# Patient Record
Sex: FEMALE | Race: WHITE | Employment: UNEMPLOYED
[De-identification: names, ages, dates, MRNs, and addresses within clinical notes are randomized per-mention and may not be internally consistent; named-entity substitution may affect disease eponyms.]

---

## 2017-01-03 ENCOUNTER — ROUTINE PRENATAL (OUTPATIENT)
Dept: PERINATAL CARE | Facility: CLINIC | Age: 27
End: 2017-01-03

## 2017-01-03 VITALS
SYSTOLIC BLOOD PRESSURE: 102 MMHG | RESPIRATION RATE: 18 BRPM | WEIGHT: 182 LBS | BODY MASS INDEX: 30.29 KG/M2 | HEART RATE: 84 BPM | DIASTOLIC BLOOD PRESSURE: 64 MMHG | TEMPERATURE: 97.8 F

## 2017-01-03 DIAGNOSIS — O09.899 HIGH RISK PREGNANCY WITH LOW PAPPA (PREGNANCY-ASSOCIATED PLASMA PROTEIN A): ICD-10-CM

## 2017-01-03 DIAGNOSIS — O28.0 HIGH RISK PREGNANCY WITH LOW PAPPA (PREGNANCY-ASSOCIATED PLASMA PROTEIN A): ICD-10-CM

## 2017-01-03 DIAGNOSIS — O24.414 INSULIN CONTROLLED GESTATIONAL DIABETES MELLITUS (GDM) IN THIRD TRIMESTER: Primary | ICD-10-CM

## 2017-01-03 DIAGNOSIS — Z3A.34 34 WEEKS GESTATION OF PREGNANCY: ICD-10-CM

## 2017-01-03 DIAGNOSIS — O43.93 PLACENTAL DISORDER, THIRD TRIMESTER: ICD-10-CM

## 2017-01-03 PROCEDURE — 76820 UMBILICAL ARTERY ECHO: CPT | Performed by: OBSTETRICS & GYNECOLOGY

## 2017-01-03 PROCEDURE — 76818 FETAL BIOPHYS PROFILE W/NST: CPT | Performed by: OBSTETRICS & GYNECOLOGY

## 2017-01-05 ENCOUNTER — ROUTINE PRENATAL (OUTPATIENT)
Dept: OBGYN | Facility: CLINIC | Age: 27
End: 2017-01-05

## 2017-01-05 VITALS
DIASTOLIC BLOOD PRESSURE: 74 MMHG | HEART RATE: 74 BPM | SYSTOLIC BLOOD PRESSURE: 112 MMHG | WEIGHT: 182 LBS | BODY MASS INDEX: 30.29 KG/M2

## 2017-01-05 DIAGNOSIS — O09.899 HIGH RISK PREGNANCY WITH LOW PAPPA (PREGNANCY-ASSOCIATED PLASMA PROTEIN A): Primary | ICD-10-CM

## 2017-01-05 DIAGNOSIS — O09.93 HRP (HIGH RISK PREGNANCY), THIRD TRIMESTER: ICD-10-CM

## 2017-01-05 DIAGNOSIS — O28.0 HIGH RISK PREGNANCY WITH LOW PAPPA (PREGNANCY-ASSOCIATED PLASMA PROTEIN A): Primary | ICD-10-CM

## 2017-01-05 DIAGNOSIS — Z3A.34 34 WEEKS GESTATION OF PREGNANCY: ICD-10-CM

## 2017-01-05 PROCEDURE — 59025 FETAL NON-STRESS TEST: CPT | Performed by: NURSE PRACTITIONER

## 2017-01-09 ENCOUNTER — ROUTINE PRENATAL (OUTPATIENT)
Dept: PERINATAL CARE | Facility: CLINIC | Age: 27
End: 2017-01-09

## 2017-01-09 VITALS
DIASTOLIC BLOOD PRESSURE: 60 MMHG | HEART RATE: 92 BPM | RESPIRATION RATE: 16 BRPM | WEIGHT: 181 LBS | TEMPERATURE: 97.7 F | SYSTOLIC BLOOD PRESSURE: 96 MMHG | BODY MASS INDEX: 30.12 KG/M2

## 2017-01-09 DIAGNOSIS — Z36.4 ANTENATAL SCREENING FOR FETAL GROWTH RETARDATION USING ULTRASONICS: ICD-10-CM

## 2017-01-09 DIAGNOSIS — O28.0 HIGH RISK PREGNANCY WITH LOW PAPPA (PREGNANCY-ASSOCIATED PLASMA PROTEIN A): ICD-10-CM

## 2017-01-09 DIAGNOSIS — Z3A.35 35 WEEKS GESTATION OF PREGNANCY: ICD-10-CM

## 2017-01-09 DIAGNOSIS — O09.899 HIGH RISK PREGNANCY WITH LOW PAPPA (PREGNANCY-ASSOCIATED PLASMA PROTEIN A): ICD-10-CM

## 2017-01-09 DIAGNOSIS — O24.414 INSULIN CONTROLLED GESTATIONAL DIABETES MELLITUS (GDM) IN THIRD TRIMESTER: Primary | ICD-10-CM

## 2017-01-09 PROCEDURE — 76816 OB US FOLLOW-UP PER FETUS: CPT | Performed by: OBSTETRICS & GYNECOLOGY

## 2017-01-09 PROCEDURE — 76820 UMBILICAL ARTERY ECHO: CPT | Performed by: OBSTETRICS & GYNECOLOGY

## 2017-01-09 PROCEDURE — 76819 FETAL BIOPHYS PROFIL W/O NST: CPT | Performed by: OBSTETRICS & GYNECOLOGY

## 2017-01-12 ENCOUNTER — ROUTINE PRENATAL (OUTPATIENT)
Dept: OBGYN | Facility: CLINIC | Age: 27
End: 2017-01-12

## 2017-01-12 VITALS
WEIGHT: 180 LBS | BODY MASS INDEX: 29.95 KG/M2 | HEART RATE: 82 BPM | DIASTOLIC BLOOD PRESSURE: 64 MMHG | SYSTOLIC BLOOD PRESSURE: 110 MMHG

## 2017-01-12 DIAGNOSIS — Z3A.35 35 WEEKS GESTATION OF PREGNANCY: ICD-10-CM

## 2017-01-12 DIAGNOSIS — O09.93 HRP (HIGH RISK PREGNANCY), THIRD TRIMESTER: Primary | ICD-10-CM

## 2017-01-12 DIAGNOSIS — O28.0 HIGH RISK PREGNANCY WITH LOW PAPPA (PREGNANCY-ASSOCIATED PLASMA PROTEIN A): ICD-10-CM

## 2017-01-12 DIAGNOSIS — O09.899 HIGH RISK PREGNANCY WITH LOW PAPPA (PREGNANCY-ASSOCIATED PLASMA PROTEIN A): ICD-10-CM

## 2017-01-12 PROCEDURE — 59025 FETAL NON-STRESS TEST: CPT | Performed by: ADVANCED PRACTICE MIDWIFE

## 2017-01-12 ASSESSMENT — PATIENT HEALTH QUESTIONNAIRE - PHQ9
1. LITTLE INTEREST OR PLEASURE IN DOING THINGS: 0
SUM OF ALL RESPONSES TO PHQ QUESTIONS 1-9: 0
2. FEELING DOWN, DEPRESSED OR HOPELESS: 0
SUM OF ALL RESPONSES TO PHQ9 QUESTIONS 1 & 2: 0

## 2017-01-16 ENCOUNTER — ROUTINE PRENATAL (OUTPATIENT)
Dept: PERINATAL CARE | Facility: CLINIC | Age: 27
End: 2017-01-16

## 2017-01-16 VITALS
SYSTOLIC BLOOD PRESSURE: 112 MMHG | TEMPERATURE: 98.1 F | HEART RATE: 80 BPM | BODY MASS INDEX: 30.45 KG/M2 | WEIGHT: 183 LBS | RESPIRATION RATE: 16 BRPM | DIASTOLIC BLOOD PRESSURE: 74 MMHG

## 2017-01-16 DIAGNOSIS — O09.899 HIGH RISK PREGNANCY WITH LOW PAPPA (PREGNANCY-ASSOCIATED PLASMA PROTEIN A): ICD-10-CM

## 2017-01-16 DIAGNOSIS — Z3A.36 36 WEEKS GESTATION OF PREGNANCY: ICD-10-CM

## 2017-01-16 DIAGNOSIS — O43.93 PLACENTAL DISORDER, THIRD TRIMESTER: ICD-10-CM

## 2017-01-16 DIAGNOSIS — O28.0 LOW MATERNAL SERUM HUMAN CHORIONIC GONADOTROPIN (HCG): ICD-10-CM

## 2017-01-16 DIAGNOSIS — O28.0 HIGH RISK PREGNANCY WITH LOW PAPPA (PREGNANCY-ASSOCIATED PLASMA PROTEIN A): ICD-10-CM

## 2017-01-16 DIAGNOSIS — O24.414 INSULIN CONTROLLED GESTATIONAL DIABETES MELLITUS (GDM) IN THIRD TRIMESTER: Primary | ICD-10-CM

## 2017-01-16 PROCEDURE — 76818 FETAL BIOPHYS PROFILE W/NST: CPT | Performed by: OBSTETRICS & GYNECOLOGY

## 2017-01-16 PROCEDURE — 76820 UMBILICAL ARTERY ECHO: CPT | Performed by: OBSTETRICS & GYNECOLOGY

## 2017-01-19 ENCOUNTER — ROUTINE PRENATAL (OUTPATIENT)
Dept: OBGYN | Facility: CLINIC | Age: 27
End: 2017-01-19

## 2017-01-19 VITALS
DIASTOLIC BLOOD PRESSURE: 74 MMHG | WEIGHT: 183 LBS | SYSTOLIC BLOOD PRESSURE: 108 MMHG | HEART RATE: 78 BPM | BODY MASS INDEX: 30.45 KG/M2

## 2017-01-19 DIAGNOSIS — Z33.2 ELECTIVE ABORTION: ICD-10-CM

## 2017-01-19 DIAGNOSIS — O09.93 HRP (HIGH RISK PREGNANCY), THIRD TRIMESTER: Primary | ICD-10-CM

## 2017-01-19 DIAGNOSIS — O09.899 HIGH RISK PREGNANCY WITH LOW PAPPA (PREGNANCY-ASSOCIATED PLASMA PROTEIN A): ICD-10-CM

## 2017-01-19 DIAGNOSIS — O24.414 INSULIN CONTROLLED GESTATIONAL DIABETES MELLITUS (GDM) IN THIRD TRIMESTER: ICD-10-CM

## 2017-01-19 DIAGNOSIS — O24.419 GESTATIONAL DIABETES MELLITUS (GDM) IN THIRD TRIMESTER, GESTATIONAL DIABETES METHOD OF CONTROL UNSPECIFIED: ICD-10-CM

## 2017-01-19 DIAGNOSIS — O28.0 HIGH RISK PREGNANCY WITH LOW PAPPA (PREGNANCY-ASSOCIATED PLASMA PROTEIN A): ICD-10-CM

## 2017-01-19 DIAGNOSIS — R73.09 GTT (GLUCOSE TOLERANCE TEST) ABNORMAL: ICD-10-CM

## 2017-01-19 DIAGNOSIS — Z3A.36 36 WEEKS GESTATION OF PREGNANCY: ICD-10-CM

## 2017-01-19 PROCEDURE — 59025 FETAL NON-STRESS TEST: CPT | Performed by: ADVANCED PRACTICE MIDWIFE

## 2017-01-23 ENCOUNTER — ROUTINE PRENATAL (OUTPATIENT)
Dept: PERINATAL CARE | Facility: CLINIC | Age: 27
End: 2017-01-23

## 2017-01-23 VITALS
RESPIRATION RATE: 16 BRPM | WEIGHT: 184 LBS | HEART RATE: 92 BPM | BODY MASS INDEX: 30.62 KG/M2 | SYSTOLIC BLOOD PRESSURE: 112 MMHG | DIASTOLIC BLOOD PRESSURE: 74 MMHG | TEMPERATURE: 98 F

## 2017-01-23 DIAGNOSIS — O24.414 INSULIN CONTROLLED GESTATIONAL DIABETES MELLITUS (GDM) IN THIRD TRIMESTER: Primary | ICD-10-CM

## 2017-01-23 DIAGNOSIS — O09.899 HIGH RISK PREGNANCY WITH LOW PAPPA (PREGNANCY-ASSOCIATED PLASMA PROTEIN A): ICD-10-CM

## 2017-01-23 DIAGNOSIS — O43.93 PLACENTAL DISORDER, THIRD TRIMESTER: ICD-10-CM

## 2017-01-23 DIAGNOSIS — O28.0 HIGH RISK PREGNANCY WITH LOW PAPPA (PREGNANCY-ASSOCIATED PLASMA PROTEIN A): ICD-10-CM

## 2017-01-23 DIAGNOSIS — Z3A.37 37 WEEKS GESTATION OF PREGNANCY: ICD-10-CM

## 2017-01-23 PROCEDURE — 76818 FETAL BIOPHYS PROFILE W/NST: CPT | Performed by: OBSTETRICS & GYNECOLOGY

## 2017-01-23 PROCEDURE — 76820 UMBILICAL ARTERY ECHO: CPT | Performed by: OBSTETRICS & GYNECOLOGY

## 2017-01-26 ENCOUNTER — ROUTINE PRENATAL (OUTPATIENT)
Dept: OBGYN | Facility: CLINIC | Age: 27
End: 2017-01-26

## 2017-01-26 VITALS
DIASTOLIC BLOOD PRESSURE: 72 MMHG | WEIGHT: 184 LBS | BODY MASS INDEX: 30.62 KG/M2 | HEART RATE: 82 BPM | SYSTOLIC BLOOD PRESSURE: 118 MMHG

## 2017-01-26 DIAGNOSIS — O09.899 HIGH RISK PREGNANCY WITH LOW PAPPA (PREGNANCY-ASSOCIATED PLASMA PROTEIN A): ICD-10-CM

## 2017-01-26 DIAGNOSIS — O09.93 HRP (HIGH RISK PREGNANCY), THIRD TRIMESTER: ICD-10-CM

## 2017-01-26 DIAGNOSIS — O28.0 HIGH RISK PREGNANCY WITH LOW PAPPA (PREGNANCY-ASSOCIATED PLASMA PROTEIN A): ICD-10-CM

## 2017-01-26 DIAGNOSIS — Z3A.37 37 WEEKS GESTATION OF PREGNANCY: Primary | ICD-10-CM

## 2017-01-26 PROCEDURE — 59025 FETAL NON-STRESS TEST: CPT | Performed by: OBSTETRICS & GYNECOLOGY

## 2017-01-30 ENCOUNTER — ROUTINE PRENATAL (OUTPATIENT)
Dept: PERINATAL CARE | Facility: CLINIC | Age: 27
End: 2017-01-30

## 2017-01-30 VITALS
WEIGHT: 185.5 LBS | HEART RATE: 78 BPM | DIASTOLIC BLOOD PRESSURE: 65 MMHG | RESPIRATION RATE: 16 BRPM | TEMPERATURE: 98.1 F | BODY MASS INDEX: 30.87 KG/M2 | SYSTOLIC BLOOD PRESSURE: 99 MMHG

## 2017-01-30 DIAGNOSIS — Z3A.38 38 WEEKS GESTATION OF PREGNANCY: ICD-10-CM

## 2017-01-30 DIAGNOSIS — O09.899 HIGH RISK PREGNANCY WITH LOW PAPPA (PREGNANCY-ASSOCIATED PLASMA PROTEIN A): ICD-10-CM

## 2017-01-30 DIAGNOSIS — O24.414 INSULIN CONTROLLED GESTATIONAL DIABETES MELLITUS (GDM) IN THIRD TRIMESTER: Primary | ICD-10-CM

## 2017-01-30 DIAGNOSIS — Z36.4 ANTENATAL SCREENING FOR FETAL GROWTH RETARDATION USING ULTRASONICS: ICD-10-CM

## 2017-01-30 DIAGNOSIS — O28.0 HIGH RISK PREGNANCY WITH LOW PAPPA (PREGNANCY-ASSOCIATED PLASMA PROTEIN A): ICD-10-CM

## 2017-01-30 PROCEDURE — 76818 FETAL BIOPHYS PROFILE W/NST: CPT | Performed by: OBSTETRICS & GYNECOLOGY

## 2017-01-30 PROCEDURE — 76820 UMBILICAL ARTERY ECHO: CPT | Performed by: OBSTETRICS & GYNECOLOGY

## 2017-01-30 PROCEDURE — 76816 OB US FOLLOW-UP PER FETUS: CPT | Performed by: OBSTETRICS & GYNECOLOGY

## 2017-02-02 ENCOUNTER — ROUTINE PRENATAL (OUTPATIENT)
Dept: OBGYN | Facility: CLINIC | Age: 27
End: 2017-02-02

## 2017-02-02 VITALS
SYSTOLIC BLOOD PRESSURE: 108 MMHG | WEIGHT: 186 LBS | HEART RATE: 68 BPM | DIASTOLIC BLOOD PRESSURE: 68 MMHG | BODY MASS INDEX: 30.95 KG/M2

## 2017-02-02 DIAGNOSIS — Z3A.38 38 WEEKS GESTATION OF PREGNANCY: ICD-10-CM

## 2017-02-02 DIAGNOSIS — R73.09 GTT (GLUCOSE TOLERANCE TEST) ABNORMAL: ICD-10-CM

## 2017-02-02 DIAGNOSIS — O28.0 HIGH RISK PREGNANCY WITH LOW PAPPA (PREGNANCY-ASSOCIATED PLASMA PROTEIN A): Primary | ICD-10-CM

## 2017-02-02 DIAGNOSIS — Z33.2 ELECTIVE ABORTION: ICD-10-CM

## 2017-02-02 DIAGNOSIS — O24.414 INSULIN CONTROLLED GESTATIONAL DIABETES MELLITUS (GDM) IN THIRD TRIMESTER: ICD-10-CM

## 2017-02-02 DIAGNOSIS — O24.419 GESTATIONAL DIABETES MELLITUS (GDM), ANTEPARTUM, GESTATIONAL DIABETES METHOD OF CONTROL UNSPECIFIED: ICD-10-CM

## 2017-02-02 DIAGNOSIS — O09.899 HIGH RISK PREGNANCY WITH LOW PAPPA (PREGNANCY-ASSOCIATED PLASMA PROTEIN A): Primary | ICD-10-CM

## 2017-02-02 PROCEDURE — 59025 FETAL NON-STRESS TEST: CPT | Performed by: OBSTETRICS & GYNECOLOGY

## 2017-02-06 ENCOUNTER — ROUTINE PRENATAL (OUTPATIENT)
Dept: PERINATAL CARE | Facility: CLINIC | Age: 27
End: 2017-02-06

## 2017-02-06 ENCOUNTER — HOSPITAL ENCOUNTER (OUTPATIENT)
Age: 27
Discharge: HOME | End: 2017-02-06
Payer: MEDICAID

## 2017-02-06 VITALS
HEART RATE: 84 BPM | DIASTOLIC BLOOD PRESSURE: 70 MMHG | BODY MASS INDEX: 31.62 KG/M2 | SYSTOLIC BLOOD PRESSURE: 120 MMHG | TEMPERATURE: 97.1 F | RESPIRATION RATE: 20 BRPM | WEIGHT: 190 LBS

## 2017-02-06 DIAGNOSIS — O43.93 PLACENTAL DISORDER, THIRD TRIMESTER: ICD-10-CM

## 2017-02-06 DIAGNOSIS — O28.0 HIGH RISK PREGNANCY WITH LOW PAPPA (PREGNANCY-ASSOCIATED PLASMA PROTEIN A): ICD-10-CM

## 2017-02-06 DIAGNOSIS — O09.899 HIGH RISK PREGNANCY WITH LOW PAPPA (PREGNANCY-ASSOCIATED PLASMA PROTEIN A): ICD-10-CM

## 2017-02-06 DIAGNOSIS — O24.414 INSULIN CONTROLLED GESTATIONAL DIABETES MELLITUS (GDM) IN THIRD TRIMESTER: Primary | ICD-10-CM

## 2017-02-06 DIAGNOSIS — Z3A.39 39 WEEKS GESTATION OF PREGNANCY: ICD-10-CM

## 2017-02-06 PROCEDURE — 76818 FETAL BIOPHYS PROFILE W/NST: CPT | Performed by: OBSTETRICS & GYNECOLOGY

## 2017-02-06 PROCEDURE — 76815 OB US LIMITED FETUS(S): CPT | Performed by: OBSTETRICS & GYNECOLOGY

## 2017-02-06 PROCEDURE — 76820 UMBILICAL ARTERY ECHO: CPT | Performed by: OBSTETRICS & GYNECOLOGY

## 2017-02-07 ENCOUNTER — HOSPITAL ENCOUNTER (INPATIENT)
Age: 27
LOS: 3 days | Discharge: HOME | End: 2017-02-10
Attending: OBSTETRICS & GYNECOLOGY | Admitting: OBSTETRICS & GYNECOLOGY
Payer: COMMERCIAL

## 2017-02-07 PROBLEM — O42.90 LEAKAGE OF AMNIOTIC FLUID: Status: ACTIVE | Noted: 2017-02-07

## 2017-02-07 LAB
ABO/RH: NORMAL
ABSOLUTE EOS #: 0.1 K/UL (ref 0–0.4)
ABSOLUTE LYMPH #: 2.5 K/UL (ref 1–4.8)
ABSOLUTE MONO #: 0.9 K/UL (ref 0.1–1.3)
ANTIBODY SCREEN: NEGATIVE
ARM BAND NUMBER: NORMAL
BASOPHILS # BLD: 0 % (ref 0–2)
BASOPHILS ABSOLUTE: 0 K/UL (ref 0–0.2)
DIFFERENTIAL TYPE: ABNORMAL
EOSINOPHILS RELATIVE PERCENT: 1 % (ref 0–4)
EXPIRATION DATE: NORMAL
GLUCOSE BLD-MCNC: 101 MG/DL (ref 65–105)
GLUCOSE BLD-MCNC: 93 MG/DL (ref 65–105)
HCT VFR BLD CALC: 33.5 % (ref 36–46)
HEMOGLOBIN: 11.8 G/DL (ref 12–16)
INR BLD: 0.9
LYMPHOCYTES # BLD: 24 % (ref 24–44)
MCH RBC QN AUTO: 29.6 PG (ref 26–34)
MCHC RBC AUTO-ENTMCNC: 35.1 G/DL (ref 31–37)
MCV RBC AUTO: 84.4 FL (ref 80–100)
MONOCYTES # BLD: 9 % (ref 1–7)
PARTIAL THROMBOPLASTIN TIME: 25.9 SEC (ref 23–31)
PDW BLD-RTO: 13.5 % (ref 11.5–14.9)
PLATELET # BLD: 196 K/UL (ref 150–450)
PLATELET ESTIMATE: ABNORMAL
PMV BLD AUTO: 8.6 FL (ref 6–12)
PROTHROMBIN TIME: 9.9 SEC (ref 9.7–12)
RBC # BLD: 3.97 M/UL (ref 4–5.2)
RBC # BLD: ABNORMAL 10*6/UL
SEG NEUTROPHILS: 66 % (ref 36–66)
SEGMENTED NEUTROPHILS ABSOLUTE COUNT: 7.1 K/UL (ref 1.3–9.1)
WBC # BLD: 10.6 K/UL (ref 3.5–11)
WBC # BLD: ABNORMAL 10*3/UL

## 2017-02-07 PROCEDURE — 85025 COMPLETE CBC W/AUTO DIFF WBC: CPT

## 2017-02-07 PROCEDURE — 85610 PROTHROMBIN TIME: CPT

## 2017-02-07 PROCEDURE — 85730 THROMBOPLASTIN TIME PARTIAL: CPT

## 2017-02-07 PROCEDURE — 86900 BLOOD TYPING SEROLOGIC ABO: CPT

## 2017-02-07 PROCEDURE — 86901 BLOOD TYPING SEROLOGIC RH(D): CPT

## 2017-02-07 PROCEDURE — 81001 URINALYSIS AUTO W/SCOPE: CPT

## 2017-02-07 PROCEDURE — 1220000000 HC SEMI PRIVATE OB R&B

## 2017-02-07 PROCEDURE — 36415 COLL VENOUS BLD VENIPUNCTURE: CPT

## 2017-02-07 PROCEDURE — 86850 RBC ANTIBODY SCREEN: CPT

## 2017-02-07 PROCEDURE — 86780 TREPONEMA PALLIDUM: CPT

## 2017-02-07 PROCEDURE — 3E033VJ INTRODUCTION OF OTHER HORMONE INTO PERIPHERAL VEIN, PERCUTANEOUS APPROACH: ICD-10-PCS | Performed by: OBSTETRICS & GYNECOLOGY

## 2017-02-07 PROCEDURE — 2580000003 HC RX 258: Performed by: OBSTETRICS & GYNECOLOGY

## 2017-02-07 PROCEDURE — 82947 ASSAY GLUCOSE BLOOD QUANT: CPT

## 2017-02-07 RX ORDER — ACETAMINOPHEN 500 MG
1000 TABLET ORAL EVERY 6 HOURS PRN
Status: DISCONTINUED | OUTPATIENT
Start: 2017-02-07 | End: 2017-02-08

## 2017-02-07 RX ORDER — DEXTROSE MONOHYDRATE 25 G/50ML
12.5 INJECTION, SOLUTION INTRAVENOUS PRN
Status: DISCONTINUED | OUTPATIENT
Start: 2017-02-07 | End: 2017-02-08

## 2017-02-07 RX ORDER — SODIUM CHLORIDE 9 MG/ML
INJECTION, SOLUTION INTRAVENOUS CONTINUOUS
Status: DISCONTINUED | OUTPATIENT
Start: 2017-02-07 | End: 2017-02-08

## 2017-02-07 RX ORDER — SODIUM CHLORIDE 0.9 % (FLUSH) 0.9 %
10 SYRINGE (ML) INJECTION PRN
Status: DISCONTINUED | OUTPATIENT
Start: 2017-02-07 | End: 2017-02-08

## 2017-02-07 RX ORDER — ONDANSETRON 2 MG/ML
4 INJECTION INTRAMUSCULAR; INTRAVENOUS EVERY 6 HOURS PRN
Status: DISCONTINUED | OUTPATIENT
Start: 2017-02-07 | End: 2017-02-08

## 2017-02-07 RX ORDER — NICOTINE POLACRILEX 4 MG
15 LOZENGE BUCCAL PRN
Status: DISCONTINUED | OUTPATIENT
Start: 2017-02-07 | End: 2017-02-08

## 2017-02-07 RX ORDER — DEXTROSE MONOHYDRATE 50 MG/ML
100 INJECTION, SOLUTION INTRAVENOUS PRN
Status: DISCONTINUED | OUTPATIENT
Start: 2017-02-07 | End: 2017-02-08

## 2017-02-07 RX ADMIN — SODIUM CHLORIDE: 9 INJECTION, SOLUTION INTRAVENOUS at 21:00

## 2017-02-07 NOTE — IP AVS SNAPSHOT
After Visit Summary    Medication List for Home    Based on the information you provided to us as well as any changes during this visit, the following is your updated medication list.  Compare this with your prescription bottles at home. If you have any questions or concerns, contact your primary care physician's office. Daily Medication List (This medication list can be shared with any healthcare provider who is helping you manage your medications)      There are NEW medications for you. START taking them after you leave the hospital        Last Dose    Next Dose Due AM NOON PM NIGHT    benzocaine-menthol 20-0.5 % Aero spray   Commonly known as:  DERMOPLAST   Apply topically as needed for Pain                2/10/2017  8:06 AM     Use as needed                         cephALEXin 500 MG capsule   Commonly known as:  KEFLEX   Take 1 capsule by mouth 4 times daily for 6 days                500 mg on 2/10/2017  9:30 AM     2/10/17 3:00 pm                         docusate 100 MG Caps   Commonly known as:  COLACE, DULCOLAX   Take 100 mg by mouth 2 times daily                100 mg on 2/10/2017  8:09 AM     2/10/17  9:00 pm                       HYDROcodone-acetaminophen 5-325 MG per tablet   Commonly known as:  NORCO   Take 1 tablet by mouth every 4 hours as needed for Pain . 1 tablet on 2/10/2017  4:18 AM     May have anytime. Can have every 4 hours if needed. ibuprofen 800 MG tablet   Commonly known as:  ADVIL;MOTRIN   Take 1 tablet by mouth every 8 hours                800 mg on 2/10/2017  8:07 AM     4:00 pm if needed.                            These are medications you told us you were taking at home, CONTINUE taking them after you leave the hospital        Last Dose    Next Dose Due AM NOON PM NIGHT    Prenatal Vitamins 0.8 MG Tabs   Take 1 tablet by mouth daily your survey in the envelope provided. We hope you will choose us in the future for your healthcare needs. Patient Information     Patient Name NIKOS James Civil 1990      Care Provided at:     Name Address Phone       Gaurav Vazquez  [address] [phone]            Your Visit    Here you will find information about your visit, including the reason for your visit. Please take this sheet with you when you visit your doctor or other health care provider in the future. It will help determine the best possible medical care for you at that time. If you have any questions once you leave the hospital, please call the department phone number listed below. Why you were here     Your primary diagnosis was:  Not on File    Your diagnoses also included:  High Risk Pregnancy With Low Pappa (Pregnancy-Associated Plasma Protein A), Insulin Controlled Gestational Diabetes Mellitus (Gdm) In Third Trimester, Generalized Anxiety Disorder, Leakage Of Amniotic Fluid, Placental Disorder      Visit Information     Date & Time Provider Department Dept. Phone    2017 [facility]   Labor & Delivery [phone]       Follow-up Appointments    Below is a list of your follow-up and future appointments. This may not be a complete list as you may have made appointments directly with providers that we are not aware of or your providers may have made some for you. Please call your providers to confirm appointments. It is important to keep your appointments. Please bring your current insurance card, photo ID, co-pay, and all medication bottles to your appointment. If self-pay, payment is expected at the time of service.         Follow-up Information     Follow up with Delman Dakins, MD .    Specialty:  Family Medicine    Contact information:    [address]  [address] [facility]  [address] [facility] [address] [phone]

## 2017-02-08 ENCOUNTER — ANESTHESIA EVENT (OUTPATIENT)
Dept: LABOR AND DELIVERY | Age: 27
End: 2017-02-08

## 2017-02-08 ENCOUNTER — ANESTHESIA (OUTPATIENT)
Dept: LABOR AND DELIVERY | Age: 27
End: 2017-02-08

## 2017-02-08 ENCOUNTER — ANESTHESIA EVENT (OUTPATIENT)
Dept: LABOR AND DELIVERY | Age: 27
End: 2017-02-08
Payer: COMMERCIAL

## 2017-02-08 ENCOUNTER — ANESTHESIA (OUTPATIENT)
Dept: LABOR AND DELIVERY | Age: 27
End: 2017-02-08
Payer: COMMERCIAL

## 2017-02-08 LAB
-: ABNORMAL
AMORPHOUS: ABNORMAL
BACTERIA: ABNORMAL
BILIRUBIN URINE: NEGATIVE
CASTS UA: ABNORMAL /LPF
COLOR: YELLOW
COMMENT UA: ABNORMAL
CRYSTALS, UA: ABNORMAL /HPF
EPITHELIAL CELLS UA: ABNORMAL /HPF
GLUCOSE BLD-MCNC: 83 MG/DL (ref 65–105)
GLUCOSE BLD-MCNC: 92 MG/DL (ref 65–105)
GLUCOSE URINE: NEGATIVE
KETONES, URINE: NEGATIVE
LEUKOCYTE ESTERASE, URINE: ABNORMAL
MUCUS: ABNORMAL
NITRITE, URINE: NEGATIVE
OTHER OBSERVATIONS UA: ABNORMAL
PH UA: 7 (ref 5–8)
PROTEIN UA: ABNORMAL
RBC UA: ABNORMAL /HPF
RENAL EPITHELIAL, UA: ABNORMAL /HPF
SPECIFIC GRAVITY UA: 1.01 (ref 1–1.03)
T. PALLIDUM, IGG: NONREACTIVE
TRICHOMONAS: ABNORMAL
TURBIDITY: ABNORMAL
URINE HGB: ABNORMAL
UROBILINOGEN, URINE: NORMAL
WBC UA: ABNORMAL /HPF
YEAST: ABNORMAL

## 2017-02-08 PROCEDURE — 1220000000 HC SEMI PRIVATE OB R&B

## 2017-02-08 PROCEDURE — 6370000000 HC RX 637 (ALT 250 FOR IP): Performed by: OBSTETRICS & GYNECOLOGY

## 2017-02-08 PROCEDURE — 87075 CULTR BACTERIA EXCEPT BLOOD: CPT

## 2017-02-08 PROCEDURE — 96366 THER/PROPH/DIAG IV INF ADDON: CPT

## 2017-02-08 PROCEDURE — 96375 TX/PRO/DX INJ NEW DRUG ADDON: CPT

## 2017-02-08 PROCEDURE — 6360000002 HC RX W HCPCS: Performed by: OBSTETRICS & GYNECOLOGY

## 2017-02-08 PROCEDURE — 96365 THER/PROPH/DIAG IV INF INIT: CPT

## 2017-02-08 PROCEDURE — 82947 ASSAY GLUCOSE BLOOD QUANT: CPT

## 2017-02-08 PROCEDURE — 59409 OBSTETRICAL CARE: CPT | Performed by: OBSTETRICS & GYNECOLOGY

## 2017-02-08 PROCEDURE — 6360000002 HC RX W HCPCS: Performed by: ANESTHESIOLOGY

## 2017-02-08 PROCEDURE — 87070 CULTURE OTHR SPECIMN AEROBIC: CPT

## 2017-02-08 PROCEDURE — 7200000001 HC VAGINAL DELIVERY

## 2017-02-08 PROCEDURE — 96360 HYDRATION IV INFUSION INIT: CPT

## 2017-02-08 PROCEDURE — 0KQM0ZZ REPAIR PERINEUM MUSCLE, OPEN APPROACH: ICD-10-PCS | Performed by: OBSTETRICS & GYNECOLOGY

## 2017-02-08 PROCEDURE — 2580000003 HC RX 258: Performed by: OBSTETRICS & GYNECOLOGY

## 2017-02-08 PROCEDURE — 86403 PARTICLE AGGLUT ANTBDY SCRN: CPT

## 2017-02-08 PROCEDURE — 62327 NJX INTERLAMINAR LMBR/SAC: CPT | Performed by: ANESTHESIOLOGY

## 2017-02-08 PROCEDURE — 2500000003 HC RX 250 WO HCPCS: Performed by: ANESTHESIOLOGY

## 2017-02-08 PROCEDURE — 88307 TISSUE EXAM BY PATHOLOGIST: CPT

## 2017-02-08 PROCEDURE — 87086 URINE CULTURE/COLONY COUNT: CPT

## 2017-02-08 PROCEDURE — 6360000002 HC RX W HCPCS

## 2017-02-08 PROCEDURE — 96374 THER/PROPH/DIAG INJ IV PUSH: CPT

## 2017-02-08 PROCEDURE — 96361 HYDRATE IV INFUSION ADD-ON: CPT

## 2017-02-08 PROCEDURE — 87205 SMEAR GRAM STAIN: CPT

## 2017-02-08 RX ORDER — FENTANYL CITRATE 50 UG/ML
INJECTION, SOLUTION INTRAMUSCULAR; INTRAVENOUS
Status: COMPLETED
Start: 2017-02-08 | End: 2017-02-08

## 2017-02-08 RX ORDER — ACETAMINOPHEN 650 MG/1
650 SUPPOSITORY RECTAL EVERY 4 HOURS PRN
Status: DISCONTINUED | OUTPATIENT
Start: 2017-02-08 | End: 2017-02-10 | Stop reason: HOSPADM

## 2017-02-08 RX ORDER — HYDROCODONE BITARTRATE AND ACETAMINOPHEN 5; 325 MG/1; MG/1
2 TABLET ORAL EVERY 4 HOURS PRN
Status: DISCONTINUED | OUTPATIENT
Start: 2017-02-08 | End: 2017-02-09

## 2017-02-08 RX ORDER — LANOLIN 100 %
OINTMENT (GRAM) TOPICAL PRN
Status: DISCONTINUED | OUTPATIENT
Start: 2017-02-08 | End: 2017-02-10 | Stop reason: HOSPADM

## 2017-02-08 RX ORDER — SODIUM CHLORIDE 0.9 % (FLUSH) 0.9 %
10 SYRINGE (ML) INJECTION EVERY 12 HOURS SCHEDULED
Status: DISCONTINUED | OUTPATIENT
Start: 2017-02-08 | End: 2017-02-10 | Stop reason: HOSPADM

## 2017-02-08 RX ORDER — ONDANSETRON 2 MG/ML
4 INJECTION INTRAMUSCULAR; INTRAVENOUS EVERY 6 HOURS PRN
Status: DISCONTINUED | OUTPATIENT
Start: 2017-02-08 | End: 2017-02-10 | Stop reason: HOSPADM

## 2017-02-08 RX ORDER — IBUPROFEN 800 MG/1
800 TABLET ORAL EVERY 8 HOURS
Status: DISCONTINUED | OUTPATIENT
Start: 2017-02-08 | End: 2017-02-10 | Stop reason: HOSPADM

## 2017-02-08 RX ORDER — BISACODYL 10 MG
10 SUPPOSITORY, RECTAL RECTAL DAILY PRN
Status: DISCONTINUED | OUTPATIENT
Start: 2017-02-08 | End: 2017-02-10 | Stop reason: HOSPADM

## 2017-02-08 RX ORDER — ROPIVACAINE HYDROCHLORIDE 2 MG/ML
INJECTION, SOLUTION EPIDURAL; INFILTRATION; PERINEURAL PRN
Status: DISCONTINUED | OUTPATIENT
Start: 2017-02-08 | End: 2017-02-09 | Stop reason: SDUPTHER

## 2017-02-08 RX ORDER — NALBUPHINE HCL 10 MG/ML
10 AMPUL (ML) INJECTION ONCE
Status: COMPLETED | OUTPATIENT
Start: 2017-02-08 | End: 2017-02-08

## 2017-02-08 RX ORDER — FENTANYL CITRATE 50 UG/ML
INJECTION, SOLUTION INTRAMUSCULAR; INTRAVENOUS PRN
Status: DISCONTINUED | OUTPATIENT
Start: 2017-02-08 | End: 2017-02-09 | Stop reason: SDUPTHER

## 2017-02-08 RX ORDER — NALOXONE HYDROCHLORIDE 0.4 MG/ML
0.4 INJECTION, SOLUTION INTRAMUSCULAR; INTRAVENOUS; SUBCUTANEOUS PRN
Status: DISCONTINUED | OUTPATIENT
Start: 2017-02-08 | End: 2017-02-08

## 2017-02-08 RX ORDER — SODIUM CHLORIDE 0.9 % (FLUSH) 0.9 %
10 SYRINGE (ML) INJECTION PRN
Status: DISCONTINUED | OUTPATIENT
Start: 2017-02-08 | End: 2017-02-10 | Stop reason: HOSPADM

## 2017-02-08 RX ORDER — DOCUSATE SODIUM 100 MG/1
100 CAPSULE, LIQUID FILLED ORAL 2 TIMES DAILY
Status: DISCONTINUED | OUTPATIENT
Start: 2017-02-08 | End: 2017-02-10 | Stop reason: HOSPADM

## 2017-02-08 RX ORDER — ONDANSETRON 2 MG/ML
4 INJECTION INTRAMUSCULAR; INTRAVENOUS EVERY 6 HOURS PRN
Status: DISCONTINUED | OUTPATIENT
Start: 2017-02-08 | End: 2017-02-08

## 2017-02-08 RX ORDER — SIMETHICONE 80 MG
80 TABLET,CHEWABLE ORAL EVERY 6 HOURS PRN
Status: DISCONTINUED | OUTPATIENT
Start: 2017-02-08 | End: 2017-02-10 | Stop reason: HOSPADM

## 2017-02-08 RX ORDER — ONDANSETRON HYDROCHLORIDE 8 MG/1
8 TABLET, FILM COATED ORAL EVERY 8 HOURS PRN
Status: DISCONTINUED | OUTPATIENT
Start: 2017-02-08 | End: 2017-02-10 | Stop reason: HOSPADM

## 2017-02-08 RX ORDER — EPHEDRINE SULFATE 50 MG/ML
INJECTION, SOLUTION INTRAVENOUS
Status: COMPLETED
Start: 2017-02-08 | End: 2017-02-08

## 2017-02-08 RX ORDER — HYDROCODONE BITARTRATE AND ACETAMINOPHEN 5; 325 MG/1; MG/1
1 TABLET ORAL EVERY 4 HOURS PRN
Status: DISCONTINUED | OUTPATIENT
Start: 2017-02-08 | End: 2017-02-09

## 2017-02-08 RX ORDER — ACETAMINOPHEN 500 MG
1000 TABLET ORAL EVERY 6 HOURS PRN
Status: DISCONTINUED | OUTPATIENT
Start: 2017-02-08 | End: 2017-02-10 | Stop reason: HOSPADM

## 2017-02-08 RX ADMIN — ONDANSETRON 4 MG: 2 INJECTION INTRAMUSCULAR; INTRAVENOUS at 07:57

## 2017-02-08 RX ADMIN — FENTANYL CITRATE 100 MCG: 50 INJECTION, SOLUTION INTRAMUSCULAR; INTRAVENOUS at 02:33

## 2017-02-08 RX ADMIN — ROPIVACAINE HYDROCHLORIDE 8 ML: 2 INJECTION, SOLUTION EPIDURAL; INFILTRATION at 02:33

## 2017-02-08 RX ADMIN — HYDROCODONE BITARTRATE AND ACETAMINOPHEN 2 TABLET: 5; 325 TABLET ORAL at 14:00

## 2017-02-08 RX ADMIN — Medication 8 ML/HR: at 02:54

## 2017-02-08 RX ADMIN — SODIUM CHLORIDE: 9 INJECTION, SOLUTION INTRAVENOUS at 03:01

## 2017-02-08 RX ADMIN — HYDROCODONE BITARTRATE AND ACETAMINOPHEN 2 TABLET: 5; 325 TABLET ORAL at 17:56

## 2017-02-08 RX ADMIN — DOCUSATE SODIUM 100 MG: 100 CAPSULE, LIQUID FILLED ORAL at 10:06

## 2017-02-08 RX ADMIN — HYDROCODONE BITARTRATE AND ACETAMINOPHEN 2 TABLET: 5; 325 TABLET ORAL at 22:49

## 2017-02-08 RX ADMIN — NALBUPHINE HYDROCHLORIDE 10 MG: 10 INJECTION, SOLUTION INTRAMUSCULAR; INTRAVENOUS; SUBCUTANEOUS at 00:16

## 2017-02-08 RX ADMIN — Medication 1 MILLI-UNITS/MIN: at 00:00

## 2017-02-08 RX ADMIN — CEFAZOLIN SODIUM 2 G: 2 SOLUTION INTRAVENOUS at 08:12

## 2017-02-08 RX ADMIN — CEFAZOLIN SODIUM 2 G: 2 SOLUTION INTRAVENOUS at 17:55

## 2017-02-08 RX ADMIN — BENZOCAINE AND MENTHOL: 20; .5 SPRAY TOPICAL at 12:02

## 2017-02-08 RX ADMIN — IBUPROFEN 800 MG: 800 TABLET, FILM COATED ORAL at 19:55

## 2017-02-08 RX ADMIN — IBUPROFEN 800 MG: 800 TABLET, FILM COATED ORAL at 10:06

## 2017-02-08 RX ADMIN — DOCUSATE SODIUM 100 MG: 100 CAPSULE, LIQUID FILLED ORAL at 19:56

## 2017-02-08 RX ADMIN — FENTANYL CITRATE 100 MCG: 50 INJECTION INTRAMUSCULAR; INTRAVENOUS at 03:00

## 2017-02-08 ASSESSMENT — PAIN SCALES - GENERAL
PAINLEVEL_OUTOF10: 10
PAINLEVEL_OUTOF10: 6
PAINLEVEL_OUTOF10: 7
PAINLEVEL_OUTOF10: 6
PAINLEVEL_OUTOF10: 8
PAINLEVEL_OUTOF10: 7
PAINLEVEL_OUTOF10: 9

## 2017-02-08 NOTE — L&D DELIVERY NOTE
Mother's Information     Labor Events     labor?:  No   Rupture date:  17 Rupture time:     Rupture type:  Spontaneous=SROM   Fluid color:  Clear               Mother Delivery Information    Episiotomy:  Right Mediolateral   Lacerations:  None   # of Repair Packets:  2   Vaginal Delivery Est. Blood Loss (mL):  200   Surgical or Additional Est. Blood Loss (mL):  0 (View Only):  Edit in Flowsheets   Combined Est. Blood Loss (mL):  200            Good Zimmerman, Baby Boy Harry Cee [[id number]]     Events of Labor     labor?:  No    steroids?:  None   Cervical ripening date/time:     Antibiotics received during labor?:  Yes   Rupture date/time: 17   Rupture type:  Spontaneous=SROM   Fluid color:  Clear   Fluid odor:  None   Induction:  None   Augmentation:  Oxytocin   Indications for augmentation:  Ineffective Contraction Pattern   Labor complications:  None         Print Group Title    Labor onset date/time: 17 0353 EST   Dilation complete date/time:   17 0612 EST   Start pushin2017 5515   Decision time (emergent ):        Anesthesia    Method:  Epidural         Assisted Delivery Details    Forceps attempted?:  No   Vacuum extractor attempted?:  No            Document Additional Attempt       Document Additional Attempt                   Shoulder Dystocia    Shoulder dystocia present?:  No            Add Second Maneuver      Add Third Maneuver      Add Fourth Maneuver      Add Fifth Maneuver      Add Sixth Maneuver      Add Seventh Maneuver      Add Eighth Maneuver      Add Ninth Maneuver          Presentation    Presentation:  Vertex       Information     Changing the 's delivery date/time could affect patient care.:     Delivery date/time:   17 5845   Delivery type:  Vaginal, Spontaneous Delivery    Details:            Delivery Providers    Delivering clinician:  Sun Blackburn   Other personnel:   Provider Role   Charmaine Al noted  Uterine cultures obtained secondary to maternal fever. Uterus did not appear warm. No malodorous discharge noted    cord blood was obtained and sent to the lab and Pitocin, 20 milliunits in 1 liter of ringers lactate was administered, wide open, to assist with uterine contraction    The umbilical cord had delayed clamping of 1 minute: Yes      Episiotomy: (lateralizing to right)  Second degree episiotomy. Suture used for repair:  Vicryl 3.0. The Cervix Vagina & Rectum were inspected after the repair and found to be intact without any defects. Good sphincter tone was present. Yes        Delivery Summary:  Labor & Delivery Summary  Labor Onset Date: 17  Labor Onset Time: 0353  Dilation Complete Date: 17  Dilation Complete Time: 612  OB Anesthesia Type: Epidural  Augmentation: Oxytocin  Delivery of Placenta Date: 17  Delivery of Placenta Time: 919  Placenta: Intact, Spontaneous, To Pathology  Episiotomy: Right mediolateral  Laceration: None  : No  Complications: None    Specimen:  Placenta sent to pathology Yes     Estimated blood loss: 200 ML        Condition:  infant stable to general nursery and mother stable    Blood Type and Rh:   ABO/Rh   Date Value Ref Range Status   2017 A POSITIVE  Final         Rubella Immunity Status:     Rubella Antibody, IGG   Date Value Ref Range Status   2016 230.0 IU/mL Final     Comment:                 REFERENCE RANGE:  <5.0       NON-REACTIVE (non-immune)  5.0 TO 9.9 EQUIVOCAL  >=10.0     REACTIVE     (immune)        NOTE: NEW REFERENCE RANGE  Performed at [facility] [id number] [place] [address] [phone]               Infant Feeding:    breast    Circumcision Requested: Yes      Attending Attestation: I was present and scrubbed for the entire procedure. A Vaginal Vault Sweep Was Completed-All Sponge Counts Were Correct: Yes          Resident Name: RADHA Guerrero. PGY2    Attending Name: Ashok Aguilar

## 2017-02-08 NOTE — DISCHARGE SUMMARY
Obstetric Discharge Summary    Patient Name: Hayes Lopez  Patient : 1990  Primary Care Physician: Enid Peter MD  Admit Date: 2017    Principal Diagnosis: IUP at 39w1d, admitted for spontaneous ROM     Her pregnancy has been complicated by:   Patient Active Problem List   Diagnosis    Lower abdominal pain    Generalized anxiety disorder    Tachycardia    Elective     BMI 26.0-26.9,adult    High risk pregnancy with low PAPPA (pregnancy-associated plasma protein A) and low free Beta-hcG    Placental lakes    33 weeks gestation of pregnancy    Gestational diabetes mellitus, class A1    GDMA2    SROM @ 1945 on      17 M Ap Wt:7#10    Maternal fever during labor       Infection Present?: No  Hospital Acquired: No    Surgical Operations & Procedures:  [x] Pitocin Induction of Labor  [] Pitocin Augmentation of Labor  [] Prostaglandin Induction of Labor  [] Mechanical Induction of Labor  [] Artificial Rupture of Membranes  [] Intrauterine Pressure Catheter  [] Fetal Scalp Electrode  [] Amnioinfusion  Analgesia: epidural  Delivery Type: Spontaneous Vaginal Delivery: See Labor and Delivery Summary   Laceration(s): Second degree episiotomy. Suture used for repair:  Vicryl 3.0 x2. Consultations: Anesthesia    Pertinent Findings & Procedures:   Hayes Lopez is a 27 y.o. female  at 36w3d presents with SROM. She received pitocin, epidural. Maternal fever was noted with a Tmax of 100.6 F. Patient was started on IV antibiotics (Ancef). She delivered by spontaneous vaginal a Live Born infant on 17. Information for the patient's :  Jazz Rueda [[id number]]   male  Birth Weight: 7 lb 10 oz (3.459 kg)      Apgars:   Information for the patient's :  Jazz Rueda [[id number]]      Apgars were 8 at 1 minute and 9 at 5 minutes. Postpartum course: normal. Patient was continued on Ancef x 24 hours.  Both anaerobic and aerobic uterine

## 2017-02-08 NOTE — LACTATION NOTE
This note was copied from a baby's chart. Assisted mother again with feeding with glucose screen of 34. Baby awake and attempts to latch but falls asleep after a few sucks. Assisted mother with pumping and 8 ml colostrum fed to baby with syringe. Baby is gaggy and trying to have mucous emesis but retains feeding. Will continue to follow glucose protocol.

## 2017-02-08 NOTE — H&P
pocket  Estimated Fetal Weight:  7 lbs 11 oz    PRENATAL LAB RESULTS:     Blood Type/Rh: A pos  Antibody Screen: negative  Hemoglobin, Hematocrit, Platelets: 16.2 / 20.3 / 191  Rubella: immune  T.  Pallidum, IgG: non-reactive   Hepatitis B Surface Antigen: non-reactive   HIV: non-reactive   Sickle Cell Screen: negative  Gonorrhea: negative  Chlamydia: negative  Urine culture: negative, date: 16    1 hour Glucose Tolerance Test: 142     Glucose Fastin  1 hour Glucose Tolerance Test: 172  2 hour Glucose Tolerance Test: 156  3 hour Glucose Tolerance Test: 133    Group B Strep:  Negative 17    Cystic Fibrosis Screen:  negative  First Trimester Screen:  Low PAPPA and hcg  MSAFP/Multiple Markers:  normal  Anatomy US: M report reviewed - anterior placenta, male fetus, 3 vessel cord    Assessment/Plan:    Yang Fry is a 27 y.o. female  39w1d     GBS negative / Rh positive / R immune   - No indication for GBS prophylaxis    SROM @ 1945, clear   - Admit to L&D, service of Dr. Josie Carranza   - cEFM and toco   - CBC, T&S, Trep, UA and coags ordered   - IV NS @ 125ml/hr   - plan for pitocin induction    GDMA-2   - will monitor glucose closely, poct now and then in 2h   - clears (carb control) diet   - NPH 6u qhs held at this time    Patient Active Problem List    Diagnosis Date Noted    High risk pregnancy with low PAPPA (pregnancy-associated plasma protein A) and low free Beta-hcG 2016     Priority: High     2016 Growth scans every 4-6 weeks [facility]  32 week  testing   Follow up appt in 6 weeks      Elective  2016     Priority: Medium           SROM @ 1945 on 2017    GDMA2 2017     1/3/2016 NPH QHS 6 units started      Placental disorder 2016    33 weeks gestation of pregnancy     Gestational diabetes mellitus, class A1     BMI 26.0-26.9,adult 2016    Lower abdominal pain 10/06/2015    Generalized anxiety disorder 10/06/2015    Tachycardia 10/06/2015       Plan discussed with Dr. Gurwinder Rodney, who is agreeable. Steroids given this admission: No    Risks, benefits, alternatives and possible complications have been discussed in detail with the patient. Admission, and post admission procedures and expectations were discussed in detail. All questions were answered.     Attending's Name: Dr. Jo Ann Blanco DO  Ob/Gyn Resident  Pager [phone]  2/7/2017, 9:03 PM

## 2017-02-08 NOTE — FLOWSHEET NOTE
Patient admitted to room 180 from ED per wheelchair. Here with c/o leaking fluid/contractions. Denies vaginal bleeding. Denies N/V/D. Denies fever/chills. Relates of + fetal movement. Assisted into bed, Siderails up x2. Call light in reach. Bed in low position. Oriented to room, surroundings, call system and plan of care. Patient verbalizes understanding. EFM applied and monitor test completed/passed.

## 2017-02-09 ENCOUNTER — ANESTHESIA EVENT (OUTPATIENT)
Dept: LABOR AND DELIVERY | Age: 27
End: 2017-02-09

## 2017-02-09 ENCOUNTER — ANESTHESIA (OUTPATIENT)
Dept: LABOR AND DELIVERY | Age: 27
End: 2017-02-09

## 2017-02-09 LAB
ABSOLUTE EOS #: 0.1 K/UL (ref 0–0.4)
ABSOLUTE LYMPH #: 2.8 K/UL (ref 1–4.8)
ABSOLUTE MONO #: 0.9 K/UL (ref 0.1–1.3)
BASOPHILS # BLD: 1 % (ref 0–2)
BASOPHILS ABSOLUTE: 0.1 K/UL (ref 0–0.2)
CULTURE: NORMAL
CULTURE: NORMAL
DIFFERENTIAL TYPE: ABNORMAL
EOSINOPHILS RELATIVE PERCENT: 1 % (ref 0–4)
GLUCOSE BLD-MCNC: 92 MG/DL (ref 65–105)
HCT VFR BLD CALC: 32.1 % (ref 36–46)
HEMOGLOBIN: 10.9 G/DL (ref 12–16)
LYMPHOCYTES # BLD: 21 % (ref 24–44)
Lab: NORMAL
MCH RBC QN AUTO: 28.9 PG (ref 26–34)
MCHC RBC AUTO-ENTMCNC: 33.9 G/DL (ref 31–37)
MCV RBC AUTO: 85.2 FL (ref 80–100)
MONOCYTES # BLD: 7 % (ref 1–7)
PDW BLD-RTO: 13.6 % (ref 11.5–14.9)
PLATELET # BLD: 154 K/UL (ref 150–450)
PLATELET ESTIMATE: ABNORMAL
PMV BLD AUTO: 8.2 FL (ref 6–12)
RBC # BLD: 3.77 M/UL (ref 4–5.2)
RBC # BLD: ABNORMAL 10*6/UL
SEG NEUTROPHILS: 70 % (ref 36–66)
SEGMENTED NEUTROPHILS ABSOLUTE COUNT: 9.6 K/UL (ref 1.3–9.1)
SPECIMEN DESCRIPTION: NORMAL
SPECIMEN DESCRIPTION: NORMAL
STATUS: NORMAL
WBC # BLD: 13.4 K/UL (ref 3.5–11)
WBC # BLD: ABNORMAL 10*3/UL

## 2017-02-09 PROCEDURE — 1220000000 HC SEMI PRIVATE OB R&B

## 2017-02-09 PROCEDURE — 36415 COLL VENOUS BLD VENIPUNCTURE: CPT

## 2017-02-09 PROCEDURE — 6370000000 HC RX 637 (ALT 250 FOR IP): Performed by: OBSTETRICS & GYNECOLOGY

## 2017-02-09 PROCEDURE — 7200000001 HC VAGINAL DELIVERY

## 2017-02-09 PROCEDURE — 82947 ASSAY GLUCOSE BLOOD QUANT: CPT

## 2017-02-09 PROCEDURE — 6360000002 HC RX W HCPCS: Performed by: OBSTETRICS & GYNECOLOGY

## 2017-02-09 PROCEDURE — 85025 COMPLETE CBC W/AUTO DIFF WBC: CPT

## 2017-02-09 RX ORDER — HYDROCODONE BITARTRATE AND ACETAMINOPHEN 5; 325 MG/1; MG/1
1 TABLET ORAL EVERY 4 HOURS PRN
Qty: 20 TABLET | Refills: 0 | Status: SHIPPED | OUTPATIENT
Start: 2017-02-09 | End: 2017-02-16

## 2017-02-09 RX ORDER — CEPHALEXIN 500 MG/1
500 CAPSULE ORAL 4 TIMES DAILY
Qty: 24 CAPSULE | Refills: 0 | Status: SHIPPED | OUTPATIENT
Start: 2017-02-09 | End: 2017-02-15

## 2017-02-09 RX ORDER — HYDROCODONE BITARTRATE AND ACETAMINOPHEN 5; 325 MG/1; MG/1
1 TABLET ORAL EVERY 4 HOURS PRN
Status: DISCONTINUED | OUTPATIENT
Start: 2017-02-09 | End: 2017-02-10 | Stop reason: HOSPADM

## 2017-02-09 RX ORDER — CEPHALEXIN 500 MG/1
500 CAPSULE ORAL EVERY 6 HOURS SCHEDULED
Status: DISCONTINUED | OUTPATIENT
Start: 2017-02-09 | End: 2017-02-10 | Stop reason: HOSPADM

## 2017-02-09 RX ORDER — IBUPROFEN 800 MG/1
800 TABLET ORAL EVERY 8 HOURS
Qty: 30 TABLET | Refills: 0 | Status: SHIPPED | OUTPATIENT
Start: 2017-02-09 | End: 2017-02-22

## 2017-02-09 RX ORDER — HYDROCODONE BITARTRATE AND ACETAMINOPHEN 5; 325 MG/1; MG/1
2 TABLET ORAL EVERY 4 HOURS PRN
Status: DISCONTINUED | OUTPATIENT
Start: 2017-02-09 | End: 2017-02-10 | Stop reason: HOSPADM

## 2017-02-09 RX ORDER — PSEUDOEPHEDRINE HCL 30 MG
100 TABLET ORAL 2 TIMES DAILY
Qty: 60 CAPSULE | Refills: 0 | Status: SHIPPED | OUTPATIENT
Start: 2017-02-09 | End: 2017-02-22

## 2017-02-09 RX ADMIN — HYDROCODONE BITARTRATE AND ACETAMINOPHEN 1 TABLET: 5; 325 TABLET ORAL at 20:45

## 2017-02-09 RX ADMIN — IBUPROFEN 800 MG: 800 TABLET, FILM COATED ORAL at 04:26

## 2017-02-09 RX ADMIN — HYDROCODONE BITARTRATE AND ACETAMINOPHEN 1 TABLET: 5; 325 TABLET ORAL at 22:49

## 2017-02-09 RX ADMIN — HYDROCODONE BITARTRATE AND ACETAMINOPHEN 2 TABLET: 5; 325 TABLET ORAL at 04:26

## 2017-02-09 RX ADMIN — DOCUSATE SODIUM 100 MG: 100 CAPSULE, LIQUID FILLED ORAL at 08:15

## 2017-02-09 RX ADMIN — CEFAZOLIN SODIUM 2 G: 2 SOLUTION INTRAVENOUS at 00:35

## 2017-02-09 RX ADMIN — CEPHALEXIN 500 MG: 500 CAPSULE ORAL at 15:18

## 2017-02-09 RX ADMIN — DOCUSATE SODIUM 100 MG: 100 CAPSULE, LIQUID FILLED ORAL at 20:45

## 2017-02-09 RX ADMIN — IBUPROFEN 800 MG: 800 TABLET, FILM COATED ORAL at 15:17

## 2017-02-09 RX ADMIN — HYDROCODONE BITARTRATE AND ACETAMINOPHEN 2 TABLET: 5; 325 TABLET ORAL at 09:12

## 2017-02-09 RX ADMIN — IBUPROFEN 800 MG: 800 TABLET, FILM COATED ORAL at 22:48

## 2017-02-09 RX ADMIN — CEFAZOLIN SODIUM 2 G: 2 SOLUTION INTRAVENOUS at 08:16

## 2017-02-09 RX ADMIN — CEPHALEXIN 500 MG: 500 CAPSULE ORAL at 20:47

## 2017-02-09 ASSESSMENT — PAIN SCALES - GENERAL
PAINLEVEL_OUTOF10: 7
PAINLEVEL_OUTOF10: 5
PAINLEVEL_OUTOF10: 5
PAINLEVEL_OUTOF10: 8
PAINLEVEL_OUTOF10: 5

## 2017-02-09 ASSESSMENT — PAIN DESCRIPTION - PAIN TYPE: TYPE: ACUTE PAIN

## 2017-02-09 ASSESSMENT — PAIN DESCRIPTION - ORIENTATION: ORIENTATION: LOWER

## 2017-02-09 ASSESSMENT — PAIN DESCRIPTION - LOCATION: LOCATION: ABDOMEN

## 2017-02-10 VITALS
SYSTOLIC BLOOD PRESSURE: 112 MMHG | BODY MASS INDEX: 31.49 KG/M2 | TEMPERATURE: 97.5 F | HEART RATE: 73 BPM | OXYGEN SATURATION: 98 % | RESPIRATION RATE: 16 BRPM | HEIGHT: 65 IN | DIASTOLIC BLOOD PRESSURE: 70 MMHG | WEIGHT: 189 LBS

## 2017-02-10 LAB — SURGICAL PATHOLOGY REPORT: NORMAL

## 2017-02-10 PROCEDURE — 6370000000 HC RX 637 (ALT 250 FOR IP): Performed by: OBSTETRICS & GYNECOLOGY

## 2017-02-10 RX ADMIN — CEPHALEXIN 500 MG: 500 CAPSULE ORAL at 09:30

## 2017-02-10 RX ADMIN — IBUPROFEN 800 MG: 800 TABLET, FILM COATED ORAL at 08:07

## 2017-02-10 RX ADMIN — HYDROCODONE BITARTRATE AND ACETAMINOPHEN 1 TABLET: 5; 325 TABLET ORAL at 04:18

## 2017-02-10 RX ADMIN — DOCUSATE SODIUM 100 MG: 100 CAPSULE, LIQUID FILLED ORAL at 08:09

## 2017-02-10 RX ADMIN — CEPHALEXIN 500 MG: 500 CAPSULE ORAL at 03:53

## 2017-02-10 RX ADMIN — BENZOCAINE AND MENTHOL: 20; .5 SPRAY TOPICAL at 08:06

## 2017-02-10 ASSESSMENT — PAIN SCALES - GENERAL
PAINLEVEL_OUTOF10: 4
PAINLEVEL_OUTOF10: 4

## 2017-02-10 NOTE — FLOWSHEET NOTE
Educational information given:  Caring for Yourself and Your Baby; Why must my baby be screened (PKU); Pertussis; Chicken Pox; All Kids Need Hepatitis B Shots!; Melvin; Vaccines: Questions and Answers about Shots; [place] Hearing Screening; Smoking Cessation; The Facts About Secondhand Smoke; Victim of abuse information; Hepatitis B Vaccine information sheet; Baby Safe, anti shaking certificate.

## 2017-02-10 NOTE — PROGRESS NOTES
Dr. Alex Benson performing bedside ultrasound.
Labor Note    Santi Aviles is a 27 y.o. female at 44w2d    Patient seen and evaluated. She was resting comfortably and denied any complaints. Vital Signs:  Vitals:    17 0603 17 0606 17 0608 17 0611   BP: 124/79      Pulse: 96      Resp: 16      Temp:   100 °F (37.8 °C)    TempSrc:       SpO2:  99%  100%   Weight:       Height:           FHT: 130, moderate variability  Accels: present  Decels: occasional early  Contractions: regular, every 2-3 minutes  Cervical Exam: 10 cm dilated, 100% effaced, 1+ station  Pitocin: @ 6 mu/min    Membranes: Ruptured clear fluid  Scalp Electrode in place: absent  Intrauterine Pressure Catheter in Place: absent    Interventions: none      Assessment/Plan:  Santi Aviles is a 27 y.o. female  39w2d     GBS negative / Rh positive / R immune   - No indication for GBS prophylaxis     SROM @ 1945, clear   - cEFM and toco   - IV NS @ 125ml/hr   - pitocin per protocol    - s/p epidural     GDMA-2   - poct glucose q2h    - maintain intrapartum glucose between 70 and 110   - clears (carb control) diet   - NPH 6u qhs held      Anticipate vaginal delivery. Dr. Gurwinder Rodney updated and en route.     Minnie Anglin DO  Ob/Gyn Resident   2017, 6:18 AM
Fingerstick    Collection Time: 17  5:57 AM   Result Value Ref Range    POC Glucose 92 65 - 105 mg/dL       Assessment/Plan:  1. Tia Kaye is PPD # 1 s/p    - Doing well, VSS   - male infant in [facility], circumcision desired   - Encourage ambulation   - Postpartum CBC awaiting  2. Rh positive/Rubella immune  3. Breast feeding   4. GDMA2   - fasting blood glucose wnl  5. Continue post partum care    Counseling Completed:  Secondary Smoke risks and Sudden Infant Death Syndrome were reviewed with recommendations. Infant sleeping, \"back to sleep\" and avoidance of co-sleeping recommendations were reviewed. Signs and Symptoms of Post Partum Depression were reviewed. The patient is to call if any occur. Signs and symptoms of Mastitis were reviewed. The patient is to call if any occur for follow up.   Discharge instructions including pelvic rest, no driving with pain medicine and office follow-up were reviewed with patient     Provider's Name: Dr. Chandrika Yates DO  Ob/Gyn Resident   2017, 6:17 AM
edematous    Recent Results (from the past 24 hour(s))   CBC auto differential    Collection Time: 17  9:20 AM   Result Value Ref Range    WBC 13.4 (H) 3.5 - 11.0 k/uL    RBC 3.77 (L) 4.0 - 5.2 m/uL    Hemoglobin 10.9 (L) 12.0 - 16.0 g/dL    Hematocrit 32.1 (L) 36 - 46 %    MCV 85.2 80 - 100 fL    MCH 28.9 26 - 34 pg    MCHC 33.9 31 - 37 g/dL    RDW 13.6 11.5 - 14.9 %    Platelets 908 265 - 764 k/uL    MPV 8.2 6.0 - 12.0 fL    Differential Type NOT REPORTED     Seg Neutrophils 70 (H) 36 - 66 %    Lymphocytes 21 (L) 24 - 44 %    Monocytes 7 1 - 7 %    Eosinophils Relative Percent 1 0 - 4 %    Basophils 1 0 - 2 %    Segs Absolute 9.60 (H) 1.3 - 9.1 k/uL    Absolute Lymph # 2.80 1.0 - 4.8 k/uL    Absolute Mono # 0.90 0.1 - 1.3 k/uL    Absolute Eos # 0.10 0.0 - 0.4 k/uL    Basophils # 0.10 0.0 - 0.2 k/uL    WBC Morphology NOT REPORTED     RBC Morphology NOT REPORTED     Platelet Estimate NOT REPORTED          Assessment/Plan:  1. Andry Salter is PPD # 2 s/p    - Doing well, VSS   - male infant in [address] [facility], circumcision completed   - Encourage ambulation   - Postpartum CBC completed  2. Rh positive/Rubella immune  3. Breast feeding   4. GDMA2   - fasting blood glucose wnl  5. Continue post partum care    Patient okay for discharge to home today. Counseling Completed:  Secondary Smoke risks and Sudden Infant Death Syndrome were reviewed with recommendations. Infant sleeping, \"back to sleep\" and avoidance of co-sleeping recommendations were reviewed. Signs and Symptoms of Post Partum Depression were reviewed. The patient is to call if any occur. Signs and symptoms of Mastitis were reviewed. The patient is to call if any occur for follow up.   Discharge instructions including pelvic rest, no driving with pain medicine and office follow-up were reviewed with patient     Provider's Name: Dr. Melvi Marcos DO  Ob/Gyn Resident   2/10/2017, 7:01 AM

## 2017-02-13 LAB
CULTURE: ABNORMAL
DIRECT EXAM: ABNORMAL
DIRECT EXAM: ABNORMAL
Lab: ABNORMAL
SPECIMEN DESCRIPTION: ABNORMAL
SPECIMEN DESCRIPTION: ABNORMAL
STATUS: ABNORMAL

## 2017-02-22 ENCOUNTER — OFFICE VISIT (OUTPATIENT)
Dept: OBGYN | Facility: CLINIC | Age: 27
End: 2017-02-22

## 2017-02-22 VITALS
DIASTOLIC BLOOD PRESSURE: 78 MMHG | HEIGHT: 64 IN | RESPIRATION RATE: 16 BRPM | HEART RATE: 88 BPM | WEIGHT: 162 LBS | BODY MASS INDEX: 27.66 KG/M2 | SYSTOLIC BLOOD PRESSURE: 112 MMHG

## 2017-02-22 PROCEDURE — 99024 POSTOP FOLLOW-UP VISIT: CPT | Performed by: ADVANCED PRACTICE MIDWIFE

## 2017-02-22 ASSESSMENT — PATIENT HEALTH QUESTIONNAIRE - PHQ9
SUM OF ALL RESPONSES TO PHQ QUESTIONS 1-9: 2
2. FEELING DOWN, DEPRESSED OR HOPELESS: 1
SUM OF ALL RESPONSES TO PHQ9 QUESTIONS 1 & 2: 2
1. LITTLE INTEREST OR PLEASURE IN DOING THINGS: 1

## 2017-03-08 ENCOUNTER — OFFICE VISIT (OUTPATIENT)
Dept: OBGYN | Facility: CLINIC | Age: 27
End: 2017-03-08

## 2017-03-08 VITALS
BODY MASS INDEX: 27.49 KG/M2 | SYSTOLIC BLOOD PRESSURE: 114 MMHG | HEART RATE: 80 BPM | WEIGHT: 161 LBS | RESPIRATION RATE: 18 BRPM | HEIGHT: 64 IN | DIASTOLIC BLOOD PRESSURE: 68 MMHG

## 2017-03-08 PROCEDURE — 99024 POSTOP FOLLOW-UP VISIT: CPT | Performed by: ADVANCED PRACTICE MIDWIFE

## 2017-03-08 RX ORDER — SERTRALINE HYDROCHLORIDE 25 MG/1
25 TABLET, FILM COATED ORAL DAILY
Qty: 30 TABLET | Refills: 0 | Status: SHIPPED | OUTPATIENT
Start: 2017-03-08 | End: 2017-07-24

## 2017-03-08 ASSESSMENT — PATIENT HEALTH QUESTIONNAIRE - PHQ9
SUM OF ALL RESPONSES TO PHQ9 QUESTIONS 1 & 2: 2
1. LITTLE INTEREST OR PLEASURE IN DOING THINGS: 1
2. FEELING DOWN, DEPRESSED OR HOPELESS: 1
SUM OF ALL RESPONSES TO PHQ QUESTIONS 1-9: 2

## 2017-03-22 ENCOUNTER — OFFICE VISIT (OUTPATIENT)
Dept: OBGYN CLINIC | Age: 27
End: 2017-03-22
Payer: MEDICAID

## 2017-03-22 VITALS
HEART RATE: 78 BPM | SYSTOLIC BLOOD PRESSURE: 110 MMHG | BODY MASS INDEX: 27.31 KG/M2 | HEIGHT: 64 IN | WEIGHT: 160 LBS | DIASTOLIC BLOOD PRESSURE: 65 MMHG

## 2017-03-22 RX ORDER — ACETAMINOPHEN AND CODEINE PHOSPHATE 120; 12 MG/5ML; MG/5ML
1 SOLUTION ORAL DAILY
Qty: 28 TABLET | Refills: 12 | Status: SHIPPED | OUTPATIENT
Start: 2017-03-22 | End: 2017-07-24

## 2017-03-22 ASSESSMENT — PATIENT HEALTH QUESTIONNAIRE - PHQ9
SUM OF ALL RESPONSES TO PHQ QUESTIONS 1-9: 0
2. FEELING DOWN, DEPRESSED OR HOPELESS: 0
1. LITTLE INTEREST OR PLEASURE IN DOING THINGS: 0
SUM OF ALL RESPONSES TO PHQ9 QUESTIONS 1 & 2: 0

## 2017-07-24 ENCOUNTER — OFFICE VISIT (OUTPATIENT)
Dept: OBGYN CLINIC | Age: 27
End: 2017-07-24
Payer: MEDICAID

## 2017-07-24 VITALS
RESPIRATION RATE: 18 BRPM | HEIGHT: 64 IN | SYSTOLIC BLOOD PRESSURE: 114 MMHG | WEIGHT: 155 LBS | DIASTOLIC BLOOD PRESSURE: 62 MMHG | HEART RATE: 72 BPM | BODY MASS INDEX: 26.46 KG/M2

## 2017-07-24 DIAGNOSIS — Z32.02 NEGATIVE PREGNANCY TEST: Primary | ICD-10-CM

## 2017-07-24 DIAGNOSIS — N91.2 AMENORRHEA: ICD-10-CM

## 2017-07-24 DIAGNOSIS — Z01.419 WELL FEMALE EXAM WITH ROUTINE GYNECOLOGICAL EXAM: ICD-10-CM

## 2017-07-24 DIAGNOSIS — Z3A.09 9 WEEKS GESTATION OF PREGNANCY: ICD-10-CM

## 2017-07-24 LAB
CONTROL: NORMAL
PREGNANCY TEST URINE, POC: NEGATIVE

## 2017-07-24 PROCEDURE — 81025 URINE PREGNANCY TEST: CPT | Performed by: ADVANCED PRACTICE MIDWIFE

## 2017-07-24 PROCEDURE — 99214 OFFICE O/P EST MOD 30 MIN: CPT | Performed by: ADVANCED PRACTICE MIDWIFE

## 2017-07-24 ASSESSMENT — PATIENT HEALTH QUESTIONNAIRE - PHQ9
SUM OF ALL RESPONSES TO PHQ QUESTIONS 1-9: 0
2. FEELING DOWN, DEPRESSED OR HOPELESS: 0
SUM OF ALL RESPONSES TO PHQ9 QUESTIONS 1 & 2: 0
1. LITTLE INTEREST OR PLEASURE IN DOING THINGS: 0

## 2017-08-10 ENCOUNTER — TELEPHONE (OUTPATIENT)
Dept: OBGYN CLINIC | Age: 27
End: 2017-08-10

## 2017-08-10 RX ORDER — AZITHROMYCIN 250 MG/1
250 TABLET, FILM COATED ORAL DAILY
Qty: 6 TABLET | Refills: 0 | Status: SHIPPED | OUTPATIENT
Start: 2017-08-10 | End: 2017-08-16

## 2018-01-22 ENCOUNTER — OFFICE VISIT (OUTPATIENT)
Dept: FAMILY MEDICINE CLINIC | Age: 28
End: 2018-01-22
Payer: MEDICAID

## 2018-01-22 ENCOUNTER — TELEPHONE (OUTPATIENT)
Dept: OBGYN CLINIC | Age: 28
End: 2018-01-22

## 2018-01-22 VITALS
BODY MASS INDEX: 21.86 KG/M2 | TEMPERATURE: 97.6 F | HEART RATE: 75 BPM | OXYGEN SATURATION: 98 % | SYSTOLIC BLOOD PRESSURE: 103 MMHG | HEIGHT: 66 IN | DIASTOLIC BLOOD PRESSURE: 71 MMHG | WEIGHT: 136 LBS | RESPIRATION RATE: 16 BRPM

## 2018-01-22 DIAGNOSIS — R50.9 FEVER, UNSPECIFIED FEVER CAUSE: ICD-10-CM

## 2018-01-22 DIAGNOSIS — B34.9 PHARYNGITIS WITH VIRAL SYNDROME: Primary | ICD-10-CM

## 2018-01-22 DIAGNOSIS — J02.9 PHARYNGITIS WITH VIRAL SYNDROME: Primary | ICD-10-CM

## 2018-01-22 LAB
INFLUENZA A ANTIBODY: NORMAL
INFLUENZA B ANTIBODY: NORMAL

## 2018-01-22 PROCEDURE — 1036F TOBACCO NON-USER: CPT | Performed by: FAMILY MEDICINE

## 2018-01-22 PROCEDURE — 87804 INFLUENZA ASSAY W/OPTIC: CPT | Performed by: FAMILY MEDICINE

## 2018-01-22 PROCEDURE — 99214 OFFICE O/P EST MOD 30 MIN: CPT | Performed by: FAMILY MEDICINE

## 2018-01-22 PROCEDURE — G8427 DOCREV CUR MEDS BY ELIG CLIN: HCPCS | Performed by: FAMILY MEDICINE

## 2018-01-22 PROCEDURE — G8484 FLU IMMUNIZE NO ADMIN: HCPCS | Performed by: FAMILY MEDICINE

## 2018-01-22 PROCEDURE — G8420 CALC BMI NORM PARAMETERS: HCPCS | Performed by: FAMILY MEDICINE

## 2018-01-22 RX ORDER — CEPHALEXIN 500 MG/1
500 CAPSULE ORAL 4 TIMES DAILY
Qty: 40 CAPSULE | Refills: 0 | Status: SHIPPED | OUTPATIENT
Start: 2018-01-22 | End: 2018-01-22 | Stop reason: ALTCHOICE

## 2018-01-22 ASSESSMENT — ENCOUNTER SYMPTOMS
RHINORRHEA: 1
EYES NEGATIVE: 1
RESPIRATORY NEGATIVE: 1
ALLERGIC/IMMUNOLOGIC NEGATIVE: 1
GASTROINTESTINAL NEGATIVE: 1
SORE THROAT: 1

## 2018-01-22 NOTE — PROGRESS NOTES
Social History   Substance Use Topics    Smoking status: Never Smoker    Smokeless tobacco: Never Used    Alcohol use No      Comment: not with pregnancy      Current Outpatient Prescriptions   Medication Sig Dispense Refill    Prenatal Multivit-Min-Fe-FA (PRENATAL VITAMINS) 0.8 MG TABS Take 1 tablet by mouth daily 30 tablet 12     No current facility-administered medications for this visit. Allergies   Allergen Reactions    Codeine Nausea Only    Percocet [Oxycodone-Acetaminophen] Nausea Only     Pt states she is okay with percocet. Health Maintenance   Topic Date Due    Cervical cancer screen  07/13/2017    Flu vaccine (1) 09/01/2017    DTaP/Tdap/Td vaccine (2 - Td) 12/01/2026    HIV screen  Completed       Subjective:      Review of Systems   Constitutional: Positive for fever. HENT: Positive for congestion, postnasal drip, rhinorrhea and sore throat. Eyes: Negative. Respiratory: Negative. Cardiovascular: Negative. Gastrointestinal: Negative. Endocrine: Negative. Genitourinary: Negative. Musculoskeletal: Negative. Skin: Negative. Allergic/Immunologic: Negative. Neurological: Negative. Hematological: Negative. Psychiatric/Behavioral: Negative. Objective:     Physical Exam   Constitutional: She is oriented to person, place, and time. She appears well-developed and well-nourished. HENT:   Head: Normocephalic and atraumatic. Right Ear: External ear normal.   Left Ear: External ear normal.   Nose: Nose normal.   Mouth/Throat: Uvula is midline. Oropharyngeal exudate, posterior oropharyngeal edema and posterior oropharyngeal erythema present. Eyes: Conjunctivae and EOM are normal. Pupils are equal, round, and reactive to light. Neck: Normal range of motion. Neck supple. Cardiovascular: Normal rate, regular rhythm, normal heart sounds and intact distal pulses. Pulmonary/Chest: Effort normal and breath sounds normal.   Abdominal: Soft.

## 2018-02-20 ENCOUNTER — TELEPHONE (OUTPATIENT)
Dept: OBGYN CLINIC | Age: 28
End: 2018-02-20

## 2018-02-20 NOTE — TELEPHONE ENCOUNTER
Had a baby 1 yr ago periods have been normal up until  This one she is bleeding heavy going thru super tampon every hour and cramping.  Still breast feeding Please advise

## 2018-03-15 ENCOUNTER — OFFICE VISIT (OUTPATIENT)
Dept: FAMILY MEDICINE CLINIC | Age: 28
End: 2018-03-15
Payer: MEDICAID

## 2018-03-15 VITALS
HEIGHT: 66 IN | OXYGEN SATURATION: 98 % | TEMPERATURE: 97.7 F | BODY MASS INDEX: 21.69 KG/M2 | HEART RATE: 68 BPM | WEIGHT: 135 LBS | SYSTOLIC BLOOD PRESSURE: 105 MMHG | DIASTOLIC BLOOD PRESSURE: 71 MMHG

## 2018-03-15 DIAGNOSIS — J06.9 UPPER RESPIRATORY TRACT INFECTION, UNSPECIFIED TYPE: ICD-10-CM

## 2018-03-15 DIAGNOSIS — J02.9 SORE THROAT: Primary | ICD-10-CM

## 2018-03-15 LAB
INFLUENZA A ANTIBODY: NEGATIVE
INFLUENZA B ANTIBODY: NEGATIVE
S PYO AG THROAT QL: NORMAL

## 2018-03-15 PROCEDURE — 1036F TOBACCO NON-USER: CPT | Performed by: FAMILY MEDICINE

## 2018-03-15 PROCEDURE — 87804 INFLUENZA ASSAY W/OPTIC: CPT | Performed by: FAMILY MEDICINE

## 2018-03-15 PROCEDURE — G8427 DOCREV CUR MEDS BY ELIG CLIN: HCPCS | Performed by: FAMILY MEDICINE

## 2018-03-15 PROCEDURE — 99213 OFFICE O/P EST LOW 20 MIN: CPT | Performed by: FAMILY MEDICINE

## 2018-03-15 PROCEDURE — G8484 FLU IMMUNIZE NO ADMIN: HCPCS | Performed by: FAMILY MEDICINE

## 2018-03-15 PROCEDURE — G8420 CALC BMI NORM PARAMETERS: HCPCS | Performed by: FAMILY MEDICINE

## 2018-03-15 PROCEDURE — 87880 STREP A ASSAY W/OPTIC: CPT | Performed by: FAMILY MEDICINE

## 2018-03-15 RX ORDER — AZITHROMYCIN 250 MG/1
250 TABLET, FILM COATED ORAL DAILY
Qty: 6 TABLET | Refills: 0 | Status: SHIPPED | OUTPATIENT
Start: 2018-03-15 | End: 2018-03-20

## 2018-03-15 ASSESSMENT — ENCOUNTER SYMPTOMS
SORE THROAT: 1
EYE ITCHING: 0
EYE PAIN: 0
WHEEZING: 0
SHORTNESS OF BREATH: 0
EYE DISCHARGE: 0
NAUSEA: 0
CHANGE IN BOWEL HABIT: 0
CHEST TIGHTNESS: 0
VISUAL CHANGE: 0
RHINORRHEA: 0
DIARRHEA: 0
ABDOMINAL PAIN: 0
SINUS PRESSURE: 0
VOMITING: 0
SWOLLEN GLANDS: 0
COUGH: 0
SINUS PAIN: 0
BACK PAIN: 0
EYE REDNESS: 0

## 2018-03-15 NOTE — PROGRESS NOTES
[address] [facility]   [address] [place] [address] [address] [facility] [address]  Dept: [phone]  Dept Fax: [phone]    Lauren Greene is a 24 y.o. female who presents today for her medical conditions/complaints as noted below. Lauren Greene is c/o of Pharyngitis (started yesterday)        HPI:     Pharyngitis   This is a new problem. The current episode started yesterday. The problem occurs constantly. The problem has been gradually worsening. Associated symptoms include fatigue and a sore throat. Pertinent negatives include no abdominal pain, change in bowel habit, chest pain, chills, congestion, coughing, diaphoresis, fever, headaches, myalgias, nausea, neck pain, rash, swollen glands, urinary symptoms, visual change or vomiting. The symptoms are aggravated by swallowing. Treatments tried: cough drops salt water gargles. The treatment provided mild relief. Breast feeding her 3year old in the process of weaning.   Past Medical History:   Diagnosis Date    Anxiety     Generalized anxiety disorder 10/6/2015    Gestational diabetes mellitus in pregnancy 2016      Past Surgical History:   Procedure Laterality Date    WISDOM TOOTH EXTRACTION         Family History   Problem Relation Age of Onset    No Known Problems Father     No Known Problems Mother     No Known Problems Brother     Breast Cancer Neg Hx     Cancer Neg Hx     Colon Cancer Neg Hx     Diabetes Neg Hx     Eclampsia Neg Hx     Hypertension Neg Hx     Ovarian Cancer Neg Hx      Labor Neg Hx     Spont Abortions Neg Hx     Stroke Neg Hx        Social History   Substance Use Topics    Smoking status: Never Smoker    Smokeless tobacco: Never Used    Alcohol use No      Comment: not with pregnancy      Current Outpatient Prescriptions   Medication Sig Dispense Refill    azithromycin (ZITHROMAX Z-ALAN) 250 MG tablet Take 1 tablet by mouth daily for 5 days Take 2 tablets by mouth on the first day and then one tablet daily for the next 4 days 6 tablet 0    Prenatal Multivit-Min-Fe-FA (PRENATAL VITAMINS) 0.8 MG TABS Take 1 tablet by mouth daily 30 tablet 12     No current facility-administered medications for this visit. Allergies   Allergen Reactions    Codeine Nausea Only    Percocet [Oxycodone-Acetaminophen] Nausea Only     Pt states she is okay with percocet. Health Maintenance   Topic Date Due    Cervical cancer screen  07/13/2017    Flu vaccine (1) 09/01/2017    DTaP/Tdap/Td vaccine (2 - Td) 12/01/2026    HIV screen  Completed       Subjective:      Review of Systems   Constitutional: Positive for fatigue. Negative for appetite change, chills, diaphoresis and fever. HENT: Positive for sore throat. Negative for congestion, ear pain, postnasal drip, rhinorrhea, sinus pain, sinus pressure and sneezing. Eyes: Negative for pain, discharge, redness, itching and visual disturbance. Respiratory: Negative for cough, chest tightness, shortness of breath and wheezing. Cardiovascular: Negative for chest pain and leg swelling. Gastrointestinal: Negative for abdominal pain, change in bowel habit, diarrhea, nausea and vomiting. Genitourinary: Negative for dysuria, flank pain, frequency and urgency. LMP 2/19/18   Musculoskeletal: Negative for back pain, myalgias and neck pain. Skin: Negative for rash. Neurological: Negative for dizziness, light-headedness and headaches. Objective:     Physical Exam   Constitutional: She is oriented to person, place, and time. She appears well-developed and well-nourished. No distress. HENT:   Head: Normocephalic. Right Ear: External ear normal. A middle ear effusion is present. Left Ear: External ear normal. A middle ear effusion is present. Nose: Mucosal edema present. No rhinorrhea or sinus tenderness. Mouth/Throat: Uvula is midline and mucous membranes are normal. No uvula swelling.  Posterior oropharyngeal days Take 2 tablets by mouth on the first day and then one tablet daily for the next 4 days     Dispense:  6 tablet     Refill:  0       Patient given educational materials - see patient instructions. Discussed use, benefit, and side effects of prescribed medications. All patient questions answered. Pt voiced understanding. Reviewed health maintenance. Instructed to continue current medications, diet and exercise. Patient agreed with treatment plan. Follow up as directed.      Electronically signed by Yessenia Cobb MD on 3/15/2018 at 12:27 PM

## 2018-03-15 NOTE — PATIENT INSTRUCTIONS
good.  · Use a vaporizer or humidifier to add moisture to your bedroom. Follow the directions for cleaning the machine. When should you call for help? Call your doctor now or seek immediate medical care if:  ? · You have new or worse trouble swallowing. ? · Your sore throat gets much worse on one side. ? Watch closely for changes in your health, and be sure to contact your doctor if you do not get better as expected. Where can you learn more? Go to [email or web address] org and sign in to your NaHere account. Enter S678 in the [employer] box to learn more about \"Sore Throat: Care Instructions. \"     If you do not have an account, please click on the \"Sign Up Now\" link. Current as of: May 12, 2017  Content Version: 11.5  © 7220-9473 [employer]el[employer]. Care instructions adapted under license by [facility] ([facility]). If you have questions about a medical condition or this instruction, always ask your healthcare professional. [place] [address] any warranty or liability for your use of this information. Patient Education        Upper Respiratory Infection (Cold): Care Instructions  Your Care Instructions    An upper respiratory infection, or URI, is an infection of the nose, sinuses, or throat. URIs are spread by coughs, sneezes, and direct contact. The common cold is the most frequent kind of URI. The flu and sinus infections are other kinds of URIs. Almost all URIs are caused by viruses. Antibiotics won't cure them. But you can treat most infections with home care. This may include drinking lots of fluids and taking over-the-counter pain medicine. You will probably feel better in 4 to 10 days. The doctor has checked you carefully, but problems can develop later. If you notice any problems or new symptoms, get medical treatment right away. Follow-up care is a key part of your treatment and safety.  Be sure to make and go to all appointments, and call your change in the color of your mucus. ? · You do not get better as expected. Where can you learn more? Go to https://chpepiceweb.[email or web address] org and sign in to your StackAdapt account. Enter H906 in the Paybubble box to learn more about \"Upper Respiratory Infection (Cold): Care Instructions. \"     If you do not have an account, please click on the \"Sign Up Now\" link. Current as of: May 12, 2017  Content Version: 11.5  © 3586-2202 [employer]. Care instructions adapted under license by [facility] ([facility]). If you have questions about a medical condition or this instruction, always ask your healthcare professional. [address] any warranty or liability for your use of this information.

## 2018-06-04 ENCOUNTER — TELEPHONE (OUTPATIENT)
Dept: OBGYN CLINIC | Age: 28
End: 2018-06-04

## 2018-06-04 RX ORDER — FLUCONAZOLE 150 MG/1
150 TABLET ORAL ONCE
Qty: 2 TABLET | Refills: 0 | Status: SHIPPED | OUTPATIENT
Start: 2018-06-04 | End: 2018-06-04

## 2018-06-28 ENCOUNTER — HOSPITAL ENCOUNTER (OUTPATIENT)
Age: 28
Discharge: HOME | End: 2018-06-28
Payer: MEDICAID

## 2018-06-28 ENCOUNTER — OFFICE VISIT (OUTPATIENT)
Dept: FAMILY MEDICINE CLINIC | Age: 28
End: 2018-06-28
Payer: MEDICAID

## 2018-06-28 VITALS
TEMPERATURE: 98.1 F | OXYGEN SATURATION: 97 % | BODY MASS INDEX: 21.99 KG/M2 | WEIGHT: 132 LBS | SYSTOLIC BLOOD PRESSURE: 112 MMHG | DIASTOLIC BLOOD PRESSURE: 74 MMHG | HEIGHT: 65 IN | HEART RATE: 75 BPM

## 2018-06-28 DIAGNOSIS — R42 LIGHTHEADEDNESS: ICD-10-CM

## 2018-06-28 DIAGNOSIS — Z86.32 HISTORY OF GESTATIONAL DIABETES: ICD-10-CM

## 2018-06-28 DIAGNOSIS — R63.4 UNEXPLAINED WEIGHT LOSS: ICD-10-CM

## 2018-06-28 DIAGNOSIS — D64.9 ANEMIA, UNSPECIFIED TYPE: ICD-10-CM

## 2018-06-28 DIAGNOSIS — F41.1 GENERALIZED ANXIETY DISORDER: ICD-10-CM

## 2018-06-28 DIAGNOSIS — Z00.00 PREVENTATIVE HEALTH CARE: ICD-10-CM

## 2018-06-28 DIAGNOSIS — S29.012A MUSCLE STRAIN OF LEFT UPPER BACK, INITIAL ENCOUNTER: Primary | ICD-10-CM

## 2018-06-28 LAB
ALBUMIN SERPL-MCNC: 4.4 G/DL (ref 3.5–5.2)
ALBUMIN/GLOBULIN RATIO: NORMAL (ref 1–2.5)
ALP BLD-CCNC: 78 U/L (ref 35–104)
ALT SERPL-CCNC: 8 U/L (ref 5–33)
ANION GAP SERPL CALCULATED.3IONS-SCNC: 10 MMOL/L (ref 9–17)
AST SERPL-CCNC: 14 U/L
BILIRUB SERPL-MCNC: 0.7 MG/DL (ref 0.3–1.2)
BUN BLDV-MCNC: 13 MG/DL (ref 6–20)
BUN/CREAT BLD: NORMAL (ref 9–20)
CALCIUM SERPL-MCNC: 8.9 MG/DL (ref 8.6–10.4)
CHLORIDE BLD-SCNC: 105 MMOL/L (ref 98–107)
CO2: 26 MMOL/L (ref 20–31)
CREAT SERPL-MCNC: 0.52 MG/DL (ref 0.5–0.9)
GFR AFRICAN AMERICAN: >60 ML/MIN
GFR NON-AFRICAN AMERICAN: >60 ML/MIN
GFR SERPL CREATININE-BSD FRML MDRD: NORMAL ML/MIN/{1.73_M2}
GFR SERPL CREATININE-BSD FRML MDRD: NORMAL ML/MIN/{1.73_M2}
GLUCOSE BLD-MCNC: 79 MG/DL (ref 70–99)
HCT VFR BLD CALC: 41.3 % (ref 36–46)
HEMOGLOBIN: 13.9 G/DL (ref 12–16)
MCH RBC QN AUTO: 28.6 PG (ref 26–34)
MCHC RBC AUTO-ENTMCNC: 33.7 G/DL (ref 31–37)
MCV RBC AUTO: 84.9 FL (ref 80–100)
NRBC AUTOMATED: NORMAL PER 100 WBC
PDW BLD-RTO: 13.1 % (ref 11.5–14.9)
PLATELET # BLD: 229 K/UL (ref 150–450)
PMV BLD AUTO: 8.8 FL (ref 6–12)
POTASSIUM SERPL-SCNC: 4 MMOL/L (ref 3.7–5.3)
RBC # BLD: 4.86 M/UL (ref 4–5.2)
SODIUM BLD-SCNC: 141 MMOL/L (ref 135–144)
TOTAL PROTEIN: 7.4 G/DL (ref 6.4–8.3)
TSH SERPL DL<=0.05 MIU/L-ACNC: 2.22 MIU/L (ref 0.3–5)
WBC # BLD: 5.8 K/UL (ref 3.5–11)

## 2018-06-28 PROCEDURE — 80053 COMPREHEN METABOLIC PANEL: CPT

## 2018-06-28 PROCEDURE — 84443 ASSAY THYROID STIM HORMONE: CPT

## 2018-06-28 PROCEDURE — 83036 HEMOGLOBIN GLYCOSYLATED A1C: CPT

## 2018-06-28 PROCEDURE — 99204 OFFICE O/P NEW MOD 45 MIN: CPT | Performed by: NURSE PRACTITIONER

## 2018-06-28 PROCEDURE — 1036F TOBACCO NON-USER: CPT | Performed by: NURSE PRACTITIONER

## 2018-06-28 PROCEDURE — G8427 DOCREV CUR MEDS BY ELIG CLIN: HCPCS | Performed by: NURSE PRACTITIONER

## 2018-06-28 PROCEDURE — 85027 COMPLETE CBC AUTOMATED: CPT

## 2018-06-28 PROCEDURE — 36415 COLL VENOUS BLD VENIPUNCTURE: CPT

## 2018-06-28 PROCEDURE — G8420 CALC BMI NORM PARAMETERS: HCPCS | Performed by: NURSE PRACTITIONER

## 2018-06-28 RX ORDER — TIZANIDINE 4 MG/1
4 TABLET ORAL 3 TIMES DAILY
Qty: 21 TABLET | Refills: 0 | Status: SHIPPED | OUTPATIENT
Start: 2018-06-28 | End: 2018-07-25

## 2018-06-28 ASSESSMENT — ENCOUNTER SYMPTOMS
COUGH: 0
NAUSEA: 1
SHORTNESS OF BREATH: 0
BACK PAIN: 1
BLOOD IN STOOL: 0
EYE DISCHARGE: 0
ABDOMINAL PAIN: 0

## 2018-06-29 LAB
ESTIMATED AVERAGE GLUCOSE: 94 MG/DL
HBA1C MFR BLD: 4.9 % (ref 4–6)

## 2018-07-25 ENCOUNTER — OFFICE VISIT (OUTPATIENT)
Dept: OBGYN CLINIC | Age: 28
End: 2018-07-25
Payer: MEDICAID

## 2018-07-25 VITALS
BODY MASS INDEX: 22.36 KG/M2 | RESPIRATION RATE: 18 BRPM | WEIGHT: 131 LBS | HEIGHT: 64 IN | DIASTOLIC BLOOD PRESSURE: 70 MMHG | HEART RATE: 74 BPM | SYSTOLIC BLOOD PRESSURE: 114 MMHG

## 2018-07-25 DIAGNOSIS — N91.2 AMENORRHEA: ICD-10-CM

## 2018-07-25 DIAGNOSIS — Z32.02 NEGATIVE PREGNANCY TEST: Primary | ICD-10-CM

## 2018-07-25 LAB
CONTROL: NORMAL
PREGNANCY TEST URINE, POC: NEGATIVE

## 2018-07-25 PROCEDURE — 81025 URINE PREGNANCY TEST: CPT | Performed by: ADVANCED PRACTICE MIDWIFE

## 2018-07-25 PROCEDURE — 99214 OFFICE O/P EST MOD 30 MIN: CPT | Performed by: ADVANCED PRACTICE MIDWIFE

## 2018-07-25 ASSESSMENT — PATIENT HEALTH QUESTIONNAIRE - PHQ9
SUM OF ALL RESPONSES TO PHQ QUESTIONS 1-9: 0
SUM OF ALL RESPONSES TO PHQ9 QUESTIONS 1 & 2: 0
2. FEELING DOWN, DEPRESSED OR HOPELESS: 0
1. LITTLE INTEREST OR PLEASURE IN DOING THINGS: 0

## 2018-07-25 NOTE — PROGRESS NOTES
History and Physical  [address], [address] [facility] [address] [phone]   Fax [phone]  Jahaira Cannon  2018              28 y.o. Chief Complaint   Patient presents with    Annual Exam       Patient's last menstrual period was 2018. Primary Care Physician: JANUARY Rivera CNP    The patient was seen and examined. She has no chief complaint today and is here for her annual exam.  Her bowels are regular. There are no voiding complaints. She denies any bloating. She denies vaginal discharge and was counseled on STD's and the need for barrier contraception.      HPI : Jahaira Cannon is a 34 y.o. female     Gyn exam, no complains, trying to conceive   ________________________________________________________________________  Obstetric History       T1      L1     SAB0   TAB1   Ectopic0   Molar0   Multiple0   Live Births1       # Outcome Date GA Lbr Roberto/2nd Weight Sex Delivery Anes PTL Lv   2 Term 17 39w2d 02:19 / 03:04 7 lb 10 oz (3.459 kg) M Vag-Spont EPI N JERICHO      Name: Rinku Avni:  8                Apgar5: 9   1 TAB                 Past Medical History:   Diagnosis Date    Anemia 2018    Anxiety     Generalized anxiety disorder 10/6/2015    Gestational diabetes mellitus in pregnancy 2016                                                                   Past Surgical History:   Procedure Laterality Date    WISDOM TOOTH EXTRACTION       Family History   Problem Relation Age of Onset    Hypertension Father     Hypertension Mother     No Known Problems Brother     Breast Cancer Neg Hx     Cancer Neg Hx     Colon Cancer Neg Hx     Diabetes Neg Hx     Eclampsia Neg Hx     Ovarian Cancer Neg Hx      Labor Neg Hx     Spont Abortions Neg Hx     Stroke Neg Hx      Social History     Social History    Marital status: Single     Spouse name: N/A    evaluated and found to be appropriate for the patients age. OMM Structural Component:  The patient did not complain of a Chief complaint requiring OMM. Chief Complaint:none    Structural Exam: No Interest                  ASSESSMENT:      34 y.o. Annual   Diagnosis Orders   1. Negative pregnancy test  POCT urine pregnancy    HCG, Quantitative, Pregnancy   2. Amenorrhea  HCG, Quantitative, Pregnancy          Chief Complaint   Patient presents with    Annual Exam          Past Medical History:   Diagnosis Date    Anemia 6/28/2018    Anxiety     Generalized anxiety disorder 10/6/2015    Gestational diabetes mellitus in pregnancy 12/1/2016         Patient Active Problem List    Diagnosis Date Noted    Unexplained weight loss 06/28/2018    Anemia 06/28/2018    SROM @ 1945 on 2/7 02/07/2017    GDMA2 01/03/2017     1/3/2016 NPH QHS 6 units started      Placental lakes 12/28/2016    Gestational diabetes mellitus, class A1     Lower abdominal pain 10/06/2015    Generalized anxiety disorder 10/06/2015    Tachycardia 10/06/2015          Hereditary Breast, Ovarian, Colon and Uterine Cancer screening Done. Tobacco & Secondary smoke risks reviewed; instructed on cessation and avoidance      Counseling Completed:  Preventative Health Recommendations and Follow up  The patient was informed of the recommended preventative health recommendations. 1. Annuals every year; Cytology collections per prevailing guidelines. 2. Mammograms begin every year at 35 yo if no abnormalities are found and no family     History. 3. Bone density studies every 2-3 years. Begin at 71 yo. If no fracture history or osteoporosis family history. (significant). 4. Colonoscopy begin at 38 yo. Repeat every ten years if negative and no family history. 5. Calcium of 9906-0976 mg/day in split dosing  6. Vitamin D 400-800 IU/day  7.  All other preventative health recommendations will be managed by the patients Primary care

## 2018-07-25 NOTE — PATIENT INSTRUCTIONS
Patient Education        Learning About Natural Family Planning  What is natural family planning? Natural family planning is a way to find out which days of the month you are most likely to get pregnant. To prevent pregnancy, you do not have sex on those days. If you want to get pregnant, you have sex during those days. But a woman may use natural family planning and still not get the results she wants. This method may not work well for you if your periods are not regular. It also may not work well if you have problems keeping track of your periods or taking your temperature at the same time each day. How well does it work as birth control? Family planning takes a lot of effort. You must be very aware of your body. And you must track many things closely. It can be very hard to do \"exactly as directed. \" This type of family planning does not work better than other birth control methods. In the first year of use:  · When natural family planning is used exactly as directed, 5 women out of 100 have an unplanned pregnancy. · When it is not used exactly as directed, 24 women out of 100 have an unplanned pregnancy. How do you find out when you are likely to get pregnant? A woman who has regular periods has about 5 to 9 fertile days each month. These are the days when she can get pregnant. To find out when you are fertile, you must know when you release an egg (ovulate). There are several ways to find out when you are fertile. To get the result you want, you may need to use some of these methods at the same time. You should check your body changes using these methods for several months before you use them to avoid pregnancy. · In the calendar, or rhythm method, you write down when you start your period. This tells you how long your cycle is. It also tells you how regular it is. With this information, you can guess which days of the month you are most likely to be fertile.  This is between 9 and 17 days before your next period. This method works best if you have regular cycles. · In the basal body temperature (BBT) method, you take your temperature first thing in the morning every day. This gives you your BBT. This is your lowest temperature during the day. Your BBT goes down 1 to 2 days before ovulation. Then it goes back up 1 to 2 days after you ovulate. If you use care to track your BBT, you may be able to guess when you are fertile. · In the cervical mucus (Casey) method, you check the mucus in your vagina every day. You write down the amount, stickiness, and color of the mucus. The mucus changes during your menstrual cycle. If you track it, you may be able to guess when you are fertile. · In hormonal monitoring, you buy a kit that lets you check the amount of a hormone in your urine. The amount of the hormone tells you if you may be ovulating. · In the combined (symptothermal) method, you use your basal body temperature, changes in your cervical mucus, and a hormone test to guess when you ovulate. You also watch for signs of ovulation. These include tender breasts, belly pain, and mood changes. Be sure to tell your doctor about any health problems you have or medicines you take. He or she can help you choose the birth control method that is right for you. What are the advantages of natural family planning? · It may fit well with your Scientology or personal beliefs about birth control. · You are able to use a \"natural\" method of birth control. It doesn't use medicines, surgery, or other devices. · You are aware of your cycle and the changes it causes in your body. What are the disadvantages of natural family planning? · It does not always work very well. · It doesn't protect against sexually transmitted infections (STIs), such as herpes or HIV. If you're not sure if your sex partner might have an STI, use a condom to protect against disease.   · It may not work well when you have a lot of stress, have

## 2018-08-13 ENCOUNTER — HOSPITAL ENCOUNTER (OUTPATIENT)
Age: 28
Discharge: HOME | End: 2018-08-13
Payer: MEDICAID

## 2018-08-13 DIAGNOSIS — Z32.02 NEGATIVE PREGNANCY TEST: ICD-10-CM

## 2018-08-13 DIAGNOSIS — N91.2 AMENORRHEA: ICD-10-CM

## 2018-08-13 LAB — HCG QUANTITATIVE: ABNORMAL IU/L

## 2018-08-13 PROCEDURE — 84702 CHORIONIC GONADOTROPIN TEST: CPT

## 2018-08-13 PROCEDURE — 36415 COLL VENOUS BLD VENIPUNCTURE: CPT

## 2018-08-14 DIAGNOSIS — O36.80X0 ENCOUNTER TO DETERMINE FETAL VIABILITY OF PREGNANCY, SINGLE OR UNSPECIFIED FETUS: Primary | ICD-10-CM

## 2018-08-14 RX ORDER — PYRIDOXINE HCL (VITAMIN B6) 50 MG
50 TABLET ORAL 2 TIMES DAILY
Qty: 30 TABLET | Refills: 0 | Status: SHIPPED | OUTPATIENT
Start: 2018-08-14 | End: 2018-11-21

## 2018-08-15 ENCOUNTER — HOSPITAL ENCOUNTER (OUTPATIENT)
Dept: ULTRASOUND IMAGING | Age: 28
Discharge: HOME | End: 2018-08-17
Payer: MEDICAID

## 2018-08-15 DIAGNOSIS — O36.80X0 ENCOUNTER TO DETERMINE FETAL VIABILITY OF PREGNANCY, SINGLE OR UNSPECIFIED FETUS: ICD-10-CM

## 2018-08-15 PROCEDURE — 76801 OB US < 14 WKS SINGLE FETUS: CPT

## 2018-08-15 PROCEDURE — 76817 TRANSVAGINAL US OBSTETRIC: CPT

## 2018-08-16 ENCOUNTER — TELEPHONE (OUTPATIENT)
Dept: OBGYN CLINIC | Age: 28
End: 2018-08-16

## 2018-08-16 NOTE — TELEPHONE ENCOUNTER
Patient notified of US results, she is already taking PNV, has a new ob intake scheduled beginning of September.

## 2018-08-16 NOTE — TELEPHONE ENCOUNTER
----- Message from Froylan Venegas, Sho Santana sent at 8/16/2018  8:03 AM EDT -----  New OB appointment  PNV # 30 one po daily with 12 RF

## 2018-08-23 NOTE — FLOWSHEET NOTE
Discharge teaching and instructions completed. AVS reviewed. Printed prescriptions filled by outpatient pharmacy. Patient voiced understanding regarding prescriptions, follow up appointments, and care of self at home. Discharged home per wheelchair. I called the patient and let him know that all his pathology was negative. He can follow-up with Dr. Munoz in a couple of weeks.     ----- Message from Vilma Lazar RN sent at 8/23/2018 11:43 AM CDT -----  Contact: Erin patients wife  Post-op with provider closer to home??    -Rosy    ----- Message -----  From: Elvia Erickson  Sent: 8/23/2018  11:19 AM  To: Albert PANDYA Staff    Needs Advice    Reason for call:  Calling for further post instruction. Also, wants to know if patients initial po can be with an ENT closer to where the patient lives.         Communication Preference: [phone]

## 2018-08-30 ENCOUNTER — TELEPHONE (OUTPATIENT)
Dept: OBGYN CLINIC | Age: 28
End: 2018-08-30

## 2018-08-30 NOTE — TELEPHONE ENCOUNTER
pt called stating that she threw up this morning-it started out bright green and then turned to straight blood, she said it was kind of a lot.  Please call her back

## 2018-09-10 ENCOUNTER — HOSPITAL ENCOUNTER (OUTPATIENT)
Age: 28
Setting detail: SPECIMEN
Discharge: HOME | End: 2018-09-10
Payer: MEDICAID

## 2018-09-10 ENCOUNTER — INITIAL PRENATAL (OUTPATIENT)
Dept: OBGYN CLINIC | Age: 28
End: 2018-09-10
Payer: MEDICAID

## 2018-09-10 VITALS
HEART RATE: 78 BPM | BODY MASS INDEX: 23.34 KG/M2 | DIASTOLIC BLOOD PRESSURE: 70 MMHG | WEIGHT: 136 LBS | SYSTOLIC BLOOD PRESSURE: 114 MMHG

## 2018-09-10 DIAGNOSIS — Z3A.10 10 WEEKS GESTATION OF PREGNANCY: Primary | ICD-10-CM

## 2018-09-10 DIAGNOSIS — Z3A.10 10 WEEKS GESTATION OF PREGNANCY: ICD-10-CM

## 2018-09-10 DIAGNOSIS — Z34.90 SECOND PREGNANCY: ICD-10-CM

## 2018-09-10 DIAGNOSIS — Z86.32 HISTORY OF GESTATIONAL DIABETES MELLITUS (GDM): ICD-10-CM

## 2018-09-10 DIAGNOSIS — O09.91 HIGH-RISK PREGNANCY IN FIRST TRIMESTER: ICD-10-CM

## 2018-09-10 PROCEDURE — 87070 CULTURE OTHR SPECIMN AEROBIC: CPT

## 2018-09-10 PROCEDURE — 99213 OFFICE O/P EST LOW 20 MIN: CPT | Performed by: ADVANCED PRACTICE MIDWIFE

## 2018-09-10 PROCEDURE — 87591 N.GONORRHOEAE DNA AMP PROB: CPT

## 2018-09-10 PROCEDURE — 87491 CHLMYD TRACH DNA AMP PROBE: CPT

## 2018-09-10 NOTE — PROGRESS NOTES
Abi Toro  9/10/2018    YOB: 1990    9/10/2018   Patient's last menstrual period was 2018.  10w0d        Primary Care Physician: JANUARY Muñoz CNP        CC: Initial Prenatal Visit    Subjective:     Abi Toro is a 34 y.o. female      is being seen today for her first obstetrical visit. This is a planned pregnancy. She is at 10w0d  Her obstetrical history is significant for history of GDM. Relationship with FOB: significant other, living together. Patient does intend to breast feed. Pregnancy history fully reviewed. Mother's ethnicity:   Father's ethnicity:       Objective:   Blood pressure 114/70, pulse 78, weight 136 lb (61.7 kg), last menstrual period 2018, not currently breastfeeding. Obstetric History       T1      L1     SAB0   TAB1   Ectopic0   Molar0   Multiple0   Live Births1       # Outcome Date GA Lbr Roberto/2nd Weight Sex Delivery Anes PTL Lv   3 Current            2 Term 17 39w2d 02:19 / 03:04 7 lb 10 oz (3.459 kg) M Vag-Spont EPI N JERICHO      Name: Stefanie Sesay:  8                Apgar5: 9   1 TAB                   Past Medical History:   Diagnosis Date    Anemia 2018    Anxiety     Generalized anxiety disorder 10/6/2015    Gestational diabetes mellitus in pregnancy 2016     Past Surgical History:   Procedure Laterality Date    WISDOM TOOTH EXTRACTION        Social History     Social History    Marital status: Single     Spouse name: N/A    Number of children: N/A    Years of education: N/A     Occupational History    Not on file.      Social History Main Topics    Smoking status: Never Smoker    Smokeless tobacco: Never Used    Alcohol use No      Comment: rare     Drug use: No    Sexual activity: Yes     Partners: Male     Other Topics Concern    Not on file     Social History Narrative    No narrative on file     Family History   Problem well.    The patient was informed of a 2-4% risk of congenital anomalies in the general population. She was also informed that karyotyping is the only way to evaluate the fetus for genetic problems and genetic lethal anomalies. Chorionic villous sampling, amniocentesis and NIPT testing were also discussed with morbidity rates in detail. She declined the procedure. Route of delivery and counseling on vaginal, operative vaginal, and  sections were completed with the risks of each to both the patient as well as her baby. The possibility of a blood transfusion was discussed as well. The patient was not opposed to receiving a transfusion if needed. Nuchal translucency/Quad Evaluation and MSAFP single marker testing was reviewed in detail with attention to timing of testing and their windows. For patients beyond the gestational age for Nuchal translucency evaluation Quad testing was recommended. Timing for the Quad test was reviewed. Benefits of the above testing was reviewed. A second trimester amniocentesis was also made available to the patient. Risks, Benefits and non-invasive alternative testing was reviewed. The literature regarding a questionable link to pitocin augmentation and induction of labor, the assistance of labor contractions and the initiation of contractions to help delivery, have been reviewed with the patient regarding the increased potential of having a  with Attention Deficit Hyperactivity Disorder and or Autism. These two disorders and the ramifications of their impact on a child and the family caring for that child has been reviewed with the patient in detail. She was given the risks, benefits and alternatives of the use of this medication. She has agreed to its use in the delivery of her unborn child if needed at the time of delivery, Yes.     The patient was questioned in detail regarding any genetic misnomer history, chromosomal abnormalities, or learning disabilities in herself, the father of the baby or their families. SHE DENIED ANY HISTORY AS STATED ABOVE: Yes    Upon completion of the visit all questions were answered and the patients follow-up and testing schedule were reviewed. Prenatal vitamins were given. T-dap Vaccine recommendations reviewed with the patient. Patient notified of timing of vaccination 27-36 weeks gestation. Patient aware Vaccine is NOT Live. Yes.

## 2018-09-11 LAB
C TRACH DNA GENITAL QL NAA+PROBE: NEGATIVE
N. GONORRHOEAE DNA: NEGATIVE

## 2018-09-12 ENCOUNTER — TELEPHONE (OUTPATIENT)
Dept: OBGYN CLINIC | Age: 28
End: 2018-09-12

## 2018-09-13 LAB
CULTURE: ABNORMAL
Lab: ABNORMAL
SPECIMEN DESCRIPTION: ABNORMAL
STATUS: ABNORMAL

## 2018-09-14 ENCOUNTER — HOSPITAL ENCOUNTER (OUTPATIENT)
Age: 28
Discharge: HOME | End: 2018-09-14
Payer: MEDICAID

## 2018-09-14 DIAGNOSIS — Z3A.10 10 WEEKS GESTATION OF PREGNANCY: ICD-10-CM

## 2018-09-14 DIAGNOSIS — O09.91 HIGH-RISK PREGNANCY IN FIRST TRIMESTER: ICD-10-CM

## 2018-09-14 LAB
ABO/RH: NORMAL
ABSOLUTE EOS #: 0.1 K/UL (ref 0–0.4)
ABSOLUTE IMMATURE GRANULOCYTE: ABNORMAL K/UL (ref 0–0.3)
ABSOLUTE LYMPH #: 1.2 K/UL (ref 1–4.8)
ABSOLUTE MONO #: 0.4 K/UL (ref 0.1–1.3)
ANTIBODY SCREEN: NEGATIVE
BASOPHILS # BLD: 0 % (ref 0–2)
BASOPHILS ABSOLUTE: 0 K/UL (ref 0–0.2)
BILIRUBIN URINE: NEGATIVE
COLOR: YELLOW
COMMENT UA: NORMAL
DIFFERENTIAL TYPE: ABNORMAL
EOSINOPHILS RELATIVE PERCENT: 1 % (ref 0–4)
GLUCOSE ADMINISTRATION: NORMAL
GLUCOSE TOLERANCE SCREEN 50G: 116 MG/DL (ref 70–135)
GLUCOSE URINE: NEGATIVE
HCG QUANTITATIVE: ABNORMAL IU/L
HCT VFR BLD CALC: 36.2 % (ref 36–46)
HEMOGLOBIN: 12.3 G/DL (ref 12–16)
HEPATITIS B SURFACE ANTIGEN: NONREACTIVE
HIV AG/AB: NONREACTIVE
IMMATURE GRANULOCYTES: ABNORMAL %
KETONES, URINE: NEGATIVE
LEUKOCYTE ESTERASE, URINE: NEGATIVE
LYMPHOCYTES # BLD: 17 % (ref 24–44)
MCH RBC QN AUTO: 28.8 PG (ref 26–34)
MCHC RBC AUTO-ENTMCNC: 33.9 G/DL (ref 31–37)
MCV RBC AUTO: 84.9 FL (ref 80–100)
MONOCYTES # BLD: 6 % (ref 1–7)
NITRITE, URINE: NEGATIVE
NRBC AUTOMATED: ABNORMAL PER 100 WBC
PDW BLD-RTO: 13.5 % (ref 11.5–14.9)
PH UA: 7 (ref 5–8)
PLATELET # BLD: 189 K/UL (ref 150–450)
PLATELET ESTIMATE: ABNORMAL
PMV BLD AUTO: 8.1 FL (ref 6–12)
PROTEIN UA: NEGATIVE
RBC # BLD: 4.26 M/UL (ref 4–5.2)
RBC # BLD: ABNORMAL 10*6/UL
RUBV IGG SER QL: 269.7 IU/ML
SEG NEUTROPHILS: 76 % (ref 36–66)
SEGMENTED NEUTROPHILS ABSOLUTE COUNT: 5.4 K/UL (ref 1.3–9.1)
SPECIFIC GRAVITY UA: 1.01 (ref 1–1.03)
T. PALLIDUM, IGG: NONREACTIVE
TSH SERPL DL<=0.05 MIU/L-ACNC: 1.28 MIU/L (ref 0.3–5)
TURBIDITY: CLEAR
URINE HGB: NEGATIVE
UROBILINOGEN, URINE: NORMAL
WBC # BLD: 7.1 K/UL (ref 3.5–11)
WBC # BLD: ABNORMAL 10*3/UL

## 2018-09-14 PROCEDURE — 82950 GLUCOSE TEST: CPT

## 2018-09-14 PROCEDURE — 86901 BLOOD TYPING SEROLOGIC RH(D): CPT

## 2018-09-14 PROCEDURE — 86762 RUBELLA ANTIBODY: CPT

## 2018-09-14 PROCEDURE — 87340 HEPATITIS B SURFACE AG IA: CPT

## 2018-09-14 PROCEDURE — 86900 BLOOD TYPING SEROLOGIC ABO: CPT

## 2018-09-14 PROCEDURE — 36415 COLL VENOUS BLD VENIPUNCTURE: CPT

## 2018-09-14 PROCEDURE — 81003 URINALYSIS AUTO W/O SCOPE: CPT

## 2018-09-14 PROCEDURE — 86780 TREPONEMA PALLIDUM: CPT

## 2018-09-14 PROCEDURE — 86850 RBC ANTIBODY SCREEN: CPT

## 2018-09-14 PROCEDURE — 84443 ASSAY THYROID STIM HORMONE: CPT

## 2018-09-14 PROCEDURE — 85025 COMPLETE CBC W/AUTO DIFF WBC: CPT

## 2018-09-14 PROCEDURE — 84702 CHORIONIC GONADOTROPIN TEST: CPT

## 2018-09-14 PROCEDURE — 87389 HIV-1 AG W/HIV-1&-2 AB AG IA: CPT

## 2018-09-24 ENCOUNTER — ROUTINE PRENATAL (OUTPATIENT)
Dept: PERINATAL CARE | Age: 28
End: 2018-09-24
Payer: MEDICAID

## 2018-09-24 VITALS
DIASTOLIC BLOOD PRESSURE: 71 MMHG | HEIGHT: 64 IN | TEMPERATURE: 98.1 F | WEIGHT: 137 LBS | RESPIRATION RATE: 16 BRPM | BODY MASS INDEX: 23.39 KG/M2 | HEART RATE: 82 BPM | SYSTOLIC BLOOD PRESSURE: 113 MMHG

## 2018-09-24 DIAGNOSIS — Z3A.12 12 WEEKS GESTATION OF PREGNANCY: ICD-10-CM

## 2018-09-24 DIAGNOSIS — O36.80X0 ENCOUNTER TO DETERMINE FETAL VIABILITY OF PREGNANCY, SINGLE OR UNSPECIFIED FETUS: ICD-10-CM

## 2018-09-24 DIAGNOSIS — Z36.9 FIRST TRIMESTER SCREENING: Primary | ICD-10-CM

## 2018-09-24 PROBLEM — R63.4 UNEXPLAINED WEIGHT LOSS: Status: RESOLVED | Noted: 2018-06-28 | Resolved: 2018-09-24

## 2018-09-24 PROBLEM — Z34.90 SECOND PREGNANCY: Status: RESOLVED | Noted: 2018-09-10 | Resolved: 2018-09-24

## 2018-09-24 PROBLEM — O42.90 LEAKAGE OF AMNIOTIC FLUID: Status: RESOLVED | Noted: 2017-02-07 | Resolved: 2018-09-24

## 2018-09-24 PROCEDURE — 76801 OB US < 14 WKS SINGLE FETUS: CPT | Performed by: OBSTETRICS & GYNECOLOGY

## 2018-09-24 PROCEDURE — 76813 OB US NUCHAL MEAS 1 GEST: CPT | Performed by: OBSTETRICS & GYNECOLOGY

## 2018-09-27 ENCOUNTER — TELEPHONE (OUTPATIENT)
Dept: PERINATAL CARE | Age: 28
End: 2018-09-27

## 2018-10-03 PROBLEM — O09.899 HIGH RISK PREGNANCY WITH LOW PAPPA (PREGNANCY-ASSOCIATED PLASMA PROTEIN A): Status: ACTIVE | Noted: 2018-10-03

## 2018-10-03 PROBLEM — O28.0 HIGH RISK PREGNANCY WITH LOW PAPPA (PREGNANCY-ASSOCIATED PLASMA PROTEIN A): Status: ACTIVE | Noted: 2018-10-03

## 2018-10-09 ENCOUNTER — ROUTINE PRENATAL (OUTPATIENT)
Dept: OBGYN CLINIC | Age: 28
End: 2018-10-09
Payer: MEDICAID

## 2018-10-09 VITALS
WEIGHT: 136 LBS | HEART RATE: 82 BPM | BODY MASS INDEX: 23.34 KG/M2 | DIASTOLIC BLOOD PRESSURE: 70 MMHG | SYSTOLIC BLOOD PRESSURE: 116 MMHG

## 2018-10-09 DIAGNOSIS — O28.0 HIGH RISK PREGNANCY WITH LOW PAPPA (PREGNANCY-ASSOCIATED PLASMA PROTEIN A): ICD-10-CM

## 2018-10-09 DIAGNOSIS — Z86.32 HISTORY OF GESTATIONAL DIABETES MELLITUS (GDM): ICD-10-CM

## 2018-10-09 DIAGNOSIS — O09.899 HIGH RISK PREGNANCY WITH LOW PAPPA (PREGNANCY-ASSOCIATED PLASMA PROTEIN A): ICD-10-CM

## 2018-10-09 DIAGNOSIS — Z3A.14 14 WEEKS GESTATION OF PREGNANCY: Primary | ICD-10-CM

## 2018-10-09 PROCEDURE — G8484 FLU IMMUNIZE NO ADMIN: HCPCS | Performed by: ADVANCED PRACTICE MIDWIFE

## 2018-10-09 PROCEDURE — G8420 CALC BMI NORM PARAMETERS: HCPCS | Performed by: ADVANCED PRACTICE MIDWIFE

## 2018-10-09 PROCEDURE — 1036F TOBACCO NON-USER: CPT | Performed by: ADVANCED PRACTICE MIDWIFE

## 2018-10-09 PROCEDURE — 99213 OFFICE O/P EST LOW 20 MIN: CPT | Performed by: ADVANCED PRACTICE MIDWIFE

## 2018-10-09 PROCEDURE — G8427 DOCREV CUR MEDS BY ELIG CLIN: HCPCS | Performed by: ADVANCED PRACTICE MIDWIFE

## 2018-11-01 ENCOUNTER — ROUTINE PRENATAL (OUTPATIENT)
Dept: OBGYN CLINIC | Age: 28
End: 2018-11-01
Payer: MEDICAID

## 2018-11-01 VITALS
HEART RATE: 72 BPM | WEIGHT: 141 LBS | DIASTOLIC BLOOD PRESSURE: 62 MMHG | SYSTOLIC BLOOD PRESSURE: 110 MMHG | BODY MASS INDEX: 24.2 KG/M2

## 2018-11-01 DIAGNOSIS — D64.9 ANEMIA, UNSPECIFIED TYPE: ICD-10-CM

## 2018-11-01 DIAGNOSIS — F41.1 GENERALIZED ANXIETY DISORDER: ICD-10-CM

## 2018-11-01 DIAGNOSIS — O24.414 INSULIN CONTROLLED GESTATIONAL DIABETES MELLITUS (GDM) IN THIRD TRIMESTER: ICD-10-CM

## 2018-11-01 DIAGNOSIS — Z86.32 HISTORY OF GESTATIONAL DIABETES MELLITUS (GDM): ICD-10-CM

## 2018-11-01 DIAGNOSIS — Z3A.17 17 WEEKS GESTATION OF PREGNANCY: ICD-10-CM

## 2018-11-01 DIAGNOSIS — O28.0 HIGH RISK PREGNANCY WITH LOW PAPPA (PREGNANCY-ASSOCIATED PLASMA PROTEIN A): ICD-10-CM

## 2018-11-01 DIAGNOSIS — O09.92 HRP (HIGH RISK PREGNANCY), SECOND TRIMESTER: Primary | ICD-10-CM

## 2018-11-01 DIAGNOSIS — O09.899 HIGH RISK PREGNANCY WITH LOW PAPPA (PREGNANCY-ASSOCIATED PLASMA PROTEIN A): ICD-10-CM

## 2018-11-01 PROCEDURE — 99213 OFFICE O/P EST LOW 20 MIN: CPT | Performed by: OBSTETRICS & GYNECOLOGY

## 2018-11-01 PROCEDURE — G8420 CALC BMI NORM PARAMETERS: HCPCS | Performed by: OBSTETRICS & GYNECOLOGY

## 2018-11-01 PROCEDURE — G8484 FLU IMMUNIZE NO ADMIN: HCPCS | Performed by: OBSTETRICS & GYNECOLOGY

## 2018-11-01 PROCEDURE — 1036F TOBACCO NON-USER: CPT | Performed by: OBSTETRICS & GYNECOLOGY

## 2018-11-01 PROCEDURE — G8427 DOCREV CUR MEDS BY ELIG CLIN: HCPCS | Performed by: OBSTETRICS & GYNECOLOGY

## 2018-11-14 ENCOUNTER — HOSPITAL ENCOUNTER (OUTPATIENT)
Age: 28
Discharge: HOME | End: 2018-11-14
Payer: MEDICAID

## 2018-11-14 DIAGNOSIS — Z3A.17 17 WEEKS GESTATION OF PREGNANCY: ICD-10-CM

## 2018-11-14 DIAGNOSIS — O09.899 HIGH RISK PREGNANCY WITH LOW PAPPA (PREGNANCY-ASSOCIATED PLASMA PROTEIN A): ICD-10-CM

## 2018-11-14 DIAGNOSIS — O28.0 HIGH RISK PREGNANCY WITH LOW PAPPA (PREGNANCY-ASSOCIATED PLASMA PROTEIN A): ICD-10-CM

## 2018-11-14 DIAGNOSIS — O09.92 HRP (HIGH RISK PREGNANCY), SECOND TRIMESTER: ICD-10-CM

## 2018-11-14 PROCEDURE — 82105 ALPHA-FETOPROTEIN SERUM: CPT

## 2018-11-14 PROCEDURE — 36415 COLL VENOUS BLD VENIPUNCTURE: CPT

## 2018-11-16 LAB
AFP INTERPRETATION: NORMAL
AFP MOM: 0.76
AFP SPECIMEN: NORMAL
AFP: 41 NG/ML
DATE OF BIRTH: NORMAL
DATING METHOD: NORMAL
DETERMINED BY: NORMAL
DIABETIC: NO
DUE DATE: NORMAL
ESTIMATED DUE DATE: NORMAL
FAMILY HISTORY NTD: NO
GESTATIONAL AGE: NORMAL
INSULIN REQ DIABETES: NO
LAST MENSTRUAL PERIOD: NORMAL
MATERNAL AGE AT EDD: 27 YR
MATERNAL WEIGHT: 144
MONOCHORIONIC TWINS: NO
NUMBER OF FETUSES: NORMAL
PATIENT WEIGHT UNITS: NORMAL
PATIENT WEIGHT: NORMAL
RACE (MATERNAL): NORMAL
RACE: NORMAL
REPEAT SPECIMEN?: NO
SMOKING: NO
SMOKING: NO
VALPROIC/CARBAMAZEP: NO
ZZ NTE CLEAN UP: HISTORY: NO

## 2018-11-21 ENCOUNTER — ROUTINE PRENATAL (OUTPATIENT)
Dept: PERINATAL CARE | Age: 28
End: 2018-11-21
Payer: MEDICAID

## 2018-11-21 VITALS
RESPIRATION RATE: 16 BRPM | HEART RATE: 71 BPM | WEIGHT: 146 LBS | BODY MASS INDEX: 24.92 KG/M2 | DIASTOLIC BLOOD PRESSURE: 60 MMHG | HEIGHT: 64 IN | TEMPERATURE: 97.4 F | SYSTOLIC BLOOD PRESSURE: 101 MMHG

## 2018-11-21 DIAGNOSIS — O28.0 HIGH RISK PREGNANCY WITH LOW PAPPA (PREGNANCY-ASSOCIATED PLASMA PROTEIN A): Primary | ICD-10-CM

## 2018-11-21 DIAGNOSIS — Z3A.20 20 WEEKS GESTATION OF PREGNANCY: ICD-10-CM

## 2018-11-21 DIAGNOSIS — Z86.32 H/O GESTATIONAL DIABETES IN PRIOR PREGNANCY, CURRENTLY PREGNANT, SECOND TRIMESTER: ICD-10-CM

## 2018-11-21 DIAGNOSIS — Z36.86 ENCOUNTER FOR SCREENING FOR RISK OF PRE-TERM LABOR: ICD-10-CM

## 2018-11-21 DIAGNOSIS — O09.899 SHORT INTERVAL BETWEEN PREGNANCIES COMPLICATING PREGNANCY, ANTEPARTUM: ICD-10-CM

## 2018-11-21 DIAGNOSIS — O09.292 H/O GESTATIONAL DIABETES IN PRIOR PREGNANCY, CURRENTLY PREGNANT, SECOND TRIMESTER: ICD-10-CM

## 2018-11-21 DIAGNOSIS — O09.899 HIGH RISK PREGNANCY WITH LOW PAPPA (PREGNANCY-ASSOCIATED PLASMA PROTEIN A): Primary | ICD-10-CM

## 2018-11-21 PROCEDURE — G8419 CALC BMI OUT NRM PARAM NOF/U: HCPCS | Performed by: OBSTETRICS & GYNECOLOGY

## 2018-11-21 PROCEDURE — G8427 DOCREV CUR MEDS BY ELIG CLIN: HCPCS | Performed by: OBSTETRICS & GYNECOLOGY

## 2018-11-21 PROCEDURE — 76817 TRANSVAGINAL US OBSTETRIC: CPT | Performed by: OBSTETRICS & GYNECOLOGY

## 2018-11-21 PROCEDURE — 99214 OFFICE O/P EST MOD 30 MIN: CPT | Performed by: OBSTETRICS & GYNECOLOGY

## 2018-11-21 PROCEDURE — G8484 FLU IMMUNIZE NO ADMIN: HCPCS | Performed by: OBSTETRICS & GYNECOLOGY

## 2018-11-21 PROCEDURE — 76811 OB US DETAILED SNGL FETUS: CPT | Performed by: OBSTETRICS & GYNECOLOGY

## 2018-11-21 PROCEDURE — 1036F TOBACCO NON-USER: CPT | Performed by: OBSTETRICS & GYNECOLOGY

## 2018-11-29 ENCOUNTER — ROUTINE PRENATAL (OUTPATIENT)
Dept: OBGYN CLINIC | Age: 28
End: 2018-11-29
Payer: MEDICAID

## 2018-11-29 VITALS
SYSTOLIC BLOOD PRESSURE: 110 MMHG | HEART RATE: 74 BPM | DIASTOLIC BLOOD PRESSURE: 70 MMHG | BODY MASS INDEX: 25.06 KG/M2 | WEIGHT: 146 LBS

## 2018-11-29 DIAGNOSIS — O28.0 HIGH RISK PREGNANCY WITH LOW PAPPA (PREGNANCY-ASSOCIATED PLASMA PROTEIN A): ICD-10-CM

## 2018-11-29 DIAGNOSIS — Z86.32 HISTORY OF GESTATIONAL DIABETES MELLITUS (GDM): ICD-10-CM

## 2018-11-29 DIAGNOSIS — O09.899 HIGH RISK PREGNANCY WITH LOW PAPPA (PREGNANCY-ASSOCIATED PLASMA PROTEIN A): ICD-10-CM

## 2018-11-29 DIAGNOSIS — Z3A.21 21 WEEKS GESTATION OF PREGNANCY: Primary | ICD-10-CM

## 2018-11-29 PROCEDURE — G8427 DOCREV CUR MEDS BY ELIG CLIN: HCPCS | Performed by: ADVANCED PRACTICE MIDWIFE

## 2018-11-29 PROCEDURE — G8484 FLU IMMUNIZE NO ADMIN: HCPCS | Performed by: ADVANCED PRACTICE MIDWIFE

## 2018-11-29 PROCEDURE — 1036F TOBACCO NON-USER: CPT | Performed by: ADVANCED PRACTICE MIDWIFE

## 2018-11-29 PROCEDURE — G8419 CALC BMI OUT NRM PARAM NOF/U: HCPCS | Performed by: ADVANCED PRACTICE MIDWIFE

## 2018-11-29 PROCEDURE — 99213 OFFICE O/P EST LOW 20 MIN: CPT | Performed by: ADVANCED PRACTICE MIDWIFE

## 2018-11-30 ENCOUNTER — TELEPHONE (OUTPATIENT)
Dept: OBGYN CLINIC | Age: 28
End: 2018-11-30

## 2018-12-27 ENCOUNTER — ROUTINE PRENATAL (OUTPATIENT)
Dept: OBGYN CLINIC | Age: 28
End: 2018-12-27
Payer: MEDICAID

## 2018-12-27 VITALS
WEIGHT: 154 LBS | HEART RATE: 78 BPM | SYSTOLIC BLOOD PRESSURE: 100 MMHG | DIASTOLIC BLOOD PRESSURE: 64 MMHG | BODY MASS INDEX: 26.43 KG/M2

## 2018-12-27 DIAGNOSIS — O28.0 HIGH RISK PREGNANCY WITH LOW PAPPA (PREGNANCY-ASSOCIATED PLASMA PROTEIN A): Primary | ICD-10-CM

## 2018-12-27 DIAGNOSIS — O09.899 HIGH RISK PREGNANCY WITH LOW PAPPA (PREGNANCY-ASSOCIATED PLASMA PROTEIN A): Primary | ICD-10-CM

## 2018-12-27 DIAGNOSIS — Z86.32 HISTORY OF GESTATIONAL DIABETES MELLITUS (GDM): ICD-10-CM

## 2018-12-27 PROCEDURE — 99213 OFFICE O/P EST LOW 20 MIN: CPT | Performed by: ADVANCED PRACTICE MIDWIFE

## 2018-12-27 PROCEDURE — 1036F TOBACCO NON-USER: CPT | Performed by: ADVANCED PRACTICE MIDWIFE

## 2018-12-27 PROCEDURE — G8484 FLU IMMUNIZE NO ADMIN: HCPCS | Performed by: ADVANCED PRACTICE MIDWIFE

## 2018-12-27 PROCEDURE — G8427 DOCREV CUR MEDS BY ELIG CLIN: HCPCS | Performed by: ADVANCED PRACTICE MIDWIFE

## 2018-12-27 PROCEDURE — G8419 CALC BMI OUT NRM PARAM NOF/U: HCPCS | Performed by: ADVANCED PRACTICE MIDWIFE

## 2019-01-03 ENCOUNTER — ROUTINE PRENATAL (OUTPATIENT)
Dept: PERINATAL CARE | Age: 29
End: 2019-01-03
Payer: MEDICAID

## 2019-01-03 VITALS
HEART RATE: 76 BPM | DIASTOLIC BLOOD PRESSURE: 70 MMHG | TEMPERATURE: 98.1 F | HEIGHT: 64 IN | SYSTOLIC BLOOD PRESSURE: 110 MMHG | BODY MASS INDEX: 26.63 KG/M2 | WEIGHT: 156 LBS | RESPIRATION RATE: 16 BRPM

## 2019-01-03 DIAGNOSIS — Z86.32 H/O GESTATIONAL DIABETES IN PRIOR PREGNANCY, CURRENTLY PREGNANT, SECOND TRIMESTER: ICD-10-CM

## 2019-01-03 DIAGNOSIS — Z3A.26 26 WEEKS GESTATION OF PREGNANCY: ICD-10-CM

## 2019-01-03 DIAGNOSIS — O09.899 SHORT INTERVAL BETWEEN PREGNANCIES COMPLICATING PREGNANCY, ANTEPARTUM: ICD-10-CM

## 2019-01-03 DIAGNOSIS — O28.0 HIGH RISK PREGNANCY WITH LOW PAPPA (PREGNANCY-ASSOCIATED PLASMA PROTEIN A): Primary | ICD-10-CM

## 2019-01-03 DIAGNOSIS — Z36.4 ANTENATAL SCREENING FOR FETAL GROWTH RETARDATION USING ULTRASONICS: ICD-10-CM

## 2019-01-03 DIAGNOSIS — O09.899 HIGH RISK PREGNANCY WITH LOW PAPPA (PREGNANCY-ASSOCIATED PLASMA PROTEIN A): Primary | ICD-10-CM

## 2019-01-03 DIAGNOSIS — O09.292 H/O GESTATIONAL DIABETES IN PRIOR PREGNANCY, CURRENTLY PREGNANT, SECOND TRIMESTER: ICD-10-CM

## 2019-01-03 PROCEDURE — 76820 UMBILICAL ARTERY ECHO: CPT | Performed by: OBSTETRICS & GYNECOLOGY

## 2019-01-03 PROCEDURE — 76816 OB US FOLLOW-UP PER FETUS: CPT | Performed by: OBSTETRICS & GYNECOLOGY

## 2019-01-11 ENCOUNTER — HOSPITAL ENCOUNTER (OUTPATIENT)
Age: 29
Discharge: HOME | End: 2019-01-11
Payer: MEDICAID

## 2019-01-11 DIAGNOSIS — O28.0 HIGH RISK PREGNANCY WITH LOW PAPPA (PREGNANCY-ASSOCIATED PLASMA PROTEIN A): ICD-10-CM

## 2019-01-11 DIAGNOSIS — O09.899 HIGH RISK PREGNANCY WITH LOW PAPPA (PREGNANCY-ASSOCIATED PLASMA PROTEIN A): ICD-10-CM

## 2019-01-11 LAB
ABSOLUTE EOS #: 0 K/UL (ref 0–0.4)
ABSOLUTE IMMATURE GRANULOCYTE: ABNORMAL K/UL (ref 0–0.3)
ABSOLUTE LYMPH #: 1.4 K/UL (ref 1–4.8)
ABSOLUTE MONO #: 0.6 K/UL (ref 0.1–1.3)
BASOPHILS # BLD: 0 % (ref 0–2)
BASOPHILS ABSOLUTE: 0 K/UL (ref 0–0.2)
DIFFERENTIAL TYPE: ABNORMAL
EOSINOPHILS RELATIVE PERCENT: 0 % (ref 0–4)
GLUCOSE ADMINISTRATION: NORMAL
GLUCOSE TOLERANCE SCREEN 50G: 116 MG/DL (ref 70–135)
HCT VFR BLD CALC: 33.1 % (ref 36–46)
HEMOGLOBIN: 11.6 G/DL (ref 12–16)
IMMATURE GRANULOCYTES: ABNORMAL %
LYMPHOCYTES # BLD: 16 % (ref 24–44)
MCH RBC QN AUTO: 30.2 PG (ref 26–34)
MCHC RBC AUTO-ENTMCNC: 35 G/DL (ref 31–37)
MCV RBC AUTO: 86.2 FL (ref 80–100)
MONOCYTES # BLD: 7 % (ref 1–7)
NRBC AUTOMATED: ABNORMAL PER 100 WBC
PDW BLD-RTO: 13 % (ref 11.5–14.9)
PLATELET # BLD: 180 K/UL (ref 150–450)
PLATELET ESTIMATE: ABNORMAL
PMV BLD AUTO: 7.6 FL (ref 6–12)
RBC # BLD: 3.83 M/UL (ref 4–5.2)
RBC # BLD: ABNORMAL 10*6/UL
SEG NEUTROPHILS: 77 % (ref 36–66)
SEGMENTED NEUTROPHILS ABSOLUTE COUNT: 6.7 K/UL (ref 1.3–9.1)
WBC # BLD: 8.8 K/UL (ref 3.5–11)
WBC # BLD: ABNORMAL 10*3/UL

## 2019-01-11 PROCEDURE — 82950 GLUCOSE TEST: CPT

## 2019-01-11 PROCEDURE — 36415 COLL VENOUS BLD VENIPUNCTURE: CPT

## 2019-01-11 PROCEDURE — 85025 COMPLETE CBC W/AUTO DIFF WBC: CPT

## 2019-01-17 ENCOUNTER — HOSPITAL ENCOUNTER (OUTPATIENT)
Age: 29
Setting detail: SPECIMEN
Discharge: HOME | End: 2019-01-17
Payer: MEDICAID

## 2019-01-17 ENCOUNTER — ROUTINE PRENATAL (OUTPATIENT)
Dept: OBGYN CLINIC | Age: 29
End: 2019-01-17
Payer: MEDICAID

## 2019-01-17 VITALS
DIASTOLIC BLOOD PRESSURE: 62 MMHG | WEIGHT: 162 LBS | SYSTOLIC BLOOD PRESSURE: 102 MMHG | HEART RATE: 74 BPM | BODY MASS INDEX: 27.81 KG/M2

## 2019-01-17 DIAGNOSIS — O09.899 HIGH RISK PREGNANCY WITH LOW PAPPA (PREGNANCY-ASSOCIATED PLASMA PROTEIN A): ICD-10-CM

## 2019-01-17 DIAGNOSIS — O28.0 HIGH RISK PREGNANCY WITH LOW PAPPA (PREGNANCY-ASSOCIATED PLASMA PROTEIN A): ICD-10-CM

## 2019-01-17 DIAGNOSIS — O24.414 INSULIN CONTROLLED GESTATIONAL DIABETES MELLITUS (GDM) IN THIRD TRIMESTER: ICD-10-CM

## 2019-01-17 DIAGNOSIS — O09.899 SHORT INTERVAL BETWEEN PREGNANCIES COMPLICATING PREGNANCY, ANTEPARTUM: ICD-10-CM

## 2019-01-17 DIAGNOSIS — Z23 NEED FOR PROPHYLACTIC VACCINATION WITH COMBINED DIPHTHERIA-TETANUS-PERTUSSIS (DTP) VACCINE: ICD-10-CM

## 2019-01-17 DIAGNOSIS — D64.9 ANEMIA, UNSPECIFIED TYPE: ICD-10-CM

## 2019-01-17 DIAGNOSIS — O09.93 HIGH-RISK PREGNANCY IN THIRD TRIMESTER: Primary | ICD-10-CM

## 2019-01-17 DIAGNOSIS — Z86.32 HISTORY OF GESTATIONAL DIABETES MELLITUS (GDM): ICD-10-CM

## 2019-01-17 LAB
BILIRUBIN URINE: NEGATIVE
COLOR: YELLOW
COMMENT UA: NORMAL
GLUCOSE URINE: NEGATIVE
KETONES, URINE: NEGATIVE
LEUKOCYTE ESTERASE, URINE: NEGATIVE
NITRITE, URINE: NEGATIVE
PH UA: 7 (ref 5–8)
PROTEIN UA: NEGATIVE
SPECIFIC GRAVITY UA: 1 (ref 1–1.03)
TURBIDITY: CLEAR
URINE HGB: NEGATIVE
UROBILINOGEN, URINE: NORMAL

## 2019-01-17 PROCEDURE — G8428 CUR MEDS NOT DOCUMENT: HCPCS | Performed by: OBSTETRICS & GYNECOLOGY

## 2019-01-17 PROCEDURE — 99213 OFFICE O/P EST LOW 20 MIN: CPT | Performed by: OBSTETRICS & GYNECOLOGY

## 2019-01-17 PROCEDURE — 1036F TOBACCO NON-USER: CPT | Performed by: OBSTETRICS & GYNECOLOGY

## 2019-01-17 PROCEDURE — 90715 TDAP VACCINE 7 YRS/> IM: CPT | Performed by: OBSTETRICS & GYNECOLOGY

## 2019-01-17 PROCEDURE — G8419 CALC BMI OUT NRM PARAM NOF/U: HCPCS | Performed by: OBSTETRICS & GYNECOLOGY

## 2019-01-17 PROCEDURE — G8484 FLU IMMUNIZE NO ADMIN: HCPCS | Performed by: OBSTETRICS & GYNECOLOGY

## 2019-01-17 PROCEDURE — 81003 URINALYSIS AUTO W/O SCOPE: CPT

## 2019-01-17 PROCEDURE — 90471 IMMUNIZATION ADMIN: CPT | Performed by: OBSTETRICS & GYNECOLOGY

## 2019-01-29 ENCOUNTER — ROUTINE PRENATAL (OUTPATIENT)
Dept: OBGYN CLINIC | Age: 29
End: 2019-01-29
Payer: MEDICAID

## 2019-01-29 VITALS
HEART RATE: 76 BPM | WEIGHT: 162 LBS | DIASTOLIC BLOOD PRESSURE: 70 MMHG | SYSTOLIC BLOOD PRESSURE: 114 MMHG | BODY MASS INDEX: 27.81 KG/M2

## 2019-01-29 DIAGNOSIS — Z3A.30 30 WEEKS GESTATION OF PREGNANCY: Primary | ICD-10-CM

## 2019-01-29 DIAGNOSIS — O09.899 HIGH RISK PREGNANCY WITH LOW PAPPA (PREGNANCY-ASSOCIATED PLASMA PROTEIN A): ICD-10-CM

## 2019-01-29 DIAGNOSIS — Z86.32 HISTORY OF GESTATIONAL DIABETES MELLITUS (GDM): ICD-10-CM

## 2019-01-29 DIAGNOSIS — O28.0 HIGH RISK PREGNANCY WITH LOW PAPPA (PREGNANCY-ASSOCIATED PLASMA PROTEIN A): ICD-10-CM

## 2019-01-29 PROCEDURE — 1036F TOBACCO NON-USER: CPT | Performed by: ADVANCED PRACTICE MIDWIFE

## 2019-01-29 PROCEDURE — G8484 FLU IMMUNIZE NO ADMIN: HCPCS | Performed by: ADVANCED PRACTICE MIDWIFE

## 2019-01-29 PROCEDURE — G8419 CALC BMI OUT NRM PARAM NOF/U: HCPCS | Performed by: ADVANCED PRACTICE MIDWIFE

## 2019-01-29 PROCEDURE — G8427 DOCREV CUR MEDS BY ELIG CLIN: HCPCS | Performed by: ADVANCED PRACTICE MIDWIFE

## 2019-01-29 PROCEDURE — 99213 OFFICE O/P EST LOW 20 MIN: CPT | Performed by: ADVANCED PRACTICE MIDWIFE

## 2019-02-07 ENCOUNTER — TELEPHONE (OUTPATIENT)
Dept: OBGYN CLINIC | Age: 29
End: 2019-02-07

## 2019-02-07 RX ORDER — FLUCONAZOLE 150 MG/1
150 TABLET ORAL ONCE
Qty: 1 TABLET | Refills: 0 | Status: SHIPPED | OUTPATIENT
Start: 2019-02-07 | End: 2019-02-07

## 2019-02-12 ENCOUNTER — ROUTINE PRENATAL (OUTPATIENT)
Dept: OBGYN CLINIC | Age: 29
End: 2019-02-12
Payer: MEDICAID

## 2019-02-12 VITALS
WEIGHT: 166 LBS | HEART RATE: 82 BPM | SYSTOLIC BLOOD PRESSURE: 108 MMHG | BODY MASS INDEX: 28.49 KG/M2 | DIASTOLIC BLOOD PRESSURE: 66 MMHG

## 2019-02-12 DIAGNOSIS — Z86.32 HISTORY OF GESTATIONAL DIABETES MELLITUS (GDM): ICD-10-CM

## 2019-02-12 DIAGNOSIS — O09.93 HIGH-RISK PREGNANCY IN THIRD TRIMESTER: Primary | ICD-10-CM

## 2019-02-12 DIAGNOSIS — Z3A.32 32 WEEKS GESTATION OF PREGNANCY: ICD-10-CM

## 2019-02-12 DIAGNOSIS — O28.0 HIGH RISK PREGNANCY WITH LOW PAPPA (PREGNANCY-ASSOCIATED PLASMA PROTEIN A): ICD-10-CM

## 2019-02-12 DIAGNOSIS — O09.899 HIGH RISK PREGNANCY WITH LOW PAPPA (PREGNANCY-ASSOCIATED PLASMA PROTEIN A): ICD-10-CM

## 2019-02-12 PROCEDURE — G8419 CALC BMI OUT NRM PARAM NOF/U: HCPCS | Performed by: OBSTETRICS & GYNECOLOGY

## 2019-02-12 PROCEDURE — G8427 DOCREV CUR MEDS BY ELIG CLIN: HCPCS | Performed by: OBSTETRICS & GYNECOLOGY

## 2019-02-12 PROCEDURE — 99213 OFFICE O/P EST LOW 20 MIN: CPT | Performed by: OBSTETRICS & GYNECOLOGY

## 2019-02-12 PROCEDURE — 1036F TOBACCO NON-USER: CPT | Performed by: OBSTETRICS & GYNECOLOGY

## 2019-02-12 PROCEDURE — G8484 FLU IMMUNIZE NO ADMIN: HCPCS | Performed by: OBSTETRICS & GYNECOLOGY

## 2019-02-13 DIAGNOSIS — O09.93 HIGH-RISK PREGNANCY IN THIRD TRIMESTER: ICD-10-CM

## 2019-02-13 DIAGNOSIS — Z3A.32 32 WEEKS GESTATION OF PREGNANCY: ICD-10-CM

## 2019-02-13 DIAGNOSIS — O09.899 HIGH RISK PREGNANCY WITH LOW PAPPA (PREGNANCY-ASSOCIATED PLASMA PROTEIN A): ICD-10-CM

## 2019-02-13 DIAGNOSIS — O28.0 HIGH RISK PREGNANCY WITH LOW PAPPA (PREGNANCY-ASSOCIATED PLASMA PROTEIN A): ICD-10-CM

## 2019-02-15 ENCOUNTER — ROUTINE PRENATAL (OUTPATIENT)
Dept: OBGYN CLINIC | Age: 29
End: 2019-02-15
Payer: MEDICAID

## 2019-02-15 VITALS
WEIGHT: 166 LBS | SYSTOLIC BLOOD PRESSURE: 110 MMHG | DIASTOLIC BLOOD PRESSURE: 71 MMHG | BODY MASS INDEX: 28.49 KG/M2 | HEART RATE: 81 BPM

## 2019-02-15 DIAGNOSIS — Z3A.32 32 WEEKS GESTATION OF PREGNANCY: Primary | ICD-10-CM

## 2019-02-15 DIAGNOSIS — O28.0 HIGH RISK PREGNANCY WITH LOW PAPPA (PREGNANCY-ASSOCIATED PLASMA PROTEIN A): ICD-10-CM

## 2019-02-15 DIAGNOSIS — Z86.32 HISTORY OF GESTATIONAL DIABETES MELLITUS (GDM): ICD-10-CM

## 2019-02-15 DIAGNOSIS — O09.899 HIGH RISK PREGNANCY WITH LOW PAPPA (PREGNANCY-ASSOCIATED PLASMA PROTEIN A): ICD-10-CM

## 2019-02-15 PROCEDURE — 59025 FETAL NON-STRESS TEST: CPT | Performed by: OBSTETRICS & GYNECOLOGY

## 2019-02-18 ENCOUNTER — ROUTINE PRENATAL (OUTPATIENT)
Dept: PERINATAL CARE | Age: 29
End: 2019-02-18
Payer: MEDICAID

## 2019-02-18 VITALS
RESPIRATION RATE: 16 BRPM | BODY MASS INDEX: 28.51 KG/M2 | HEIGHT: 64 IN | HEART RATE: 88 BPM | TEMPERATURE: 98.3 F | SYSTOLIC BLOOD PRESSURE: 112 MMHG | DIASTOLIC BLOOD PRESSURE: 68 MMHG | WEIGHT: 167 LBS

## 2019-02-18 DIAGNOSIS — O28.0 HIGH RISK PREGNANCY WITH LOW PAPPA (PREGNANCY-ASSOCIATED PLASMA PROTEIN A): Primary | ICD-10-CM

## 2019-02-18 DIAGNOSIS — O09.899 SHORT INTERVAL BETWEEN PREGNANCIES COMPLICATING PREGNANCY, ANTEPARTUM: ICD-10-CM

## 2019-02-18 DIAGNOSIS — Z3A.33 33 WEEKS GESTATION OF PREGNANCY: ICD-10-CM

## 2019-02-18 DIAGNOSIS — Z13.89 ENCOUNTER FOR ROUTINE SCREENING FOR MALFORMATION USING ULTRASONICS: ICD-10-CM

## 2019-02-18 DIAGNOSIS — O09.899 HIGH RISK PREGNANCY WITH LOW PAPPA (PREGNANCY-ASSOCIATED PLASMA PROTEIN A): Primary | ICD-10-CM

## 2019-02-18 DIAGNOSIS — Z36.4 ANTENATAL SCREENING FOR FETAL GROWTH RETARDATION USING ULTRASONICS: ICD-10-CM

## 2019-02-18 PROCEDURE — 76820 UMBILICAL ARTERY ECHO: CPT | Performed by: OBSTETRICS & GYNECOLOGY

## 2019-02-18 PROCEDURE — 76805 OB US >/= 14 WKS SNGL FETUS: CPT | Performed by: OBSTETRICS & GYNECOLOGY

## 2019-02-18 PROCEDURE — 76818 FETAL BIOPHYS PROFILE W/NST: CPT | Performed by: OBSTETRICS & GYNECOLOGY

## 2019-02-21 ENCOUNTER — ROUTINE PRENATAL (OUTPATIENT)
Dept: OBGYN CLINIC | Age: 29
End: 2019-02-21
Payer: MEDICAID

## 2019-02-21 VITALS
HEART RATE: 84 BPM | DIASTOLIC BLOOD PRESSURE: 68 MMHG | BODY MASS INDEX: 28.49 KG/M2 | WEIGHT: 166 LBS | SYSTOLIC BLOOD PRESSURE: 108 MMHG

## 2019-02-21 DIAGNOSIS — O09.899 SHORT INTERVAL BETWEEN PREGNANCIES COMPLICATING PREGNANCY, ANTEPARTUM: ICD-10-CM

## 2019-02-21 DIAGNOSIS — O09.899 HIGH RISK PREGNANCY WITH LOW PAPPA (PREGNANCY-ASSOCIATED PLASMA PROTEIN A): ICD-10-CM

## 2019-02-21 DIAGNOSIS — Z3A.33 33 WEEKS GESTATION OF PREGNANCY: Primary | ICD-10-CM

## 2019-02-21 DIAGNOSIS — O28.0 HIGH RISK PREGNANCY WITH LOW PAPPA (PREGNANCY-ASSOCIATED PLASMA PROTEIN A): ICD-10-CM

## 2019-02-21 DIAGNOSIS — Z86.32 HISTORY OF GESTATIONAL DIABETES MELLITUS (GDM): ICD-10-CM

## 2019-02-21 PROCEDURE — G8419 CALC BMI OUT NRM PARAM NOF/U: HCPCS | Performed by: ADVANCED PRACTICE MIDWIFE

## 2019-02-21 PROCEDURE — G8427 DOCREV CUR MEDS BY ELIG CLIN: HCPCS | Performed by: ADVANCED PRACTICE MIDWIFE

## 2019-02-21 PROCEDURE — 59025 FETAL NON-STRESS TEST: CPT | Performed by: ADVANCED PRACTICE MIDWIFE

## 2019-02-21 PROCEDURE — G8484 FLU IMMUNIZE NO ADMIN: HCPCS | Performed by: ADVANCED PRACTICE MIDWIFE

## 2019-02-21 PROCEDURE — 1036F TOBACCO NON-USER: CPT | Performed by: ADVANCED PRACTICE MIDWIFE

## 2019-02-21 RX ORDER — AMOXICILLIN 500 MG/1
500 CAPSULE ORAL 3 TIMES DAILY
COMMUNITY
End: 2019-02-26 | Stop reason: ALTCHOICE

## 2019-02-26 ENCOUNTER — ROUTINE PRENATAL (OUTPATIENT)
Dept: PERINATAL CARE | Age: 29
End: 2019-02-26
Payer: MEDICAID

## 2019-02-26 VITALS
HEART RATE: 80 BPM | BODY MASS INDEX: 29.02 KG/M2 | SYSTOLIC BLOOD PRESSURE: 106 MMHG | RESPIRATION RATE: 16 BRPM | WEIGHT: 170 LBS | TEMPERATURE: 98 F | HEIGHT: 64 IN | DIASTOLIC BLOOD PRESSURE: 70 MMHG

## 2019-02-26 DIAGNOSIS — O09.899 SHORT INTERVAL BETWEEN PREGNANCIES COMPLICATING PREGNANCY, ANTEPARTUM: ICD-10-CM

## 2019-02-26 DIAGNOSIS — Z3A.34 34 WEEKS GESTATION OF PREGNANCY: ICD-10-CM

## 2019-02-26 DIAGNOSIS — O28.0 HIGH RISK PREGNANCY WITH LOW PAPPA (PREGNANCY-ASSOCIATED PLASMA PROTEIN A): Primary | ICD-10-CM

## 2019-02-26 DIAGNOSIS — O43.103 PLACENTAL ABNORMALITY IN THIRD TRIMESTER: ICD-10-CM

## 2019-02-26 DIAGNOSIS — O09.899 HIGH RISK PREGNANCY WITH LOW PAPPA (PREGNANCY-ASSOCIATED PLASMA PROTEIN A): Primary | ICD-10-CM

## 2019-02-26 PROCEDURE — 76818 FETAL BIOPHYS PROFILE W/NST: CPT | Performed by: OBSTETRICS & GYNECOLOGY

## 2019-02-26 PROCEDURE — 76820 UMBILICAL ARTERY ECHO: CPT | Performed by: OBSTETRICS & GYNECOLOGY

## 2019-02-26 PROCEDURE — 76815 OB US LIMITED FETUS(S): CPT | Performed by: OBSTETRICS & GYNECOLOGY

## 2019-02-28 ENCOUNTER — ROUTINE PRENATAL (OUTPATIENT)
Dept: OBGYN CLINIC | Age: 29
End: 2019-02-28
Payer: MEDICAID

## 2019-02-28 VITALS
HEART RATE: 78 BPM | BODY MASS INDEX: 29.01 KG/M2 | WEIGHT: 169 LBS | SYSTOLIC BLOOD PRESSURE: 106 MMHG | DIASTOLIC BLOOD PRESSURE: 66 MMHG

## 2019-02-28 DIAGNOSIS — Z3A.34 34 WEEKS GESTATION OF PREGNANCY: ICD-10-CM

## 2019-02-28 DIAGNOSIS — O28.0 HIGH RISK PREGNANCY WITH LOW PAPPA (PREGNANCY-ASSOCIATED PLASMA PROTEIN A): ICD-10-CM

## 2019-02-28 DIAGNOSIS — O09.93 HIGH-RISK PREGNANCY IN THIRD TRIMESTER: Primary | ICD-10-CM

## 2019-02-28 DIAGNOSIS — Z86.32 HISTORY OF GESTATIONAL DIABETES MELLITUS (GDM): ICD-10-CM

## 2019-02-28 DIAGNOSIS — O09.899 HIGH RISK PREGNANCY WITH LOW PAPPA (PREGNANCY-ASSOCIATED PLASMA PROTEIN A): ICD-10-CM

## 2019-02-28 PROCEDURE — G8427 DOCREV CUR MEDS BY ELIG CLIN: HCPCS | Performed by: NURSE PRACTITIONER

## 2019-02-28 PROCEDURE — G8484 FLU IMMUNIZE NO ADMIN: HCPCS | Performed by: NURSE PRACTITIONER

## 2019-02-28 PROCEDURE — 59025 FETAL NON-STRESS TEST: CPT | Performed by: NURSE PRACTITIONER

## 2019-02-28 PROCEDURE — G8419 CALC BMI OUT NRM PARAM NOF/U: HCPCS | Performed by: NURSE PRACTITIONER

## 2019-02-28 PROCEDURE — 1036F TOBACCO NON-USER: CPT | Performed by: NURSE PRACTITIONER

## 2019-03-05 ENCOUNTER — ROUTINE PRENATAL (OUTPATIENT)
Dept: PERINATAL CARE | Age: 29
End: 2019-03-05
Payer: MEDICAID

## 2019-03-05 VITALS
WEIGHT: 170 LBS | HEIGHT: 64 IN | BODY MASS INDEX: 29.02 KG/M2 | HEART RATE: 100 BPM | SYSTOLIC BLOOD PRESSURE: 106 MMHG | DIASTOLIC BLOOD PRESSURE: 68 MMHG | RESPIRATION RATE: 16 BRPM | TEMPERATURE: 98 F

## 2019-03-05 DIAGNOSIS — O09.899 HIGH RISK PREGNANCY WITH LOW PAPPA (PREGNANCY-ASSOCIATED PLASMA PROTEIN A): Primary | ICD-10-CM

## 2019-03-05 DIAGNOSIS — O28.0 HIGH RISK PREGNANCY WITH LOW PAPPA (PREGNANCY-ASSOCIATED PLASMA PROTEIN A): Primary | ICD-10-CM

## 2019-03-05 DIAGNOSIS — O43.103 PLACENTAL ABNORMALITY IN THIRD TRIMESTER: ICD-10-CM

## 2019-03-05 DIAGNOSIS — Z3A.35 35 WEEKS GESTATION OF PREGNANCY: ICD-10-CM

## 2019-03-05 DIAGNOSIS — O09.899 SHORT INTERVAL BETWEEN PREGNANCIES COMPLICATING PREGNANCY, ANTEPARTUM: ICD-10-CM

## 2019-03-05 PROCEDURE — 76820 UMBILICAL ARTERY ECHO: CPT | Performed by: OBSTETRICS & GYNECOLOGY

## 2019-03-05 PROCEDURE — 76815 OB US LIMITED FETUS(S): CPT | Performed by: OBSTETRICS & GYNECOLOGY

## 2019-03-05 PROCEDURE — 76818 FETAL BIOPHYS PROFILE W/NST: CPT | Performed by: OBSTETRICS & GYNECOLOGY

## 2019-03-05 RX ORDER — PENICILLIN V POTASSIUM 500 MG/1
500 TABLET ORAL 3 TIMES DAILY
COMMUNITY
Start: 2019-03-01 | End: 2019-03-19 | Stop reason: ALTCHOICE

## 2019-03-07 ENCOUNTER — ROUTINE PRENATAL (OUTPATIENT)
Dept: OBGYN CLINIC | Age: 29
End: 2019-03-07
Payer: MEDICAID

## 2019-03-07 VITALS
BODY MASS INDEX: 29.01 KG/M2 | HEART RATE: 91 BPM | SYSTOLIC BLOOD PRESSURE: 106 MMHG | WEIGHT: 169 LBS | DIASTOLIC BLOOD PRESSURE: 70 MMHG

## 2019-03-07 DIAGNOSIS — O09.93 HRP (HIGH RISK PREGNANCY), THIRD TRIMESTER: Primary | ICD-10-CM

## 2019-03-07 DIAGNOSIS — Z86.32 HISTORY OF GESTATIONAL DIABETES MELLITUS (GDM): ICD-10-CM

## 2019-03-07 DIAGNOSIS — Z3A.35 35 WEEKS GESTATION OF PREGNANCY: ICD-10-CM

## 2019-03-07 DIAGNOSIS — O28.0 HIGH RISK PREGNANCY WITH LOW PAPPA (PREGNANCY-ASSOCIATED PLASMA PROTEIN A): ICD-10-CM

## 2019-03-07 DIAGNOSIS — O09.899 HIGH RISK PREGNANCY WITH LOW PAPPA (PREGNANCY-ASSOCIATED PLASMA PROTEIN A): ICD-10-CM

## 2019-03-07 PROCEDURE — G8419 CALC BMI OUT NRM PARAM NOF/U: HCPCS | Performed by: OBSTETRICS & GYNECOLOGY

## 2019-03-07 PROCEDURE — 1036F TOBACCO NON-USER: CPT | Performed by: OBSTETRICS & GYNECOLOGY

## 2019-03-07 PROCEDURE — 59025 FETAL NON-STRESS TEST: CPT | Performed by: OBSTETRICS & GYNECOLOGY

## 2019-03-07 PROCEDURE — G8484 FLU IMMUNIZE NO ADMIN: HCPCS | Performed by: OBSTETRICS & GYNECOLOGY

## 2019-03-07 PROCEDURE — G8427 DOCREV CUR MEDS BY ELIG CLIN: HCPCS | Performed by: OBSTETRICS & GYNECOLOGY

## 2019-03-12 ENCOUNTER — ROUTINE PRENATAL (OUTPATIENT)
Dept: PERINATAL CARE | Age: 29
End: 2019-03-12
Payer: MEDICAID

## 2019-03-12 VITALS
HEIGHT: 64 IN | RESPIRATION RATE: 16 BRPM | SYSTOLIC BLOOD PRESSURE: 110 MMHG | TEMPERATURE: 97.7 F | BODY MASS INDEX: 29.37 KG/M2 | WEIGHT: 172 LBS | HEART RATE: 103 BPM | DIASTOLIC BLOOD PRESSURE: 73 MMHG

## 2019-03-12 DIAGNOSIS — O09.899 HIGH RISK PREGNANCY WITH LOW PAPPA (PREGNANCY-ASSOCIATED PLASMA PROTEIN A): Primary | ICD-10-CM

## 2019-03-12 DIAGNOSIS — O09.899 SHORT INTERVAL BETWEEN PREGNANCIES COMPLICATING PREGNANCY, ANTEPARTUM: ICD-10-CM

## 2019-03-12 DIAGNOSIS — Z36.4 ANTENATAL SCREENING FOR FETAL GROWTH RETARDATION USING ULTRASONICS: ICD-10-CM

## 2019-03-12 DIAGNOSIS — O28.0 HIGH RISK PREGNANCY WITH LOW PAPPA (PREGNANCY-ASSOCIATED PLASMA PROTEIN A): Primary | ICD-10-CM

## 2019-03-12 DIAGNOSIS — Z3A.36 36 WEEKS GESTATION OF PREGNANCY: ICD-10-CM

## 2019-03-12 PROCEDURE — 76816 OB US FOLLOW-UP PER FETUS: CPT | Performed by: OBSTETRICS & GYNECOLOGY

## 2019-03-12 PROCEDURE — 76820 UMBILICAL ARTERY ECHO: CPT | Performed by: OBSTETRICS & GYNECOLOGY

## 2019-03-12 PROCEDURE — 76818 FETAL BIOPHYS PROFILE W/NST: CPT | Performed by: OBSTETRICS & GYNECOLOGY

## 2019-03-14 ENCOUNTER — ROUTINE PRENATAL (OUTPATIENT)
Dept: OBGYN CLINIC | Age: 29
End: 2019-03-14
Payer: MEDICAID

## 2019-03-14 ENCOUNTER — HOSPITAL ENCOUNTER (OUTPATIENT)
Age: 29
Setting detail: SPECIMEN
Discharge: HOME | End: 2019-03-14
Payer: MEDICAID

## 2019-03-14 VITALS
HEART RATE: 94 BPM | BODY MASS INDEX: 29.52 KG/M2 | DIASTOLIC BLOOD PRESSURE: 71 MMHG | SYSTOLIC BLOOD PRESSURE: 116 MMHG | WEIGHT: 172 LBS

## 2019-03-14 DIAGNOSIS — O09.93 HIGH-RISK PREGNANCY IN THIRD TRIMESTER: Primary | ICD-10-CM

## 2019-03-14 DIAGNOSIS — O28.0 HIGH RISK PREGNANCY WITH LOW PAPPA (PREGNANCY-ASSOCIATED PLASMA PROTEIN A): ICD-10-CM

## 2019-03-14 DIAGNOSIS — Z86.32 HISTORY OF GESTATIONAL DIABETES MELLITUS (GDM): ICD-10-CM

## 2019-03-14 DIAGNOSIS — O09.899 HIGH RISK PREGNANCY WITH LOW PAPPA (PREGNANCY-ASSOCIATED PLASMA PROTEIN A): ICD-10-CM

## 2019-03-14 DIAGNOSIS — Z3A.36 36 WEEKS GESTATION OF PREGNANCY: ICD-10-CM

## 2019-03-14 PROCEDURE — 87081 CULTURE SCREEN ONLY: CPT

## 2019-03-14 PROCEDURE — 1036F TOBACCO NON-USER: CPT | Performed by: OBSTETRICS & GYNECOLOGY

## 2019-03-14 PROCEDURE — G8419 CALC BMI OUT NRM PARAM NOF/U: HCPCS | Performed by: OBSTETRICS & GYNECOLOGY

## 2019-03-14 PROCEDURE — G8484 FLU IMMUNIZE NO ADMIN: HCPCS | Performed by: OBSTETRICS & GYNECOLOGY

## 2019-03-14 PROCEDURE — G8427 DOCREV CUR MEDS BY ELIG CLIN: HCPCS | Performed by: OBSTETRICS & GYNECOLOGY

## 2019-03-14 PROCEDURE — 59025 FETAL NON-STRESS TEST: CPT | Performed by: OBSTETRICS & GYNECOLOGY

## 2019-03-17 LAB
CULTURE: NORMAL
Lab: NORMAL
SPECIMEN DESCRIPTION: NORMAL

## 2019-03-19 ENCOUNTER — ROUTINE PRENATAL (OUTPATIENT)
Dept: PERINATAL CARE | Age: 29
End: 2019-03-19
Payer: MEDICAID

## 2019-03-19 VITALS
BODY MASS INDEX: 30.05 KG/M2 | HEIGHT: 64 IN | HEART RATE: 88 BPM | TEMPERATURE: 98.8 F | WEIGHT: 176 LBS | SYSTOLIC BLOOD PRESSURE: 122 MMHG | RESPIRATION RATE: 16 BRPM | DIASTOLIC BLOOD PRESSURE: 76 MMHG

## 2019-03-19 DIAGNOSIS — Z3A.37 37 WEEKS GESTATION OF PREGNANCY: ICD-10-CM

## 2019-03-19 DIAGNOSIS — O09.899 HIGH RISK PREGNANCY WITH LOW PAPPA (PREGNANCY-ASSOCIATED PLASMA PROTEIN A): Primary | ICD-10-CM

## 2019-03-19 DIAGNOSIS — O09.899 SHORT INTERVAL BETWEEN PREGNANCIES COMPLICATING PREGNANCY, ANTEPARTUM: ICD-10-CM

## 2019-03-19 DIAGNOSIS — O43.103 PLACENTAL ABNORMALITY IN THIRD TRIMESTER: ICD-10-CM

## 2019-03-19 DIAGNOSIS — O28.0 HIGH RISK PREGNANCY WITH LOW PAPPA (PREGNANCY-ASSOCIATED PLASMA PROTEIN A): Primary | ICD-10-CM

## 2019-03-19 PROCEDURE — 76815 OB US LIMITED FETUS(S): CPT | Performed by: OBSTETRICS & GYNECOLOGY

## 2019-03-19 PROCEDURE — 76818 FETAL BIOPHYS PROFILE W/NST: CPT | Performed by: OBSTETRICS & GYNECOLOGY

## 2019-03-19 PROCEDURE — 76820 UMBILICAL ARTERY ECHO: CPT | Performed by: OBSTETRICS & GYNECOLOGY

## 2019-03-21 ENCOUNTER — ROUTINE PRENATAL (OUTPATIENT)
Dept: OBGYN CLINIC | Age: 29
End: 2019-03-21
Payer: MEDICAID

## 2019-03-21 VITALS
BODY MASS INDEX: 29.87 KG/M2 | WEIGHT: 174 LBS | SYSTOLIC BLOOD PRESSURE: 118 MMHG | DIASTOLIC BLOOD PRESSURE: 75 MMHG | HEART RATE: 103 BPM

## 2019-03-21 DIAGNOSIS — O09.93 HIGH-RISK PREGNANCY IN THIRD TRIMESTER: Primary | ICD-10-CM

## 2019-03-21 DIAGNOSIS — O28.0 HIGH RISK PREGNANCY WITH LOW PAPPA (PREGNANCY-ASSOCIATED PLASMA PROTEIN A): ICD-10-CM

## 2019-03-21 DIAGNOSIS — O09.899 HIGH RISK PREGNANCY WITH LOW PAPPA (PREGNANCY-ASSOCIATED PLASMA PROTEIN A): ICD-10-CM

## 2019-03-21 DIAGNOSIS — Z3A.37 37 WEEKS GESTATION OF PREGNANCY: ICD-10-CM

## 2019-03-21 DIAGNOSIS — Z86.32 HISTORY OF GESTATIONAL DIABETES MELLITUS (GDM): ICD-10-CM

## 2019-03-21 PROCEDURE — G8484 FLU IMMUNIZE NO ADMIN: HCPCS | Performed by: OBSTETRICS & GYNECOLOGY

## 2019-03-21 PROCEDURE — 59025 FETAL NON-STRESS TEST: CPT | Performed by: OBSTETRICS & GYNECOLOGY

## 2019-03-21 PROCEDURE — G8427 DOCREV CUR MEDS BY ELIG CLIN: HCPCS | Performed by: OBSTETRICS & GYNECOLOGY

## 2019-03-21 PROCEDURE — G8417 CALC BMI ABV UP PARAM F/U: HCPCS | Performed by: OBSTETRICS & GYNECOLOGY

## 2019-03-21 PROCEDURE — 1036F TOBACCO NON-USER: CPT | Performed by: OBSTETRICS & GYNECOLOGY

## 2019-03-24 ENCOUNTER — HOSPITAL ENCOUNTER (OUTPATIENT)
Age: 29
Discharge: HOME | End: 2019-03-24
Attending: OBSTETRICS & GYNECOLOGY | Admitting: OBSTETRICS & GYNECOLOGY
Payer: MEDICAID

## 2019-03-24 VITALS
HEART RATE: 126 BPM | BODY MASS INDEX: 29.71 KG/M2 | HEIGHT: 64 IN | TEMPERATURE: 98.5 F | DIASTOLIC BLOOD PRESSURE: 80 MMHG | WEIGHT: 174 LBS | RESPIRATION RATE: 18 BRPM | SYSTOLIC BLOOD PRESSURE: 123 MMHG

## 2019-03-24 PROBLEM — O09.93 HRP (HIGH RISK PREGNANCY), THIRD TRIMESTER: Status: ACTIVE | Noted: 2019-03-24

## 2019-03-24 LAB
-: ABNORMAL
ABSOLUTE EOS #: 0 K/UL (ref 0–0.4)
ABSOLUTE IMMATURE GRANULOCYTE: ABNORMAL K/UL (ref 0–0.3)
ABSOLUTE LYMPH #: 0.9 K/UL (ref 1–4.8)
ABSOLUTE MONO #: 0.4 K/UL (ref 0.1–1.3)
ALBUMIN SERPL-MCNC: 3.5 G/DL (ref 3.5–5.2)
ALBUMIN/GLOBULIN RATIO: ABNORMAL (ref 1–2.5)
ALP BLD-CCNC: 196 U/L (ref 35–104)
ALT SERPL-CCNC: 15 U/L (ref 5–33)
AMORPHOUS: ABNORMAL
ANION GAP SERPL CALCULATED.3IONS-SCNC: 15 MMOL/L (ref 9–17)
AST SERPL-CCNC: 24 U/L
BACTERIA: ABNORMAL
BASOPHILS # BLD: 0 % (ref 0–2)
BASOPHILS ABSOLUTE: 0 K/UL (ref 0–0.2)
BILIRUB SERPL-MCNC: 0.81 MG/DL (ref 0.3–1.2)
BILIRUBIN URINE: NEGATIVE
BUN BLDV-MCNC: 10 MG/DL (ref 6–20)
BUN/CREAT BLD: ABNORMAL (ref 9–20)
CALCIUM SERPL-MCNC: 8.7 MG/DL (ref 8.6–10.4)
CASTS UA: ABNORMAL /LPF
CHLORIDE BLD-SCNC: 105 MMOL/L (ref 98–107)
CO2: 21 MMOL/L (ref 20–31)
COLOR: YELLOW
COMMENT UA: ABNORMAL
CREAT SERPL-MCNC: 0.46 MG/DL (ref 0.5–0.9)
CRYSTALS, UA: ABNORMAL /HPF
DIFFERENTIAL TYPE: ABNORMAL
DIRECT EXAM: NORMAL
EOSINOPHILS RELATIVE PERCENT: 0 % (ref 0–4)
EPITHELIAL CELLS UA: ABNORMAL /HPF
GFR AFRICAN AMERICAN: >60 ML/MIN
GFR NON-AFRICAN AMERICAN: >60 ML/MIN
GFR SERPL CREATININE-BSD FRML MDRD: ABNORMAL ML/MIN/{1.73_M2}
GFR SERPL CREATININE-BSD FRML MDRD: ABNORMAL ML/MIN/{1.73_M2}
GLUCOSE BLD-MCNC: 99 MG/DL (ref 70–99)
GLUCOSE URINE: NEGATIVE
HCT VFR BLD CALC: 40.8 % (ref 36–46)
HEMOGLOBIN: 13.7 G/DL (ref 12–16)
IMMATURE GRANULOCYTES: ABNORMAL %
KETONES, URINE: ABNORMAL
LEUKOCYTE ESTERASE, URINE: ABNORMAL
LYMPHOCYTES # BLD: 8 % (ref 24–44)
Lab: NORMAL
MCH RBC QN AUTO: 29.2 PG (ref 26–34)
MCHC RBC AUTO-ENTMCNC: 33.6 G/DL (ref 31–37)
MCV RBC AUTO: 87.1 FL (ref 80–100)
MONOCYTES # BLD: 3 % (ref 1–7)
MUCUS: ABNORMAL
NITRITE, URINE: NEGATIVE
NRBC AUTOMATED: ABNORMAL PER 100 WBC
OTHER OBSERVATIONS UA: ABNORMAL
PDW BLD-RTO: 13.3 % (ref 11.5–14.9)
PH UA: 7 (ref 5–8)
PLATELET # BLD: 237 K/UL (ref 150–450)
PLATELET ESTIMATE: ABNORMAL
PMV BLD AUTO: 8.6 FL (ref 6–12)
POTASSIUM SERPL-SCNC: 4.4 MMOL/L (ref 3.7–5.3)
PROTEIN UA: ABNORMAL
RBC # BLD: 4.68 M/UL (ref 4–5.2)
RBC # BLD: ABNORMAL 10*6/UL
RBC UA: ABNORMAL /HPF
RENAL EPITHELIAL, UA: ABNORMAL /HPF
SEG NEUTROPHILS: 89 % (ref 36–66)
SEGMENTED NEUTROPHILS ABSOLUTE COUNT: 10.8 K/UL (ref 1.3–9.1)
SODIUM BLD-SCNC: 141 MMOL/L (ref 135–144)
SPECIFIC GRAVITY UA: 1.02 (ref 1–1.03)
SPECIMEN DESCRIPTION: NORMAL
TOTAL PROTEIN: 6.6 G/DL (ref 6.4–8.3)
TRICHOMONAS: ABNORMAL
TURBIDITY: ABNORMAL
URINE HGB: NEGATIVE
UROBILINOGEN, URINE: NORMAL
WBC # BLD: 12.1 K/UL (ref 3.5–11)
WBC # BLD: ABNORMAL 10*3/UL
WBC UA: ABNORMAL /HPF
YEAST: ABNORMAL

## 2019-03-24 PROCEDURE — 81001 URINALYSIS AUTO W/SCOPE: CPT

## 2019-03-24 PROCEDURE — 99213 OFFICE O/P EST LOW 20 MIN: CPT

## 2019-03-24 PROCEDURE — 96374 THER/PROPH/DIAG INJ IV PUSH: CPT

## 2019-03-24 PROCEDURE — 80053 COMPREHEN METABOLIC PANEL: CPT

## 2019-03-24 PROCEDURE — 87804 INFLUENZA ASSAY W/OPTIC: CPT

## 2019-03-24 PROCEDURE — 87086 URINE CULTURE/COLONY COUNT: CPT

## 2019-03-24 PROCEDURE — 96361 HYDRATE IV INFUSION ADD-ON: CPT

## 2019-03-24 PROCEDURE — 36415 COLL VENOUS BLD VENIPUNCTURE: CPT

## 2019-03-24 PROCEDURE — 96372 THER/PROPH/DIAG INJ SC/IM: CPT

## 2019-03-24 PROCEDURE — 96360 HYDRATION IV INFUSION INIT: CPT

## 2019-03-24 PROCEDURE — 85025 COMPLETE CBC W/AUTO DIFF WBC: CPT

## 2019-03-24 PROCEDURE — 6360000002 HC RX W HCPCS: Performed by: OBSTETRICS & GYNECOLOGY

## 2019-03-24 PROCEDURE — 2580000003 HC RX 258: Performed by: OBSTETRICS & GYNECOLOGY

## 2019-03-24 RX ORDER — ONDANSETRON 4 MG/1
4 TABLET, FILM COATED ORAL EVERY 8 HOURS PRN
Qty: 30 TABLET | Refills: 0 | Status: SHIPPED | OUTPATIENT
Start: 2019-03-24 | End: 2019-03-29

## 2019-03-24 RX ORDER — SODIUM CHLORIDE, SODIUM LACTATE, POTASSIUM CHLORIDE, AND CALCIUM CHLORIDE .6; .31; .03; .02 G/100ML; G/100ML; G/100ML; G/100ML
1000 INJECTION, SOLUTION INTRAVENOUS ONCE
Status: COMPLETED | OUTPATIENT
Start: 2019-03-24 | End: 2019-03-24

## 2019-03-24 RX ORDER — ONDANSETRON 2 MG/ML
4 INJECTION INTRAMUSCULAR; INTRAVENOUS EVERY 6 HOURS PRN
Status: DISCONTINUED | OUTPATIENT
Start: 2019-03-24 | End: 2019-03-24 | Stop reason: HOSPADM

## 2019-03-24 RX ORDER — ACETAMINOPHEN 325 MG/1
650 TABLET ORAL EVERY 4 HOURS PRN
Status: DISCONTINUED | OUTPATIENT
Start: 2019-03-24 | End: 2019-03-24 | Stop reason: HOSPADM

## 2019-03-24 RX ORDER — PROMETHAZINE HYDROCHLORIDE 25 MG/ML
25 INJECTION, SOLUTION INTRAMUSCULAR; INTRAVENOUS ONCE
Status: COMPLETED | OUTPATIENT
Start: 2019-03-24 | End: 2019-03-24

## 2019-03-24 RX ADMIN — SODIUM CHLORIDE, POTASSIUM CHLORIDE, SODIUM LACTATE AND CALCIUM CHLORIDE 1000 ML: 600; 310; 30; 20 INJECTION, SOLUTION INTRAVENOUS at 11:07

## 2019-03-24 RX ADMIN — PROMETHAZINE HYDROCHLORIDE 25 MG: 25 INJECTION INTRAMUSCULAR; INTRAVENOUS at 13:05

## 2019-03-24 RX ADMIN — ONDANSETRON 4 MG: 2 INJECTION INTRAMUSCULAR; INTRAVENOUS at 11:07

## 2019-03-24 ASSESSMENT — PAIN DESCRIPTION - DESCRIPTORS: DESCRIPTORS: CRAMPING

## 2019-03-25 LAB
CULTURE: NORMAL
Lab: NORMAL
SPECIMEN DESCRIPTION: NORMAL

## 2019-03-26 ENCOUNTER — ROUTINE PRENATAL (OUTPATIENT)
Dept: PERINATAL CARE | Age: 29
End: 2019-03-26
Payer: MEDICAID

## 2019-03-26 VITALS
DIASTOLIC BLOOD PRESSURE: 74 MMHG | WEIGHT: 176 LBS | BODY MASS INDEX: 30.05 KG/M2 | SYSTOLIC BLOOD PRESSURE: 114 MMHG | HEIGHT: 64 IN | TEMPERATURE: 97.5 F | HEART RATE: 76 BPM | RESPIRATION RATE: 16 BRPM

## 2019-03-26 DIAGNOSIS — O43.103 PLACENTAL ABNORMALITY IN THIRD TRIMESTER: ICD-10-CM

## 2019-03-26 DIAGNOSIS — Z3A.38 38 WEEKS GESTATION OF PREGNANCY: ICD-10-CM

## 2019-03-26 DIAGNOSIS — O09.899 SHORT INTERVAL BETWEEN PREGNANCIES COMPLICATING PREGNANCY, ANTEPARTUM: ICD-10-CM

## 2019-03-26 DIAGNOSIS — O28.0 HIGH RISK PREGNANCY WITH LOW PAPPA (PREGNANCY-ASSOCIATED PLASMA PROTEIN A): Primary | ICD-10-CM

## 2019-03-26 DIAGNOSIS — O09.899 HIGH RISK PREGNANCY WITH LOW PAPPA (PREGNANCY-ASSOCIATED PLASMA PROTEIN A): Primary | ICD-10-CM

## 2019-03-26 PROCEDURE — 76820 UMBILICAL ARTERY ECHO: CPT | Performed by: OBSTETRICS & GYNECOLOGY

## 2019-03-26 PROCEDURE — 76815 OB US LIMITED FETUS(S): CPT | Performed by: OBSTETRICS & GYNECOLOGY

## 2019-03-26 PROCEDURE — 76818 FETAL BIOPHYS PROFILE W/NST: CPT | Performed by: OBSTETRICS & GYNECOLOGY

## 2019-03-28 ENCOUNTER — ROUTINE PRENATAL (OUTPATIENT)
Dept: OBGYN CLINIC | Age: 29
End: 2019-03-28
Payer: MEDICAID

## 2019-03-28 VITALS
WEIGHT: 177 LBS | HEART RATE: 86 BPM | SYSTOLIC BLOOD PRESSURE: 121 MMHG | BODY MASS INDEX: 30.38 KG/M2 | DIASTOLIC BLOOD PRESSURE: 76 MMHG

## 2019-03-28 DIAGNOSIS — Z3A.38 38 WEEKS GESTATION OF PREGNANCY: ICD-10-CM

## 2019-03-28 DIAGNOSIS — Z86.32 HISTORY OF GESTATIONAL DIABETES MELLITUS (GDM): ICD-10-CM

## 2019-03-28 DIAGNOSIS — O09.899 HIGH RISK PREGNANCY WITH LOW PAPPA (PREGNANCY-ASSOCIATED PLASMA PROTEIN A): Primary | ICD-10-CM

## 2019-03-28 DIAGNOSIS — O28.0 HIGH RISK PREGNANCY WITH LOW PAPPA (PREGNANCY-ASSOCIATED PLASMA PROTEIN A): Primary | ICD-10-CM

## 2019-03-28 PROCEDURE — G8417 CALC BMI ABV UP PARAM F/U: HCPCS | Performed by: OBSTETRICS & GYNECOLOGY

## 2019-03-28 PROCEDURE — G8427 DOCREV CUR MEDS BY ELIG CLIN: HCPCS | Performed by: OBSTETRICS & GYNECOLOGY

## 2019-03-28 PROCEDURE — 1036F TOBACCO NON-USER: CPT | Performed by: OBSTETRICS & GYNECOLOGY

## 2019-03-28 PROCEDURE — 99213 OFFICE O/P EST LOW 20 MIN: CPT | Performed by: OBSTETRICS & GYNECOLOGY

## 2019-03-28 PROCEDURE — G8484 FLU IMMUNIZE NO ADMIN: HCPCS | Performed by: OBSTETRICS & GYNECOLOGY

## 2019-03-28 PROCEDURE — 59025 FETAL NON-STRESS TEST: CPT | Performed by: OBSTETRICS & GYNECOLOGY

## 2019-04-02 ENCOUNTER — ANESTHESIA EVENT (OUTPATIENT)
Dept: LABOR AND DELIVERY | Age: 29
DRG: 540 | End: 2019-04-02
Payer: MEDICAID

## 2019-04-02 ENCOUNTER — ANESTHESIA (OUTPATIENT)
Dept: LABOR AND DELIVERY | Age: 29
DRG: 540 | End: 2019-04-02
Payer: MEDICAID

## 2019-04-02 ENCOUNTER — HOSPITAL ENCOUNTER (INPATIENT)
Age: 29
LOS: 3 days | Discharge: HOME | DRG: 540 | End: 2019-04-05
Attending: OBSTETRICS & GYNECOLOGY | Admitting: OBSTETRICS & GYNECOLOGY
Payer: MEDICAID

## 2019-04-02 VITALS — DIASTOLIC BLOOD PRESSURE: 49 MMHG | OXYGEN SATURATION: 97 % | SYSTOLIC BLOOD PRESSURE: 97 MMHG

## 2019-04-02 PROBLEM — Z33.1 IUP (INTRAUTERINE PREGNANCY), INCIDENTAL: Status: ACTIVE | Noted: 2019-04-02

## 2019-04-02 PROBLEM — O09.93 HRP (HIGH RISK PREGNANCY), THIRD TRIMESTER: Status: RESOLVED | Noted: 2019-03-24 | Resolved: 2019-04-02

## 2019-04-02 PROBLEM — Z33.1 IUP (INTRAUTERINE PREGNANCY), INCIDENTAL: Status: RESOLVED | Noted: 2019-04-02 | Resolved: 2019-04-02

## 2019-04-02 PROBLEM — Z86.32 H/O GESTATIONAL DIABETES IN PRIOR PREGNANCY, CURRENTLY PREGNANT, SECOND TRIMESTER: Status: RESOLVED | Noted: 2018-11-21 | Resolved: 2019-04-02

## 2019-04-02 PROBLEM — O09.292 H/O GESTATIONAL DIABETES IN PRIOR PREGNANCY, CURRENTLY PREGNANT, SECOND TRIMESTER: Status: RESOLVED | Noted: 2018-11-21 | Resolved: 2019-04-02

## 2019-04-02 LAB
ABO/RH: NORMAL
AMPHETAMINE SCREEN URINE: NEGATIVE
ANTIBODY SCREEN: NEGATIVE
ARM BAND NUMBER: NORMAL
BARBITURATE SCREEN URINE: NEGATIVE
BENZODIAZEPINE SCREEN, URINE: NEGATIVE
BUPRENORPHINE URINE: NORMAL
CANNABINOID SCREEN URINE: NEGATIVE
COCAINE METABOLITE, URINE: NEGATIVE
EXPIRATION DATE: NORMAL
GLUCOSE BLD-MCNC: 90 MG/DL (ref 65–105)
HCT VFR BLD CALC: 34.5 % (ref 36–46)
HCT VFR BLD CALC: 38.2 % (ref 36–46)
HEMOGLOBIN: 11.6 G/DL (ref 12–16)
HEMOGLOBIN: 12.7 G/DL (ref 12–16)
MCH RBC QN AUTO: 28.5 PG (ref 26–34)
MCHC RBC AUTO-ENTMCNC: 33.3 G/DL (ref 31–37)
MCV RBC AUTO: 85.6 FL (ref 80–100)
MDMA URINE: NORMAL
METHADONE SCREEN, URINE: NEGATIVE
METHAMPHETAMINE, URINE: NORMAL
NRBC AUTOMATED: NORMAL PER 100 WBC
OPIATES, URINE: NEGATIVE
OXYCODONE SCREEN URINE: NEGATIVE
PDW BLD-RTO: 13.1 % (ref 11.5–14.9)
PHENCYCLIDINE, URINE: NEGATIVE
PLATELET # BLD: 284 K/UL (ref 150–450)
PMV BLD AUTO: 8.5 FL (ref 6–12)
PROPOXYPHENE, URINE: NORMAL
RBC # BLD: 4.47 M/UL (ref 4–5.2)
T. PALLIDUM, IGG: NONREACTIVE
TEST INFORMATION: NORMAL
TRICYCLIC ANTIDEPRESSANTS, UR: NORMAL
WBC # BLD: 11 K/UL (ref 3.5–11)

## 2019-04-02 PROCEDURE — 59514 CESAREAN DELIVERY ONLY: CPT | Performed by: OBSTETRICS & GYNECOLOGY

## 2019-04-02 PROCEDURE — 3609079900 HC CESAREAN SECTION: Performed by: OBSTETRICS & GYNECOLOGY

## 2019-04-02 PROCEDURE — 88307 TISSUE EXAM BY PATHOLOGIST: CPT

## 2019-04-02 PROCEDURE — 2580000003 HC RX 258: Performed by: STUDENT IN AN ORGANIZED HEALTH CARE EDUCATION/TRAINING PROGRAM

## 2019-04-02 PROCEDURE — 3700000000 HC ANESTHESIA ATTENDED CARE: Performed by: OBSTETRICS & GYNECOLOGY

## 2019-04-02 PROCEDURE — 6A550ZT PHERESIS OF CORD BLOOD STEM CELLS, SINGLE: ICD-10-PCS | Performed by: OBSTETRICS & GYNECOLOGY

## 2019-04-02 PROCEDURE — 80307 DRUG TEST PRSMV CHEM ANLYZR: CPT

## 2019-04-02 PROCEDURE — 6360000002 HC RX W HCPCS: Performed by: STUDENT IN AN ORGANIZED HEALTH CARE EDUCATION/TRAINING PROGRAM

## 2019-04-02 PROCEDURE — 76815 OB US LIMITED FETUS(S): CPT

## 2019-04-02 PROCEDURE — 6370000000 HC RX 637 (ALT 250 FOR IP): Performed by: STUDENT IN AN ORGANIZED HEALTH CARE EDUCATION/TRAINING PROGRAM

## 2019-04-02 PROCEDURE — 85027 COMPLETE CBC AUTOMATED: CPT

## 2019-04-02 PROCEDURE — 3700000001 HC ADD 15 MINUTES (ANESTHESIA): Performed by: OBSTETRICS & GYNECOLOGY

## 2019-04-02 PROCEDURE — 36415 COLL VENOUS BLD VENIPUNCTURE: CPT

## 2019-04-02 PROCEDURE — 85014 HEMATOCRIT: CPT

## 2019-04-02 PROCEDURE — 4A1HXCZ MONITORING OF PRODUCTS OF CONCEPTION, CARDIAC RATE, EXTERNAL APPROACH: ICD-10-PCS | Performed by: OBSTETRICS & GYNECOLOGY

## 2019-04-02 PROCEDURE — 86850 RBC ANTIBODY SCREEN: CPT

## 2019-04-02 PROCEDURE — 2709999900 HC NON-CHARGEABLE SUPPLY: Performed by: OBSTETRICS & GYNECOLOGY

## 2019-04-02 PROCEDURE — 82947 ASSAY GLUCOSE BLOOD QUANT: CPT

## 2019-04-02 PROCEDURE — 86780 TREPONEMA PALLIDUM: CPT

## 2019-04-02 PROCEDURE — 93005 ELECTROCARDIOGRAM TRACING: CPT

## 2019-04-02 PROCEDURE — 6360000002 HC RX W HCPCS: Performed by: SPECIALIST

## 2019-04-02 PROCEDURE — 1220000000 HC SEMI PRIVATE OB R&B

## 2019-04-02 PROCEDURE — 2500000003 HC RX 250 WO HCPCS: Performed by: SPECIALIST

## 2019-04-02 PROCEDURE — 86901 BLOOD TYPING SEROLOGIC RH(D): CPT

## 2019-04-02 PROCEDURE — 7100000001 HC PACU RECOVERY - ADDTL 15 MIN: Performed by: OBSTETRICS & GYNECOLOGY

## 2019-04-02 PROCEDURE — 85018 HEMOGLOBIN: CPT

## 2019-04-02 PROCEDURE — 86900 BLOOD TYPING SEROLOGIC ABO: CPT

## 2019-04-02 PROCEDURE — 2580000003 HC RX 258: Performed by: SPECIALIST

## 2019-04-02 PROCEDURE — 2500000003 HC RX 250 WO HCPCS: Performed by: STUDENT IN AN ORGANIZED HEALTH CARE EDUCATION/TRAINING PROGRAM

## 2019-04-02 PROCEDURE — 7100000000 HC PACU RECOVERY - FIRST 15 MIN: Performed by: OBSTETRICS & GYNECOLOGY

## 2019-04-02 RX ORDER — BUPIVACAINE HYDROCHLORIDE 5 MG/ML
30 INJECTION, SOLUTION EPIDURAL; INTRACAUDAL ONCE
Status: COMPLETED | OUTPATIENT
Start: 2019-04-02 | End: 2019-04-02

## 2019-04-02 RX ORDER — DIPHENHYDRAMINE HYDROCHLORIDE 50 MG/ML
25 INJECTION INTRAMUSCULAR; INTRAVENOUS EVERY 6 HOURS PRN
Status: DISCONTINUED | OUTPATIENT
Start: 2019-04-02 | End: 2019-04-05 | Stop reason: HOSPADM

## 2019-04-02 RX ORDER — NAPROXEN 500 MG/1
250 TABLET ORAL 2 TIMES DAILY WITH MEALS
Status: DISCONTINUED | OUTPATIENT
Start: 2019-04-03 | End: 2019-04-03

## 2019-04-02 RX ORDER — KETOROLAC TROMETHAMINE 30 MG/ML
30 INJECTION, SOLUTION INTRAMUSCULAR; INTRAVENOUS EVERY 6 HOURS
Status: COMPLETED | OUTPATIENT
Start: 2019-04-02 | End: 2019-04-03

## 2019-04-02 RX ORDER — NALOXONE HYDROCHLORIDE 0.4 MG/ML
0.4 INJECTION, SOLUTION INTRAMUSCULAR; INTRAVENOUS; SUBCUTANEOUS PRN
Status: DISCONTINUED | OUTPATIENT
Start: 2019-04-02 | End: 2019-04-02

## 2019-04-02 RX ORDER — TRISODIUM CITRATE DIHYDRATE AND CITRIC ACID MONOHYDRATE 500; 334 MG/5ML; MG/5ML
30 SOLUTION ORAL ONCE
Status: COMPLETED | OUTPATIENT
Start: 2019-04-02 | End: 2019-04-02

## 2019-04-02 RX ORDER — NALBUPHINE HCL 10 MG/ML
5 AMPUL (ML) INJECTION EVERY 4 HOURS PRN
Status: DISCONTINUED | OUTPATIENT
Start: 2019-04-02 | End: 2019-04-02

## 2019-04-02 RX ORDER — SODIUM CHLORIDE 0.9 % (FLUSH) 0.9 %
10 SYRINGE (ML) INJECTION PRN
Status: DISCONTINUED | OUTPATIENT
Start: 2019-04-02 | End: 2019-04-05 | Stop reason: HOSPADM

## 2019-04-02 RX ORDER — ONDANSETRON 2 MG/ML
4 INJECTION INTRAMUSCULAR; INTRAVENOUS EVERY 6 HOURS PRN
Status: DISCONTINUED | OUTPATIENT
Start: 2019-04-02 | End: 2019-04-05 | Stop reason: HOSPADM

## 2019-04-02 RX ORDER — DOCUSATE SODIUM 100 MG/1
100 CAPSULE, LIQUID FILLED ORAL 2 TIMES DAILY
Status: DISCONTINUED | OUTPATIENT
Start: 2019-04-02 | End: 2019-04-05 | Stop reason: HOSPADM

## 2019-04-02 RX ORDER — GLYCOPYRROLATE 1 MG/5 ML
SYRINGE (ML) INTRAVENOUS PRN
Status: DISCONTINUED | OUTPATIENT
Start: 2019-04-02 | End: 2019-04-02 | Stop reason: SDUPTHER

## 2019-04-02 RX ORDER — SODIUM CHLORIDE 9 MG/ML
INJECTION, SOLUTION INTRAVENOUS CONTINUOUS PRN
Status: DISCONTINUED | OUTPATIENT
Start: 2019-04-02 | End: 2019-04-02 | Stop reason: SDUPTHER

## 2019-04-02 RX ORDER — SODIUM CHLORIDE, SODIUM LACTATE, POTASSIUM CHLORIDE, CALCIUM CHLORIDE 600; 310; 30; 20 MG/100ML; MG/100ML; MG/100ML; MG/100ML
INJECTION, SOLUTION INTRAVENOUS CONTINUOUS
Status: DISCONTINUED | OUTPATIENT
Start: 2019-04-02 | End: 2019-04-03

## 2019-04-02 RX ORDER — SODIUM CHLORIDE, SODIUM LACTATE, POTASSIUM CHLORIDE, CALCIUM CHLORIDE 600; 310; 30; 20 MG/100ML; MG/100ML; MG/100ML; MG/100ML
INJECTION, SOLUTION INTRAVENOUS CONTINUOUS
Status: DISCONTINUED | OUTPATIENT
Start: 2019-04-02 | End: 2019-04-02

## 2019-04-02 RX ORDER — SIMETHICONE 80 MG
80 TABLET,CHEWABLE ORAL EVERY 6 HOURS PRN
Status: DISCONTINUED | OUTPATIENT
Start: 2019-04-02 | End: 2019-04-05 | Stop reason: HOSPADM

## 2019-04-02 RX ORDER — MORPHINE SULFATE 0.5 MG/ML
INJECTION, SOLUTION EPIDURAL; INTRATHECAL; INTRAVENOUS PRN
Status: DISCONTINUED | OUTPATIENT
Start: 2019-04-02 | End: 2019-04-02 | Stop reason: SDUPTHER

## 2019-04-02 RX ORDER — ACETAMINOPHEN 325 MG/1
650 TABLET ORAL EVERY 4 HOURS PRN
Status: DISCONTINUED | OUTPATIENT
Start: 2019-04-02 | End: 2019-04-05 | Stop reason: HOSPADM

## 2019-04-02 RX ORDER — OXYCODONE HYDROCHLORIDE AND ACETAMINOPHEN 5; 325 MG/1; MG/1
2 TABLET ORAL EVERY 4 HOURS PRN
Status: DISCONTINUED | OUTPATIENT
Start: 2019-04-02 | End: 2019-04-05 | Stop reason: HOSPADM

## 2019-04-02 RX ORDER — SODIUM CHLORIDE 0.9 % (FLUSH) 0.9 %
10 SYRINGE (ML) INJECTION PRN
Status: DISCONTINUED | OUTPATIENT
Start: 2019-04-02 | End: 2019-04-02

## 2019-04-02 RX ORDER — ONDANSETRON 2 MG/ML
INJECTION INTRAMUSCULAR; INTRAVENOUS PRN
Status: DISCONTINUED | OUTPATIENT
Start: 2019-04-02 | End: 2019-04-02 | Stop reason: SDUPTHER

## 2019-04-02 RX ORDER — OXYCODONE HYDROCHLORIDE AND ACETAMINOPHEN 5; 325 MG/1; MG/1
1 TABLET ORAL EVERY 4 HOURS PRN
Status: DISCONTINUED | OUTPATIENT
Start: 2019-04-02 | End: 2019-04-05 | Stop reason: HOSPADM

## 2019-04-02 RX ORDER — ONDANSETRON 2 MG/ML
4 INJECTION INTRAMUSCULAR; INTRAVENOUS EVERY 6 HOURS PRN
Status: DISCONTINUED | OUTPATIENT
Start: 2019-04-02 | End: 2019-04-02

## 2019-04-02 RX ORDER — LANOLIN 100 %
OINTMENT (GRAM) TOPICAL
Status: DISCONTINUED | OUTPATIENT
Start: 2019-04-02 | End: 2019-04-05 | Stop reason: HOSPADM

## 2019-04-02 RX ADMIN — MORPHINE SULFATE 0.2 MG: 0.5 INJECTION, SOLUTION EPIDURAL; INTRATHECAL; INTRAVENOUS at 10:53

## 2019-04-02 RX ADMIN — KETOROLAC TROMETHAMINE 30 MG: 30 INJECTION, SOLUTION INTRAMUSCULAR; INTRAVENOUS at 18:37

## 2019-04-02 RX ADMIN — ONDANSETRON 4 MG: 2 INJECTION INTRAMUSCULAR; INTRAVENOUS at 11:19

## 2019-04-02 RX ADMIN — Medication 2 G: at 18:37

## 2019-04-02 RX ADMIN — PHENYLEPHRINE HYDROCHLORIDE 200 MCG: 10 INJECTION INTRAVENOUS at 11:19

## 2019-04-02 RX ADMIN — Medication 50 MILLI-UNITS/MIN: at 12:33

## 2019-04-02 RX ADMIN — PHENYLEPHRINE HYDROCHLORIDE 200 MCG: 10 INJECTION INTRAVENOUS at 11:32

## 2019-04-02 RX ADMIN — PHENYLEPHRINE HYDROCHLORIDE 200 MCG: 10 INJECTION INTRAVENOUS at 11:04

## 2019-04-02 RX ADMIN — SODIUM CITRATE AND CITRIC ACID MONOHYDRATE 30 ML: 500; 334 SOLUTION ORAL at 10:24

## 2019-04-02 RX ADMIN — DOCUSATE SODIUM 100 MG: 100 CAPSULE, LIQUID FILLED ORAL at 21:49

## 2019-04-02 RX ADMIN — BUPIVACAINE HYDROCHLORIDE 150 MG: 5 INJECTION, SOLUTION EPIDURAL; INTRACAUDAL; PERINEURAL at 11:30

## 2019-04-02 RX ADMIN — ONDANSETRON 4 MG: 2 INJECTION INTRAMUSCULAR; INTRAVENOUS at 16:47

## 2019-04-02 RX ADMIN — SODIUM CHLORIDE: 9 INJECTION, SOLUTION INTRAVENOUS at 10:44

## 2019-04-02 RX ADMIN — Medication 0.4 MG: at 10:55

## 2019-04-02 RX ADMIN — SODIUM CHLORIDE, POTASSIUM CHLORIDE, SODIUM LACTATE AND CALCIUM CHLORIDE: 600; 310; 30; 20 INJECTION, SOLUTION INTRAVENOUS at 12:10

## 2019-04-02 RX ADMIN — Medication 500 ML: at 11:16

## 2019-04-02 RX ADMIN — SODIUM CHLORIDE, POTASSIUM CHLORIDE, SODIUM LACTATE AND CALCIUM CHLORIDE: 600; 310; 30; 20 INJECTION, SOLUTION INTRAVENOUS at 09:44

## 2019-04-02 RX ADMIN — KETOROLAC TROMETHAMINE 30 MG: 30 INJECTION, SOLUTION INTRAMUSCULAR; INTRAVENOUS at 12:41

## 2019-04-02 RX ADMIN — Medication 2 G: at 10:33

## 2019-04-02 RX ADMIN — PHENYLEPHRINE HYDROCHLORIDE 200 MCG: 10 INJECTION INTRAVENOUS at 10:56

## 2019-04-02 RX ADMIN — DIPHENHYDRAMINE HYDROCHLORIDE 25 MG: 50 INJECTION INTRAMUSCULAR; INTRAVENOUS at 21:58

## 2019-04-02 ASSESSMENT — PULMONARY FUNCTION TESTS
PIF_VALUE: 1
PIF_VALUE: 2
PIF_VALUE: 1
PIF_VALUE: 2
PIF_VALUE: 1
PIF_VALUE: 2
PIF_VALUE: 1
PIF_VALUE: 0
PIF_VALUE: 1
PIF_VALUE: 2
PIF_VALUE: 1
PIF_VALUE: 2
PIF_VALUE: 1

## 2019-04-02 ASSESSMENT — PAIN SCALES - GENERAL
PAINLEVEL_OUTOF10: 7
PAINLEVEL_OUTOF10: 0
PAINLEVEL_OUTOF10: 0

## 2019-04-02 NOTE — H&P
OBSTETRICAL HISTORY [address]    Date: 2019       Time: 9:01 AM   Patient Name: Heather Du     Patient : 1990  Room/Bed: [facility]1558-    Admission Date/Time: 2019  8:36 AM      CC: scheduled primary  section      HPI: Heather Du is a 34 y.o.  at 39w2d who presents to labor and delivery for primary  section for maternal request 2/2 suspected fetal macrosomia. Patient denies any headache, visual changes, difficulty breathing, RUQ pain, N/V, F/C, and pain/swelling in lower extremities. The patient reports fetal movement is present, denies contractions, denies loss of fluid, denies vaginal bleeding. Pregnancy is complicated by  Suspected fetal macrosomia, Low PAPPA, Short interpregnancy interval, Hx GDMA2 (G1), Anemia (12.7), Anxiety     DATING:  LMP: Patient's last menstrual period was 2018 (exact date).   Estimated Date of Delivery: 19   Based on: early ultrasound, at 6 2/7 weeks GA    PREGNANCY RISK FACTORS:  Patient Active Problem List   Diagnosis    Generalized anxiety disorder    Hx GDMA2 (G1)    Anemia    History of gestational diabetes mellitus (GDM)    High risk pregnancy with low PAPPA (pregnancy-associated plasma protein A)    Short interval between pregnancies complicating pregnancy, antepartum    H/O gestational diabetes in prior pregnancy, currently pregnant, second trimester    Placental abnormality in third trimester    IUP (intrauterine pregnancy), incidental        Steroids Given In This Pregnancy:  no     REVIEW OF SYSTEMS:   Constitutional: negative fever, negative chills  HEENT: negative visual disturbances, negative headaches  Respiratory: negative dyspnea, negative cough  Cardiovascular: negative chest pain,  negative palpitations  Gastrointestinal: negative abdominal pain, negative RUQ pain, negative N/V, negative diarrhea, negative constipation  Genitourinary: negative dysuria, apparent distress, alert and cooperative  Neurologic:  alert, oriented, normal speech, no focal findings or movement disorder noted  Lungs:  No increased work of breathing, good air exchange, clear to auscultation bilaterally, no crackles or wheezing  Heart:  regular rate and rhythm and no murmur    Abdomen:  soft, gravid, non-tender, no right upper quadrant tenderness, no CVA tenderness, uterus non-tender, no signs of abruption and no signs of chorioamnionitis  Extremities:  no calf tenderness, non edematous, DTRs: normal    Pelvic Exam: not indicated     LIMITED BEDSIDE US:  Position: Cephalic  Placental Location: posterior  Fetal Heart Tones: Present  Fetal Movement: Present  Amniotic Fluid Index/Volume: adequate 2x2 cm fluid pocket  Estimated Fetal Weight:  10 lbs 8 oz    PRENATAL LAB RESULTS:   Blood Type/Rh: A pos  Antibody Screen: negative  Hemoglobin, Hematocrit, Platelets: 06.7 / 64.9 / 189  Rubella: immune  T. Pallidum, IgG: non-reactive   Hepatitis B Surface Antigen: non-reactive   HIV: non-reactive   Sickle Cell Screen: negative  Gonorrhea: negative  Chlamydia: negative  Urine culture: negative, date: 3/24/19    1 hour Glucose Tolerance Test: 116    Group B Strep: negative  Cystic Fibrosis Screen: negative  First Trimester Screen: increased risk  > 1/1,532 to   MSAFP/Multiple Markers: normal  Non-Invasive Prenatal Testing: not available  Anatomy US: posterior, 3VC, patient doesn't want to know (male)    ASSESSMENT & PLAN:  James White is a 34 y.o. female  at 39w2d IUP   - GBS negative / Rh positive / R immune   - No indication for GBS prophylaxis    Scheduled primary  section    - Maternal request    - Suspected fetal macrosomia    - Admit to L&D service of Dr. Tayler Nuñez   - Prepare for  Section   -  Consent signed and placed in chart    - T.  Pall, Type & Screen, CBC ordered   - Urine drug screen ordered, Informed consent obtained   - IV fluid - LR @ 125cc/hr   - NPO   - Ancef antibiotic prophylaxis to be given prior to incision    - Anesthesia and NICU notified    Suspected fetal macrosomia    - 10#2 in [facility] on week ago per patient    Low PAPPA    Short interpregnancy interval     Hx GDMA2 (G1)   - POCT glucose 90 on admission    - 1h     Anemia (12.7)    Anxiety     Patient Active Problem List    Diagnosis Date Noted    High risk pregnancy with low PAPPA (pregnancy-associated plasma protein A) 10/03/2018     Priority: High     Consult with [facility]  32 week week  testing  Growth scans every 4 weeks starting at 25 weeks  2018 declines invasive prenatal testing, opted for non-invasive testing through [facility]        History of gestational diabetes mellitus (GDM) 09/10/2018     Priority: High     Early 1 hour GTT ordered 9/10/2018  If wnl repeat at 28 weeks      IUP (intrauterine pregnancy), incidental 2019    Placental abnormality in third trimester 2019    Short interval between pregnancies complicating pregnancy, antepartum 2018    H/O gestational diabetes in prior pregnancy, currently pregnant, second trimester 2018    Anemia 2018    Hx GDMA2 (G1) 2017     1/3/2016 NPH QHS 6 units started      Generalized anxiety disorder 10/06/2015       Plan discussed with Dr. Jonathan Do, who is agreeable. Steroids given this admission: No    Risks, benefits, alternatives and possible complications have been discussed in detail with the patient. Admission, and post admission procedures and expectations were discussed in detail. All questions were answered.     Attending's Name: Dr. Niocle Hernandez  Ob/Gyn Resident  2019, 9:01 AM

## 2019-04-02 NOTE — ANESTHESIA PRE PROCEDURE
Department of Anesthesiology  Preprocedure Note       Name:  Ruperto Uriostegui   Age:  34 y.o.  :  1990                                          MRN:  [id number]         Date:  2019      Surgeon: * No surgeons listed *    Procedure: anesthesia record    Medications prior to admission:   Prior to Admission medications    Medication Sig Start Date End Date Taking?  Authorizing Provider   Prenatal Multivit-Min-Fe-FA (PRENATAL VITAMINS) 0.8 MG TABS Take 1 tablet by mouth daily 18  Yes JANUARY Mahoney CNM       Current medications:    Current Facility-Administered Medications   Medication Dose Route Frequency Provider Last Rate Last Dose    lactated ringers infusion   Intravenous Continuous Funmilayo L Nichols 75 mL/hr at 19 1210      sodium chloride flush 0.9 % injection 10 mL  10 mL Intravenous PRN Funmilayo L Nichols        acetaminophen (TYLENOL) tablet 650 mg  650 mg Oral Q4H PRN Funmilayo L Nichols        oxyCODONE-acetaminophen (PERCOCET) 5-325 MG per tablet 1 tablet  1 tablet Oral Q4H PRN Funmilayo L Nichols        Or    oxyCODONE-acetaminophen (PERCOCET) 5-325 MG per tablet 2 tablet  2 tablet Oral Q4H PRN Funmilayo L Nichols        ketorolac (TORADOL) injection 30 mg  30 mg Intravenous Q6H Funmilayo L Nichols   30 mg at 19 1241    diphenhydrAMINE (BENADRYL) injection 25 mg  25 mg Intravenous Q6H PRN Funmilayo L Nichols        simethicone (MYLICON) chewable tablet 80 mg  80 mg Oral Q6H PRN Funmilayo L Nichols        docusate sodium (COLACE) capsule 100 mg  100 mg Oral BID Funmilayo L Nichols        magnesium hydroxide (MILK OF MAGNESIA) 400 MG/5ML suspension 30 mL  30 mL Oral Daily PRN Funmilayo L Nichols        ondansetron (ZOFRAN) injection 4 mg  4 mg Intravenous Q6H PRN Funmilayo L Nichols        oxytocin (PITOCIN) 30 units in 500 mL infusion  1 arnie-units/min Intravenous Continuous Funmilayo L Nichols 50 mL/hr at 19 1233 50 arnie-units/min at 19 1233    lanolin ointment   Topical Q1H PRN Funmilayo Tory [place] BMI:   Wt Readings from Last 3 Encounters:   03/28/19 177 lb (80.3 kg)   03/26/19 176 lb (79.8 kg)   03/24/19 174 lb (78.9 kg)     There is no height or weight on file to calculate BMI.    CBC:   Lab Results   Component Value Date    WBC 11.0 04/02/2019    RBC 4.47 04/02/2019    HGB 11.6 04/02/2019    HCT 34.5 04/02/2019    MCV 85.6 04/02/2019    RDW 13.1 04/02/2019     04/02/2019       CMP:   Lab Results   Component Value Date     03/24/2019    K 4.4 03/24/2019     03/24/2019    CO2 21 03/24/2019    BUN 10 03/24/2019    CREATININE 0.46 03/24/2019    GFRAA >60 03/24/2019    LABGLOM >60 03/24/2019    GLUCOSE 99 03/24/2019    PROT 6.6 03/24/2019    CALCIUM 8.7 03/24/2019    BILITOT 0.81 03/24/2019    ALKPHOS 196 03/24/2019    AST 24 03/24/2019    ALT 15 03/24/2019       POC Tests:   Recent Labs     04/02/19  0914   POCGLU 90       Coags:   Lab Results   Component Value Date    PROTIME 9.9 02/07/2017    INR 0.9 02/07/2017    APTT 25.9 02/07/2017       HCG (If Applicable):   Lab Results   Component Value Date    PREGTESTUR negative 07/25/2018    HCGQUANT 82,457 (H) 09/14/2018        ABGs: No results found for: PHART, PO2ART, RQE9COI, ZEA6RVQ, BEART, W9EVEZIT     Type & Screen (If Applicable):  No results found for: LABABO, [address]    Anesthesia Evaluation  Patient summary reviewed and Nursing notes reviewed  Airway: Mallampati: II  TM distance: >3 FB   Neck ROM: full  Mouth opening: > = 3 FB Dental: normal exam         Pulmonary:normal exam                               Cardiovascular:                      Neuro/Psych:   (+) psychiatric history:depression/anxiety             GI/Hepatic/Renal:             Endo/Other:    (+) Diabetes, .                   ROS comment: History of gestational diabeted Abdominal:           Vascular:                                        Anesthesia Plan      spinal     ASA 2           MIPS: Postoperative opioids intended and Prophylactic antiemetics administered. Anesthetic plan and risks discussed with patient. Plan discussed with CRNA.                   Stephen Lomeli MD   4/2/2019

## 2019-04-02 NOTE — DISCHARGE SUMMARY
taking these medications    docusate sodium 100 MG capsule  Commonly known as:  COLACE  Take 1 capsule by mouth daily as needed for Constipation     naproxen 250 MG tablet  Commonly known as:  NAPROSYN  Take 1 tablet by mouth 2 times daily (with meals)     ondansetron 4 MG disintegrating tablet  Commonly known as:  ZOFRAN-ODT  Take 1 tablet by mouth every 8 hours as needed for Nausea or Vomiting     oxyCODONE-acetaminophen 5-325 MG per tablet  Commonly known as:  PERCOCET  Take 1 tablet by mouth every 4 hours as needed for Pain for up to 7 days. simethicone 80 MG chewable tablet  Commonly known as:  MYLICON  Take 1 tablet by mouth 4 times daily as needed for Flatulence        ASK your doctor about these medications    Prenatal Vitamins 0.8 MG Tabs  Take 1 tablet by mouth daily           Where to Get Your Medications      You can get these medications from any pharmacy    Bring a paper prescription for each of these medications  · docusate sodium 100 MG capsule  · naproxen 250 MG tablet  · ondansetron 4 MG disintegrating tablet  · oxyCODONE-acetaminophen 5-325 MG per tablet  · simethicone 80 MG chewable tablet           Activity: pelvic rest x 6 weeks, no driving while on narcotics, no lifting greater than 15 lbs  Diet: regular diet  Follow up: 2 weeks     Condition on discharge: good    Discharge date: 04/05/19      Priti Guevara DO  Ob/Gyn Resident    Comments:  Home care and follow-up care were reviewed. Pelvic rest, and birth control were reviewed. Signs and symptoms of mastitis and post partum depression were reviewed. The patient is to notify her physician if any of these occur. The patient was counseled on secondary smoke risks and the increased risk of sudden infant death syndrome and respiratory problems to her baby with exposure. She was counseled on various alternate recommendations to decrease the exposure to secondary smoke to her children.

## 2019-04-02 NOTE — ANESTHESIA POSTPROCEDURE EVALUATION
Department of Anesthesiology  Postprocedure Note    Patient: Rosana Jones  MRN: [id number]  YOB: 1990  Date of evaluation: 4/2/2019  Time:  2:20 PM     Procedure Summary     Date:  04/02/19 Room / Location:      Anesthesia Start:   Anesthesia Stop:      Procedure:  anesthesia record Diagnosis:      Scheduled Providers:   Responsible Provider:      Anesthesia Type:  spinal ASA Status:  2          Anesthesia Type: No value filed. Kendrick Phase I: Kendrick Score: 9    Kendrick Phase II:      Last vitals: Reviewed and per EMR flowsheets.        Anesthesia Post Evaluation    Comments: POST- ANESTHESIA EVALUATION       Pt Name: Rosana Jones  MRN: [id number]  YOB: 1990  Date of evaluation: 4/2/2019  Time:  2:20 PM      /64   Pulse 68   Temp 97.5 °F (36.4 °C) (Infrared)   Resp 16   LMP 07/01/2018 (Exact Date)   SpO2 95%      Consciousness Level  Awake  Cardiopulmonary Status  Stable  Pain Adequately Treated YES  Nausea / Vomiting  NO  Adequate Hydration  YES  Anesthesia Related Complications NONE      Electronically signed by Kevan Hollis MD on 4/2/2019 at 2:20 PM

## 2019-04-02 NOTE — ANESTHESIA PROCEDURE NOTES
Spinal Block    Patient location during procedure: OB  End time: 4/2/2019 10:54 AM  Reason for block: primary anesthetic  Staffing  Anesthesiologist: Bruce Rodriguez MD  Resident/CRNA: JANUARY Hall CRNA  Performed: resident/CRNA and anesthesiologist   Preanesthetic Checklist  Completed: patient identified, site marked, surgical consent, pre-op evaluation, timeout performed, IV checked, risks and benefits discussed, monitors and equipment checked, anesthesia consent given, oxygen available and patient being monitored  Spinal Block  Patient position: sitting  Prep: Betadine  Patient monitoring: frequent blood pressure checks  Approach: midline  Location: L3/L4  Provider prep: mask and sterile gloves  Dose: 0.2  Agent: bupivacaine  Adjuvant: duramorph  Dose: 2  Dose: 2  Needle  Needle type: Pencan   Needle gauge: 24 G  Needle length: 4 in  Needle insertion depth: 2 cm  Lot number: [id number]  Expiration date: 7/1/2020  Assessment  Sensory level: T10  Swirl obtained: Yes  CSF: clear  Attempts: 1  Hemodynamics: stable

## 2019-04-02 NOTE — PROGRESS NOTES
OB Resident Progress Note       Vitals:    04/02/19 1325 04/02/19 1340 04/02/19 1345 04/02/19 1355   BP: 109/75 111/67  106/64   Pulse: 63 63  68   Resp: 16 16     Temp:       TempSrc:       SpO2:  96% 95%            Called to beside by RN for patient complaining of feeling dizzy. Patient seen and examined and found to be sitting in bed playing on her phone. States she is feeling somewhat dizzy. States she is hungry and is waiting for dietary to come so that she can order some food and she is looking forward to eating. Denies increased dizziness with sitting upright, however, admits she feels tired and worse with all movements. Additionally admits to continued anxious feelings. Will obtain H/H at this time. VSS, pulse 68 throughout encounter. Bleeding stable, minimal noted since delivery. Urine output adequate. Gen: A+O.  NAD. Heart: RRR. Lungs: CTA b/l. Abd: Soft, nontender. Fundus firm and below the umbilicus. Ext: No edema or cyanosis. Dr. Gil Ganser updated.      Rhea Guzman  OB/GYN Resident, PGY2  Pager: [phone]  04/02/19  2:07 PM

## 2019-04-02 NOTE — SIGNIFICANT EVENT
OB Resident Note     Vitals:    19 0915 19 0918 19 0919 19 0921   BP: (!) 77/51 (!) 95/59 99/61 103/63   Pulse: 82 71 71 70   Resp: 16      Temp:       TempSrc:       SpO2:             Writer in room to discuss  and scan patient. Patient reports feeling very hot/dizzy following IV insertion. O2 applied by RN. Patient saturations at 100%. Cool wash cloth applied to patient's forehead. BP noted to be decreased, pillow placed under patients right side to relieve pressure on IVC and IVF bolus initiated. POCT glucose obtained and noted to be 90. Patient reports feeling better. O2 removed. BP's stabilized. EKG obtained and revealing PVC's.        Juan José Stewart  OB/GYN Resident, PGY2  Pager: [phone]  19  10:21 AM

## 2019-04-02 NOTE — PROGRESS NOTES
NEGATIVE Final    Cannabinoid Scrn, Ur 04/02/2019 NEGATIVE  NEGATIVE Final    Comment:       (Positive cutoff 50 ng/mL)                  Oxycodone Screen, Ur 04/02/2019 NEGATIVE  NEGATIVE Final    Comment:       (Positive cutoff 100 ng/mL)                  Methamphetamine, Urine 04/02/2019 NOT REPORTED  NEGATIVE Final    Tricyclic Antidepressants, Urine 04/02/2019 NOT REPORTED  NEGATIVE Final    MDMA, Urine 04/02/2019 NOT REPORTED  NEGATIVE Final    Buprenorphine Urine 04/02/2019 NOT REPORTED  NEGATIVE Final    Test Information 04/02/2019 Assay provides medical screening only. The absence of expected drug(s) and/or metabolite(s) may indicate diluted or adulterated urine, limitations of testing or timing of collection. Final    Comment: Testing for legal purposes should be confirmed by another method. To request confirmation   of test result, please call the lab within 7 days of sample submission.       Ventricular Rate 04/02/2019 83  BPM Preliminary    Atrial Rate 04/02/2019 83  BPM Preliminary    P-R Interval 04/02/2019 148  ms Preliminary    QRS Duration 04/02/2019 96  ms Preliminary    Q-T Interval 04/02/2019 364  ms Preliminary    QTc Calculation (Bazett) 04/02/2019 427  ms Preliminary    P Axis 04/02/2019 54  degrees Preliminary    R Axis 04/02/2019 83  degrees Preliminary    T Axis 04/02/2019 55  degrees Preliminary    POC Glucose 04/02/2019 90  65 - 105 mg/dL Final   Admission on 03/24/2019, Discharged on 03/24/2019   Component Date Value Ref Range Status    Color, UA 03/24/2019 YELLOW  YELLOW Final    Turbidity UA 03/24/2019 CLOUDY* CLEAR Final    Glucose, Ur 03/24/2019 NEGATIVE  NEGATIVE Final    Bilirubin Urine 03/24/2019 NEGATIVE  NEGATIVE Final    Ketones, Urine 03/24/2019 LARGE* NEGATIVE Final    Specific Gravity, UA 03/24/2019 1.017  1.000 - 1.030 Final    Urine Hgb 03/24/2019 NEGATIVE  NEGATIVE Final    pH, UA 03/24/2019 7.0  5.0 - 8.0 Final    Protein, UA 03/24/2019 TRACE* NEGATIVE Final    Urobilinogen, Urine 03/24/2019 Normal  Normal Final    Nitrite, Urine 03/24/2019 NEGATIVE  NEGATIVE Final    Leukocyte Esterase, Urine 03/24/2019 TRACE* NEGATIVE Final    Urinalysis Comments 03/24/2019 NOT REPORTED   Final    WBC 03/24/2019 12.1* 3.5 - 11.0 k/uL Final    RBC 03/24/2019 4.68  4.0 - 5.2 m/uL Final    Hemoglobin 03/24/2019 13.7  12.0 - 16.0 g/dL Final    Hematocrit 03/24/2019 40.8  36 - 46 % Final    MCV 03/24/2019 87.1  80 - 100 fL Final    MCH 03/24/2019 29.2  26 - 34 pg Final    MCHC 03/24/2019 33.6  31 - 37 g/dL Final    RDW 03/24/2019 13.3  11.5 - 14.9 % Final    Platelets 01/61/6131 237  150 - 450 k/uL Final    MPV 03/24/2019 8.6  6.0 - 12.0 fL Final    NRBC Automated 03/24/2019 NOT REPORTED  per 100 WBC Final    Differential Type 03/24/2019 NOT REPORTED   Final    Seg Neutrophils 03/24/2019 89* 36 - 66 % Final    Lymphocytes 03/24/2019 8* 24 - 44 % Final    Monocytes 03/24/2019 3  1 - 7 % Final    Eosinophils % 03/24/2019 0  0 - 4 % Final    Basophils 03/24/2019 0  0 - 2 % Final    Immature Granulocytes 03/24/2019 NOT REPORTED  0 % Final    Segs Absolute 03/24/2019 10.80* 1.3 - 9.1 k/uL Final    Absolute Lymph # 03/24/2019 0.90* 1.0 - 4.8 k/uL Final    Absolute Mono # 03/24/2019 0.40  0.1 - 1.3 k/uL Final    Absolute Eos # 03/24/2019 0.00  0.0 - 0.4 k/uL Final    Basophils # 03/24/2019 0.00  0.0 - 0.2 k/uL Final    Absolute Immature Granulocyte 03/24/2019 NOT REPORTED  0.00 - 0.30 k/uL Final    WBC Morphology 03/24/2019 NOT REPORTED   Final    RBC Morphology 03/24/2019 NOT REPORTED   Final    Platelet Estimate 91/38/3509 NOT REPORTED   Final    Glucose 03/24/2019 99  70 - 99 mg/dL Final    BUN 03/24/2019 10  6 - 20 mg/dL Final    CREATININE 03/24/2019 0.46* 0.50 - 0.90 mg/dL Final    Bun/Cre Ratio 03/24/2019 NOT REPORTED  9 - 20 Final    Calcium 03/24/2019 8.7  8.6 - 10.4 mg/dL Final    Sodium 03/24/2019 141  135 - 144 mmol/L Final  Potassium 03/24/2019 4.4  3.7 - 5.3 mmol/L Final    Chloride 03/24/2019 105  98 - 107 mmol/L Final    CO2 03/24/2019 21  20 - 31 mmol/L Final    Anion Gap 03/24/2019 15  9 - 17 mmol/L Final    Alkaline Phosphatase 03/24/2019 196* 35 - 104 U/L Final    ALT 03/24/2019 15  5 - 33 U/L Final    AST 03/24/2019 24  <32 U/L Final    Total Bilirubin 03/24/2019 0.81  0.3 - 1.2 mg/dL Final    Total Protein 03/24/2019 6.6  6.4 - 8.3 g/dL Final    Alb 03/24/2019 3.5  3.5 - 5.2 g/dL Final    Albumin/Globulin Ratio 03/24/2019 NOT REPORTED  1.0 - 2.5 Final    GFR Non- 03/24/2019 >60  >60 mL/min Final    GFR  03/24/2019 >60  >60 mL/min Final    GFR Comment 03/24/2019        Final    Comment: Average GFR for 20-28 years old:   80 mL/min/1.73sq m  Chronic Kidney Disease:   <60 mL/min/1.73sq m  Kidney failure:   <15 mL/min/1.73sq m              eGFR calculated using average adult body mass. Additional eGFR calculator available at:        [email or web address]            GFR Staging 03/24/2019 NOT REPORTED   Final    - 03/24/2019        Final    WBC, UA 03/24/2019 2 TO 5  /HPF Final    RBC, UA 03/24/2019 0 TO 2  /HPF Final    Casts UA 03/24/2019 NOT REPORTED  /LPF Final    Crystals UA 03/24/2019 NOT REPORTED  None /HPF Final    Epithelial Cells UA 03/24/2019 5 TO 10  /HPF Final    Renal Epithelial, Urine 03/24/2019 NOT REPORTED  0 /HPF Final    Bacteria, UA 03/24/2019 FEW* None Final    Mucus, UA 03/24/2019 NOT REPORTED  None Final    Trichomonas, UA 03/24/2019 NOT REPORTED  None Final    Amorphous, UA 03/24/2019 1+* None Final    Other Observations UA 03/24/2019 NOT REPORTED  NOT REQ. Final    Yeast, UA 03/24/2019 NOT REPORTED  None Final    Specimen Description 03/24/2019 . CLEAN CATCH URINE   Final    Special Requests 03/24/2019 NOT REPORTED   Final    Culture 03/24/2019 NO SIGNIFICANT GROWTH   Final    Specimen Description 03/24/2019 . RAIMUNDO Final    Special Requests 2019 NOT REPORTED   Final    Direct Exam 2019 PRESUMPTIVE NEGATIVE for Influenza A + B antigens. PCR testing to confirm this result is available upon request.  Specimen will be saved in the laboratory for 7 days. Please call [phone] if PCR testing is indicated. Final   Hospital Outpatient Visit on 2019   Component Date Value Ref Range Status    Specimen Description 2019 . VAGINAL SPECIMEN   Final    Special Requests 2019 NOT REPORTED   Final    Culture 2019 NEGATIVE FOR GROUP B STREPTOCOCCI   Final   ]    Assessment:  1. Lorenzo Olivo is a 34 y.o. female  2. IUP @ 39w2d  3. Suspected fetal macrosomia  4. Pt request for primary . Procedure with risks/ complications reviewed. Questions answered. Consent on chart  Patient Active Problem List    Diagnosis Date Noted    High risk pregnancy with low PAPPA (pregnancy-associated plasma protein A) 10/03/2018     Priority: High     Overview Note:     Consult with M  32 week week  testing  Growth scans every 4 weeks starting at 24 weeks  2018 declines invasive prenatal testing, opted for non-invasive testing through [facility]        History of gestational diabetes mellitus (GDM) 09/10/2018     Priority: High     Overview Note:     Early 1 hour GTT ordered 9/10/2018  If wnl repeat at 28 weeks      Placental abnormality in third trimester 2019    Short interval between pregnancies complicating pregnancy, antepartum 2018    Anemia 2018    Hx GDMA2 (G1) 2017     Overview Note:     1/3/2016 [facility] QHS 6 units started      Generalized anxiety disorder 10/06/2015          Plan:  1.   2. Procedure risk and complications reviewed  3. SCIP ABX ordered  4. Thromboembolic prophylaxis ordered with EPC's BL  5.  Consent Obtained      Electronically signed by Keren Starr DO  on 2019 at 10:39 AM

## 2019-04-02 NOTE — PROGRESS NOTES
OB Resident Progress Note     Labs reviewed. Hg stable. Patient continues to do well. Food order placed and patient anticipating lunch. Recent Results (from the past 1 hour(s))   Hemoglobin and Hematocrit, Blood    Collection Time: 04/02/19  2:04 PM   Result Value Ref Range    Hemoglobin 11.6 (L) 12.0 - 16.0 g/dL    Hematocrit 34.5 (L) 36 - 46 %     Dr. Kelly Almaraz updated.      El Leigh  OB/GYN Resident, PGY2  Pager: [phone]  4 [facility]  04/02/19  2:14 PM

## 2019-04-02 NOTE — FLOWSHEET NOTE
Patient arrived on labor and delivery unit for a scheduled . Patient relates she is having a C/S due to large baby size. Patient states she is feeling the baby move. Denies vaginal bleeding, denies leaking of fluid. Given hospital gown and instructed on clean catch urine specimen. Patient consents to universal drug screen.

## 2019-04-02 NOTE — ANESTHESIA POSTPROCEDURE EVALUATION
POST- ANESTHESIA EVALUATION       Pt Name: Constantino Rosales  MRN: [id number]  YOB: 1990  Date of evaluation: 2019  Time:  2:35 PM      /72   Pulse 96   Temp 97.2 °F (36.2 °C) (Infrared)   Resp 16   LMP 2018 (Exact Date)   SpO2 94%      Consciousness Level  Awake  Cardiopulmonary Status  Stable  Pain Adequately Treated YES  Nausea / Vomiting  NO  Adequate Hydration  YES  Anesthesia Related Complications NONE      Electronically signed by Chaim Hsu MD on 2019 at 2:35 PM       Department of Anesthesiology  Postprocedure Note    Patient: Constantino Rosales  MRN: [id number]  YOB: 1990  Date of evaluation: 2019  Time:  2:34 PM     Procedure Summary     Date:  19 Room / Location:  [facility] L&D [facility] / [facility] AND [facility] L&D OR    Anesthesia Start:  60 Anesthesia Stop:  239    Procedure:   SECTION PRIMARY (N/A ) Diagnosis:  (PRIMARY  LARGE BABY DUE 19)    Surgeon:  Marisabel Neely DO Responsible Provider:  Chaim Hsu MD    Anesthesia Type:  spinal ASA Status:  2          Anesthesia Type: spinal    Kendrick Phase I: Kendrick Score: 9    Kendrick Phase II: Kendrick Score: 10    Last vitals: Reviewed and per EMR flowsheets.        Anesthesia Post Evaluation

## 2019-04-02 NOTE — FLOWSHEET NOTE
Patient continues to report dizziness. VSS. Pulse ox 98% Dr. Argenis Sullivan asked to evaluate patient.

## 2019-04-02 NOTE — LACTATION NOTE
This note was copied from a baby's chart. Nurses assisted mother with breastfeeding in recovery after primary  delivery. Baby nursed well on both sides. Mother nursed first baby for 21 months and stopped nursing due to this pregnancy. Bby LGA and mother is gestational diabetic. Glucose screen 57% after first feeding. Father now doing skin to skin holding. Handouts given-  Breastfeeding Resource Guide including links to  video clips on:  (Hand expression , Breast feeding Positions & Latch ),Breastfeeding feeding Norms the first 3 days of life,Feeding Diary,  ILCA Managing Your Milk Supply: Going with the Flow, Feeding Cues, Second Night,USDA Tips for Breastfeeding Moms, Best Start- Mixing Alcohol and Breastfeeding ,Handout from the [employer] You Need to Know About Marijuana Use and Pregnancy\" given to mother. [employer]- Milk Hand Expresssion and Pumping handouts given with demonstration of hand expression and Signs of a Good Feeding handout. Discussed handouts with mother verbalizing understanding and encouraged mother  to view video clips.

## 2019-04-02 NOTE — OP NOTE
[address]  OBSTETRICAL  PHYSICIAN POST-OPERATIVE  NOTE:      Patient Name: Keenan Renteria  Patient : 1990  Room/Bed: [facility]/8734-85  Admission Date/Time: 2019  8:36 AM  Primary Care Physician: JANUARY Carrera CNP  MRN #: [id number]  CSN #: [id number]        Date: 2019  Time: 11:30 AM        Pre-operative Diagnosis:   Keenan Renteria is a 34 y.o. female at 44w2d      Term pregnancy, Single fetus and Pregnancy complicated by: see problem list  Patient Active Problem List    Diagnosis Date Noted    High risk pregnancy with low PAPPA (pregnancy-associated plasma protein A) 10/03/2018     Priority: High     Overview Note:     Consult with MFM  32 week week  testing  Growth scans every 4 weeks starting at 24 weeks  2018 declines invasive prenatal testing, opted for non-invasive testing through [facility]        History of gestational diabetes mellitus (GDM) 09/10/2018     Priority: High     Overview Note:     Early 1 hour GTT ordered 9/10/2018  If wnl repeat at 28 weeks      Placental abnormality in third trimester 2019    Short interval between pregnancies complicating pregnancy, antepartum 2018    Anemia 2018    Hx GDMA2 (G1) 2017     Overview Note:     1/3/2016 [facility] QHS 6 units started      Generalized anxiety disorder 10/06/2015     Suspected fetal macrosomia  Pt request for primary       Post-operative Diagnosis:    Living  infant(s) and Male  Same as Pre-Op  Occiput posterior      Procedures:  1.  Section- primary : Low Cervical, Transverse    2. Abdominal Delivery of a Live Born     male        Surgeon:  Amy Haro DO      Assistants:  1.  Chirag CRANEOMartin   [address] D[address]O. PGY2      OR Staff:  Scrub Person First: Yasmin Abarca      Anesthesia:  spinal    Duramorph Utilized: Yes        Estimated blood loss:  500 ML    Fluids:     IV: 900 ml   Blood Products:  none   Cell Saver: No    Urine Output[de-identified]  500 ml (clear)    Drains: no   TYPE:   * No LDAs found *      TRS Tissue Retention System Skin Retractor Utilized and placed under sterile conditions: No      Complications:  None      Findings/ Delivery Summary:  Mother's Information    Labor Events     labor?:  No     Mother Delivery Information    Surgical or Additional Est. Blood Loss (mL):  0 (View Only):  Edit in Flowsheets   Combined Est. Blood Loss (mL):  0        Osmin, Baby Pending  Larna Graver [[id number]]    Labor Events     labor?:  No   steroids?:  None  Cervical ripening date/time:     Rupture date/time:     Fluid color:  Clear          Anesthesia    Method:  Spinal     Assisted Delivery Details    Forceps attempted?:  No  Vacuum extractor attempted?:  No     Document Additional Attempt       Document Additional Attempt             [place] Presentation    Presentation:  Vertex  Position:  Left  _:  Occiput  _:  Posterior      Information    Head delivery date/time:  2019 11:14:00   Changing the 's delivery date/time could affect patient care.:     Delivery date/time:   19 1114   Delivery type:  , Low Transverse    Details:   Trial of labor?:  No    categorization:  Primary    priority:  Scheduled   Indications for :  Macrosomia   Skin Incision Type:  Pfannenstiel   Uterine Incision:  Low Transverse         Delivery Providers    Delivering clinician:  Katty Avila DO   Provider Role    Katty Avila DO Surgeon    Jg Palacios DO Assistant Surgeon    Hayden Massey Resident      Cord    Vessels:  3 Vessels  Complications:  None  Delayed cord clamping?:  Yes  Cord clamped date/time:  2019 1115  Cord blood disposition:  Lab  Gases sent?:  Yes  Stem cell collection (by provider):   No     Placenta    Date/time:  2019 11:16:00  Removal:  Spontaneous  Appearance:  Intact  Disposition:  Lab, Pathology     Delivery Resuscitation    Method:  Bulb Suction, Stimulation     Apgars    Living status:  Living  Apgars   1 Minute:   5 Minute:   10 Minute 15 Minute 20 Minute   Skin Color: 0  1       Heart Rate: 2  2       Reflex Irritability: 2  2       Muscle Tone: 2  2       Respiratory Effort: 2  2       Total: 8  9               Apgars Assigned By:  Prosper Wyatt CNP     [place] Measurements    Weight:  4440 g Length:  53.5 cm      Delivery Information    Surgical or additional est. blood loss (mL):  0 (View Only):  Edit in Flowsheets   Combined est. blood loss (mL):  0     Other Procedures    Procedures:  None              Living  infant(s) and Male    Cephalic  occiput posterior  Other:       Amniotic Fluid was: Clear  A Nuchal Cord: was not present x 0  A Spontaneous Cry Was Noted: Yes  The Baby: was suctioned        The Placenta Was Removed:  intact, whole and that the umbilical cord had three vessels noted    cord gasses were obtained and sent to the lab, cord blood was obtained and sent to the lab and Pitocin, 20 milliunits in 1 liter of ringers lactate was administered, wide open, to assist with uterine contraction    The umbilical cord had delayed clamping of 1 minute: Yes    The Maternal Adnexa was Visualized. There were not any adnexal masses.         There is no height or weight on file to calculate BMI. (Wound Vac indicated for BMI Value>35)    Wound Vac Applied to Incision: No      Specimen:  Placenta sent to pathology yes  * No specimens in log *     Condition:  infant stable to general nursery and mother stable    Blood Type and Rh: A POSITIVE        Rubella Immunity Status:    Rubella Antibody, IGG   Date Value Ref Range Status   2018 269.7 IU/mL Final     Comment:                 REFERENCE RANGE:  <5.0       NON-REACTIVE (non-immune)  5.0 TO 9.9 EQUIVOCAL  >=10.0     REACTIVE     (immune)                 Infant Feeding:    breast    Circumcision: Yes    All Sponge Counts Were Correct x 3 calls-Prior to closure of Peritoneum, Fascia, and Skin Layers: Yes        Attending Attestation: I was present and scrubbed for the entire procedure. SCIP will be utilized for Antibiotics: ancef  Allergies as of 2019    (No Known Allergies)         EPC's in  Place for DVT prophylaxis      Procedure: (Understanding of limitations from template op-reports exist)  Pema Murrieta is a 34 y.o. female  @ 39w2d for  delivery. The risks, benefits, complications, alternative treatment options, and expected outcomes were discussed with the patient. Risks of surgery were discussed, including but not limited to: pain, bleeding, need for blood transfusion, infection, injury to internal organs including intestines, bladder, uterus, fallopian tubes, ovaries and rarely injury to the fetus. Postoperative complications including pain, bleeding, need for blood transfusion, infection, re-operation, infection of the incisions were also explained. The patient verbalized understanding and agreed to proceed, giving informed consent. The patient was taken to Operating Room, identified as Pema Murrieta and the procedure verified as  Delivery. A Time Out was held and the above information confirmed. Procedure Details:  After Spinal Anesthetic with Duramorph was instilled in the seated position the patient was returned to the supine position with a wedge placed under her right hip. FHT's were obtained, the patient was prepped and draped in the usual sterile manner. A three minute drape delay was completed. The bladder was draining clear urine. SCIP antibiotics were infused, EPC's were in place and operating. A time out was completed. There was an adequate skin check both high and low. A Pfannenstiel incision was made with a #10 scalpel and carried down to the fascia with bovie cautery. Fascial incision was made and extended transversely.   The fascia was  from the underlying rectus tissue superiorly and inferiorly. The peritoneum was identified and entered superiorly. The peritoneal incision was extended superiorly and inferiorly, care not to involve the bowel or the bladder. The [place] SHRUTI retractor was placed inferiorly into the incision. The vesico-uterine reflection was developed and skeletonized off the lower uterine segment with blunt and sharp dissection. The [facility] retractor was reapproximated. A low transverse uterine incision was made and the uterine cavity was entered with extreme caution with the blunt end of the scalpel. This incision was extended using digital dissection in a cephalad to caudad manner. A live male infant was delivered without complications. The head was delivered through the incision and fundal pressure was used for the remaining body of the . There was not a nuchal cord. The  had bulb suction of the mouth and nares. There was a spontaneous cry. The infant was vigorous  and there was delayed cord clamping of 1 minute. Please find the  Apgar scores and weight above in the delivery summary. The umbilical cord was then clamped and cut, the infant was handed off to the awaiting neonatology team staff member attending the delivery. Then cord specimen and cord blood was collected and the placenta was delivered using gentle traction and fundal massage, (Spontaneously), it was intact and appeared normal.  The uterus was cleared of all clots and debris and was firm and contracted. IV Pitocin was infusing. An outflow tract was confirmed. The uterine incision was closed with running locked sutures of 0 Vicryl, starting at each corner with figure of \"8's\" and moving to the midline. Excellent hemostasis was observed. Gutters were cleared of all clots and debris. The pelvis was irrigated with warm, sterile water. Uterine incision was reinspected and hemostatic. The uterus, bilateral tubes and ovaries were normal.   The peritoneum was closed with 2-0 vicryl.    The fascia

## 2019-04-02 NOTE — L&D DELIVERY NOTE
Mother's Information    Labor Events     labor?:  No     Mother Delivery Information    Surgical or Additional Est. Blood Loss (mL):  0 (View Only):  Edit in Flowsheets   Combined Est. Blood Loss (mL):  0        Inocencio Godoy, Baby Pending  Aleksandra Lacey [[id number]]    Labor Events     labor?:  No   steroids?:  None  Cervical ripening date/time:     Rupture date/time:     Fluid color:  Clear          Anesthesia    Method:  Spinal     Assisted Delivery Details    Forceps attempted?:  No  Vacuum extractor attempted?:  No     Document Additional Attempt       Document Additional Attempt              Presentation    Presentation:  Vertex  Position:  Left  _:  Occiput  _:  Posterior     [employer] Information    Head delivery date/time:  2019 11:14:00   Changing the 's delivery date/time could affect patient care.:     Delivery date/time:   19 1114   Delivery type:  , Low Transverse    Details:   Trial of labor?:  No    categorization:  Primary    priority:  Scheduled   Indications for :  Macrosomia   Skin Incision Type:  Pfannenstiel   Uterine Incision:  Low Transverse         Delivery Providers    Delivering clinician:  Bryan Forrester DO   Provider Role    Bryan Forrester, DO Surgeon    Brandyn Matos, DO Assistant Surgeon    Spencer Hogan Resident      Cord    Vessels:  3 Vessels  Complications:  None  Delayed cord clamping?:  Yes  Cord clamped date/time:  2019 1115  Cord blood disposition:  Lab  Gases sent?:  Yes  Stem cell collection (by provider):   No     Placenta    Date/time:  2019 11:16:00  Removal:  Spontaneous  Appearance:  Intact  Disposition:  Lab, Pathology     Delivery Resuscitation    Method:  Bulb Suction, Stimulation     Apgars    Living status:  Living  Apgars   1 Minute:   5 Minute:   10 Minute 15 Minute 20 Minute   Skin Color: 0  1       Heart Rate: 2  2       Reflex Irritability: 2  2       Muscle Tone: 2  2       Respiratory Effort: 2  2       Total: 8  9               Apgars Assigned By:  Rosa Sevilla CNP      Measurements    Weight:  4440 g Length:  53.5 cm      Delivery Information    Surgical or additional est. blood loss (mL):  0 (View Only):  Edit in Flowsheets   Combined est. blood loss (mL):  0

## 2019-04-02 NOTE — FLOWSHEET NOTE
Dr. Lemons Senior (resident) at bedside. Patient repositioned to left tilt d/t decreased BP. IV bolus running and blood sugar checked. (90). BILLY Vargas at bedside.

## 2019-04-02 NOTE — ANESTHESIA POSTPROCEDURE EVALUATION
Department of Anesthesiology  Postprocedure Note    Patient: Princess Silva  MRN: [id number]  YOB: 1990  Date of evaluation: 2019  Time:  2:13 PM     Procedure Summary     Date:  19 Room / Location:  [facility] L&D OR / [facility] L&D OR    Anesthesia Start:  2735 Anesthesia Stop:  7281    Procedure:   SECTION PRIMARY (N/A ) Diagnosis:  (PRIMARY  LARGE BABY DUE 19)    Surgeon:  Gba Vázquez DO Responsible Provider:  Toby Summers MD    Anesthesia Type:  spinal ASA Status:  2          Anesthesia Type: spinal    Kendrick Phase I: Kendrick Score: 9    Kendrick Phase II:      Last vitals: Reviewed and per EMR flowsheets.        Anesthesia Post Evaluation    Comments: POST-ANESTHESIA EVALUATION       Pt Name: Princess Silva  MRN: [id number]  YOB: 1990  Date of evaluation: 2019  Time:  2:13 PM      /64   Pulse 68   Temp 97.5 °F (36.4 °C) (Infrared)   Resp 16   LMP 2018 (Exact Date)   SpO2 95%      Consciousness Level    Awake    Sedated but arouseable    Deeply sedated  Cardiopulmonary Status    Stable with patent airway      OTHER  Pain Adequately Treated    YES     NO    Nausea / Vomiting     YES     NO  Adequate Hydration     YES     NO  Anesthesia Related Complications    YES     NO      Electronically signed by Toby Summers MD on 2019 at 2:13 PM

## 2019-04-02 NOTE — FLOWSHEET NOTE
Writer received referral patient wanted to see a  prior to her ; [place] visit with patient and her , Sheldon Ortega; patient anxious, restless and uncomfortable;  asked for prayer for patient and the baby; patient and FOB comforted by prayer; listening presence and support; provided staff support;     19 0961   Encounter Summary   Services provided to: Patient and family together   Referral/Consult From: Patient   Support System Spouse   Continue Visiting   (19)   Complexity of Encounter High   Length of Encounter 15 minutes   Spiritual Assessment Completed Yes   Crisis   Type Emotional distress   Assessment Approachable; Anxious; Fearful;Helplessness   Intervention Active listening;Prayer;Sustaining presence/ Ministry of presence   Outcome Expressed gratitude;Engaged in conversation;Coping; Hopeful;Receptive

## 2019-04-03 LAB
ABSOLUTE EOS #: 0.1 K/UL (ref 0–0.4)
ABSOLUTE IMMATURE GRANULOCYTE: ABNORMAL K/UL (ref 0–0.3)
ABSOLUTE LYMPH #: 1.9 K/UL (ref 1–4.8)
ABSOLUTE MONO #: 0.8 K/UL (ref 0.1–1.3)
BASOPHILS # BLD: 0 % (ref 0–2)
BASOPHILS ABSOLUTE: 0 K/UL (ref 0–0.2)
DIFFERENTIAL TYPE: ABNORMAL
EOSINOPHILS RELATIVE PERCENT: 1 % (ref 0–4)
HCT VFR BLD CALC: 33.7 % (ref 36–46)
HEMOGLOBIN: 11.1 G/DL (ref 12–16)
IMMATURE GRANULOCYTES: ABNORMAL %
LYMPHOCYTES # BLD: 17 % (ref 24–44)
MCH RBC QN AUTO: 28.3 PG (ref 26–34)
MCHC RBC AUTO-ENTMCNC: 33 G/DL (ref 31–37)
MCV RBC AUTO: 85.7 FL (ref 80–100)
MONOCYTES # BLD: 7 % (ref 1–7)
NRBC AUTOMATED: ABNORMAL PER 100 WBC
PDW BLD-RTO: 13.2 % (ref 11.5–14.9)
PLATELET # BLD: 252 K/UL (ref 150–450)
PLATELET ESTIMATE: ABNORMAL
PMV BLD AUTO: 8.2 FL (ref 6–12)
RBC # BLD: 3.93 M/UL (ref 4–5.2)
RBC # BLD: ABNORMAL 10*6/UL
SEG NEUTROPHILS: 75 % (ref 36–66)
SEGMENTED NEUTROPHILS ABSOLUTE COUNT: 8.9 K/UL (ref 1.3–9.1)
WBC # BLD: 11.8 K/UL (ref 3.5–11)
WBC # BLD: ABNORMAL 10*3/UL

## 2019-04-03 PROCEDURE — 1220000000 HC SEMI PRIVATE OB R&B

## 2019-04-03 PROCEDURE — 6360000002 HC RX W HCPCS: Performed by: STUDENT IN AN ORGANIZED HEALTH CARE EDUCATION/TRAINING PROGRAM

## 2019-04-03 PROCEDURE — 36415 COLL VENOUS BLD VENIPUNCTURE: CPT

## 2019-04-03 PROCEDURE — 2580000003 HC RX 258: Performed by: STUDENT IN AN ORGANIZED HEALTH CARE EDUCATION/TRAINING PROGRAM

## 2019-04-03 PROCEDURE — 99024 POSTOP FOLLOW-UP VISIT: CPT | Performed by: OBSTETRICS & GYNECOLOGY

## 2019-04-03 PROCEDURE — 85025 COMPLETE CBC W/AUTO DIFF WBC: CPT

## 2019-04-03 PROCEDURE — 6370000000 HC RX 637 (ALT 250 FOR IP)

## 2019-04-03 PROCEDURE — 6370000000 HC RX 637 (ALT 250 FOR IP): Performed by: STUDENT IN AN ORGANIZED HEALTH CARE EDUCATION/TRAINING PROGRAM

## 2019-04-03 RX ORDER — AMMONIA INHALANTS 0.04 G/.3ML
INHALANT RESPIRATORY (INHALATION)
Status: COMPLETED
Start: 2019-04-03 | End: 2019-04-03

## 2019-04-03 RX ORDER — OXYCODONE HYDROCHLORIDE AND ACETAMINOPHEN 5; 325 MG/1; MG/1
1 TABLET ORAL EVERY 4 HOURS PRN
Qty: 28 TABLET | Refills: 0 | Status: SHIPPED | OUTPATIENT
Start: 2019-04-03 | End: 2019-04-10

## 2019-04-03 RX ORDER — NAPROXEN 500 MG/1
500 TABLET ORAL 2 TIMES DAILY WITH MEALS
Status: DISCONTINUED | OUTPATIENT
Start: 2019-04-03 | End: 2019-04-05 | Stop reason: HOSPADM

## 2019-04-03 RX ORDER — SIMETHICONE 80 MG
80 TABLET,CHEWABLE ORAL 4 TIMES DAILY PRN
Qty: 180 TABLET | Refills: 0 | Status: SHIPPED | OUTPATIENT
Start: 2019-04-03 | End: 2019-04-17

## 2019-04-03 RX ORDER — NAPROXEN 250 MG/1
250 TABLET ORAL 2 TIMES DAILY WITH MEALS
Qty: 60 TABLET | Refills: 1 | Status: SHIPPED | OUTPATIENT
Start: 2019-04-03 | End: 2019-04-17

## 2019-04-03 RX ORDER — ONDANSETRON 4 MG/1
4 TABLET, ORALLY DISINTEGRATING ORAL EVERY 8 HOURS PRN
Qty: 15 TABLET | Refills: 1 | Status: SHIPPED | OUTPATIENT
Start: 2019-04-03 | End: 2019-04-17

## 2019-04-03 RX ORDER — DOCUSATE SODIUM 100 MG/1
100 CAPSULE, LIQUID FILLED ORAL DAILY PRN
Qty: 60 CAPSULE | Refills: 1 | Status: SHIPPED | OUTPATIENT
Start: 2019-04-03 | End: 2019-04-17

## 2019-04-03 RX ORDER — AMMONIA INHALANTS 0.04 G/.3ML
INHALANT RESPIRATORY (INHALATION)
Status: DISPENSED
Start: 2019-04-03 | End: 2019-04-04

## 2019-04-03 RX ADMIN — DOCUSATE SODIUM 100 MG: 100 CAPSULE, LIQUID FILLED ORAL at 08:22

## 2019-04-03 RX ADMIN — KETOROLAC TROMETHAMINE 30 MG: 30 INJECTION, SOLUTION INTRAMUSCULAR; INTRAVENOUS at 01:47

## 2019-04-03 RX ADMIN — Medication 10 ML: at 21:38

## 2019-04-03 RX ADMIN — Medication 2 G: at 01:53

## 2019-04-03 RX ADMIN — OXYCODONE AND ACETAMINOPHEN 2 TABLET: 5; 325 TABLET ORAL at 16:19

## 2019-04-03 RX ADMIN — AMMONIA INHALANTS 0.3 ML: 0.04 INHALANT RESPIRATORY (INHALATION) at 05:20

## 2019-04-03 RX ADMIN — DOCUSATE SODIUM 100 MG: 100 CAPSULE, LIQUID FILLED ORAL at 21:37

## 2019-04-03 RX ADMIN — OXYCODONE AND ACETAMINOPHEN 1 TABLET: 5; 325 TABLET ORAL at 08:23

## 2019-04-03 RX ADMIN — NAPROXEN 500 MG: 500 TABLET ORAL at 08:48

## 2019-04-03 RX ADMIN — NAPROXEN 500 MG: 500 TABLET ORAL at 21:37

## 2019-04-03 RX ADMIN — OXYCODONE AND ACETAMINOPHEN 2 TABLET: 5; 325 TABLET ORAL at 12:07

## 2019-04-03 RX ADMIN — SODIUM CHLORIDE, POTASSIUM CHLORIDE, SODIUM LACTATE AND CALCIUM CHLORIDE: 600; 310; 30; 20 INJECTION, SOLUTION INTRAVENOUS at 05:20

## 2019-04-03 ASSESSMENT — PAIN DESCRIPTION - LOCATION
LOCATION: ABDOMEN

## 2019-04-03 ASSESSMENT — PAIN SCALES - GENERAL
PAINLEVEL_OUTOF10: 4
PAINLEVEL_OUTOF10: 7
PAINLEVEL_OUTOF10: 6
PAINLEVEL_OUTOF10: 3
PAINLEVEL_OUTOF10: 3
PAINLEVEL_OUTOF10: 4
PAINLEVEL_OUTOF10: 7

## 2019-04-03 ASSESSMENT — PAIN DESCRIPTION - FREQUENCY
FREQUENCY: INTERMITTENT

## 2019-04-03 ASSESSMENT — PAIN DESCRIPTION - PAIN TYPE
TYPE: SURGICAL PAIN

## 2019-04-03 ASSESSMENT — PAIN DESCRIPTION - ORIENTATION
ORIENTATION: LOWER

## 2019-04-03 ASSESSMENT — PAIN DESCRIPTION - DESCRIPTORS
DESCRIPTORS: SHARP
DESCRIPTORS: CRAMPING;ACHING
DESCRIPTORS: CRAMPING

## 2019-04-03 NOTE — PROGRESS NOTES
BPM Preliminary    Atrial Rate 04/02/2019 83  BPM Preliminary    P-R Interval 04/02/2019 148  ms Preliminary    QRS Duration 04/02/2019 96  ms Preliminary    Q-T Interval 04/02/2019 364  ms Preliminary    QTc Calculation (Bazett) 04/02/2019 427  ms Preliminary    P Axis 04/02/2019 54  degrees Preliminary    R Axis 04/02/2019 83  degrees Preliminary    T Axis 04/02/2019 55  degrees Preliminary    POC Glucose 04/02/2019 90  65 - 105 mg/dL Final    Hemoglobin 04/02/2019 11.6* 12.0 - 16.0 g/dL Final    Hematocrit 04/02/2019 34.5* 36 - 46 % Final    WBC 04/03/2019 11.8* 3.5 - 11.0 k/uL Final    RBC 04/03/2019 3.93* 4.0 - 5.2 m/uL Final    Hemoglobin 04/03/2019 11.1* 12.0 - 16.0 g/dL Final    Hematocrit 04/03/2019 33.7* 36 - 46 % Final    MCV 04/03/2019 85.7  80 - 100 fL Final    MCH 04/03/2019 28.3  26 - 34 pg Final    MCHC 04/03/2019 33.0  31 - 37 g/dL Final    RDW 04/03/2019 13.2  11.5 - 14.9 % Final    Platelets 75/53/0289 252  150 - 450 k/uL Final    MPV 04/03/2019 8.2  6.0 - 12.0 fL Final    NRBC Automated 04/03/2019 NOT REPORTED  per 100 WBC Final    Differential Type 04/03/2019 NOT REPORTED   Final    Immature Granulocytes 04/03/2019 NOT REPORTED  0 % Final    Absolute Immature Granulocyte 04/03/2019 NOT REPORTED  0.00 - 0.30 k/uL Final    WBC Morphology 04/03/2019 NOT REPORTED   Final    RBC Morphology 04/03/2019 NOT REPORTED   Final    Platelet Estimate 51/65/7763 NOT REPORTED   Final    Seg Neutrophils 04/03/2019 75* 36 - 66 % Final    Lymphocytes 04/03/2019 17* 24 - 44 % Final    Monocytes 04/03/2019 7  1 - 7 % Final    Eosinophils % 04/03/2019 1  0 - 4 % Final    Basophils 04/03/2019 0  0 - 2 % Final    Segs Absolute 04/03/2019 8.90  1.3 - 9.1 k/uL Final    Absolute Lymph # 04/03/2019 1.90  1.0 - 4.8 k/uL Final    Absolute Mono # 04/03/2019 0.80  0.1 - 1.3 k/uL Final    Absolute Eos # 04/03/2019 0.10  0.0 - 0.4 k/uL Final    Basophils # 04/03/2019 0.00  0.0 - 0.2 k/uL without talking to your doctor. · Do not share them. · Know what the results and side effects. Report them to your doctor. · Some drugs can be dangerous when mixed. Talk to a doctor or pharmacist if you are taking more than one drug. This includes over-the-counter medicine and herb or dietary supplements. · Plan ahead for refills so you don't run out. Lifestyle Changes    You and your doctor will plan lifestyle changes that will help you recover. To get encouragement and learn strategies, consider joining a support group for new mothers. Follow-up   Make a follow-up appointment as directed by your doctor. Call Your Doctor If Any of the Following Occurs   After you leave the hospital, call your doctor if any of the following occurs:   · Signs of infection, including fever and chills   · Heavy vaginal bleeding   · Foul-smelling vaginal discharge   · Excessive bleeding, redness, swelling, increasing pain or discharge from the incision site   · Nausea and/or vomiting that you cannot control with the medicines you were given after surgery, or which persist for more than two days after discharge from the hospital   · Pain that you cannot control with the medicines you have been given   · Swelling and/or pain in one or both legs   · Cough, shortness of breath, or chest pain   · Joint pain, fatigue, stiffness, rash, or other new symptoms   · Become dizzy or faint   If you think you have an emergency,  CALL 911  . Home care, Restrictions,  Follow up Care, and birth control review completed    RTO 2 weeks    Secondary Smoke risks and Sudden Infant Death Syndrome were reviewed with recommendations. Cessation options discussed. Signs and Symptoms of Post Partum Depression were reviewed. The patient is to call if any occur. Signs and symptoms of Mastitis were reviewed. The patient is to call if any occur for follow up.       Attending's Name:  Electronically signed by Lizabeth Olson DO on 4/3/2019 at 11:54 AM

## 2019-04-03 NOTE — PROGRESS NOTES
POST OPERATIVE DAY # 1    Andre Hernandez is a 34 y.o. female   This patient was seen and examined today. PLTCS on 4/2/19    Her pregnancy was complicated by:   Patient Active Problem List   Diagnosis    Generalized anxiety disorder    Hx GDMA2 (G1)    Anemia    History of gestational diabetes mellitus (GDM)    High risk pregnancy with low PAPPA (pregnancy-associated plasma protein A)    Short interval between pregnancies complicating pregnancy, antepartum    Placental abnormality in third trimester    Macrosomia affecting management of mother in third trimester    Patient-requested procedure    PLTCS 4/2/19 M APG 8/9 Wt 9#12       Today she is doing well without any chief complaint. Her lochia is light. She denies chest pain, shortness of breath, headache, lightheadedness, blurred vision and peripheral edema. She is breast feeding and she denies any signs or symptoms of mastitis. She is ambulating in her room. She is voiding without difficulty via win catheter. She currently denies S/S of postpartum depression. Flatus present. Bowel movement absent. She is tolerating solids.     Vital Signs:  Vitals:    04/02/19 1540 04/02/19 1555 04/02/19 2009 04/02/19 2315   BP: 128/68 120/76 119/71 122/64   Pulse: 74 86 72 81   Resp: 16 16 16 16   Temp:   98.2 °F (36.8 °C) 98.6 °F (37 °C)   TempSrc:   Infrared Infrared   SpO2:             Urine Input & Output last 24hrs:     Intake/Output Summary (Last 24 hours) at 4/3/2019 0241  Last data filed at 4/3/2019 0009  Gross per 24 hour   Intake 2634 ml   Output 2950 ml   Net -316 ml       Physical Exam:  General:  no apparent distress, alert and cooperative  Neurologic:  alert, oriented, normal speech, no focal findings or movement disorder noted  Lungs:  No increased work of breathing, good air exchange, clear to auscultation bilaterally, no crackles or wheezing  Heart:  Regular rate and rhythm, normal S1 and S2, no S3 or S4, and no murmur noted    Abdomen: abdomen soft, non-distended, non-tender, bowel sounds present   Fundus: non-tender, firm, below umbilicus  Incision: bandage clean and dry, patient prefers to take bandage off in the shower  Extremities:  no calf tenderness, non edematous, SCDs on and running    Labs:  Lab Results   Component Value Date    WBC 11.0 2019    HGB 11.6 (L) 2019    HCT 34.5 (L) 2019    MCV 85.6 2019     2019       Assessment/Plan:  1. Pema Murrieta is a  POD # 1 s/p PLTCS   - Doing well, VSS    - Male infant in [address], circumcision desired, consent signed   - Encourage ambulation and use of incentive spirometer   - D/C win catheter and saline lock IV on POD #1    - Ancef x 24hrs   - H&H awaiting  2. Rh positive/Rubella immune  3. Breast feeding   4. Anxiety  5. Anemia    - Hgb 12.7>11.6   - H&H pending this morning  6. Continue post-op care. Counseling Completed:  Secondary Smoke risks and Sudden Infant Death Syndrome were reviewed with recommendations. Infant sleeping, \"back to sleep\" and avoidance of co-sleeping recommendations were reviewed. Signs and Symptoms of Post Partum Depression were reviewed. The patient is to call if any occur. Signs and symptoms of Mastitis were reviewed. The patient is to call if any occur for follow up.   Discharge instructions including pelvic rest, incision care, 15 lb weight restriction, no driving with pain medicine and office follow-up were reviewed with patient     Providers Name: Dr. Jh Villavicencio DO  Ob/Gyn Resident  4/3/2019, 2:41 AM

## 2019-04-03 NOTE — PROGRESS NOTES
Obstetric/Gynecology Resident Interval Note    Informed that patient was a little dizzy this am ambulating and did better with smelling salts. Symptoms then abated. Vitals:    04/02/19 2009 04/02/19 2315 04/03/19 0506 04/03/19 0521   BP: 119/71 122/64 (!) 129/59 106/62   Pulse: 72 81 88 75   Resp: 16 16 16 16   Temp: 98.2 °F (36.8 °C) 98.6 °F (37 °C) 98.4 °F (36.9 °C)    TempSrc: Infrared Infrared Infrared    SpO2:         Hgb decreased from 12.6 >11.6. H&H due this morning. Will continue IV fluids at this time. Patient voided over 2600 mL overnight so will discontinue win catheter at this time.      Daryl Song DO  OB/GYN Resident, PGY3  [facility] [place]  4/3/2019, 6:53 AM

## 2019-04-04 LAB — SURGICAL PATHOLOGY REPORT: NORMAL

## 2019-04-04 PROCEDURE — 6370000000 HC RX 637 (ALT 250 FOR IP): Performed by: STUDENT IN AN ORGANIZED HEALTH CARE EDUCATION/TRAINING PROGRAM

## 2019-04-04 PROCEDURE — 6360000002 HC RX W HCPCS: Performed by: STUDENT IN AN ORGANIZED HEALTH CARE EDUCATION/TRAINING PROGRAM

## 2019-04-04 PROCEDURE — 99024 POSTOP FOLLOW-UP VISIT: CPT | Performed by: OBSTETRICS & GYNECOLOGY

## 2019-04-04 PROCEDURE — 1220000000 HC SEMI PRIVATE OB R&B

## 2019-04-04 PROCEDURE — 2580000003 HC RX 258: Performed by: STUDENT IN AN ORGANIZED HEALTH CARE EDUCATION/TRAINING PROGRAM

## 2019-04-04 RX ORDER — POLYETHYLENE GLYCOL 3350 17 G/17G
17 POWDER, FOR SOLUTION ORAL DAILY
Status: DISCONTINUED | OUTPATIENT
Start: 2019-04-04 | End: 2019-04-05 | Stop reason: HOSPADM

## 2019-04-04 RX ORDER — BISACODYL 10 MG
10 SUPPOSITORY, RECTAL RECTAL DAILY PRN
Status: DISCONTINUED | OUTPATIENT
Start: 2019-04-04 | End: 2019-04-05 | Stop reason: HOSPADM

## 2019-04-04 RX ADMIN — DOCUSATE SODIUM 100 MG: 100 CAPSULE, LIQUID FILLED ORAL at 08:33

## 2019-04-04 RX ADMIN — OXYCODONE AND ACETAMINOPHEN 1 TABLET: 5; 325 TABLET ORAL at 08:34

## 2019-04-04 RX ADMIN — MAGNESIUM HYDROXIDE 30 ML: 400 SUSPENSION ORAL at 08:34

## 2019-04-04 RX ADMIN — POLYETHYLENE GLYCOL (3350) 17 G: 17 POWDER, FOR SOLUTION ORAL at 16:29

## 2019-04-04 RX ADMIN — OXYCODONE AND ACETAMINOPHEN 1 TABLET: 5; 325 TABLET ORAL at 21:34

## 2019-04-04 RX ADMIN — DOCUSATE SODIUM 100 MG: 100 CAPSULE, LIQUID FILLED ORAL at 21:31

## 2019-04-04 RX ADMIN — OXYCODONE AND ACETAMINOPHEN 1 TABLET: 5; 325 TABLET ORAL at 12:45

## 2019-04-04 RX ADMIN — NAPROXEN 500 MG: 500 TABLET ORAL at 10:12

## 2019-04-04 RX ADMIN — NAPROXEN 500 MG: 500 TABLET ORAL at 21:34

## 2019-04-04 RX ADMIN — ONDANSETRON 4 MG: 2 INJECTION INTRAMUSCULAR; INTRAVENOUS at 21:05

## 2019-04-04 RX ADMIN — OXYCODONE AND ACETAMINOPHEN 1 TABLET: 5; 325 TABLET ORAL at 17:18

## 2019-04-04 RX ADMIN — Medication 10 ML: at 12:47

## 2019-04-04 RX ADMIN — Medication 10 ML: at 21:09

## 2019-04-04 RX ADMIN — SIMETHICONE CHEW TAB 80 MG 80 MG: 80 TABLET ORAL at 08:33

## 2019-04-04 ASSESSMENT — PAIN SCALES - GENERAL
PAINLEVEL_OUTOF10: 2
PAINLEVEL_OUTOF10: 6
PAINLEVEL_OUTOF10: 5
PAINLEVEL_OUTOF10: 4

## 2019-04-04 ASSESSMENT — PAIN DESCRIPTION - PAIN TYPE
TYPE: SURGICAL PAIN

## 2019-04-04 ASSESSMENT — PAIN DESCRIPTION - PROGRESSION: CLINICAL_PROGRESSION: GRADUALLY WORSENING

## 2019-04-04 ASSESSMENT — PAIN DESCRIPTION - LOCATION
LOCATION: ABDOMEN

## 2019-04-04 ASSESSMENT — PAIN DESCRIPTION - DESCRIPTORS
DESCRIPTORS: DISCOMFORT

## 2019-04-04 ASSESSMENT — PAIN DESCRIPTION - ORIENTATION: ORIENTATION: LOWER

## 2019-04-04 ASSESSMENT — PAIN DESCRIPTION - FREQUENCY
FREQUENCY: INTERMITTENT
FREQUENCY: INTERMITTENT

## 2019-04-04 NOTE — PROGRESS NOTES
POST OPERATIVE DAY # 2    Fabiano Guadarrama is a 34 y.o. female   This patient was seen and examined today. She delivered by  Section on 19. Her pregnancy was complicated by:   Patient Active Problem List   Diagnosis    Generalized anxiety disorder    Hx GDMA2 (G1)    Anemia    History of gestational diabetes mellitus (GDM)    High risk pregnancy with low PAPPA (pregnancy-associated plasma protein A)    Short interval between pregnancies complicating pregnancy, antepartum    Placental abnormality in third trimester    Macrosomia affecting management of mother in third trimester    Patient-requested procedure    PLTCS 19 M APG 8/9 Wt 9#12    Postpartum state       Today she is doing well without any chief complaint. Her lochia is moderate. She denies chest pain, shortness of breath, headache and lightheadedness. She is  breast feeding and she denies any signs or symptoms of mastitis. She is ambulating well. She is voiding without difficulty. She currently denies S/S of postpartum depression. Flatus present. Bowel movement absent. She is tolerating solids. She is thinking about going home today.     Vital Signs:  Vitals:    19 1252 19 1505 19 1621 19 1957   BP: 116/74 105/71 117/70 115/67   Pulse: 66 67 73 72   Resp: 16 15 16 16   Temp: 97 °F (36.1 °C)  96.6 °F (35.9 °C) 98 °F (36.7 °C)   TempSrc:   Infrared Oral   SpO2:             Urine Input & Output last 24hrs:     Intake/Output Summary (Last 24 hours) at 2019 8212  Last data filed at 4/3/2019 1621  Gross per 24 hour   Intake 240 ml   Output 850 ml   Net -610 ml       Physical Exam:  General:  no apparent distress, alert and cooperative  Neurologic:  alert, oriented, normal speech, no focal findings or movement disorder noted  Lungs:  No increased work of breathing, good air exchange, clear to auscultation bilaterally, no crackles or wheezing  Heart:  regular rate and rhythm    Abdomen: abdomen soft, non-distended, non-tender  Fundus: non-tender, normal size, firm, below umbilicus  Incision: clean, dry and intact with steri strips in place  Extremities:  no calf tenderness, non edematous    Labs:  Lab Results   Component Value Date    WBC 11.8 (H) 2019    HGB 11.1 (L) 2019    HCT 33.7 (L) 2019    MCV 85.7 2019     2019       Assessment/Plan:  1. Maia Pantoja is a  POD # 2 s/p PLTCS   - Doing well, VSS    - male infant in [address], circumcision completed   - Encourage ambulation and use of incentive spirometer  2. Rh positive/Rubella immune  3. Breast feeding    - denies s/s of mastitis  4. Anxiety  5. Anemia   - HgB 12.7>11.6>11.1  6. Continue post-op care. 7. Anticipate discharge home today or tomorrow    Counseling Completed:  Secondary Smoke risks and Sudden Infant Death Syndrome were reviewed with recommendations. Infant sleeping, \"back to sleep\" and avoidance of co-sleeping recommendations were reviewed. Signs and Symptoms of Post Partum Depression were reviewed. The patient is to call if any occur. Signs and symptoms of Mastitis were reviewed. The patient is to call if any occur for follow up.   Discharge instructions including pelvic rest, incision care, 15 lb weight restriction, no driving with pain medicine and office follow-up were reviewed with patient     Providers Name: Dr. Donald Everett DO  Ob/Gyn Resident  2019, 6:25 AM

## 2019-04-04 NOTE — PROGRESS NOTES
Obstetrical Rounds:    POD#: 2  Hospital Day: 3  Procedure: primary  section    Date: 2019  Time: 9:06 AM        Patient Name: Jonel Foster  Patient : 1990  Room/Bed: [facility]3/1379-26  Admission Date/Time: 2019  8:36 AM  MRN #: [id number]  [facility] #: [id number]        Attending Physician Statement  I have discussed the care of Jonel Foster, including pertinent history and exam findings,  with the resident. I have reviewed their note in the electronic medical record. I have seen and examined the patient and the key elements of all parts of the encounter have been performed/reviewed by me . I agree with the assessment, plan and orders as documented by the resident. Pt without c/c. Pt plans on discharge today or tomorrow    Vitals:    19 0816   BP: 114/71   Pulse: 80   Resp: 16   Temp: 98.1 °F (36.7 °C)   SpO2:        Admission on 2019   Component Date Value Ref Range Status    Expiration Date 2019   Final    Arm Band Number 2019 [id number]   Final    ABO/Rh 2019 A POSITIVE   Final    Antibody Screen 2019 NEGATIVE   Final    WBC 2019 11.0  3.5 - 11.0 k/uL Final    RBC 2019 4.47  4.0 - 5.2 m/uL Final    Hemoglobin 2019 12.7  12.0 - 16.0 g/dL Final    Hematocrit 2019 38.2  36 - 46 % Final    MCV 2019 85.6  80 - 100 fL Final    MCH 2019 28.5  26 - 34 pg Final    MCHC 2019 33.3  31 - 37 g/dL Final    RDW 2019 13.1  11.5 - 14.9 % Final    Platelets  284  150 - 450 k/uL Final    MPV 2019 8.5  6.0 - 12.0 fL Final    NRBC Automated 2019 NOT REPORTED  per 100 WBC Final    T. pallidum, IgG 2019 NONREACTIVE  NONREACTIVE Final    Comment:       T. pallidum antibodies are not detected. There is no serological evidence of infection with T. pallidum (early primary syphilis   cannot be excluded). Retest in 2-4 weeks if syphilis is clinically suspect.             Amphetamine Screen, Ur 04/02/2019 NEGATIVE  NEGATIVE Final    Comment:       (Positive cutoff 1000 ng/mL)                  Barbiturate Screen, Ur 04/02/2019 NEGATIVE  NEGATIVE Final    Comment:       (Positive cutoff 200 ng/mL)                  Benzodiazepine Screen, Urine 04/02/2019 NEGATIVE  NEGATIVE Final    Comment:       (Positive cutoff 200 ng/mL)                  Cocaine Metabolite, Urine 04/02/2019 NEGATIVE  NEGATIVE Final    Comment:       (Positive cutoff 300 ng/mL)                  Methadone Screen, Urine 04/02/2019 NEGATIVE  NEGATIVE Final    Comment:       (Positive cutoff 300 ng/mL)                  Opiates, Urine 04/02/2019 NEGATIVE  NEGATIVE Final    Comment:       (Positive cutoff 300 ng/mL)                  Phencyclidine, Urine 04/02/2019 NEGATIVE  NEGATIVE Final    Comment:       (Positive cutoff 25 ng/mL)                  Propoxyphene, Urine 04/02/2019 NOT REPORTED  NEGATIVE Final    Cannabinoid Scrn, Ur 04/02/2019 NEGATIVE  NEGATIVE Final    Comment:       (Positive cutoff 50 ng/mL)                  Oxycodone Screen, Ur 04/02/2019 NEGATIVE  NEGATIVE Final    Comment:       (Positive cutoff 100 ng/mL)                  Methamphetamine, Urine 04/02/2019 NOT REPORTED  NEGATIVE Final    Tricyclic Antidepressants, Urine 04/02/2019 NOT REPORTED  NEGATIVE Final    MDMA, Urine 04/02/2019 NOT REPORTED  NEGATIVE Final    Buprenorphine Urine 04/02/2019 NOT REPORTED  NEGATIVE Final    Test Information 04/02/2019 Assay provides medical screening only. The absence of expected drug(s) and/or metabolite(s) may indicate diluted or adulterated urine, limitations of testing or timing of collection. Final    Comment: Testing for legal purposes should be confirmed by another method. To request confirmation   of test result, please call the lab within 7 days of sample submission.       Ventricular Rate 04/02/2019 83  BPM Preliminary    Atrial Rate 04/02/2019 83  BPM Preliminary    P-R Interval 04/02/2019 148 53561  [phone]      Discharge Instructions for  Birth     During a  section (), an incision is made in the abdomen and uterus (womb) to deliver the baby. The normal hospital stay is 2-4 days. Steps to Take   Home Care    · For the first 1-2 weeks, ask for someone to help you at home. · Let people help you. Take frequent rest breaks. · For vaginal bleeding, use extra absorbent pads. · Keep the incision area clean and dry. · Ask your doctor about when it is safe to shower, bathe, or soak in water. · Avoid heavy lifting for six weeks. Diet    After a  birth, you will start with a clear liquid diet. Examples include: Jell-o, broth, and ginger ale. If you tolerate that, you can slowly go back to your regular diet. Stay away from anything greasy or spicy right after surgery. These types of foods can upset your stomach. Drink lots of fluids to prevent constipation. Physical Activity    · Do not lift anything heavier than your baby. · When shifting positions, use a pillow to support the area where the incisions were made. · Get up slowly. This will help you to avoid feeling dizzy or light headed. · Try to move around each day. Light physical activity will help with your recovery. · Ask your doctor when you will be able to go back to work. · Do not drive unless your doctor has given you permission to do so. Do not drive if you are taking prescription pain medicine. · Ask your doctor when you will be able to resume sexual activity. If you have not done so already, talk to your doctor about birth control options. Medications    Your doctor may recommend pain medicine to ease discomfort. If you are taking medicines, follow these general guidelines:   · Take your medicine as directed. Do not change the amount or the schedule. · Do not stop taking them without talking to your doctor. · Do not share them.    · Know what the results and side effects. Report them to your doctor. · Some drugs can be dangerous when mixed. Talk to a doctor or pharmacist if you are taking more than one drug. This includes over-the-counter medicine and herb or dietary supplements. · Plan ahead for refills so you don't run out. Lifestyle Changes    You and your doctor will plan lifestyle changes that will help you recover. To get encouragement and learn strategies, consider joining a support group for new mothers. Follow-up   Make a follow-up appointment as directed by your doctor. Call Your Doctor If Any of the Following Occurs   After you leave the hospital, call your doctor if any of the following occurs:   · Signs of infection, including fever and chills   · Heavy vaginal bleeding   · Foul-smelling vaginal discharge   · Excessive bleeding, redness, swelling, increasing pain or discharge from the incision site   · Nausea and/or vomiting that you cannot control with the medicines you were given after surgery, or which persist for more than two days after discharge from the hospital   · Pain that you cannot control with the medicines you have been given   · Swelling and/or pain in one or both legs   · Cough, shortness of breath, or chest pain   · Joint pain, fatigue, stiffness, rash, or other new symptoms   · Become dizzy or faint   If you think you have an emergency,  CALL 911  . Home care, Restrictions,  Follow up Care, and birth control review completed    RTO 2 weeks    Secondary Smoke risks and Sudden Infant Death Syndrome were reviewed with recommendations. Cessation options discussed. Signs and Symptoms of Post Partum Depression were reviewed. The patient is to call if any occur. Signs and symptoms of Mastitis were reviewed. The patient is to call if any occur for follow up.       Attending's Name:  Electronically signed by Rinku Chung DO on 4/4/2019 at 9:06 AM

## 2019-04-05 VITALS
DIASTOLIC BLOOD PRESSURE: 71 MMHG | SYSTOLIC BLOOD PRESSURE: 114 MMHG | HEART RATE: 78 BPM | OXYGEN SATURATION: 97 % | RESPIRATION RATE: 16 BRPM | TEMPERATURE: 98.2 F

## 2019-04-05 PROCEDURE — 6370000000 HC RX 637 (ALT 250 FOR IP): Performed by: STUDENT IN AN ORGANIZED HEALTH CARE EDUCATION/TRAINING PROGRAM

## 2019-04-05 RX ADMIN — MAGNESIUM HYDROXIDE 30 ML: 400 SUSPENSION ORAL at 09:37

## 2019-04-05 RX ADMIN — OXYCODONE AND ACETAMINOPHEN 1 TABLET: 5; 325 TABLET ORAL at 05:55

## 2019-04-05 RX ADMIN — OXYCODONE AND ACETAMINOPHEN 1 TABLET: 5; 325 TABLET ORAL at 01:30

## 2019-04-05 RX ADMIN — DOCUSATE SODIUM 100 MG: 100 CAPSULE, LIQUID FILLED ORAL at 09:35

## 2019-04-05 RX ADMIN — OXYCODONE AND ACETAMINOPHEN 1 TABLET: 5; 325 TABLET ORAL at 10:49

## 2019-04-05 RX ADMIN — NAPROXEN 500 MG: 500 TABLET ORAL at 09:34

## 2019-04-05 ASSESSMENT — PAIN SCALES - GENERAL
PAINLEVEL_OUTOF10: 4

## 2019-04-05 NOTE — PROGRESS NOTES
POST OPERATIVE DAY # 3    Darius Paiz is a 34 y.o. female   This patient was seen and examined today. PLTCS on 4/2/19    Her pregnancy was complicated by:   Patient Active Problem List   Diagnosis    Generalized anxiety disorder    Hx GDMA2 (G1)    Anemia    History of gestational diabetes mellitus (GDM)    High risk pregnancy with low PAPPA (pregnancy-associated plasma protein A)    Short interval between pregnancies complicating pregnancy, antepartum    Placental abnormality in third trimester    Macrosomia affecting management of mother in third trimester    Patient-requested procedure    PLTCS 4/2/19 M APG 8/9 Wt 9#12    Postpartum state       Today she is doing well without any chief complaint. Her lochia is light. She denies chest pain, shortness of breath, headache, lightheadedness, blurred vision and peripheral edema. She is breast feeding and she denies any signs or symptoms of mastitis. She is ambulating without difficulty. She is voiding without difficulty. She currently denies S/S of postpartum depression. Flatus present. Bowel movement absent. She is tolerating solids. Patient desires discharge.     Vital Signs:  Vitals:    04/04/19 1247 04/04/19 1615 04/04/19 2113 04/04/19 2115   BP: 121/82 122/78  110/76   Pulse: 77 82  67   Resp: 16 16  16   Temp: 97 °F (36.1 °C) 98.2 °F (36.8 °C) 98.1 °F (36.7 °C)    TempSrc: Infrared Infrared Oral    SpO2:               Physical Exam:  General:  no apparent distress, alert and cooperative  Neurologic:  alert, oriented, normal speech, no focal findings or movement disorder noted  Lungs:  No increased work of breathing, good air exchange, clear to auscultation bilaterally, no crackles or wheezing  Heart:  Regular rate and rhythm, normal S1 and S2, no S3 or S4, and no murmur noted    Abdomen: abdomen soft, non-distended, non-tender, bowel sounds present   Fundus: non-tender, firm, below umbilicus  Incision: incision clean, dry and intact with steri-strips in place  Extremities:  no calf tenderness, non edematous, SCDs on and running    Labs:  Lab Results   Component Value Date    WBC 11.8 (H) 2019    HGB 11.1 (L) 2019    HCT 33.7 (L) 2019    MCV 85.7 2019     2019       Assessment/Plan:  1. Carolina Foreman is a  POD # 3 s/p PLTCS   - Doing well, VSS    - Male infant in [address], circumcision done   - Encourage ambulation and use of incentive spirometer   - S/p Ancef x 24hrs   - H&H awaiting  2. Rh positive/Rubella immune  3. Breast feeding   4. Anxiety  5. Anemia    - Hgb 12.7>11.6>11.1   - Rx printed   6. Continue post-op care. 7. Patient okay for discharge    Counseling Completed:  Secondary Smoke risks and Sudden Infant Death Syndrome were reviewed with recommendations. Infant sleeping, \"back to sleep\" and avoidance of co-sleeping recommendations were reviewed. Signs and Symptoms of Post Partum Depression were reviewed. The patient is to call if any occur. Signs and symptoms of Mastitis were reviewed. The patient is to call if any occur for follow up.   Discharge instructions including pelvic rest, incision care, 15 lb weight restriction, no driving with pain medicine and office follow-up were reviewed with patient     Providers Name: Dr. Fredo Oneill DO  Ob/Gyn Resident  2019, 3:29 AM

## 2019-04-05 NOTE — PROGRESS NOTES
SW spoke to pt regarding feelings she has of \"depression\" or \"sadness\" but only when breast feeding and it only lasts a few moments. Pt said she did have this with her first son. We discussed strategies to overcome but pt said she will call MD if it becomes unmanageable. Pt said she has a good support system and everything she needs for infant.

## 2019-04-05 NOTE — FLOWSHEET NOTE
Patient reports that baby was cluster feeding through the night and she didn't get much sleep. Reports slight nausea. Requesting to sleep and have infant in nursery.

## 2019-04-05 NOTE — FLOWSHEET NOTE
Discussed post partum depression with patient. Patient reports that she feels okay right now, but when she is nursing and \"milk lets down\" with hormone release she gets a feeling of sadness. Patient relates that this occurred with her last pregnancy and lasted 7 months. Patient had been prescribed Zoloft, but did not take. Dr. Flores Fayette aware.  consulted. Supportive care provided and patient encouraged to voice feelings.

## 2019-04-05 NOTE — FLOWSHEET NOTE
04/05/19 1230   Encounter Summary   Services provided to: Patient   Referral/Consult From: [facility]   (4/5/19)   Complexity of Encounter Low   Length of Encounter 15 minutes   Spiritual Assessment Completed Yes   Routine   Type Follow up   Spiritual/Jainism   Type Spiritual support   Assessment Approachable; Hopeful;Coping;Calm   Intervention Active listening;Explored feelings, thoughts, concerns;Prayer;Sustaining presence/ Ministry of presence; Discussed illness/injury and it's impact   Outcome Comfort;Expressed gratitude;Engaged in conversation;Expressed feelings/needs/concerns;Coping; Hopeful;Receptive

## 2019-04-16 PROBLEM — O09.899 HIGH RISK PREGNANCY WITH LOW PAPPA (PREGNANCY-ASSOCIATED PLASMA PROTEIN A): Status: RESOLVED | Noted: 2018-10-03 | Resolved: 2019-04-16

## 2019-04-16 PROBLEM — O28.0 HIGH RISK PREGNANCY WITH LOW PAPPA (PREGNANCY-ASSOCIATED PLASMA PROTEIN A): Status: RESOLVED | Noted: 2018-10-03 | Resolved: 2019-04-16

## 2019-04-16 PROBLEM — Z86.32 HISTORY OF GESTATIONAL DIABETES MELLITUS (GDM): Status: RESOLVED | Noted: 2018-09-10 | Resolved: 2019-04-16

## 2019-04-17 ENCOUNTER — OFFICE VISIT (OUTPATIENT)
Dept: OBGYN CLINIC | Age: 29
End: 2019-04-17

## 2019-04-17 VITALS
DIASTOLIC BLOOD PRESSURE: 72 MMHG | HEART RATE: 84 BPM | BODY MASS INDEX: 25.78 KG/M2 | SYSTOLIC BLOOD PRESSURE: 116 MMHG | RESPIRATION RATE: 18 BRPM | HEIGHT: 64 IN | WEIGHT: 151 LBS

## 2019-04-17 DIAGNOSIS — Z98.891 STATUS POST CESAREAN SECTION ROUTINE POSTPARTUM FOLLOW-UP: Primary | ICD-10-CM

## 2019-04-17 PROCEDURE — 99024 POSTOP FOLLOW-UP VISIT: CPT | Performed by: ADVANCED PRACTICE MIDWIFE

## 2019-04-17 ASSESSMENT — PATIENT HEALTH QUESTIONNAIRE - PHQ9
1. LITTLE INTEREST OR PLEASURE IN DOING THINGS: 0
SUM OF ALL RESPONSES TO PHQ QUESTIONS 1-9: 0
SUM OF ALL RESPONSES TO PHQ QUESTIONS 1-9: 0
SUM OF ALL RESPONSES TO PHQ9 QUESTIONS 1 & 2: 0
2. FEELING DOWN, DEPRESSED OR HOPELESS: 0

## 2019-04-17 NOTE — PROGRESS NOTES
risks of respiratory problems, Sudden     infant death syndrome, and potential malignancies. 4. Abstinence  5. Family planning counseling and STD counseling completed  6. Continue with post operative restrictions  7.  No lifting or [place]

## 2019-05-07 ENCOUNTER — HOSPITAL ENCOUNTER (OUTPATIENT)
Age: 29
Setting detail: SPECIMEN
Discharge: HOME | End: 2019-05-07
Payer: MEDICAID

## 2019-05-07 ENCOUNTER — OFFICE VISIT (OUTPATIENT)
Dept: FAMILY MEDICINE CLINIC | Age: 29
End: 2019-05-07
Payer: MEDICAID

## 2019-05-07 VITALS
TEMPERATURE: 98.2 F | BODY MASS INDEX: 25.95 KG/M2 | RESPIRATION RATE: 16 BRPM | HEIGHT: 64 IN | WEIGHT: 152 LBS | OXYGEN SATURATION: 97 % | HEART RATE: 70 BPM | DIASTOLIC BLOOD PRESSURE: 70 MMHG | SYSTOLIC BLOOD PRESSURE: 103 MMHG

## 2019-05-07 DIAGNOSIS — J02.9 ACUTE PHARYNGITIS, UNSPECIFIED ETIOLOGY: Primary | ICD-10-CM

## 2019-05-07 LAB — S PYO AG THROAT QL: NORMAL

## 2019-05-07 PROCEDURE — G8427 DOCREV CUR MEDS BY ELIG CLIN: HCPCS | Performed by: NURSE PRACTITIONER

## 2019-05-07 PROCEDURE — 87880 STREP A ASSAY W/OPTIC: CPT | Performed by: NURSE PRACTITIONER

## 2019-05-07 PROCEDURE — G8417 CALC BMI ABV UP PARAM F/U: HCPCS | Performed by: NURSE PRACTITIONER

## 2019-05-07 PROCEDURE — 99213 OFFICE O/P EST LOW 20 MIN: CPT | Performed by: NURSE PRACTITIONER

## 2019-05-07 PROCEDURE — 1036F TOBACCO NON-USER: CPT | Performed by: NURSE PRACTITIONER

## 2019-05-07 RX ORDER — AMOXICILLIN 500 MG/1
500 CAPSULE ORAL 2 TIMES DAILY
Qty: 20 CAPSULE | Refills: 0 | Status: SHIPPED | OUTPATIENT
Start: 2019-05-07 | End: 2019-05-17

## 2019-05-07 ASSESSMENT — ENCOUNTER SYMPTOMS
COUGH: 0
VOMITING: 0
SINUS PAIN: 0
SHORTNESS OF BREATH: 0
NAUSEA: 0
SORE THROAT: 1
ABDOMINAL PAIN: 0

## 2019-05-07 NOTE — PROGRESS NOTES
[facility]   [address] [facility]  [address] [facility] [address]  Dept: [phone]  Dept Fax: [phone]    Pema Murrieta is a 34 y.o. female who presents to the urgent care today for her medicalconditions/complaints as noted below. Pema Murrieta is c/o of Pharyngitis (x two days )      HPI:       28-year-old female presents with complaint of sore throat. Patient describes sore throat has been bothering her for several days however worsened last night. Patient describes painful swallowing rated 8 out of 10. Associated symptoms included mild congestion, postnasal drainage, swollen lymph nodes. Denies cough, fevers, chills, sinus pain, ear pain. Relieving factors include none. Worsening factors include none. Treatments tried include none. Patient is currently breast-feeding and was unsure of what she could take. Past Medical History:   Diagnosis Date    Anemia 6/28/2018    Anxiety     Generalized anxiety disorder 10/6/2015    Gestational diabetes mellitus in pregnancy 12/1/2016        Current Outpatient Medications   Medication Sig Dispense Refill    amoxicillin (AMOXIL) 500 MG capsule Take 1 capsule by mouth 2 times daily for 10 days 20 capsule 0     No current facility-administered medications for this visit. No Known Allergies    Subjective:      Review of Systems   Constitutional: Negative for chills and fever. HENT: Positive for congestion, postnasal drip and sore throat. Negative for ear pain and sinus pain. Respiratory: Negative for cough and shortness of breath. Cardiovascular: Negative for chest pain and palpitations. Gastrointestinal: Negative for abdominal pain, nausea and vomiting. Neurological: Negative for dizziness and headaches. All other systems reviewed and are negative. Objective:     Physical Exam   Constitutional: She is oriented to person, place, and time. She appears well-developed and well-nourished. HENT:   Head: Atraumatic. Right Ear: Tympanic membrane normal.   Left Ear: Tympanic membrane normal.   Nose: Mucosal edema present. No rhinorrhea. Right sinus exhibits no maxillary sinus tenderness and no frontal sinus tenderness. Left sinus exhibits no maxillary sinus tenderness and no frontal sinus tenderness. Mouth/Throat: Uvula is midline. Posterior oropharyngeal erythema present. No oropharyngeal exudate or posterior oropharyngeal edema. Cardiovascular: Normal rate. Pulmonary/Chest: Effort normal and breath sounds normal.   Neurological: She is alert and oriented to person, place, and time. Skin: Skin is warm and dry. Psychiatric: She has a normal mood and affect. Her behavior is normal.   Nursing note and vitals reviewed. /70 (Site: Left Upper Arm, Position: Sitting, Cuff Size: Medium Adult)   Pulse 70   Temp 98.2 °F (36.8 °C) (Oral)   Resp 16   Ht 5' 4\" (1.626 m)   Wt 152 lb (68.9 kg)   SpO2 97%   BMI 26.09 kg/m²   Lab Review not applicable    Assessment:       Diagnosis Orders   1. Acute pharyngitis, unspecified etiology  POCT rapid strep A    Strep A culture, throat    amoxicillin (AMOXIL) 500 MG capsule       Plan:      Return if symptoms worsen or fail to improve. Orders Placed This Encounter   Medications    amoxicillin (AMOXIL) 500 MG capsule     Sig: Take 1 capsule by mouth 2 times daily for 10 days     Dispense:  20 capsule     Refill:  0     Results for orders placed or performed in visit on 05/07/19   POCT rapid strep A   Result Value Ref Range    Strep A Ag  None Detected           Strep test negative, will send for throat culture. Patient instructed to complete entire antibiotic course. Tylenol/Motrin as needed for fever/discomfort. Change toothbrush in 24 hours. Salt water gargles and throat lozenges if desired. Patient agreeable to treatment plan. Educational materials provided on AVS.  Follow up if symptoms do not improve/worsen.       Patient given

## 2019-05-09 LAB
CULTURE: NORMAL
CULTURE: NORMAL
Lab: NORMAL
SPECIMEN DESCRIPTION: NORMAL

## 2019-05-14 LAB
EKG ATRIAL RATE: 83 BPM
EKG P AXIS: 54 DEGREES
EKG P-R INTERVAL: 148 MS
EKG Q-T INTERVAL: 364 MS
EKG QRS DURATION: 96 MS
EKG QTC CALCULATION (BAZETT): 427 MS
EKG R AXIS: 83 DEGREES
EKG T AXIS: 55 DEGREES
EKG VENTRICULAR RATE: 83 BPM

## 2019-05-20 ENCOUNTER — OFFICE VISIT (OUTPATIENT)
Dept: OBGYN CLINIC | Age: 29
End: 2019-05-20
Payer: MEDICAID

## 2019-05-20 VITALS
HEIGHT: 62 IN | DIASTOLIC BLOOD PRESSURE: 68 MMHG | SYSTOLIC BLOOD PRESSURE: 110 MMHG | HEART RATE: 78 BPM | BODY MASS INDEX: 28.71 KG/M2 | WEIGHT: 156 LBS

## 2019-05-20 DIAGNOSIS — Z98.891 STATUS POST CESAREAN SECTION ROUTINE POSTPARTUM FOLLOW-UP: Primary | ICD-10-CM

## 2019-07-15 ENCOUNTER — HOSPITAL ENCOUNTER (OUTPATIENT)
Age: 29
Setting detail: SPECIMEN
Discharge: HOME | End: 2019-07-15
Payer: MEDICAID

## 2019-07-15 ENCOUNTER — OFFICE VISIT (OUTPATIENT)
Dept: OBGYN CLINIC | Age: 29
End: 2019-07-15
Payer: MEDICAID

## 2019-07-15 VITALS
DIASTOLIC BLOOD PRESSURE: 70 MMHG | HEIGHT: 65 IN | SYSTOLIC BLOOD PRESSURE: 114 MMHG | HEART RATE: 74 BPM | BODY MASS INDEX: 24.83 KG/M2 | WEIGHT: 149 LBS | RESPIRATION RATE: 18 BRPM

## 2019-07-15 DIAGNOSIS — Z12.4 CERVICAL CANCER SCREENING: ICD-10-CM

## 2019-07-15 DIAGNOSIS — Z01.419 WELL FEMALE EXAM WITH ROUTINE GYNECOLOGICAL EXAM: Primary | ICD-10-CM

## 2019-07-15 DIAGNOSIS — Z01.419 WELL FEMALE EXAM WITH ROUTINE GYNECOLOGICAL EXAM: ICD-10-CM

## 2019-07-15 PROCEDURE — 99395 PREV VISIT EST AGE 18-39: CPT | Performed by: CLINICAL NURSE SPECIALIST

## 2019-07-15 PROCEDURE — G0145 SCR C/V CYTO,THINLAYER,RESCR: HCPCS

## 2019-07-15 NOTE — PROGRESS NOTES
History and Physical  [address], [address] [facility] [address] [phone]   Fax [phone]  Pretty Otoole  7/15/2019              29 y.o. Chief Complaint   Patient presents with    Annual Exam       No LMP recorded (lmp unknown). Primary Care Physician: JANUARY Chery - CNP    The patient was seen and examined. She has no chief complaint today and is here for her annual exam.  Her bowels are regular. There are no voiding complaints. She denies any bloating. She denies vaginal discharge and was counseled on STD's and the need for barrier contraception.      HPI : Pretty Otoole is a 34 y.o. female     Presents for annual well woman exam with no complaints.   ________________________________________________________________________  OB History    Para Term  AB Living   3 2 2 0 1 1   SAB TAB Ectopic Molar Multiple Live Births   0 1 0 0 0 1      # Outcome Date GA Lbr Roberto/2nd Weight Sex Delivery Anes PTL Lv   3 Term 19 39w2d  9 lb 12 oz (4.423 kg) M CS-LTranv      2 Term 17 39w2d 02:19 / 03:04 7 lb 10 oz (3.459 kg) M Vag-Spont EPI N JERICHO      Name: Leonor Blackwellmerin  Apgar5: 9   1 TAB              Past Medical History:   Diagnosis Date    Anemia 2018    Anxiety     Generalized anxiety disorder 10/6/2015    Gestational diabetes mellitus in pregnancy 2016                                                                   Past Surgical History:   Procedure Laterality Date     SECTION N/A 2019     SECTION PRIMARY [facility] by [facility]go DO at [facility] L[facility]D [facility]    WISDOM TOOTH EXTRACTION       Family History   Problem Relation Age of Onset    Hypertension Father     Hypertension Mother     No Known Problems Brother     Breast Cancer Neg Hx     Cancer Neg Hx     Colon Cancer Neg Hx     Diabetes Neg Hx     Eclampsia Neg Hx     Ovarian Cancer Neg

## 2019-07-18 LAB — CYTOLOGY REPORT: NORMAL

## 2019-08-27 ENCOUNTER — OFFICE VISIT (OUTPATIENT)
Dept: FAMILY MEDICINE CLINIC | Age: 29
End: 2019-08-27
Payer: MEDICAID

## 2019-08-27 VITALS
OXYGEN SATURATION: 97 % | HEART RATE: 74 BPM | WEIGHT: 149 LBS | DIASTOLIC BLOOD PRESSURE: 69 MMHG | SYSTOLIC BLOOD PRESSURE: 105 MMHG | BODY MASS INDEX: 24.79 KG/M2 | TEMPERATURE: 98.5 F

## 2019-08-27 DIAGNOSIS — J02.9 ACUTE SORE THROAT: Primary | ICD-10-CM

## 2019-08-27 LAB — S PYO AG THROAT QL: NORMAL

## 2019-08-27 PROCEDURE — G8427 DOCREV CUR MEDS BY ELIG CLIN: HCPCS | Performed by: NURSE PRACTITIONER

## 2019-08-27 PROCEDURE — 99213 OFFICE O/P EST LOW 20 MIN: CPT | Performed by: NURSE PRACTITIONER

## 2019-08-27 PROCEDURE — 1036F TOBACCO NON-USER: CPT | Performed by: NURSE PRACTITIONER

## 2019-08-27 PROCEDURE — 87880 STREP A ASSAY W/OPTIC: CPT | Performed by: NURSE PRACTITIONER

## 2019-08-27 PROCEDURE — G8420 CALC BMI NORM PARAMETERS: HCPCS | Performed by: NURSE PRACTITIONER

## 2019-08-27 ASSESSMENT — ENCOUNTER SYMPTOMS
COUGH: 0
SORE THROAT: 1
VOMITING: 0
ABDOMINAL PAIN: 0
SHORTNESS OF BREATH: 0
NAUSEA: 0
DIARRHEA: 0
SINUS PAIN: 0

## 2019-08-27 NOTE — LETTER
[facility]   [address] [address]  Phone: [phone]  Fax: [phone]    JANUARY Juarez CNP        August 27, 2019     Patient: Rhys Singleton   YOB: 1990   Date of Visit: 8/27/2019       To Whom It May Concern: It is my medical opinion that Sabina Berger is to be excused from work through 8/27/2019. If you have any questions or concerns, please don't hesitate to call.     Sincerely,        JANUARY Juarez CNP

## 2019-08-27 NOTE — PATIENT INSTRUCTIONS
chances of quitting for good. · Use a vaporizer or humidifier to add moisture to your bedroom. Follow the directions for cleaning the machine. When should you call for help? Call your doctor now or seek immediate medical care if:    · You have new or worse trouble swallowing.     · Your sore throat gets much worse on one side.    Watch closely for changes in your health, and be sure to contact your doctor if you do not get better as expected. Where can you learn more? Go to https://Bellabox.[employer]. org and sign in to your [employer] account. Enter X486 in the [employer] box to learn more about \"Sore Throat: Care Instructions. \"     If you do not have an account, please click on the \"Sign Up Now\" link. Current as of: October 21, 2018  Content Version: 12.1  © 4349-2851 Healthwise, Incorporated. Care instructions adapted under license by [facility] [facility] If you have questions about a medical condition or this instruction, always ask your healthcare professional. [place] [address] any warranty or liability for your use of this information.

## 2019-08-27 NOTE — PROGRESS NOTES
HENT:   Right Ear: Tympanic membrane normal.   Left Ear: Tympanic membrane normal.   Nose: Mucosal edema present. No rhinorrhea. Right sinus exhibits no maxillary sinus tenderness and no frontal sinus tenderness. Left sinus exhibits no maxillary sinus tenderness and no frontal sinus tenderness. Mouth/Throat: Uvula is midline. Posterior oropharyngeal erythema present. No oropharyngeal exudate or posterior oropharyngeal edema. Cardiovascular: Normal rate. Pulmonary/Chest: Effort normal and breath sounds normal.   Neurological: She is alert and oriented to person, place, and time. Skin: Skin is warm and dry. Psychiatric: She has a normal mood and affect. Her behavior is normal.   Nursing note and vitals reviewed. /69 (Site: Left Upper Arm, Position: Sitting, Cuff Size: Medium Adult)   Pulse 74   Temp 98.5 °F (36.9 °C)   Wt 149 lb (67.6 kg)   SpO2 97%   BMI 24.79 kg/m²   Lab Review not applicable    Assessment:       Diagnosis Orders   1. Acute sore throat  benzocaine-menthol (CEPACOL) 15-4 MG LOZG lozenge       Plan:      Return if symptoms worsen or fail to improve. Orders Placed This Encounter   Medications    benzocaine-menthol (CEPACOL) 15-4 MG LOZG lozenge     Sig: Take 1 lozenge by mouth every 2 hours as needed for Sore Throat     Dispense:  30 lozenge     Refill:  0     Results for orders placed or performed in visit on 08/27/19   POCT rapid strep A   Result Value Ref Range    Strep A Ag None Detected None Detected     I believe that this is likely a viral illness based on the physical exam findings. Discussed Cepacol lozenge dose/duration  Tylenol/Motrin for fever/discomfort. Patient agreeable to treatment plan. Educational materials provided on AVS.  Follow up if symptoms do not improve/worsen. Patient given educational materials - see patient instructions. Discussed use, benefit, and side effects of prescribed medications. All patientquestions answered.   Pt voiced

## 2019-10-25 ENCOUNTER — HOSPITAL ENCOUNTER (OUTPATIENT)
Age: 29
Setting detail: SPECIMEN
Discharge: HOME | End: 2019-10-25
Payer: MEDICAID

## 2019-10-25 ENCOUNTER — OFFICE VISIT (OUTPATIENT)
Dept: FAMILY MEDICINE CLINIC | Age: 29
End: 2019-10-25
Payer: MEDICAID

## 2019-10-25 VITALS
WEIGHT: 149 LBS | HEART RATE: 76 BPM | DIASTOLIC BLOOD PRESSURE: 73 MMHG | OXYGEN SATURATION: 98 % | HEIGHT: 65 IN | RESPIRATION RATE: 16 BRPM | BODY MASS INDEX: 24.83 KG/M2 | TEMPERATURE: 97.2 F | SYSTOLIC BLOOD PRESSURE: 108 MMHG

## 2019-10-25 DIAGNOSIS — R30.0 DYSURIA: Primary | ICD-10-CM

## 2019-10-25 DIAGNOSIS — R30.0 DYSURIA: ICD-10-CM

## 2019-10-25 LAB
BILIRUBIN, POC: ABNORMAL
BLOOD URINE, POC: ABNORMAL
CLARITY, POC: CLEAR
COLOR, POC: YELLOW
GLUCOSE URINE, POC: ABNORMAL
KETONES, POC: ABNORMAL
LEUKOCYTE EST, POC: ABNORMAL
NITRITE, POC: ABNORMAL
PH, POC: 6
PROTEIN, POC: ABNORMAL
SPECIFIC GRAVITY, POC: 1.01
UROBILINOGEN, POC: 0.2

## 2019-10-25 PROCEDURE — 81003 URINALYSIS AUTO W/O SCOPE: CPT | Performed by: FAMILY MEDICINE

## 2019-10-25 PROCEDURE — 99213 OFFICE O/P EST LOW 20 MIN: CPT | Performed by: FAMILY MEDICINE

## 2019-10-25 RX ORDER — SULFAMETHOXAZOLE AND TRIMETHOPRIM 800; 160 MG/1; MG/1
1 TABLET ORAL 2 TIMES DAILY
Qty: 6 TABLET | Refills: 0 | Status: SHIPPED | OUTPATIENT
Start: 2019-10-25 | End: 2019-10-28

## 2019-10-25 ASSESSMENT — ENCOUNTER SYMPTOMS
ABDOMINAL PAIN: 0
DIARRHEA: 0
VOMITING: 0
NAUSEA: 0

## 2019-10-27 LAB
CULTURE: ABNORMAL
Lab: ABNORMAL
SPECIMEN DESCRIPTION: ABNORMAL

## 2019-12-23 ENCOUNTER — TELEPHONE (OUTPATIENT)
Dept: OBGYN CLINIC | Age: 29
End: 2019-12-23

## 2019-12-23 RX ORDER — METRONIDAZOLE 500 MG/1
500 TABLET ORAL 2 TIMES DAILY
Qty: 14 TABLET | Refills: 0 | Status: SHIPPED | OUTPATIENT
Start: 2019-12-23 | End: 2019-12-30

## 2020-03-05 ENCOUNTER — HOSPITAL ENCOUNTER (EMERGENCY)
Age: 30
Discharge: HOME | End: 2020-03-05
Attending: EMERGENCY MEDICINE
Payer: COMMERCIAL

## 2020-03-05 VITALS
RESPIRATION RATE: 14 BRPM | TEMPERATURE: 98.3 F | BODY MASS INDEX: 24.99 KG/M2 | OXYGEN SATURATION: 98 % | HEART RATE: 75 BPM | HEIGHT: 65 IN | WEIGHT: 150 LBS | SYSTOLIC BLOOD PRESSURE: 116 MMHG | DIASTOLIC BLOOD PRESSURE: 75 MMHG

## 2020-03-05 PROCEDURE — 99282 EMERGENCY DEPT VISIT SF MDM: CPT

## 2020-03-05 PROCEDURE — 6370000000 HC RX 637 (ALT 250 FOR IP): Performed by: PHYSICIAN ASSISTANT

## 2020-03-05 RX ORDER — TETRACAINE HYDROCHLORIDE 5 MG/ML
1 SOLUTION OPHTHALMIC ONCE
Status: COMPLETED | OUTPATIENT
Start: 2020-03-05 | End: 2020-03-05

## 2020-03-05 RX ORDER — SOFT LENS RINSE,STORE SOLUTION
1 SOLUTION, NON-ORAL MISCELLANEOUS ONCE
Status: COMPLETED | OUTPATIENT
Start: 2020-03-05 | End: 2020-03-05

## 2020-03-05 RX ORDER — SULFACETAMIDE SODIUM 100 MG/ML
2 SOLUTION/ DROPS OPHTHALMIC
Qty: 1 BOTTLE | Refills: 0 | Status: SHIPPED | OUTPATIENT
Start: 2020-03-05 | End: 2020-03-09

## 2020-03-05 RX ADMIN — FLUORESCEIN SODIUM 1 MG: 1 STRIP OPHTHALMIC at 17:52

## 2020-03-05 RX ADMIN — TETRACAINE HYDROCHLORIDE 1 DROP: 5 SOLUTION OPHTHALMIC at 17:52

## 2020-03-05 RX ADMIN — Medication 1 DROP: at 17:52

## 2020-03-05 ASSESSMENT — PAIN DESCRIPTION - ORIENTATION: ORIENTATION: LEFT

## 2020-03-05 ASSESSMENT — PAIN DESCRIPTION - DESCRIPTORS: DESCRIPTORS: BURNING

## 2020-03-05 ASSESSMENT — PAIN SCALES - GENERAL: PAINLEVEL_OUTOF10: 4

## 2020-03-05 ASSESSMENT — PAIN DESCRIPTION - PAIN TYPE: TYPE: ACUTE PAIN

## 2020-03-05 ASSESSMENT — PAIN DESCRIPTION - LOCATION: LOCATION: EYE

## 2020-03-05 NOTE — ED PROVIDER NOTES
[address] ED  eMERGENCY dEPARTMENT eNCOUnter      Pt Name: Jocelin Salinas  MRN: [id number]  [place] 1990  Date of evaluation: 3/5/2020  Provider: JOSE Young    CHIEF COMPLAINT       Chief Complaint   Patient presents with    Eye Problem           HISTORY OF PRESENT ILLNESS  (Location/Symptom, Timing/Onset, Context/Setting, Quality, Duration, Modifying Factors, Severity.)   Jocelin Salinas is a 34 y.o. female who presents to the emergency department c/o left eye irritation. States was outside and she felt something go in her eye. She states that she flushed her eye out with saline and water. She states that when she was here in the waiting room, she thinks that the thing came out of her eye because the pain went away      Nursing Notes were reviewed. REVIEW OF SYSTEMS    (2-9 systems for level 4, 10 or more for level 5)     Review of Systems   left eye irritation and FB sensation    Except as noted above the remainder of the review of systems was reviewed and negative. PAST MEDICAL HISTORY     Past Medical History:   Diagnosis Date    Anemia 2018    Anxiety     Generalized anxiety disorder 10/6/2015    Gestational diabetes mellitus in pregnancy 2016     None otherwise stated in nurses notes    SURGICAL HISTORY       Past Surgical History:   Procedure Laterality Date     SECTION N/A 2019     SECTION PRIMARY performed by Sheila Arvizu DO at [facility]    WISDOM TOOTH EXTRACTION       None otherwise stated in nurses notes    CURRENT MEDICATIONS       Previous Medications    BENZOCAINE-MENTHOL (CEPACOL) 15-4 MG LOZG LOZENGE    Take 1 lozenge by mouth every 2 hours as needed for Sore Throat       ALLERGIES     Patient has no known allergies.     FAMILY HISTORY           Problem Relation Age of Onset    Hypertension Father     Hypertension Mother     No Known Problems Brother     Breast Cancer Neg Hx     Cancer Neg Hx     Colon Cancer Neg

## 2020-03-09 ENCOUNTER — OFFICE VISIT (OUTPATIENT)
Dept: OBGYN CLINIC | Age: 30
End: 2020-03-09
Payer: COMMERCIAL

## 2020-03-09 VITALS
HEART RATE: 74 BPM | SYSTOLIC BLOOD PRESSURE: 114 MMHG | WEIGHT: 151 LBS | BODY MASS INDEX: 25.16 KG/M2 | DIASTOLIC BLOOD PRESSURE: 70 MMHG | HEIGHT: 65 IN | RESPIRATION RATE: 18 BRPM

## 2020-03-09 PROCEDURE — 99213 OFFICE O/P EST LOW 20 MIN: CPT | Performed by: ADVANCED PRACTICE MIDWIFE

## 2020-03-09 PROCEDURE — G8427 DOCREV CUR MEDS BY ELIG CLIN: HCPCS | Performed by: ADVANCED PRACTICE MIDWIFE

## 2020-03-09 PROCEDURE — G8417 CALC BMI ABV UP PARAM F/U: HCPCS | Performed by: ADVANCED PRACTICE MIDWIFE

## 2020-03-09 PROCEDURE — 1036F TOBACCO NON-USER: CPT | Performed by: ADVANCED PRACTICE MIDWIFE

## 2020-03-09 PROCEDURE — G8484 FLU IMMUNIZE NO ADMIN: HCPCS | Performed by: ADVANCED PRACTICE MIDWIFE

## 2020-03-09 RX ORDER — SERTRALINE HYDROCHLORIDE 25 MG/1
25 TABLET, FILM COATED ORAL DAILY
Qty: 14 TABLET | Refills: 0 | Status: SHIPPED | OUTPATIENT
Start: 2020-03-09 | End: 2020-03-20 | Stop reason: SDUPTHER

## 2020-03-09 NOTE — PROGRESS NOTES
Fe Pop  3/9/2020    YOB: 1990          The patient was seen today. She is here regarding increase anxiety and depression. Her youngest child is 11 months and she is still breast feeding. She denies thoughts of harming self or others. Her bowels are regular and she is voiding without difficulty.      HPI:  Fe Pop is a 34 y.o. female  Pt is requesting anxiety / depression medication that is did not take at her 6 weeks postpartum appointment      OB History    Para Term  AB Living   3 2 2 0 1 1   SAB TAB Ectopic Molar Multiple Live Births   0 1 0 0 0 1      # Outcome Date GA Lbr Roberto/2nd Weight Sex Delivery Anes PTL Lv   3 Term 19 39w2d  9 lb 12 oz (4.423 kg) M CS-LTranv      2 Term 17 39w2d 02:19 / 03:04 7 lb 10 oz (3.459 kg) M Vag-Spont EPI N JERICHO      Name: Tere Abdullahi: 8  Apgar5: 9   1 TAB                Past Medical History:   Diagnosis Date    Anemia 2018    Anxiety     Generalized anxiety disorder 10/6/2015    Gestational diabetes mellitus in pregnancy 2016       Past Surgical History:   Procedure Laterality Date     SECTION N/A 2019     SECTION PRIMARY performed by Jimena Winter DO at [facility]D OR    WISDOM TOOTH EXTRACTION         Family History   Problem Relation Age of Onset    Hypertension Father     Hypertension Mother     No Known Problems Brother     Breast Cancer Neg Hx     Cancer Neg Hx     Colon Cancer Neg Hx     Diabetes Neg Hx     Eclampsia Neg Hx     Ovarian Cancer Neg Hx      Labor Neg Hx     Spont Abortions Neg Hx     Stroke Neg Hx        Social History     Socioeconomic History    Marital status: Single     Spouse name: Not on file    Number of children: Not on file    Years of education: Not on file    Highest education level: Not on file   Occupational History    Not on file   Social Needs    Financial resource strain: Not on file   Brian-Alexei lumps  Respiratory ROS: No SOB, Pneumoniae,Cough, or Pulmonary Embolism History  Cardiovascular ROS: No Chest Pain with Exertion, Palpitations, Syncope, Edema, Arrhythmia  Gastrointestinal ROS: No Indigestion, Heartburn, Nausea, vomiting, Diarrhea, Constipation,or Bowel Changes; No Bloody Stools or melena  Genito-Urinary ROS: No Dysuria, Hematuria or Nocturia. No Urinary Incontinence or Vaginal Discharge  Musculoskeletal ROS: No Arthralgia, Arthritis,Gout,Osteoporosis or Rheumatism  Neurological ROS: No CVA, Migraines, Epilepsy, Seizure Hx, or Limb Weakness  Dermatological ROS: No Rash, Itching, Hives, Mole Changes or Cancer          Blood pressure 114/70, pulse 74, resp. rate 18, height 5' 5\" (1.651 m), weight 151 lb (68.5 kg), last menstrual period 02/06/2020, not currently breastfeeding. Abdomen: Soft non-tender; good bowel sounds. No guarding, rebound or rigidity. No CVA tenderness bilaterally. Extremities: No calf tenderness, DTR 2/4, and No edema bilaterally    Pelvic: declined    Diagnostics:  No results found. Lab Results:  Results for orders placed or performed during the hospital encounter of 10/25/19   Urine Culture   Result Value Ref Range    Specimen Description . CLEAN CATCH URINE     Special Requests NOT REPORTED     Culture ESCHERICHIA COLI 10 to 50,000 CFU/ML (A)        Susceptibility    Escherichia coli - BACTERIAL SUSCEPTIBILITY PANEL ADRIANE     amikacin Value in next row        NOT REPORTED     ampicillin Value in next row Sensitive       8SUSCEPTIBLE     ampicillin-sulbactam Value in next row        NOT REPORTED     aztreonam Value in next row Sensitive       <=1SUSCEPTIBLE     ceFAZolin Value in next row Sensitive       <=4SUSCEPTIBLE     ceFAZolin Value in next row Sensitive       <=4SUSCEPTIBLE     cefepime Value in next row        NOT REPORTED     cefTRIAXone Value in next row Sensitive       <=1SUSCEPTIBLE     ciprofloxacin Value in next row Sensitive       <=0.25SUSCEPTIBLE

## 2020-03-20 ENCOUNTER — TELEPHONE (OUTPATIENT)
Dept: OBGYN CLINIC | Age: 30
End: 2020-03-20

## 2020-03-20 RX ORDER — SERTRALINE HYDROCHLORIDE 25 MG/1
25 TABLET, FILM COATED ORAL DAILY
Qty: 30 TABLET | Refills: 1 | Status: SHIPPED
Start: 2020-03-20 | End: 2020-05-04 | Stop reason: ALTCHOICE

## 2020-04-23 ENCOUNTER — PATIENT MESSAGE (OUTPATIENT)
Dept: OBGYN CLINIC | Age: 30
End: 2020-04-23

## 2020-04-27 ENCOUNTER — HOSPITAL ENCOUNTER (OUTPATIENT)
Age: 30
Discharge: HOME | End: 2020-04-27
Payer: COMMERCIAL

## 2020-04-27 LAB — HCG QUANTITATIVE: <1 IU/L

## 2020-04-27 PROCEDURE — 84702 CHORIONIC GONADOTROPIN TEST: CPT

## 2020-04-27 PROCEDURE — 36415 COLL VENOUS BLD VENIPUNCTURE: CPT

## 2020-05-04 ENCOUNTER — OFFICE VISIT (OUTPATIENT)
Dept: OBGYN CLINIC | Age: 30
End: 2020-05-04
Payer: COMMERCIAL

## 2020-05-04 VITALS
SYSTOLIC BLOOD PRESSURE: 102 MMHG | DIASTOLIC BLOOD PRESSURE: 70 MMHG | HEIGHT: 65 IN | WEIGHT: 151 LBS | BODY MASS INDEX: 25.16 KG/M2 | TEMPERATURE: 97.9 F

## 2020-05-04 PROCEDURE — 1036F TOBACCO NON-USER: CPT | Performed by: NURSE PRACTITIONER

## 2020-05-04 PROCEDURE — G8427 DOCREV CUR MEDS BY ELIG CLIN: HCPCS | Performed by: NURSE PRACTITIONER

## 2020-05-04 PROCEDURE — G8417 CALC BMI ABV UP PARAM F/U: HCPCS | Performed by: NURSE PRACTITIONER

## 2020-05-04 PROCEDURE — 99213 OFFICE O/P EST LOW 20 MIN: CPT | Performed by: NURSE PRACTITIONER

## 2020-05-04 RX ORDER — CEPHALEXIN 500 MG/1
CAPSULE ORAL
COMMUNITY
Start: 2020-05-02 | End: 2020-07-13 | Stop reason: ALTCHOICE

## 2020-05-04 NOTE — PROGRESS NOTES
Labor Neg Hx     Spont Abortions Neg Hx     Stroke Neg Hx        Social History     Socioeconomic History    Marital status: Single     Spouse name: Not on file    Number of children: Not on file    Years of education: Not on file    Highest education level: Not on file   Occupational History    Not on file   Social Needs    Financial resource strain: Not on file    Food insecurity     Worry: Not on file     Inability: Not on file    Transportation needs     Medical: Not on file     Non-medical: Not on file   Tobacco Use    Smoking status: Never Smoker    Smokeless tobacco: Never Used   Substance and Sexual Activity    Alcohol use: No     Alcohol/week: 0.0 standard drinks     Comment: rare     Drug use: No    Sexual activity: Yes     Partners: Male   Lifestyle    Physical activity     Days per week: Not on file     Minutes per session: Not on file    Stress: Not on file   Relationships    Social connections     Talks on phone: Not on file     Gets together: Not on file     Attends Sabianist service: Not on file     Active member of club or organization: Not on file     Attends meetings of clubs or organizations: Not on file     Relationship status: Not on file    Intimate partner violence     Fear of current or ex partner: Not on file     Emotionally abused: Not on file     Physically abused: Not on file     Forced sexual activity: Not on file   Other Topics Concern    Not on file   Social History Narrative    Not on file         MEDICATIONS:  Current Outpatient Medications   Medication Sig Dispense Refill    sertraline (ZOLOFT) 50 MG tablet Take 1 tablet by mouth daily 30 tablet 1    cephALEXin (KEFLEX) 500 MG capsule TAKE 1 CAPSULE BY MOUTH 4 TIMES DAILY FOR 7 DAYS       No current facility-administered medications for this visit.               ALLERGIES:  Allergies as of 2020    (No Known Allergies)         REVIEW OF SYSTEMS:    yes   A minimum of an eleven point review of

## 2020-05-18 PROBLEM — O43.103 PLACENTAL ABNORMALITY IN THIRD TRIMESTER: Status: RESOLVED | Noted: 2019-02-26 | Resolved: 2020-05-18

## 2020-05-18 PROBLEM — O09.899 SHORT INTERVAL BETWEEN PREGNANCIES COMPLICATING PREGNANCY, ANTEPARTUM: Status: RESOLVED | Noted: 2018-11-21 | Resolved: 2020-05-18

## 2020-05-18 PROBLEM — H18.899 CORNEAL RUST RING: Status: ACTIVE | Noted: 2020-05-18

## 2020-06-02 ENCOUNTER — TELEPHONE (OUTPATIENT)
Dept: OBGYN CLINIC | Age: 30
End: 2020-06-02

## 2020-06-02 RX ORDER — FLUCONAZOLE 150 MG/1
150 TABLET ORAL ONCE
Qty: 2 TABLET | Refills: 0 | Status: SHIPPED | OUTPATIENT
Start: 2020-06-02 | End: 2020-06-02

## 2020-06-25 ENCOUNTER — TELEPHONE (OUTPATIENT)
Dept: OBGYN CLINIC | Age: 30
End: 2020-06-25

## 2020-06-25 NOTE — TELEPHONE ENCOUNTER
If patient is having a hard swollen lump in her breast with a fever it could be mastitis. Patient may have dicloxacillin 500 mg Po q6 x 10 days.  If patient does not see improvements in the next couple days report to ER

## 2020-06-30 NOTE — TELEPHONE ENCOUNTER
Writer attempted to reach patient related to report from last week related to fever and clogged milk duct, as it does not appear that medication was ordered as requested. Patient did not answer, voicemail left, awaiting call back.

## 2020-07-02 NOTE — TELEPHONE ENCOUNTER
Able to reach patient. Patient stated that her symptoms have resolved and she no longer needs an antibiotic. Patient advised to call office with the report of any issues with breast, or seek ER evaluation outside of office hours. Patient agreeable.

## 2020-07-08 ENCOUNTER — TELEPHONE (OUTPATIENT)
Dept: OBGYN CLINIC | Age: 30
End: 2020-07-08

## 2020-07-08 RX ORDER — CEPHALEXIN 500 MG/1
500 CAPSULE ORAL 4 TIMES DAILY
Qty: 40 CAPSULE | Refills: 0 | Status: SHIPPED | OUTPATIENT
Start: 2020-07-08 | End: 2020-07-13 | Stop reason: ALTCHOICE

## 2020-07-08 NOTE — TELEPHONE ENCOUNTER
Patient called back to mention she stil lis breast feeding ( son is 15 months ).   It is not frequent feedings but wanted to inform in case we need to change tentative Antibiotic that we would send in for UTI

## 2020-07-08 NOTE — TELEPHONE ENCOUNTER
Cipro is a L3 breastfeeding medication. Does this medication remain the drug of choice for a breastfeeding mother?

## 2020-07-08 NOTE — TELEPHONE ENCOUNTER
Patient current with office, last seen May 2020. Requesting antibiotic for UTI, order for urine culture placed. Patient to complete today. Requesting medication coverage prior to resulted urine. Patient not currently pregnant. Annual scheduled for 7/15/20 with Martha Gilbert CNP. Recommendations in medication?

## 2020-07-08 NOTE — TELEPHONE ENCOUNTER
Patient notified accordingly. Patient encouraged to complete urine testing prior to starting antibiotic, along with increasing water intake to at least 8-10 glasses per day.

## 2020-07-10 ENCOUNTER — HOSPITAL ENCOUNTER (EMERGENCY)
Age: 30
Discharge: HOME | End: 2020-07-10
Attending: EMERGENCY MEDICINE
Payer: COMMERCIAL

## 2020-07-10 ENCOUNTER — APPOINTMENT (OUTPATIENT)
Dept: CT IMAGING | Age: 30
End: 2020-07-10
Payer: COMMERCIAL

## 2020-07-10 VITALS
HEART RATE: 74 BPM | TEMPERATURE: 97.9 F | BODY MASS INDEX: 25.16 KG/M2 | DIASTOLIC BLOOD PRESSURE: 73 MMHG | SYSTOLIC BLOOD PRESSURE: 119 MMHG | HEIGHT: 65 IN | OXYGEN SATURATION: 99 % | RESPIRATION RATE: 14 BRPM | WEIGHT: 151 LBS

## 2020-07-10 LAB
-: ABNORMAL
ABSOLUTE EOS #: 0.1 K/UL (ref 0–0.4)
ABSOLUTE IMMATURE GRANULOCYTE: NORMAL K/UL (ref 0–0.3)
ABSOLUTE LYMPH #: 1.5 K/UL (ref 1–4.8)
ABSOLUTE MONO #: 0.4 K/UL (ref 0.1–1.3)
ALBUMIN SERPL-MCNC: 4.3 G/DL (ref 3.5–5.2)
ALBUMIN/GLOBULIN RATIO: ABNORMAL (ref 1–2.5)
ALP BLD-CCNC: 76 U/L (ref 35–104)
ALT SERPL-CCNC: 11 U/L (ref 5–33)
AMORPHOUS: ABNORMAL
ANION GAP SERPL CALCULATED.3IONS-SCNC: 9 MMOL/L (ref 9–17)
AST SERPL-CCNC: 15 U/L
BACTERIA: ABNORMAL
BASOPHILS # BLD: 1 % (ref 0–2)
BASOPHILS ABSOLUTE: 0 K/UL (ref 0–0.2)
BILIRUB SERPL-MCNC: 0.44 MG/DL (ref 0.3–1.2)
BILIRUBIN URINE: NEGATIVE
BUN BLDV-MCNC: 13 MG/DL (ref 6–20)
BUN/CREAT BLD: ABNORMAL (ref 9–20)
CALCIUM SERPL-MCNC: 9.5 MG/DL (ref 8.6–10.4)
CASTS UA: ABNORMAL /LPF
CHLORIDE BLD-SCNC: 104 MMOL/L (ref 98–107)
CO2: 25 MMOL/L (ref 20–31)
COLOR: YELLOW
COMMENT UA: ABNORMAL
CREAT SERPL-MCNC: 0.4 MG/DL (ref 0.5–0.9)
CRYSTALS, UA: ABNORMAL /HPF
DIFFERENTIAL TYPE: NORMAL
EOSINOPHILS RELATIVE PERCENT: 2 % (ref 0–4)
EPITHELIAL CELLS UA: ABNORMAL /HPF
GFR AFRICAN AMERICAN: >60 ML/MIN
GFR NON-AFRICAN AMERICAN: >60 ML/MIN
GFR SERPL CREATININE-BSD FRML MDRD: ABNORMAL ML/MIN/{1.73_M2}
GFR SERPL CREATININE-BSD FRML MDRD: ABNORMAL ML/MIN/{1.73_M2}
GLUCOSE BLD-MCNC: 95 MG/DL (ref 70–99)
GLUCOSE URINE: NEGATIVE
HCG QUALITATIVE: NEGATIVE
HCT VFR BLD CALC: 41.8 % (ref 36–46)
HEMOGLOBIN: 13.9 G/DL (ref 12–16)
IMMATURE GRANULOCYTES: NORMAL %
KETONES, URINE: NEGATIVE
LEUKOCYTE ESTERASE, URINE: ABNORMAL
LIPASE: 24 U/L (ref 13–60)
LYMPHOCYTES # BLD: 25 % (ref 24–44)
MAGNESIUM: 2 MG/DL (ref 1.6–2.6)
MCH RBC QN AUTO: 27.9 PG (ref 26–34)
MCHC RBC AUTO-ENTMCNC: 33.2 G/DL (ref 31–37)
MCV RBC AUTO: 84.2 FL (ref 80–100)
MONOCYTES # BLD: 7 % (ref 1–7)
MUCUS: ABNORMAL
NITRITE, URINE: NEGATIVE
NRBC AUTOMATED: NORMAL PER 100 WBC
OTHER OBSERVATIONS UA: ABNORMAL
PDW BLD-RTO: 12.7 % (ref 11.5–14.9)
PH UA: 7 (ref 5–8)
PLATELET # BLD: 217 K/UL (ref 150–450)
PLATELET ESTIMATE: NORMAL
PMV BLD AUTO: 8 FL (ref 6–12)
POTASSIUM SERPL-SCNC: 3.8 MMOL/L (ref 3.7–5.3)
PROTEIN UA: NEGATIVE
RBC # BLD: 4.96 M/UL (ref 4–5.2)
RBC # BLD: NORMAL 10*6/UL
RBC UA: ABNORMAL /HPF
RENAL EPITHELIAL, UA: ABNORMAL /HPF
SEG NEUTROPHILS: 65 % (ref 36–66)
SEGMENTED NEUTROPHILS ABSOLUTE COUNT: 3.9 K/UL (ref 1.3–9.1)
SODIUM BLD-SCNC: 138 MMOL/L (ref 135–144)
SPECIFIC GRAVITY UA: 1 (ref 1–1.03)
TOTAL PROTEIN: 7.6 G/DL (ref 6.4–8.3)
TRICHOMONAS: ABNORMAL
TURBIDITY: CLEAR
URINE HGB: NEGATIVE
UROBILINOGEN, URINE: NORMAL
WBC # BLD: 6 K/UL (ref 3.5–11)
WBC # BLD: NORMAL 10*3/UL
WBC UA: ABNORMAL /HPF
YEAST: ABNORMAL

## 2020-07-10 PROCEDURE — 83735 ASSAY OF MAGNESIUM: CPT

## 2020-07-10 PROCEDURE — 6360000004 HC RX CONTRAST MEDICATION: Performed by: EMERGENCY MEDICINE

## 2020-07-10 PROCEDURE — 96374 THER/PROPH/DIAG INJ IV PUSH: CPT

## 2020-07-10 PROCEDURE — 81001 URINALYSIS AUTO W/SCOPE: CPT

## 2020-07-10 PROCEDURE — 99284 EMERGENCY DEPT VISIT MOD MDM: CPT

## 2020-07-10 PROCEDURE — 80053 COMPREHEN METABOLIC PANEL: CPT

## 2020-07-10 PROCEDURE — 84703 CHORIONIC GONADOTROPIN ASSAY: CPT

## 2020-07-10 PROCEDURE — 83690 ASSAY OF LIPASE: CPT

## 2020-07-10 PROCEDURE — 2580000003 HC RX 258: Performed by: EMERGENCY MEDICINE

## 2020-07-10 PROCEDURE — 6360000002 HC RX W HCPCS: Performed by: EMERGENCY MEDICINE

## 2020-07-10 PROCEDURE — 85025 COMPLETE CBC W/AUTO DIFF WBC: CPT

## 2020-07-10 PROCEDURE — 36415 COLL VENOUS BLD VENIPUNCTURE: CPT

## 2020-07-10 PROCEDURE — 74177 CT ABD & PELVIS W/CONTRAST: CPT

## 2020-07-10 RX ORDER — SODIUM CHLORIDE 0.9 % (FLUSH) 0.9 %
10 SYRINGE (ML) INJECTION PRN
Status: DISCONTINUED | OUTPATIENT
Start: 2020-07-10 | End: 2020-07-10 | Stop reason: HOSPADM

## 2020-07-10 RX ORDER — 0.9 % SODIUM CHLORIDE 0.9 %
80 INTRAVENOUS SOLUTION INTRAVENOUS ONCE
Status: COMPLETED | OUTPATIENT
Start: 2020-07-10 | End: 2020-07-10

## 2020-07-10 RX ORDER — 0.9 % SODIUM CHLORIDE 0.9 %
1000 INTRAVENOUS SOLUTION INTRAVENOUS ONCE
Status: COMPLETED | OUTPATIENT
Start: 2020-07-10 | End: 2020-07-10

## 2020-07-10 RX ORDER — KETOROLAC TROMETHAMINE 30 MG/ML
30 INJECTION, SOLUTION INTRAMUSCULAR; INTRAVENOUS ONCE
Status: COMPLETED | OUTPATIENT
Start: 2020-07-10 | End: 2020-07-10

## 2020-07-10 RX ADMIN — KETOROLAC TROMETHAMINE 30 MG: 30 INJECTION, SOLUTION INTRAMUSCULAR at 13:17

## 2020-07-10 RX ADMIN — SODIUM CHLORIDE 80 ML: 9 INJECTION, SOLUTION INTRAVENOUS at 14:08

## 2020-07-10 RX ADMIN — SODIUM CHLORIDE 1000 ML: 9 INJECTION, SOLUTION INTRAVENOUS at 13:08

## 2020-07-10 RX ADMIN — Medication 10 ML: at 14:07

## 2020-07-10 RX ADMIN — IOPAMIDOL 75 ML: 755 INJECTION, SOLUTION INTRAVENOUS at 14:07

## 2020-07-10 ASSESSMENT — PAIN DESCRIPTION - ORIENTATION: ORIENTATION: RIGHT;LEFT

## 2020-07-10 ASSESSMENT — PAIN SCALES - GENERAL
PAINLEVEL_OUTOF10: 1
PAINLEVEL_OUTOF10: 1

## 2020-07-10 ASSESSMENT — PAIN DESCRIPTION - PAIN TYPE: TYPE: ACUTE PAIN

## 2020-07-10 ASSESSMENT — PAIN DESCRIPTION - LOCATION: LOCATION: ABDOMEN

## 2020-07-10 NOTE — ED NOTES
Mode of arrival:  Friend drove pt in      Residence prior to admit: home      Chief complaint on admission: abdominal pain  Pain started today and has been intermittent. Pt states that pain is similar to labor contractions and denies any current pregnancy. Pt denies any nausea or emesis. Pain worsens with ingestion. Pt states that she has a UTI and on a keflex. Pt is AOx4, ambulates per self, and does not appear to be in any distress. C= \"Have you ever felt that you should Cut down on your drinking? \"  No  A= \"Have people Annoyed you by criticizing your drinking? \"  No  G= \"Have you ever felt bad or Guilty about your drinking? \"  No  E= \"Have you ever had a drink as an Eye-opener first thing in the morning to steady your nerves or to help a hangover? \"  No      Deferred []      Reason for deferring: N/A    *If yes to two or more: probable alcohol abuse. [address] [facility], RN  07/10/20 4615

## 2020-07-13 ENCOUNTER — OFFICE VISIT (OUTPATIENT)
Dept: OBGYN CLINIC | Age: 30
End: 2020-07-13
Payer: COMMERCIAL

## 2020-07-13 VITALS
WEIGHT: 151 LBS | BODY MASS INDEX: 25.16 KG/M2 | SYSTOLIC BLOOD PRESSURE: 120 MMHG | TEMPERATURE: 97.9 F | HEART RATE: 84 BPM | DIASTOLIC BLOOD PRESSURE: 80 MMHG | HEIGHT: 65 IN

## 2020-07-13 PROCEDURE — G8427 DOCREV CUR MEDS BY ELIG CLIN: HCPCS | Performed by: OBSTETRICS & GYNECOLOGY

## 2020-07-13 PROCEDURE — 1036F TOBACCO NON-USER: CPT | Performed by: OBSTETRICS & GYNECOLOGY

## 2020-07-13 PROCEDURE — G8417 CALC BMI ABV UP PARAM F/U: HCPCS | Performed by: OBSTETRICS & GYNECOLOGY

## 2020-07-13 PROCEDURE — 99213 OFFICE O/P EST LOW 20 MIN: CPT | Performed by: OBSTETRICS & GYNECOLOGY

## 2020-07-13 ASSESSMENT — ENCOUNTER SYMPTOMS
BACK PAIN: 0
SHORTNESS OF BREATH: 0
RHINORRHEA: 0
ABDOMINAL PAIN: 1
CHEST TIGHTNESS: 0
COUGH: 0
VOMITING: 0
EYE PAIN: 0
NAUSEA: 0
DIARRHEA: 0
CONSTIPATION: 0

## 2020-07-13 NOTE — ED PROVIDER NOTES
[facility] ED  Emergency Department Encounter  Emergency Medicine Attending Note     Pt Name: Taylor Brown  MRN: [id number]  [place] 1990   Date of evaluation: 7/10/2020  PCP:  JANUARY Ruvalcaba - [facility] 0104       Chief Complaint   Patient presents with    Abdominal Pain     cramping       HISTORY OF PRESENT ILLNESS  (Location/Symptom, Timing/Onset, Context/Setting, Quality, Duration, Modifying Factors, Severity.)      Taylor Brown is a 31 y.o. female who presents with severe abdominal pain that started today. She states that it comes in waves and will feel like everything in her abdomen is being squeezed and twisted in a similar nature to prior labor pains, however not pregnant at this time. States that it will come and then go away in a few minutes. Has happened a few times sporadically and not associated with eating or with any activity. When it comes on is rated as a 10 out of 10. She is not having any vaginal discharge or bleeding at this time. She not have any urinary symptoms. No nausea or vomiting and no diarrhea. No prior abdominal surgeries aside from a . Patient's last menstrual cycle was approximate 6 weeks ago, however she states that she only gets them every 4 to 6 weeks and she does not believe that she is pregnant. No cough or shortness of breath. No fevers or chills. PAST MEDICAL / SURGICAL / SOCIAL / FAMILY HISTORY     Past Medical History:  has a past medical history of Anemia, Anxiety, Generalized anxiety disorder, and Gestational diabetes mellitus in pregnancy. Past Surgical History:  has a past surgical history that includes [place] tooth extraction and  section (N/A, 2019). Allergies:  Patient has no known allergies. Home Meds:   Prior to Visit Medications    Medication Sig Taking?  Authorizing Provider   sertraline (ZOLOFT) 50 MG tablet Take 1 tablet by mouth daily  JANUARY Fisher - CNP Please note that medications prescribed at discharge will auto-populate into this medication list when note is refreshed. Please look at prescription date andprescriber to clarify. Family History:family history includes Hypertension in her father and mother; No Known Problems in her brother. Social History: She reports that she has never smoked. She has never used smokeless tobacco. She reports that she does not drink alcohol or use drugs. She reports being sexually active and has had partner(s) who are Male. REVIEW OF SYSTEMS    (2-9 systems for level 4, 10 or more for level 5)      Review of Systems   Constitutional: Negative for chills and fever. HENT: Negative for congestion and rhinorrhea. Eyes: Negative for pain and visual disturbance. Respiratory: Negative for cough, chest tightness and shortness of breath. Cardiovascular: Negative for chest pain and palpitations. Gastrointestinal: Positive for abdominal pain. Negative for constipation, diarrhea, nausea and vomiting. Genitourinary: Negative for dysuria and frequency. Musculoskeletal: Negative for arthralgias, back pain, joint swelling and myalgias. Skin: Negative for rash and wound. Neurological: Negative for dizziness, light-headedness and headaches. PHYSICAL EXAM   (up to 7 for level 4, 8 or more for level 5)      Initial Vitals   ED Triage Vitals   BP Temp Temp src Pulse Resp SpO2 Height Weight   07/10/20 1204 07/10/20 1202 -- 07/10/20 1202 07/10/20 1202 07/10/20 1202 07/10/20 1202 07/10/20 1202   119/73 97.9 °F (36.6 °C)  74 14 99 % 5' 5\" (1.651 m) 151 lb (68.5 kg)       Physical Exam  Vitals signs and nursing note reviewed. Constitutional:       General: She is not in acute distress. Appearance: She is well-developed. She is not diaphoretic. HENT:      Head: Normocephalic and atraumatic. Eyes:      General: No scleral icterus. Extraocular Movements: Extraocular movements intact. Conjunctiva/sclera: Conjunctivae normal.   Neck:      Musculoskeletal: Normal range of motion and neck supple. Cardiovascular:      Rate and Rhythm: Normal rate and regular rhythm. Heart sounds: Normal heart sounds. No murmur. No friction rub. No gallop. Pulmonary:      Effort: Pulmonary effort is normal. No respiratory distress. Breath sounds: Normal breath sounds. No wheezing or rales. Abdominal:      General: There is no distension. Palpations: Abdomen is soft. Tenderness: There is abdominal tenderness (mild RLQ and mild mid abdominal pain (this was during patient being asymptomatic)). There is no guarding or rebound. Musculoskeletal: Normal range of motion. General: No deformity. Skin:     General: Skin is warm. Coloration: Skin is not pale. Findings: No erythema or rash. Neurological:      Mental Status: She is alert and oriented to person, place, and time. Coordination: Coordination normal.   Psychiatric:         Behavior: Behavior normal.         Thought Content: Thought content normal.         Judgment: Judgment normal.         DIFFERENTIAL DIAGNOSIS/IMPRESSION     DDX: Constipation, ileus, small bowel obstruction, early gastroenteritis, colitis, IBD, appendicitis, UTI, transaminitis, gallbladder pathology    Impression: 31 y.o. female who presents with vague severe abdominal pain that comes in waves. It involves her entire abdomen and goes away after a few minutes. Does not have any other associated symptoms never had this happen before. On exam she is currently asymptomatic but does have some mild tenderness in the right flank and right lower quadrant. But no rebound or guarding. Otherwise well-appearing in no acute distress. Given that the pain is so severe, will get a CT scan of the abdomen to make sure that she is not have any signs of appendicitis or any other surgical pathology, will also get basic labs and urinalysis.   Of note, shortly after evaluating patient she did have a wait for the pain. Therefore ordered Toradol. Abdominal exam did not grossly change during visit episode. Will give IV fluids. And then reevaluate. Patient was screened and has no clinical signs or symptoms of a CoVID-19 infection at this time. However, given current pandemic and atypical presentations, face mask, eye protection, and gloves were worn during examination. DIAGNOSTIC RESULTS     EKG: All EKG's are interpreted by the Emergency Department Physician who either signs or Co-signs this chart in the absence of a cardiologist.    Not clinically indicated at this time. LABS: Labswere reviewed by me and abnormal results are displayed above     Labs Reviewed   COMPREHENSIVE METABOLIC PANEL - Abnormal; Notable for the following components:       Result Value    CREATININE 0.40 (*)     All other components within normal limits   URINE RT REFLEX TO CULTURE - Abnormal; Notable for the following components:    Leukocyte Esterase, Urine TRACE (*)     All other components within normal limits   MICROSCOPIC URINALYSIS - Abnormal; Notable for the following components:    Bacteria, UA FEW (*)     All other components within normal limits   CBC WITH AUTO DIFFERENTIAL   LIPASE   MAGNESIUM   HCG, SERUM, QUALITATIVE       RADIOLOGY: All plain film, CT, MRI, and formal ultrasound images (except ED bedside ultrasound) are read by the radiologist, see reports below, unless otherwise noted in ED Course, MDM or here. Ct Abdomen Pelvis W Iv Contrast    Result Date: 7/10/2020  EXAMINATION: CT OF THE ABDOMEN AND PELVIS WITH CONTRAST 7/10/2020 1:54 pm TECHNIQUE: CT of the abdomen and pelvis was performed with the administration of intravenous contrast. Multiplanar reformatted images are provided for review. Dose modulation, iterative reconstruction, and/or weight based adjustment of the mA/kV was utilized to reduce the radiation dose to as low as reasonably achievable. COMPARISON: No priors HISTORY: ORDERING SYSTEM PROVIDED HISTORY: RLQ abdominal pain TECHNOLOGIST PROVIDED HISTORY: IV Only Contrast RLQ abdominal pain Reason for Exam: lower abd pain on and off Acuity: Unknown Type of Exam: Unknown FINDINGS: Lower Chest: The visualized heart and lungs show no acute abnormalities. Organs: The liver, spleen, pancreas, kidneys and adrenal glands show no significant abnormality. Gallbladder is also grossly normal showing no evidence for gallstones. GI/Bowel: There is limited evaluation due to absence of oral contrast. The stomach shows no focal lesions. Small bowel loops normal in caliber showing no focal abnormalities. Normal appendix. Evaluation of the colon shows no acute process. Pelvis: Pelvic organs unremarkable. Peritoneum/Retroperitoneum: No free intraperitoneal fluid or significant lymphadenopathy. Vascular structures enhance satisfactorily. Bones/Soft Tissues: No acute abnormality of the bones. The superficial soft tissues show no significant abnormalities. 1. No acute infective or inflammatory process. 2. No bowel obstruction. BEDSIDE ULTRASOUND:  Not clinically indicated at this time.       ED COURSE      ED Medication Orders (From admission, onward)    Start Ordered     Status Ordering Provider    07/10/20 1400 07/10/20 1354  0.9 % sodium chloride bolus  ONCE      Last MAR action:  Stopped - by Oli Comp on 07/10/20 at 1445 KUMAR BIGGS    07/10/20 1353 07/10/20 1354  iopamidol (ISOVUE-370) 76 % injection 75 mL  IMG ONCE PRN      Last MAR action:  Given - by MARIA L MCFARLANE on 07/10/20 at 1407 KUMAR BIGGS    07/10/20 1315 07/10/20 1305  ketorolac (TORADOL) injection 30 mg  ONCE      Last MAR action:  Given - by Oli Comp on 07/10/20 at 1317 KUMAR BIGGS E    07/10/20 1300 07/10/20 1249  0.9 % sodium chloride bolus  ONCE      Last MAR action:  Stopped - by Oli Comp on 07/10/20 at 3524 [address] EMERGENCYDEPARTMENT COURSE:  ED Course as of Jul 13 1856   Fri Jul 10, 2020   5317 Patient's lab work-up and CT scan are negative. Her pain is improved after the Toradol. Has drank water without difficulty. This may be abdominal spasm versus cramping due to abnormal menstrual cycles. Something she ate. Will avoid Bentyl given that it is contraindicated in breast-feeding. Recommended Tylenol and Motrin. Follow-up primary care. Return if any concerns arise. Patient is comfortable with this plan at this time.    [CK]      ED Course User Index  [CK] Ryan Robertson MD        PROCEDURES:  None     CONSULTS:  None    CRITICAL CARE:  None      FINAL IMPRESSION      1. Generalized abdominal pain          DISPOSITION / PLAN     DISPOSITION Decision To Discharge 07/10/2020 02:48:27 PM      PATIENT REFERRED TO:  JANUARY Brambila CNP  [address]  [phone]    Schedule an appointment as soon as possible for a visit       [facility] ED  [facility]  [phone]  Go to   As needed, If symptoms worsen      DISCHARGE MEDICATIONS:  Discharge Medication List as of 7/10/2020  2:52 PM          Ryan Robertson MD  Emergency Medicine Attending      (Please note that portions of this note were completed with a voice recognition program.  Efforts were made to edit the dictations but occasionallywords are mis-transcribed.)    This note is a late chart entry due to [facility] downtime at the end of ED shift during which this patient was seen.         Ryan Robertson MD  07/13/20 3429

## 2020-07-14 ENCOUNTER — HOSPITAL ENCOUNTER (OUTPATIENT)
Dept: ULTRASOUND IMAGING | Age: 30
Discharge: HOME | End: 2020-07-16
Payer: COMMERCIAL

## 2020-07-14 PROCEDURE — 76830 TRANSVAGINAL US NON-OB: CPT

## 2020-07-15 ENCOUNTER — HOSPITAL ENCOUNTER (OUTPATIENT)
Age: 30
Setting detail: SPECIMEN
Discharge: HOME | End: 2020-07-15
Payer: COMMERCIAL

## 2020-07-15 ENCOUNTER — OFFICE VISIT (OUTPATIENT)
Dept: OBGYN CLINIC | Age: 30
End: 2020-07-15
Payer: COMMERCIAL

## 2020-07-15 VITALS
WEIGHT: 151 LBS | BODY MASS INDEX: 25.16 KG/M2 | HEIGHT: 65 IN | SYSTOLIC BLOOD PRESSURE: 104 MMHG | TEMPERATURE: 97.7 F | DIASTOLIC BLOOD PRESSURE: 64 MMHG

## 2020-07-15 PROCEDURE — G0145 SCR C/V CYTO,THINLAYER,RESCR: HCPCS

## 2020-07-15 PROCEDURE — 99214 OFFICE O/P EST MOD 30 MIN: CPT | Performed by: OBSTETRICS & GYNECOLOGY

## 2020-07-15 NOTE — PROGRESS NOTES
Priyanka Simmons  2020    YOB: 1990      HPI:  Priyanka Simmons is a 31 y.o. female [id number]     Patient with lower abdominal pain that radiates to mid abdomen. States the pain is very sharp and will only last a few seconds when it happens. Has noticed looser stool the past few days, but no diarrhea. Originally thought the pain was from a bladder infection due to how low in pelvis it starts, the pain is midline just above pubic bone, she called the office and started taking Keflex for suspected UTI. She had NO relief with the antibiotic and went to the ER>    ER did labs and CT scan of abdomen/pelvis and all work up was negative, patient was sent home and told to follow up with OBGYN. Patient is breastfeeding her 17 month old, she has normal menses for at least the past 6 months but is 9 days late on cycle, she states HCG in ER was negative. Patient has been anorexic the past few days due to fear, she is hungry, but does not want to eat because she noticed the pain is worse when she eats. However, patient was so hungry today, she attempted a sandwich and the pain was NOT associated with her eating this time. She states the pain did not start until she was driving to this appointment again which was over 2 hours after eating.        OB History    Para Term  AB Living   3 2 2 0 1 1   SAB TAB Ectopic Molar Multiple Live Births   0 1 0 0 0 1      # Outcome Date GA Lbr Roberto/2nd Weight Sex Delivery Anes PTL Lv   3 Term 19 39w2d  9 lb 12 oz (4.423 kg) M CS-LTranv      2 Term 19 39w2d 02:19 / 03:04 7 lb 10 oz (3.459 kg) M Vag-Spont EPI N JERICHO      Name: Leanna Headley: 10  Apgar5: 9   1 TAB                Past Medical History:   Diagnosis Date    Anemia 2018    Anxiety     Generalized anxiety disorder 10/6/2015    Gestational diabetes mellitus in pregnancy 2016       Past Surgical History:   Procedure Laterality Date     SECTION N/A 2019     SECTION PRIMARY performed by Leonides Mejía DO at [facility]D [facility]    WISDOM TOOTH EXTRACTION         Family History   Problem Relation Age of Onset    Hypertension Father     Hypertension Mother     No Known Problems Brother     Breast Cancer Neg Hx     Cancer Neg Hx     Colon Cancer Neg Hx     Diabetes Neg Hx     Eclampsia Neg Hx     Ovarian Cancer Neg Hx      Labor Neg Hx     Spont Abortions Neg Hx     Stroke Neg Hx        Social History     Socioeconomic History    Marital status: Single     Spouse name: Not on file    Number of children: Not on file    Years of education: Not on file    Highest education level: Not on file   Occupational History    Not on file   Social Needs    Financial resource strain: Not on file    Food insecurity     Worry: Not on file     Inability: Not on file    Transportation needs     Medical: Not on file     Non-medical: Not on file   Tobacco Use    Smoking status: Never Smoker    Smokeless tobacco: Never Used   Substance and Sexual Activity    Alcohol use: No     Alcohol/week: 0.0 standard drinks     Comment: rare     Drug use: No    Sexual activity: Yes     Partners: Male   Lifestyle    Physical activity     Days per week: Not on file     Minutes per session: Not on file    Stress: Not on file   Relationships    Social connections     Talks on phone: Not on file     Gets together: Not on file     Attends Congregational service: Not on file     Active member of club or organization: Not on file     Attends meetings of clubs or organizations: Not on file     Relationship status: Not on file    Intimate partner violence     Fear of current or ex partner: Not on file     Emotionally abused: Not on file     Physically abused: Not on file     Forced sexual activity: Not on file   Other Topics Concern    Not on file   Social History Narrative    Not on file         MEDICATIONS:  Current Outpatient Medications   Medication Sig Dispense Refill    sertraline (ZOLOFT) 50 MG tablet Take 1 tablet by mouth daily 30 tablet 1     No current facility-administered medications for this visit. ALLERGIES:  Allergies as of 07/13/2020    (No Known Allergies)         Review Of Systems (11 point):  Constitutional: No fever, chills or malaise; No weight change or fatigue  Head and Eyes: No vision, Headache, Dizziness or trauma in last 12 months  ENT ROS: No hearing, Tinnitis, sinus or taste problems  Hematological and Lymphatic ROS:No Lymphoma, Von Willebrand's, Hemophillia or Bleeding History  Psych ROS: No Depression, Homicidal thoughts,suicidal thoughts, or anxiety  Breast ROS: No prior breast abnormalities or lumps  Respiratory ROS: No SOB, Pneumoniae,Cough, or Pulmonary Embolism History  Cardiovascular ROS: No Chest Pain with Exertion, Palpitations, Syncope, Edema, Arrhythmia  Gastrointestinal ROS: No Indigestion, Heartburn, Nausea, vomiting, Diarrhea, Constipation, + looser Bowel Changes; No Bloody Stools or melena, + abdominal pain  Genito-Urinary ROS: No Dysuria, Hematuria or Nocturia. No Urinary Incontinence or Vaginal Discharge  Musculoskeletal ROS: No Arthralgia, Arthritis,Gout,Osteoporosis or Rheumatism  Neurological ROS: No CVA, Migraines, Epilepsy, Seizure Hx, or Limb Weakness  Dermatological ROS: No Rash, Itching, Hives, Mole Changes or Cancer          Blood pressure 120/80, pulse 84, temperature 97.9 °F (36.6 °C), height 5' 5\" (1.651 m), weight 151 lb (68.5 kg), last menstrual period 06/04/2020, currently breastfeeding. Abdomen: Soft non-tender; good bowel sounds. No guarding, rebound or rigidity. No CVA tenderness bilaterally. Extremities: No calf tenderness, DTR 2/4, and No edema bilaterally    Pelvic: declined, patient states exam in ER was negative    Diagnostics:  Us Non Ob Transvaginal    Result Date: 7/14/2020  EXAMINATION: PELVIC ULTRASOUND 7/14/2020 TECHNIQUE: Transvaginal pelvic ultrasound was performed. Color Doppler evaluation was performed. COMPARISON: CT abdomen pelvis from 07/10/2020 HISTORY: ORDERING SYSTEM PROVIDED HISTORY: Pelvic pain TECHNOLOGIST PROVIDED HISTORY: see above 32-year-old female with pelvic pain FINDINGS: Measurements: Uterus:  7.5 x 4.1 x 4.7 cm. Endometrial stripe:  8 mm Right Ovary:  3.2 x 1.4 x 2.3 cm. Left Ovary:  2.1 x 2.6 x 1.6 cm. Ultrasound Findings: Patient's LMP is 06/02/2020. Uterus: Uterus demonstrates normal myometrial echotexture. Retroverted uterus. No uterine mass or fibroid. Endometrial stripe: Endometrial stripe is within normal limits. Right Ovary: Right ovary is within normal limits. Left Ovary:  Left ovary is within normal limits. Bilateral ovarian follicles. Free Fluid: Small amount of free fluid in the cul-de-sac. 1. Bilateral ovarian follicles. 2. Small amount of free fluid in the cul-de-sac. 3. Retroverted uterus. 4. Endometrial stripe thickness within normal limits measuring 8 mm. Ct Abdomen Pelvis W Iv Contrast    Result Date: 7/10/2020  EXAMINATION: CT OF THE ABDOMEN AND PELVIS WITH CONTRAST 7/10/2020 1:54 pm TECHNIQUE: CT of the abdomen and pelvis was performed with the administration of intravenous contrast. Multiplanar reformatted images are provided for review. Dose modulation, iterative reconstruction, and/or weight based adjustment of the mA/kV was utilized to reduce the radiation dose to as low as reasonably achievable. COMPARISON: No priors HISTORY: ORDERING SYSTEM PROVIDED HISTORY: RLQ abdominal pain TECHNOLOGIST PROVIDED HISTORY: IV Only Contrast RLQ abdominal pain Reason for Exam: lower abd pain on and off Acuity: Unknown Type of Exam: Unknown FINDINGS: Lower Chest: The visualized heart and lungs show no acute abnormalities. Organs: The liver, spleen, pancreas, kidneys and adrenal glands show no significant abnormality. Gallbladder is also grossly normal showing no evidence for gallstones. GI/Bowel:  There is limited evaluation due to absence of oral contrast. The stomach shows no focal lesions. Small bowel loops normal in caliber showing no focal abnormalities. Normal appendix. Evaluation of the colon shows no acute process. Pelvis: Pelvic organs unremarkable. Peritoneum/Retroperitoneum: No free intraperitoneal fluid or significant lymphadenopathy. Vascular structures enhance satisfactorily. Bones/Soft Tissues: No acute abnormality of the bones. The superficial soft tissues show no significant abnormalities. 1. No acute infective or inflammatory process. 2. No bowel obstruction.        Lab Results:  Results for orders placed or performed during the hospital encounter of 07/10/20   CBC Auto Differential   Result Value Ref Range    WBC 6.0 3.5 - 11.0 k/uL    RBC 4.96 4.0 - 5.2 m/uL    Hemoglobin 13.9 12.0 - 16.0 g/dL    Hematocrit 41.8 36 - 46 %    MCV 84.2 80 - 100 fL    MCH 27.9 26 - 34 pg    MCHC 33.2 31 - 37 g/dL    RDW 12.7 11.5 - 14.9 %    Platelets 720 568 - 504 k/uL    MPV 8.0 6.0 - 12.0 fL    NRBC Automated NOT REPORTED per 100 WBC    Differential Type NOT REPORTED     Seg Neutrophils 65 36 - 66 %    Lymphocytes 25 24 - 44 %    Monocytes 7 1 - 7 %    Eosinophils % 2 0 - 4 %    Basophils 1 0 - 2 %    Immature Granulocytes NOT REPORTED 0 %    Segs Absolute 3.90 1.3 - 9.1 k/uL    Absolute Lymph # 1.50 1.0 - 4.8 k/uL    Absolute Mono # 0.40 0.1 - 1.3 k/uL    Absolute Eos # 0.10 0.0 - 0.4 k/uL    Basophils Absolute 0.00 0.0 - 0.2 k/uL    Absolute Immature Granulocyte NOT REPORTED 0.00 - 0.30 k/uL    WBC Morphology NOT REPORTED     RBC Morphology NOT REPORTED     Platelet Estimate NOT REPORTED    Comprehensive Metabolic Panel   Result Value Ref Range    Glucose 95 70 - 99 mg/dL    BUN 13 6 - 20 mg/dL    CREATININE 0.40 (L) 0.50 - 0.90 mg/dL    Bun/Cre Ratio NOT REPORTED 9 - 20    Calcium 9.5 8.6 - 10.4 mg/dL    Sodium 138 135 - 144 mmol/L    Potassium 3.8 3.7 - 5.3 mmol/L    Chloride 104 98 - 107 mmol/L    CO2 25 20 - 31 mmol/L    Anion Gap 9 9 - (G1) 01/03/2017     Previous pregnancy        Generalized anxiety disorder 10/06/2015         PLAN:  Abdominal exam was benign, discussed we cannot rule out an acute process with blood flow issues to ovaries with CT and recommended patient have TVUS to rule out any gynecologic potential source of pain. Showed patient stretches and abdominal bowel massage she can perform with pain. She actually had an episode of the severe pain in office and with massage of transverse colon to descending colon she indicated pain was more tolerable and improved within a few seconds. RTO for annual exam as scheduled    Orders Placed This Encounter   Procedures    US Non OB Transvaginal     Standing Status:   Future     Number of Occurrences:   1     Standing Expiration Date:   1/13/2021     Order Specific Question:   Reason for exam:     Answer:   see above       Patient was seen with total face to face time of 15 minutes. More than 50% of this visit was counseling and education regarding The encounter diagnosis was Pelvic pain. and Follow-up   as well as  counseling on preventative health maintenance follow-up.

## 2020-07-16 NOTE — TELEPHONE ENCOUNTER
Per Yu Marino pt notified to contact PCP for further refill on Zoloft. Pt stating she will contact PCP.

## 2020-07-20 NOTE — PROGRESS NOTES
History and Physical  [address], [address] [facility] [address] [place] [address] [phone]   Fax [phone]  Judy Ham  7/15/2020              31 y.o. Chief Complaint   Patient presents with    Annual Exam       Patient's last menstrual period was 2020. Primary Care Physician: Oleg Ferguson, APRN - CNP    HPI : Judy Ham is a 31 y.o. female [id number]    Patient had TVUS to evaluate for gyn related cause for her pelvic pain that she presented two days ago with. Since patient's visit and her TVUS, patient states with massage of abdomen and taking bowel medications, she has had complete resolution of her symptoms. Patient is very reassured that there was nothing pathologic on US that would be a potential source of her pain as she wants to get pregnant soon.    ________________________________________________________________________  OB History    Para Term  AB Living   3 2 2 0 1 1   SAB TAB Ectopic Molar Multiple Live Births   0 1 0 0 0 1      # Outcome Date GA Lbr Roberto/2nd Weight Sex Delivery Anes PTL Lv   3 Term 19 39w2d  9 lb 12 oz (4.423 kg) M CS-LTranv      2 Term 17 39w2d 02:19 / 03:04 7 lb 10 oz (3.459 kg) M Vag-Spont EPI N JERICHO      Name: Jenna Arechiga: 8  Apgar5: 9   1 TAB              Past Medical History:   Diagnosis Date    Anemia 2018    Anxiety     Generalized anxiety disorder 10/6/2015    Gestational diabetes mellitus in pregnancy 2016                                                                   Past Surgical History:   Procedure Laterality Date     SECTION N/A 2019     SECTION PRIMARY [facility] Elroy [facility]ad [facility]    WISDOM TOOTH EXTRACTION       Family History   Problem Relation Age of Onset    Hypertension Father     Hypertension Mother     No Known Problems Brother     Breast Cancer Neg Hx     Cancer Neg Hx     Colon Cancer Neg Hx     Diabetes Neg Hx     Eclampsia Neg Hx     Ovarian Cancer Neg Hx      Labor Neg Hx     Spont Abortions Neg Hx     Stroke Neg Hx      Social History     Socioeconomic History    Marital status: Single     Spouse name: Not on file    Number of children: Not on file    Years of education: Not on file    Highest education level: Not on file   Occupational History    Not on file   Social Needs    Financial resource strain: Not on file    Food insecurity     Worry: Not on file     Inability: Not on file    Transportation needs     Medical: Not on file     Non-medical: Not on file   Tobacco Use    Smoking status: Never Smoker    Smokeless tobacco: Never Used   Substance and Sexual Activity    Alcohol use: No     Alcohol/week: 0.0 standard drinks     Comment: rare     Drug use: No    Sexual activity: Yes     Partners: Male   Lifestyle    Physical activity     Days per week: Not on file     Minutes per session: Not on file    Stress: Not on file   Relationships    Social connections     Talks on phone: Not on file     Gets together: Not on file     Attends Sikh service: Not on file     Active member of club or organization: Not on file     Attends meetings of clubs or organizations: Not on file     Relationship status: Not on file    Intimate partner violence     Fear of current or ex partner: Not on file     Emotionally abused: Not on file     Physically abused: Not on file     Forced sexual activity: Not on file   Other Topics Concern    Not on file   Social History Narrative    Not on file       MEDICATIONS:  Current Outpatient Medications   Medication Sig Dispense Refill    sertraline (ZOLOFT) 50 MG tablet Take 1 tablet by mouth daily 30 tablet 1     No current facility-administered medications for this visit.             ALLERGIES:  Allergies as of 07/15/2020    (No Known Allergies)     Gynecologic History:  Patient's last menstrual period was 06/04/2020. Sexually Active: Yes    STD History: No     Permanent Sterilization: No   Reversible Birth Control: No        Hormone Replacement Exposure: No      Genetic Qualified Family History of Breast, Ovarian , Colon or Uterine Cancer: No     If YES see scanned worksheet. Preventative Health Testing:    Health Maintenance:  Health Maintenance Due   Topic Date Due    Varicella vaccine (1 of 2 - 2-dose childhood series) 03/20/1993    Hepatitis B vaccine (3 of 3 - 3-dose primary series) 01/03/1997    Hepatitis A vaccine (2 of 2 - 2-dose series) 01/07/2009    Cervical cancer screen  07/15/2020     Review Of Systems (11 point):  Constitutional: No fever, chills or malaise; No weight change or fatigue  Head and Eyes: No vision, Headache, Dizziness or trauma in last 12 months  ENT ROS: No hearing, Tinnitis, sinus or taste problems  Hematological and Lymphatic ROS:No Lymphoma, Von Willebrand's, Hemophillia or Bleeding History  Psych ROS: No Depression, Homicidal thoughts,suicidal thoughts, or anxiety  Breast ROS: No prior breast abnormalities or lumps  Respiratory ROS: No SOB, Pneumoniae,Cough, or Pulmonary Embolism History  Cardiovascular ROS: No Chest Pain with Exertion, Palpitations, Syncope, Edema, Arrhythmia  Gastrointestinal ROS: No Indigestion, Heartburn, Nausea, vomiting, Diarrhea, Constipation,or Bowel Changes; No Bloody Stools or melena  Genito-Urinary ROS: No Dysuria, Hematuria or Nocturia.  No Urinary Incontinence or Vaginal Discharge  Musculoskeletal ROS: No Arthralgia, Arthritis,Gout,Osteoporosis or Rheumatism  Neurological ROS: No CVA, Migraines, Epilepsy, Seizure Hx, or Limb Weakness  Dermatological ROS: No Rash, Itching, Hives, Mole Changes or Cancer                                                                                                                                                                                                                                  PHYSICAL Exam: Constitutional:  Vitals:    07/15/20 1258   BP: 104/64   Site: Right Upper Arm   Position: Sitting   Cuff Size: Medium Adult   Temp: 97.7 °F (36.5 °C)   Weight: 151 lb (68.5 kg)   Height: 5' 5\" (1.651 m)         General Appearance: This  is a well Developed, well Nourished, well groomed female. Skin:  There was a Normal Inspection of the skin without rashes or lesions. Extremities: The patients extremities were without calf tenderness, edema, or varicosities. Abdomen: The abdomen was soft and non-tender. Psych: The patient had a normal Orientation to: Time, Place, Person, and Situation  Breast:  (Chest)  normal appearance, no masses or tenderness, + bilateral galactorrhea (patient breastfeeding)  Pelvic Exam:  Vulva and vagina appear normal. Bimanual exam reveals normal uterus and adnexa. Musculosk:  Normal Gait and station was noted. ASSESSMENT:      31 y.o. Annual   Diagnosis Orders   1. Well woman exam with routine gynecological exam  PAP SMEAR          Chief Complaint   Patient presents with    Annual Exam          Past Medical History:   Diagnosis Date    Anemia 6/28/2018    Anxiety     Generalized anxiety disorder 10/6/2015    Gestational diabetes mellitus in pregnancy 12/1/2016         Patient Active Problem List    Diagnosis Date Noted    Corneal rust ring 05/18/2020    Postpartum state     PLTCS 4/2/19 M APG 8/9 Wt 9#12 04/02/2019    Excessive fetal growth affecting management of mother     Patient-requested procedure     Anemia 06/28/2018    Hx GDMA2 (G1) 01/03/2017     Previous pregnancy        Generalized anxiety disorder 10/06/2015          Hereditary Breast, Ovarian, Colon and Uterine Cancer screening Done. Tobacco & Secondary smoke risks reviewed; instructed on cessation and avoidance      Counseling Completed:  Preventative Health Recommendations and Follow up.     PLAN:  Annual exam performed  Cervical cytology collected  RTO one year annual exam Orders Placed This Encounter   Procedures    PAP SMEAR     Patient History:    Patient's last menstrual period was 2020. OBGYN Status: Having periods  Past Surgical History:  2019:  SECTION; N/A      Comment:   SECTION PRIMARY performed by Alexys Henson DO at [facility] L&D OR  No date: WISDOM TOOTH EXTRACTION      Social History    Tobacco Use      Smoking status: Never Smoker      Smokeless tobacco: Never Used       Standing Status:   Future     Number of Occurrences:   1     Standing Expiration Date:   8/15/2020     Order Specific Question:   Collection Type     Answer: Thin Prep     Order Specific Question:   Prior Abnormal Pap Test     Answer:   No     Order Specific Question:   Screening or Diagnostic     Answer:   Screening     Order Specific Question:   HPV Requested?      Answer:   Yes -  If ASCUS Reflex HPV     Order Specific Question:   High Risk Patient     Answer:   N/A

## 2020-07-21 LAB — CYTOLOGY REPORT: NORMAL

## 2020-08-07 ASSESSMENT — PATIENT HEALTH QUESTIONNAIRE - PHQ9
2. FEELING DOWN, DEPRESSED OR HOPELESS: 0
SUM OF ALL RESPONSES TO PHQ QUESTIONS 1-9: 0
SUM OF ALL RESPONSES TO PHQ9 QUESTIONS 1 & 2: 0
1. LITTLE INTEREST OR PLEASURE IN DOING THINGS: 0
SUM OF ALL RESPONSES TO PHQ QUESTIONS 1-9: 0

## 2020-08-07 NOTE — PROGRESS NOTES
Visit Information    Have you changed or started any medications since your last visit including any over-the-counter medicines, vitamins, or herbal medicines? no   Are you having any side effects from any of your medications? -  no  Have you stopped taking any of your medications? Is so, why? -  no    Have you seen any other physician or provider since your last visit? No  Have you had any other diagnostic tests since your last visit? No  Have you been seen in the emergency room and/or had an admission to a hospital since we last saw you? Yes - Records Obtained  Have you had your routine dental cleaning in the past 6 months? no    Have you activated your [employer] account? If not, what are your barriers?  Yes     Patient Care Team:  JANUARY Palacio CNP as PCP - General (Certified Nurse Practitioner)  JANUARY Palacio CNP as PCP - [facility]  Rohit Duffy DO as Consulting Physician (Obstetrics & Gynecology)  Brodie Lundborg, MD as Obstetrician (Perinatology)    Medical History Review  Past Medical, Family, and Social History reviewed and does contribute to the patient presenting condition    Health Maintenance   Topic Date Due    Varicella vaccine (1 of 2 - 2-dose childhood series) 03/20/1993    Hepatitis B vaccine (3 of 3 - 3-dose primary series) 01/03/1997    Hepatitis A vaccine (2 of 2 - 2-dose series) 01/07/2009    Flu vaccine (1) 09/01/2020    Cervical cancer screen  07/15/2021    DTaP/Tdap/Td vaccine (5 - Td) 01/17/2029    HIV screen  Completed    Hib vaccine  Aged Out    Meningococcal (ACWY) vaccine  Aged Out    Pneumococcal 0-64 years Vaccine  Aged Out

## 2020-08-10 ENCOUNTER — TELEMEDICINE (OUTPATIENT)
Dept: FAMILY MEDICINE CLINIC | Age: 30
End: 2020-08-10
Payer: COMMERCIAL

## 2020-08-10 PROBLEM — R73.01 ELEVATED FASTING BLOOD SUGAR: Status: ACTIVE | Noted: 2020-08-10

## 2020-08-10 PROCEDURE — 99443 PR PHYS/QHP TELEPHONE EVALUATION 21-30 MIN: CPT | Performed by: FAMILY MEDICINE

## 2020-08-10 RX ORDER — BUSPIRONE HYDROCHLORIDE 7.5 MG/1
7.5 TABLET ORAL 2 TIMES DAILY
Qty: 60 TABLET | Refills: 0 | Status: SHIPPED | OUTPATIENT
Start: 2020-08-10 | End: 2020-09-22 | Stop reason: SDUPTHER

## 2020-08-10 RX ORDER — SERTRALINE HYDROCHLORIDE 25 MG/1
50 TABLET, FILM COATED ORAL DAILY
Qty: 60 TABLET | Refills: 1 | Status: SHIPPED | OUTPATIENT
Start: 2020-08-10 | End: 2020-09-22 | Stop reason: ALTCHOICE

## 2020-08-10 ASSESSMENT — ENCOUNTER SYMPTOMS
RESPIRATORY NEGATIVE: 1
ABDOMINAL PAIN: 0
COUGH: 0
SHORTNESS OF BREATH: 0

## 2020-08-10 ASSESSMENT — ANXIETY QUESTIONNAIRES
5. BEING SO RESTLESS THAT IT IS HARD TO SIT STILL: 0-NOT AT ALL
GAD7 TOTAL SCORE: 7
1. FEELING NERVOUS, ANXIOUS, OR ON EDGE: 2-OVER HALF THE DAYS
7. FEELING AFRAID AS IF SOMETHING AWFUL MIGHT HAPPEN: 2-OVER HALF THE DAYS
2. NOT BEING ABLE TO STOP OR CONTROL WORRYING: 2-OVER HALF THE DAYS
6. BECOMING EASILY ANNOYED OR IRRITABLE: 0-NOT AT ALL
3. WORRYING TOO MUCH ABOUT DIFFERENT THINGS: 1-SEVERAL DAYS
4. TROUBLE RELAXING: 0-NOT AT ALL

## 2020-08-10 NOTE — PROGRESS NOTES
and palpitations. Gastrointestinal: Negative for abdominal pain. Genitourinary: Negative for dysuria. Musculoskeletal: Negative for arthralgias and myalgias. Skin: Negative for rash. Neurological: Negative for light-headedness and headaches. Psychiatric/Behavioral: Negative for sleep disturbance. The patient is nervous/anxious. Patient Active Problem List    Diagnosis Date Noted    Corneal rust ring 2020    Postpartum state     PLTCS 19 M APG 8/9 Wt 9#12 2019    Excessive fetal growth affecting management of mother     Patient-requested procedure     Anemia 2018    Hx GDMA2 (G1) 2017    Generalized anxiety disorder 10/06/2015        Past Medical History:   Diagnosis Date    Anemia 2018    Anxiety     Generalized anxiety disorder 10/6/2015    Gestational diabetes mellitus in pregnancy 2016     Past Surgical History:   Procedure Laterality Date     SECTION N/A 2019     SECTION PRIMARY performed by Wang Chino DO at [facility] OR    WISDOM TOOTH EXTRACTION       Family History   Problem Relation Age of Onset    Hypertension Father     Hypertension Mother     No Known Problems Brother     Breast Cancer Neg Hx     Cancer Neg Hx     Colon Cancer Neg Hx     Diabetes Neg Hx     Eclampsia Neg Hx     Ovarian Cancer Neg Hx      Labor Neg Hx     Spont Abortions Neg Hx     Stroke Neg Hx      Current Outpatient Medications   Medication Sig Dispense Refill    sertraline (ZOLOFT) 50 MG tablet Take 1 tablet by mouth daily 30 tablet 1     No current facility-administered medications for this visit. No Known Allergies     Prior to Visit Medications    Medication Sig Taking?  Authorizing Provider   sertraline (ZOLOFT) 50 MG tablet Take 1 tablet by mouth daily Yes JANUARY Navarrete NP       Social History     Tobacco Use    Smoking status: Never Smoker    Smokeless tobacco: Never Used   Substance Use 07/10/2020     Lab Results   Component Value Date    TSH 1.28 2018       Lab Results   Component Value Date    LABA1C 4.9 2018       Due to this being a TeleHealth encounter, evaluation of the following organ systems is limited: Vitals/Constitutional/EENT/Resp/CV/GI//MS/Neuro/Skin/Heme-Lymph-Imm. ASSESSMENT/PLAN:  1. Anemia, unspecified type  ***  - CBC Auto Differential; Future  - Comprehensive Metabolic Panel, Fasting; Future  - Urinalysis Reflex to Culture; Future    2. Generalized anxiety disorder  ***  - sertraline (ZOLOFT) 50 MG tablet; Take 1 tablet by mouth daily  Dispense: 30 tablet; Refill: 1    3. History of gestational diabetes  ***  - Hemoglobin A1C; Future    4. Adult BMI 25.0-25.9 kg/sq m  ***    5. Need for varicella vaccine  ***  - Varicella Zoster Antibody, IgG; Future    6. Screening for lipid disorders  ***  - Lipid, Fasting; Future    7. Encounter for vitamin deficiency screening  ***  - Vitamin D 25 Hydroxy; Future    8. Screening for endocrine disorder  ***  - TSH without Reflex; Future    Controlled Substance Monitoring:  Acute and Chronic Pain Monitoring:   No flowsheet data found. No orders of the defined types were placed in this encounter. No orders of the defined types were placed in this encounter. There are no discontinued medications. Michelle Perez received counseling on the following healthy behaviors: {Health Counselin}  Reviewed prior labs and health maintenance. Continue current medications, diet and exercise. Discussed use, benefit, and side effects of prescribed medications. Barriers to medication compliance addressed. Patient given educational materials - see patient instructions. All patient questions answered. Patient voiced understanding. No follow-ups on file. Amrita Carvalho is a 31 y.o. female being evaluated by a Virtual Visit (video visit) encounter to address concerns as mentioned above.   ***A caregiver was present when

## 2020-08-10 NOTE — PROGRESS NOTES
[address] [facility] [address]  [address] [place] [place] [address]  Phone: [phone], Fax: [phone] EVALUATION -- Audio/Phone (During GQVSM-25 public health emergency)    Rene Scott (:  1990) has requested an audio/video evaluation for the following concern(s):  Chief Complaint   Patient presents with   Madeleine Saravia Established New Doctor    Discuss Medications      HPI:  Rene Scott was an established patient of Luca Silveira. Patient has a history of anemia, generalized anxiety disorder, gestational diabetes. Digna Reynoso is a 31 y.o. female patient  presents for re evaluation of anemia. Iron deficiency anemia due to pregnancyAnemia was found by routine CBC. It has been present for several years. Associated signs & symptoms: none. Patient's medications, allergies, past medical, surgical, social and family histories were reviewed and updated as appropriate. . Previous evaluation done from ob gyn. Current treatment none. Stable, no need screening  Lab Results   Component Value Date    WBC 6.0 07/10/2020    HGB 13.9 07/10/2020    HCT 41.8 07/10/2020    MCV 84.2 07/10/2020     07/10/2020     ANXIETY  Rene Scott has an ongoing history of Anxety. She complains of: irritable, excessive worrying. Symptoms have been going on for awhile . Clinical course gradually improving. Previous treatment includes ZOloft . Previous treatment resulted in none. Current therapy Zoloft. Patient denies current suicidal and homicidal ideation/intent. Family history significant for no psychiatric illness. Possible organic causes contributing are: post partum. Patient tried to stop taking Zoloft on her own. However, patient had a lot of side effects. Patient started back on it. Patient is thinking about getting pregnant she wants to know if this is safe in pregnancy which is not. She will try to wean off of the medication by taking 25 mg from the 50 that she was taking before.   The month after that she will try to take it every other day and someone so forth. CHP NIMA-7 8/10/2020   Feeling nervous, anxious, or on edge 2-Over half the days   Not able to stop or control worrying 2-Over half the days   Worrying too much about different things 1-Several days   Trouble relaxing 0-Not at all   Being so restless that it's hard to sit still 0-Not at all   Becoming easily annoyed or irritable 0-Not at all   Feeling afraid as if something awful might happen 2-Over half the days   NIMA-7 Total Score 7       PHQ-2 Over the past 2 weeks, how often have you been bothered by any of the following problems? Little interest or pleasure in doing things: Not at all  Feeling down, depressed, or hopeless: Not at all  PHQ-2 Score: 0  PHQ-9 Over the past 2 weeks, how often have you been bothered by any of the following problems? PHQ-9 Total Score: 0     Sergio Jacobs is due for annual preventative health screening including annual blood work. We will place the orders for these today.    Patient Active Problem List    Diagnosis Date Noted    History of gestational diabetes 09/10/2018     Priority: High    Elevated fasting blood sugar 08/10/2020    Corneal rust ring 2020    Postpartum state     PLTCS 19 M APG 8/9 Wt 9#12 2019    Excessive fetal growth affecting management of mother     Patient-requested procedure     Anemia 2018    Hx GDMA2 (G1) 2017    Adult BMI 25.0-25.9 kg/sq m 2016    Generalized anxiety disorder 10/06/2015       Past Medical History:   Diagnosis Date    Anemia 2018    Anxiety     Generalized anxiety disorder 10/6/2015    Gestational diabetes mellitus in pregnancy 2016     Past Surgical History:   Procedure Laterality Date     SECTION N/A 2019     SECTION PRIMARY performed by Arminda Morel DO at [facility] OR    WISDOM TOOTH EXTRACTION       Family History   Problem Relation Age of Onset    Hypertension Father     Hypertension Mother     No Known Problems Brother     Breast Cancer Neg Hx     Cancer Neg Hx     Colon Cancer Neg Hx     Diabetes Neg Hx     Eclampsia Neg Hx     Ovarian Cancer Neg Hx      Labor Neg Hx     Spont Abortions Neg Hx     Stroke Neg Hx      Current Outpatient Medications   Medication Sig Dispense Refill    sertraline (ZOLOFT) 25 MG tablet Take 2 tablets by mouth daily 60 tablet 1    busPIRone (BUSPAR) 7.5 MG tablet Take 1 tablet by mouth 2 times daily 60 tablet 0     No current facility-administered medications for this visit. Review of Systems   Constitutional: Negative for chills and fever. HENT: Negative. Respiratory: Negative. Negative for cough and shortness of breath. Cardiovascular: Negative. Negative for chest pain and palpitations. Gastrointestinal: Negative for abdominal pain. Genitourinary: Negative for dysuria. Musculoskeletal: Negative for arthralgias and myalgias. Skin: Negative for rash. Neurological: Negative for light-headedness and headaches. Psychiatric/Behavioral: Positive for sleep disturbance. The patient is nervous/anxious. No Known Allergies     Prior to Visit Medications    Medication Sig Taking?  Authorizing Provider   sertraline (ZOLOFT) 25 MG tablet Take 2 tablets by mouth daily Yes JANUARY Willis CNP   busPIRone (BUSPAR) 7.5 MG tablet Take 1 tablet by mouth 2 times daily Yes JANUARY Willis CNP       Social History     Tobacco Use    Smoking status: Never Smoker    Smokeless tobacco: Never Used   Substance Use Topics    Alcohol use: No     Alcohol/week: 0.0 standard drinks     Comment: rare     Drug use: No        PHYSICAL EXAMINATION:  Vital Signs: (As obtained by patient/caregiver or practitioner observation)    Constitutional: [x] Appears well-developed and well-nourished [x] No apparent distress      [] Abnormal-   Mental status  [x] Alert and awake  [x] Oriented to person/place/time [x]Able to follow commands      Psychiatric:       [x] Normal Affect [x] No Hallucinations        [x] Abnormal- Anxious    Other pertinent observable physical exam findings- none    Due to this being a TeleHealth encounter, evaluation of the following organ systems is limited: Vitals/Constitutional/EENT/Resp/CV/GI//MS/Neuro/Skin/Heme-Lymph-Imm. ASSESSMENT/PLAN:  1. Generalized anxiety disorder  Failure to Improve  Taper off zoloft  Start Buspirone  Discussed how to recognize anxiety. Advised to relieve tension with exercise or a massage. Advised to get enough rest.  Advised to avoid alcohol, caffeine, nicotine, and illegal drugs. Which can increase anxiety level and cause sleep problems. - sertraline (ZOLOFT) 25 MG tablet; Take 2 tablets by mouth daily  Dispense: 60 tablet; Refill: 1  - busPIRone (BUSPAR) 7.5 MG tablet; Take 1 tablet by mouth 2 times daily  Dispense: 60 tablet; Refill: 0    2. History of gestational diabetes  Evaluate for DM  - Hemoglobin A1C; Future  - Hepatic Function Panel; Future    3. Adult BMI 25.0-25.9 kg/sq m  stable    4. Need for varicella vaccine  Recommended by CDC. No current infection. Denies previous adverse reaction to vaccination.    - Varicella Zoster Antibody, IgG; Future    5. Screening for lipid disorders  recommended    - Lipid, Fasting; Future    6. Encounter for vitamin deficiency screening  recommended    - Vitamin D 25 Hydroxy; Future    7. Screening for endocrine disorder  recommended    - TSH without Reflex; Future    Controlled Substance Monitoring:  Acute and Chronic Pain Monitoring:   No flowsheet data found.   Orders Placed This Encounter   Procedures    Varicella Zoster Antibody, IgG     Standing Status:   Future     Standing Expiration Date:   8/7/2021    Vitamin D 25 Hydroxy     Standing Status:   Future     Standing Expiration Date:   8/10/2021    Hemoglobin A1C     Standing Status:   Future     Standing Expiration Date:   8/10/2021    TSH without Reflex     Standing Status:   Future     Standing Expiration Date:   8/10/2021    Lipid, Fasting     Standing Status:   Future     Standing Expiration Date:   8/10/2021    Hepatic Function Panel     Standing Status:   Future     Standing Expiration Date:   8/10/2021     Orders Placed This Encounter   Medications    sertraline (ZOLOFT) 25 MG tablet     Sig: Take 2 tablets by mouth daily     Dispense:  60 tablet     Refill:  1    busPIRone (BUSPAR) 7.5 MG tablet     Sig: Take 1 tablet by mouth 2 times daily     Dispense:  60 tablet     Refill:  0      Janay received counseling on the following healthy behaviors: nutrition, exercise and medication adherence  Reviewed prior labs and health maintenance. Continue current medications, diet and exercise. Discussed use, benefit, and side effects of prescribed medications. Barriers to medication compliance addressed. Patient given educational materials - see patient instructions. All patient questions answered. Patient voiced understanding. No follow-ups on file. Consent:  She and/or health care decision maker is aware that that she may receive a bill for this telephone service, depending on her insurance coverage, and has provided verbal consent to proceed: Yes  I affirm this is a Patient Initiated Episode with a Patient who has not had a related appointment within my department in the past 7 days or scheduled within the next 24 hours. Patient identification was verified at the start of the visit: Yes    Total Time: minutes: 21-30 minutes    Theta Brazil is a 31 y.o. female being evaluated by a Virtual Visit (phone visit) encounter to address concerns as mentioned above. . Due to this being a TeleHealth encounter (During [place] [address] public health emergency), evaluation of the following organ systems was limited:Vitals/Constitutional/EENT/Resp/CV/GI//MS/Neuro/Skin/Heme-Lymph-Imm.   Services were provided through a video synchronous discussion virtually to substitute for in-person clinic visit. This is a telehealth visit that was performed with the originating site at Patient Location: home and provider Location of [place], [place]. Verbal consent to participate in phone visit was obtained. I discussed with the patient the nature of our telehealth visits via interactive/real-time audio that:  - I would evaluate the patient and recommend diagnostics and treatments based on my assessment  - Our sessions are not being recorded and that personal health information is protected  - Our team would provide follow up care in person if/when the patient needs it. Pursuant to the emergency declaration under the [facility], [address] [place] [address] authority and the [employer] Act, this Virtual Visit was conducted with patient's (and/or legal guardian's) consent, to reduce the patient's risk of exposure to COVID-19 and provide necessary medical care. The patient (and/or legal guardian) has also been advised to contact this office for worsening conditions or problems, and seek emergency medical treatment and/or call 911 if deemed necessary. This note was completed by using the assistance of a speech-recognition program. However, inadvertent computerized transcription errors may be present. Although every effort was made to ensure accuracy, no guarantees can be provided that every mistake has been identified and corrected by editing.   Electronically signed by JANUARY Davis CNP on 8/10/20 at 1:22 PM EDT

## 2020-08-10 NOTE — PATIENT INSTRUCTIONS
Patient Education        buspirone  Pronunciation:  brennen FERNÁNDEZEUGENIE martinez  Brand: BuSpar  What is the most important information I should know about buspirone? Do not use buspirone if you have taken an MAO inhibitor in the past 14 days. A dangerous drug interaction could occur. MAO inhibitors include isocarboxazid, linezolid, methylene blue injection, phenelzine, rasagiline, selegiline, and tranylcypromine. What is buspirone? Buspirone is an anti-anxiety medicine that affects chemicals in the brain that may be unbalanced in people with anxiety. Buspirone is used to treat symptoms of anxiety, such as fear, tension, irritability, dizziness, pounding heartbeat, and other physical symptoms. Buspirone is not an anti-psychotic medication and should not be used in place of medication prescribed by your doctor for mental illness. Buspirone may also be used for purposes not listed in this medication guide. What should I discuss with my healthcare provider before taking buspirone? You should not use buspirone if you are allergic to it. Do not use buspirone if you have taken an MAO inhibitor in the past 14 days. A dangerous drug interaction could occur. MAO inhibitors include isocarboxazid, linezolid, methylene blue injection, phenelzine, rasagiline, selegiline, and tranylcypromine. To make sure buspirone is safe for you, tell your doctor if you have any of these conditions:  · kidney disease; or  · liver disease. Buspirone is not expected to harm an unborn baby. Tell your doctor if you are pregnant or plan to become pregnant during treatment. It is not known whether buspirone passes into breast milk or if it could harm a nursing baby. Tell your doctor if you are breast-feeding a baby. Buspirone is not approved for use by anyone younger than 25years old. How should I take buspirone? Follow all directions on your prescription label. Your doctor may occasionally change your dose to make sure you get the best results. Do not take this medicine in larger or smaller amounts or for longer than recommended. You may take buspirone with or without food but take it the same way each time. Some buspirone tablets are scored so you can break the tablet into 2 or 3 pieces in order to take a smaller amount of the medicine at each dose. Do not use a buspirone tablet if it has not been broken correctly and the piece is too big or too small. Follow your doctor's instructions about how much of the tablet to take. If you have switched to buspirone from another anxiety medication, you may need to slowly decrease your dose of the other medication rather than stopping suddenly. Some anxiety medications can cause withdrawal symptoms when you stop taking them suddenly after long-term use. Buspirone can cause false positive results with certain medical tests. You may need to stop using the medicine for at least 48 hours before your test. Tell any doctor who treats you that you are using buspirone. Store at room temperature away from moisture, heat, and light. What happens if I miss a dose? Take the missed dose as soon as you remember. Skip the missed dose if it is almost time for your next scheduled dose. Do not take extra medicine to make up the missed dose. What happens if I overdose? Seek emergency medical attention or call the Poison Help line at 1-960.343.3904. What should I avoid while taking buspirone? This medication may impair your thinking or reactions. Be careful if you drive or do anything that requires you to be alert. Drinking alcohol may increase certain side effects of buspirone. Grapefruit and grapefruit juice may interact with buspirone and lead to unwanted side effects. Discuss the use of grapefruit products with your doctor. What are the possible side effects of buspirone?   Get emergency medical help if you have signs of an allergic reaction: hives; difficult breathing; swelling of your face, lips, tongue, or throat. Call your doctor at once if you have:  · chest pain;  · shortness of breath; or  · a light-headed feeling, like you might pass out. Common side effects may include:  · headache;  · dizziness, drowsiness;  · sleep problems (insomnia);  · nausea, upset stomach; or  · feeling nervous or excited. This is not a complete list of side effects and others may occur. Call your doctor for medical advice about side effects. You may report side effects to FDA at 3-667-FDA-9745. What other drugs will affect buspirone? Taking this medicine with other drugs that make you sleepy or slow your breathing can worsen these effects. Ask your doctor before taking buspirone with a sleeping pill, narcotic pain medicine, muscle relaxer, or medicine for anxiety, depression, or seizures. Other drugs may interact with buspirone, including prescription and over-the-counter medicines, vitamins, and herbal products. Tell each of your health care providers about all medicines you use now and any medicine you start or stop using. Where can I get more information? Your pharmacist can provide more information about buspirone. Remember, keep this and all other medicines out of the reach of children, never share your medicines with others, and use this medication only for the indication prescribed. Every effort has been made to ensure that the information provided by [address] is accurate, up-to-date, and complete, but no guarantee is made to that effect. Drug information contained herein may be time sensitive. [facility] information has been compiled for use by healthcare practitioners and consumers in the [place] and therefore [facility] does not warrant that uses outside of the [place] are appropriate, unless specifically indicated otherwise. [facility]'s drug information does not endorse drugs, diagnose patients or recommend therapy.  [facility]'s drug information is an informational resource designed to assist licensed healthcare practitioners in caring for their patients and/or to serve consumers viewing this service as a supplement to, and not a substitute for, the expertise, skill, knowledge and judgment of healthcare practitioners. The absence of a warning for a given drug or drug combination in no way should be construed to indicate that the drug or drug combination is safe, effective or appropriate for any given patient. [facility] does not assume any responsibility for any aspect of healthcare administered with the aid of information [facility] provides. The information contained herein is not intended to cover all possible uses, directions, precautions, warnings, drug interactions, allergic reactions, or adverse effects. If you have questions about the drugs you are taking, check with your doctor, nurse or pharmacist.  Copyright 6301-1482 [address]. Version: 5.01. Revision date: 12/14/2015. Care instructions adapted under license by [facility] ([facility]). If you have questions about a medical condition or this instruction, always ask your healthcare professional. [place] [address] any warranty or liability for your use of this information.

## 2020-09-21 ENCOUNTER — HOSPITAL ENCOUNTER (OUTPATIENT)
Age: 30
Discharge: HOME | End: 2020-09-21
Payer: COMMERCIAL

## 2020-09-21 LAB
ALBUMIN SERPL-MCNC: 4.4 G/DL (ref 3.5–5.2)
ALBUMIN/GLOBULIN RATIO: NORMAL (ref 1–2.5)
ALP BLD-CCNC: 73 U/L (ref 35–104)
ALT SERPL-CCNC: 12 U/L (ref 5–33)
AST SERPL-CCNC: 17 U/L
BILIRUB SERPL-MCNC: 0.45 MG/DL (ref 0.3–1.2)
BILIRUBIN DIRECT: 0.12 MG/DL
BILIRUBIN, INDIRECT: 0.33 MG/DL (ref 0–1)
GLOBULIN: NORMAL G/DL (ref 1.5–3.8)
TOTAL PROTEIN: 6.9 G/DL (ref 6.4–8.3)
TSH SERPL DL<=0.05 MIU/L-ACNC: 2.06 MIU/L (ref 0.3–5)
VITAMIN D 25-HYDROXY: 23.6 NG/ML (ref 30–100)

## 2020-09-21 PROCEDURE — 86787 VARICELLA-ZOSTER ANTIBODY: CPT

## 2020-09-21 PROCEDURE — 80076 HEPATIC FUNCTION PANEL: CPT

## 2020-09-21 PROCEDURE — 82306 VITAMIN D 25 HYDROXY: CPT

## 2020-09-21 PROCEDURE — 83036 HEMOGLOBIN GLYCOSYLATED A1C: CPT

## 2020-09-21 PROCEDURE — 36415 COLL VENOUS BLD VENIPUNCTURE: CPT

## 2020-09-21 PROCEDURE — 84443 ASSAY THYROID STIM HORMONE: CPT

## 2020-09-21 RX ORDER — CEPHALEXIN 500 MG/1
500 CAPSULE ORAL 4 TIMES DAILY
Qty: 40 CAPSULE | Refills: 0 | Status: SHIPPED | OUTPATIENT
Start: 2020-09-21 | End: 2020-10-01

## 2020-09-21 ASSESSMENT — ENCOUNTER SYMPTOMS
SHORTNESS OF BREATH: 0
RESPIRATORY NEGATIVE: 1
COUGH: 0
ABDOMINAL PAIN: 0

## 2020-09-21 NOTE — PROGRESS NOTES
[address] [facility] Mihir PRETTY Formerly [facility], later [facility] [address]  [address] Formerly [facility]  Phone: [phone], Fax: [phone] EVALUATION -- Audio/Phone (During EIZOE-47 public health emergency)    Valorie Al (:  1990) has requested an audio/video evaluation for the following concern(s):  No chief complaint on file. HPI:  Valorie Al was an established patient of Jett Owen. Patient has a history of anemia, generalized anxiety disorder, gestational diabetes. Startrisha Sinclair is a 31 y.o. female patient  presents for re evaluation of anemia. Iron deficiency anemia due to pregnancyAnemia was found by routine CBC. It has been present for several years. Associated signs & symptoms: none. Patient's medications, allergies, past medical, surgical, social and family histories were reviewed and updated as appropriate. . Previous evaluation done from ob gyn. Current treatment none. Stable, no need screening  Lab Results   Component Value Date    WBC 6.0 07/10/2020    HGB 13.9 07/10/2020    HCT 41.8 07/10/2020    MCV 84.2 07/10/2020     07/10/2020     ANXIETY  Valorie Al has an ongoing history of Anxety. She complains of: irritable, excessive worrying. Symptoms have been going on for awhile . Clinical course gradually improving. Previous treatment includes ZOloft . Previous treatment resulted in none. Current therapy Zoloft. Patient denies current suicidal and homicidal ideation/intent. Family history significant for no psychiatric illness. Possible organic causes contributing are: post partum. Patient tried to stop taking Zoloft on her own. However, patient had a lot of side effects. Patient started back on it. Patient is thinking about getting pregnant she wants to know if this is safe in pregnancy which is not. She will try to wean off of the medication by taking 25 mg from the 50 that she was taking before. The month after that she will try to take it every other day and someone so forth.   P NIMA-7 8/10/2020   Feeling nervous, anxious, or on edge 2-Over half the days   Not able to stop or control worrying 2-Over half the days   Worrying too much about different things 1-Several days   Trouble relaxing 0-Not at all   Being so restless that it's hard to sit still 0-Not at all   Becoming easily annoyed or irritable 0-Not at all   Feeling afraid as if something awful might happen 2-Over half the days   NIMA-7 Total Score 7       PHQ-2 Over the past 2 weeks, how often have you been bothered by any of the following problems? Little interest or pleasure in doing things: Not at all  Feeling down, depressed, or hopeless: Not at all  PHQ-2 Score: 0  PHQ-9 Over the past 2 weeks, how often have you been bothered by any of the following problems? PHQ-9 Total Score: 0     Mihir Sarmiento is due for annual preventative health screening including annual blood work. We will place the orders for these today.    Patient Active Problem List    Diagnosis Date Noted    History of gestational diabetes 09/10/2018     Priority: High    Vitamin D deficiency 2020    Elevated fasting blood sugar 08/10/2020    Corneal rust ring 2020    Postpartum state     PLTCS 19 M APG 8/9 Wt 9#12 2019    Excessive fetal growth affecting management of mother     Patient-requested procedure     Anemia 2018    Hx GDMA2 (G1) 2017    Adult BMI 25.0-25.9 kg/sq m 2016    Generalized anxiety disorder 10/06/2015       Past Medical History:   Diagnosis Date    Anemia 2018    Anxiety     Generalized anxiety disorder 10/6/2015    Gestational diabetes mellitus in pregnancy 2016     Past Surgical History:   Procedure Laterality Date     SECTION N/A 2019     SECTION PRIMARY performed by Alejandro Feliciano DO at [facility] L[facility]D OR    WISDOM TOOTH EXTRACTION       Family History   Problem Relation Age of Onset    Hypertension Father     Hypertension Mother     No Known Problems Brother     Breast Cancer Neg Hx     Cancer Neg Hx     Colon Cancer Neg Hx     Diabetes Neg Hx     Eclampsia Neg Hx     Ovarian Cancer Neg Hx      Labor Neg Hx     Spont Abortions Neg Hx     Stroke Neg Hx      Current Outpatient Medications   Medication Sig Dispense Refill    sertraline (ZOLOFT) 50 MG tablet Take 1 tablet by mouth daily      cephALEXin (KEFLEX) 500 MG capsule Take 1 capsule by mouth 4 times daily for 10 days 40 capsule 0     No current facility-administered medications for this visit. Review of Systems   Constitutional: Negative for chills and fever. HENT: Negative. Respiratory: Negative. Negative for cough and shortness of breath. Cardiovascular: Negative. Negative for chest pain and palpitations. Gastrointestinal: Negative for abdominal pain. Genitourinary: Negative for dysuria. Musculoskeletal: Negative for arthralgias and myalgias. Skin: Negative for rash. Neurological: Negative for light-headedness and headaches. Psychiatric/Behavioral: Positive for sleep disturbance. The patient is nervous/anxious. No Known Allergies     Prior to Visit Medications    Medication Sig Taking?  Authorizing Provider   sertraline (ZOLOFT) 50 MG tablet Take 1 tablet by mouth daily  Historical Provider, MD   cephALEXin (KEFLEX) 500 MG capsule Take 1 capsule by mouth 4 times daily for 10 days  Danielle Rizo APRN - CNP       Social History     Tobacco Use    Smoking status: Never Smoker    Smokeless tobacco: Never Used   Substance Use Topics    Alcohol use: No     Alcohol/week: 0.0 standard drinks     Comment: rare     Drug use: No        PHYSICAL EXAMINATION:  Vital Signs: (As obtained by patient/caregiver or practitioner observation)    Constitutional: [x] Appears well-developed and well-nourished [x] No apparent distress      [] Abnormal-   Mental status  [x] Alert and awake  [x] Oriented to person/place/time [x]Able to follow commands Psychiatric:       [x] Normal Affect [x] No Hallucinations        [x] Abnormal- Anxious    Other pertinent observable physical exam findings- none    Due to this being a TeleHealth encounter, evaluation of the following organ systems is limited: Vitals/Constitutional/EENT/Resp/CV/GI//MS/Neuro/Skin/Heme-Lymph-Imm. ASSESSMENT/PLAN:  1. Generalized anxiety disorder  ***    2. Vitamin D deficiency  ***    - TSH without Reflex; Future    Controlled Substance Monitoring:  Acute and Chronic Pain Monitoring:   No flowsheet data found. No orders of the defined types were placed in this encounter. No orders of the defined types were placed in this encounter. Beena Silvestre received counseling on the following healthy behaviors: nutrition, exercise and medication adherence  Reviewed prior labs and health maintenance. Continue current medications, diet and exercise. Discussed use, benefit, and side effects of prescribed medications. Barriers to medication compliance addressed. Patient given educational materials - see patient instructions. All patient questions answered. Patient voiced understanding. No follow-ups on file. Consent:  She and/or health care decision maker is aware that that she may receive a bill for this telephone service, depending on her insurance coverage, and has provided verbal consent to proceed: Yes  I affirm this is a Patient Initiated Episode with a Patient who has not had a related appointment within my department in the past 7 days or scheduled within the next 24 hours. Patient identification was verified at the start of the visit: Yes    Total Time: minutes: 21-30 minutes    Stephen Olsen is a 31 y.o. female being evaluated by a Virtual Visit (phone visit) encounter to address concerns as mentioned above.  . Due to this being a TeleHealth encounter (During [facility] public health emergency), evaluation of the following organ systems was limited:Vitals/Constitutional/EENT/Resp/CV/GI//MS/Neuro/Skin/Heme-Lymph-Imm. Services were provided through a video synchronous discussion virtually to substitute for in-person clinic visit. This is a telehealth visit that was performed with the originating site at Patient Location: home and provider Location of [place], [place]. Verbal consent to participate in phone visit was obtained. I discussed with the patient the nature of our telehealth visits via interactive/real-time audio that:  - I would evaluate the patient and recommend diagnostics and treatments based on my assessment  - Our sessions are not being recorded and that personal health information is protected  - Our team would provide follow up care in person if/when the patient needs it. Pursuant to the emergency declaration under the [address] [place] [address] authority and the [employer] Act, this Virtual Visit was conducted with patient's (and/or legal guardian's) consent, to reduce the patient's risk of exposure to COVID-19 and provide necessary medical care. The patient (and/or legal guardian) has also been advised to contact this office for worsening conditions or problems, and seek emergency medical treatment and/or call 911 if deemed necessary. This note was completed by using the assistance of a speech-recognition program. However, inadvertent computerized transcription errors may be present. Although every effort was made to ensure accuracy, no guarantees can be provided that every mistake has been identified and corrected by editing.   Electronically signed by JANUARY Cardoso CNP on 8/10/20 at 1:22 PM EDT

## 2020-09-22 ENCOUNTER — HOSPITAL ENCOUNTER (OUTPATIENT)
Age: 30
Discharge: HOME | End: 2020-09-22
Payer: COMMERCIAL

## 2020-09-22 ENCOUNTER — VIRTUAL VISIT (OUTPATIENT)
Dept: FAMILY MEDICINE CLINIC | Age: 30
End: 2020-09-22
Payer: COMMERCIAL

## 2020-09-22 PROBLEM — E55.9 VITAMIN D DEFICIENCY: Status: ACTIVE | Noted: 2020-09-22

## 2020-09-22 LAB
CHOLESTEROL, FASTING: 175 MG/DL
CHOLESTEROL/HDL RATIO: 2
ESTIMATED AVERAGE GLUCOSE: 94 MG/DL
HBA1C MFR BLD: 4.9 % (ref 4–6)
HDLC SERPL-MCNC: 88 MG/DL
LDL CHOLESTEROL: 81 MG/DL (ref 0–130)
TRIGLYCERIDE, FASTING: 29 MG/DL
VLDLC SERPL CALC-MCNC: NORMAL MG/DL (ref 1–30)

## 2020-09-22 PROCEDURE — 80061 LIPID PANEL: CPT

## 2020-09-22 PROCEDURE — 36415 COLL VENOUS BLD VENIPUNCTURE: CPT

## 2020-09-22 PROCEDURE — G8427 DOCREV CUR MEDS BY ELIG CLIN: HCPCS | Performed by: FAMILY MEDICINE

## 2020-09-22 PROCEDURE — 99213 OFFICE O/P EST LOW 20 MIN: CPT | Performed by: FAMILY MEDICINE

## 2020-09-22 RX ORDER — SERTRALINE HYDROCHLORIDE 25 MG/1
25 TABLET, FILM COATED ORAL DAILY
Qty: 30 TABLET | Refills: 1 | Status: SHIPPED | OUTPATIENT
Start: 2020-09-22 | End: 2020-10-29 | Stop reason: SDUPTHER

## 2020-09-22 RX ORDER — BUSPIRONE HYDROCHLORIDE 7.5 MG/1
7.5 TABLET ORAL 2 TIMES DAILY
Qty: 60 TABLET | Refills: 0 | Status: SHIPPED | OUTPATIENT
Start: 2020-09-22 | End: 2020-10-22

## 2020-09-22 ASSESSMENT — ENCOUNTER SYMPTOMS
SHORTNESS OF BREATH: 0
RESPIRATORY NEGATIVE: 1
COUGH: 0
ABDOMINAL PAIN: 0

## 2020-09-22 NOTE — PATIENT INSTRUCTIONS
Patient Education        Anxiety Disorder: Care Instructions  Your Care Instructions     Anxiety is a normal reaction to stress. Difficult situations can cause you to have symptoms such as sweaty palms and a nervous feeling. In an anxiety disorder, the symptoms are far more severe. Constant worry, muscle tension, trouble sleeping, nausea and diarrhea, and other symptoms can make normal daily activities difficult or impossible. These symptoms may occur for no reason, and they can affect your work, school, or social life. Medicines, counseling, and self-care can all help. Follow-up care is a key part of your treatment and safety. Be sure to make and go to all appointments, and call your doctor if you are having problems. It's also a good idea to know your test results and keep a list of the medicines you take. How can you care for yourself at home? · Take medicines exactly as directed. Call your doctor if you think you are having a problem with your medicine. · Go to your counseling sessions and follow-up appointments. · Recognize and accept your anxiety. Then, when you are in a situation that makes you anxious, say to yourself, \"This is not an emergency. I feel uncomfortable, but I am not in danger. I can keep going even if I feel anxious. \"  · Be kind to your body:  ? Relieve tension with exercise or a massage. ? Get enough rest.  ? Avoid alcohol, caffeine, nicotine, and illegal drugs. They can increase your anxiety level and cause sleep problems. ? Learn and do relaxation techniques. See below for more about these techniques. · Engage your mind. Get out and do something you enjoy. Go to a funny movie, or take a walk or hike. Plan your day. Having too much or too little to do can make you anxious. · Keep a record of your symptoms. Discuss your fears with a good friend or family member, or join a support group for people with similar problems. Talking to others sometimes relieves stress.   · Get involved in

## 2020-09-22 NOTE — PROGRESS NOTES
[place] [address]   [address] [facility] [address]  Phone: [phone], Fax: [phone]    TELEHEALTH EVALUATION -- Audio/Visual (During RVRCL-52 public health emergency)    Patient ID verified by me prior to start of this visit    Chantel Jackson (:  1990) has requested an audio/video evaluation for the following concern(s):  Chief Complaint   Patient presents with    Other     review results    Anxiety      HPI:  Chantel Jackson is an established patient. Patient has a history of generalized anxiety disorder, gestational diabetes. She scheduled this appointment today to review blood work. [address] has an ongoing history of Anxety. She complains of: irritable, excessive worrying. Symptoms have been going on for awhile . Clinical course gradually improving. Previous treatment includes ZOloft . Previous treatment resulted in none. Current therapy Zoloft. Patient denies current suicidal and homicidal ideation/intent. Family history significant for no psychiatric illness. Possible organic causes contributing are: post partum. Patient tried to stop taking Zoloft on her own. However, patient had a lot of side effects. Patient started back on it. Patient is thinking about getting pregnant she wants to know if this is safe in pregnancy which is not. She will try to wean off of the medication by taking 25 mg from the 50 that she was taking before. The month after that she will try to take it every other day and someone so forth. Patient have not had a chance to taper since she was still given 50 mg of Zoloft. I will resend the prescription to 25 mg.         CHP NIMA-7 8/10/2020   Feeling nervous, anxious, or on edge 2-Over half the days   Not able to stop or control worrying 2-Over half the days   Worrying too much about different things 1-Several days   Trouble relaxing 0-Not at all   Being so restless that it's hard to sit still 0-Not at all   Becoming easily annoyed or irritable 0-Not at all   Feeling afraid as if something awful might happen 2-Over half the days   NIMA-7 Total Score 7      VITAMIN D  Patient has a known Vitamin D Deficiency. she had a course of supplementation for 12 weeks. she is due to get levels check. We continue to discuss ways to manage this condition. We also discussed ways to improve the levels. Such as learning food groups that are high in Vitamin D. We also discuss that sun exposure is beneficial however, too much sun and sun damage can potentially result in skin cancer. Lab Results   Component Value Date/Time    VITD25 23.6 (L) 09/21/2020 05:02 PM      Parish Sánchez  's most recent TSH level was normal. Results reviewed with the patient. Patient denies any history of thyroid disorders. Lab Results   Component Value Date/Time    TSH 2.06 09/21/2020 05:02 PM      LFT's were normal  Lab Results   Component Value Date    ALT 12 09/21/2020    AST 17 09/21/2020    ALKPHOS 73 09/21/2020    BILITOT 0.45 09/21/2020       Review of Systems   Constitutional: Negative for chills, fatigue and fever. HENT: Negative. Respiratory: Negative. Negative for cough and shortness of breath. Cardiovascular: Negative. Negative for chest pain and palpitations. Gastrointestinal: Negative for abdominal pain. Genitourinary: Negative for dysuria. Musculoskeletal: Negative for arthralgias and myalgias. Skin: Negative for rash. Neurological: Negative for light-headedness and headaches. Psychiatric/Behavioral: Positive for sleep disturbance. The patient is nervous/anxious.         Patient Active Problem List    Diagnosis Date Noted    History of gestational diabetes 09/10/2018     Priority: High    Vitamin D deficiency 09/22/2020    Elevated fasting blood sugar 08/10/2020    Corneal rust ring 05/18/2020    Postpartum state     PLTCS 4/2/19 M APG 8/9 Wt 9#12 04/02/2019    Excessive fetal growth affecting management of mother    Shi Ewing Patient-requested procedure     Anemia 2018    Hx GDMA2 (G1) 2017    Adult BMI 25.0-25.9 kg/sq m 2016    Generalized anxiety disorder 10/06/2015        Past Medical History:   Diagnosis Date    Anemia 2018    Anxiety     Generalized anxiety disorder 10/6/2015    Gestational diabetes mellitus in pregnancy 2016     Past Surgical History:   Procedure Laterality Date     SECTION N/A 2019     SECTION PRIMARY performed by Laure Workman DO at [facility]    WISDOM TOOTH EXTRACTION       Family History   Problem Relation Age of Onset    Hypertension Father     Hypertension Mother     No Known Problems Brother     Breast Cancer Neg Hx     Cancer Neg Hx     Colon Cancer Neg Hx     Diabetes Neg Hx     Eclampsia Neg Hx     Ovarian Cancer Neg Hx      Labor Neg Hx     Spont Abortions Neg Hx     Stroke Neg Hx      Current Outpatient Medications   Medication Sig Dispense Refill    Cholecalciferol 1.25 MG (36740 UT) TABS Take 1 tablet by mouth once a week 12 tablet 0    sertraline (ZOLOFT) 25 MG tablet Take 1 tablet by mouth daily 30 tablet 1    busPIRone (BUSPAR) 7.5 MG tablet Take 1 tablet by mouth 2 times daily 60 tablet 0    cephALEXin (KEFLEX) 500 MG capsule Take 1 capsule by mouth 4 times daily for 10 days 40 capsule 0     No current facility-administered medications for this visit. No Known Allergies     Prior to Visit Medications    Medication Sig Taking?  Authorizing Provider   Cholecalciferol 1.25 MG (08297 UT) TABS Take 1 tablet by mouth once a week Yes JANUARY Willis CNP   sertraline (ZOLOFT) 25 MG tablet Take 1 tablet by mouth daily Yes JANUARY Willis CNP   busPIRone (BUSPAR) 7.5 MG tablet Take 1 tablet by mouth 2 times daily Yes JANUARY Willis CNP   cephALEXin (KEFLEX) 500 MG capsule Take 1 capsule by mouth 4 times daily for 10 days  JANUARY Eddy CNP       Social History     Tobacco Use    Smoking status: Never Smoker    Smokeless tobacco: Never Used   Substance Use Topics    Alcohol use: No     Alcohol/week: 0.0 standard drinks     Comment: rare     Drug use: No        PHYSICAL EXAMINATION:  Vital Signs: (As obtained by patient/caregiver or practitioner observation)    Constitutional: [x] Appears well-developed and well-nourished [x] No apparent distress      [] Abnormal-   Mental status  [x] Alert and awake  [x] Oriented to person/place/time [x]Able to follow commands      Eyes:  EOM    [x]  Normal  [] Abnormal-  Sclera  [x]  Normal  [] Abnormal -         Discharge [x]  None visible  [] Abnormal -    HENT:   [x] Normocephalic, atraumatic.   [] Abnormal   [x] Mouth/Throat: Mucous membranes are moist.     External Ears [x] Normal  [] Abnormal-     Neck: [x] No visualized mass     Pulmonary/Chest: [x] Respiratory effort normal.  [x] No visualized signs of difficulty breathing or respiratory distress        [] Abnormal     Musculoskeletal:   [x] Normal gait with no signs of ataxia         [x] Normal range of motion of neck        [] Abnormal-     Neurological:        [x] No Facial Asymmetry (Cranial nerve 7 motor function) (limited exam to video visit)          [x] No gaze palsy        [] Abnormal-     Skin:        [x] No significant exanthematous lesions or discoloration noted on facial skin         [] Abnormal-     Psychiatric:       [x] Normal Affect [x] No Hallucinations        [x] Abnormal- Anxious, with pressured speech    Other pertinent observable physical exam findings- none  Lab Results   Component Value Date    WBC 6.0 07/10/2020    HGB 13.9 07/10/2020    HCT 41.8 07/10/2020    MCV 84.2 07/10/2020     07/10/2020     Lab Results   Component Value Date     07/10/2020    K 3.8 07/10/2020     07/10/2020    CO2 25 07/10/2020    BUN 13 07/10/2020    CREATININE 0.40 07/10/2020    GLUCOSE 95 07/10/2020    CALCIUM 9.5 07/10/2020      Lab Results   Component Value Date ALT 12 09/21/2020    AST 17 09/21/2020    ALKPHOS 73 09/21/2020    BILITOT 0.45 09/21/2020     Lab Results   Component Value Date    TSH 2.06 09/21/2020     No results found for: CHOL  No results found for: TRIG  Lab Results   Component Value Date    HDL 88 09/22/2020     Lab Results   Component Value Date    LDLCHOLESTEROL 81 09/22/2020     Lab Results   Component Value Date    VLDL NOT REPORTED 09/22/2020     Lab Results   Component Value Date    CHOLHDLRATIO 2.0 09/22/2020     Lab Results   Component Value Date    LABA1C 4.9 06/28/2018       Lab Results   Component Value Date    VITD25 23.6 (L) 09/21/2020      Due to this being a TeleHealth encounter, evaluation of the following organ systems is limited: Vitals/Constitutional/EENT/Resp/CV/GI//MS/Neuro/Skin/Heme-Lymph-Imm. ASSESSMENT/PLAN:  1. Generalized anxiety disorder  Ongoing  Titrate off Zoloft  Start Buspirone as needed  Discussed how to recognize anxiety. Advised to relieve tension with exercise or a massage. Advised to get enough rest.  Advised to avoid alcohol, caffeine, nicotine, and illegal drugs. Which can increase anxiety level and cause sleep problems. - sertraline (ZOLOFT) 25 MG tablet; Take 1 tablet by mouth daily  Dispense: 30 tablet; Refill: 1  - busPIRone (BUSPAR) 7.5 MG tablet; Take 1 tablet by mouth 2 times daily  Dispense: 60 tablet; Refill: 0    2. Vitamin D deficiency  Continue Vitamin D supplementation  DISCUSSED AND ADVISED TO:  Foods that contain a lot of vitamin D includes [place], tuna, and mackerel. Cheese, egg yolks, and beef liver have small amounts of vit D.  Milk, soy drinks, orange juice, yogurt, margarine, and some kinds of cereal have vitamin D added to them. Continue to use sunblock when out in the sun to prevent skin cancer.  - Cholecalciferol 1.25 MG (34649 UT) TABS; Take 1 tablet by mouth once a week  Dispense: 12 tablet;  Refill: 0    Controlled Substance Monitoring:  Acute and Chronic Pain Monitoring:   No flowsheet data found. No orders of the defined types were placed in this encounter. Orders Placed This Encounter   Medications    Cholecalciferol 1.25 MG (38239 UT) TABS     Sig: Take 1 tablet by mouth once a week     Dispense:  12 tablet     Refill:  0    sertraline (ZOLOFT) 25 MG tablet     Sig: Take 1 tablet by mouth daily     Dispense:  30 tablet     Refill:  1    busPIRone (BUSPAR) 7.5 MG tablet     Sig: Take 1 tablet by mouth 2 times daily     Dispense:  60 tablet     Refill:  0      Medications Discontinued During This Encounter   Medication Reason    sertraline (ZOLOFT) 25 MG tablet Therapy completed    sertraline (ZOLOFT) 50 MG tablet REORDER    busPIRone (BUSPAR) 7.5 MG tablet REORDER      Janay received counseling on the following healthy behaviors: nutrition, exercise and medication adherence  Reviewed prior labs and health maintenance. Continue current medications, diet and exercise. Discussed use, benefit, and side effects of prescribed medications. Barriers to medication compliance addressed. Patient given educational materials - see patient instructions. All patient questions answered. Patient voiced understanding. Return in about 6 months (around 3/22/2021), or 4 to 6 months, for routine ff up, Pls do PHQ9, Pls do GAD7, hep b. Sheila Lamas is a 31 y.o. female patient  being evaluated by a Virtual Visit (video visit) encounter to address concerns as mentioned above. Due to this being a TeleHealth encounter (During XUT-64 public health emergency), evaluation of the following organ systems was limited:Vitals/Constitutional/EENT/Resp/CV/GI//MS/Neuro/Skin/Heme-Lymph-Imm. Services were provided through a video synchronous discussion virtually to substitute for in-person clinic visit. This is a telehealth visit that was performed with the originating site at Patient Location: home and provider Location of [place], [place].      Verbal consent to participate in video visit was obtained. Patient ID verified by me prior to start of this visit  I discussed with the patient the nature of our telehealth visits via interactive/real-time audio/video that:  - I would evaluate the patient and recommend diagnostics and treatments based on my assessment  - Our sessions are not being recorded and that personal health information is protected  - Our team would provide follow up care in person if/when the patient needs it. Pursuant to the emergency declaration under the [address] [place] [address], [address] [place] [address] and the [employer] Act, this Virtual Visit was conducted with patient's (and/or legal guardian's) consent, to reduce the patient's risk of exposure to COVID-19 and provide necessary medical care. The patient (and/or legal guardian) has also been advised to contact this office for worsening conditions or problems, and seek emergency medical treatment and/or call 911 if deemed necessary. This note was completed by using the assistance of a speech-recognition program. However, inadvertent computerized transcription errors may be present. Although every effort was made to ensure accuracy, no guarantees can be provided that every mistake has been identified and corrected by editing.   Electronically signed by JANUARY Montes De Oca CNP on 9/22/20 at 10:54 AM JUAN

## 2020-09-23 LAB — VZV IGG SER QL IA: 2.84

## 2020-10-29 RX ORDER — SERTRALINE HYDROCHLORIDE 25 MG/1
25 TABLET, FILM COATED ORAL DAILY
Qty: 90 TABLET | Refills: 2 | Status: SHIPPED | OUTPATIENT
Start: 2020-10-29 | End: 2020-12-07 | Stop reason: SDUPTHER

## 2020-10-29 NOTE — TELEPHONE ENCOUNTER
Please Approve or Refuse.   Send to Pharmacy per Pt's Request:     Next Visit Date:  2/22/2021   Last Visit Date: 9/22/2020    Hemoglobin A1C (%)   Date Value   09/21/2020 4.9   06/28/2018 4.9   12/01/2016 4.7             ( goal A1C is < 7)   BP Readings from Last 3 Encounters:   07/15/20 104/64   07/13/20 120/80   07/10/20 119/73          (goal 120/80)  BUN   Date Value Ref Range Status   07/10/2020 13 6 - 20 mg/dL Final     CREATININE   Date Value Ref Range Status   07/10/2020 0.40 (L) 0.50 - 0.90 mg/dL Final     Potassium   Date Value Ref Range Status   07/10/2020 3.8 3.7 - 5.3 mmol/L Final

## 2020-12-07 ENCOUNTER — TELEPHONE (OUTPATIENT)
Dept: FAMILY MEDICINE CLINIC | Age: 30
End: 2020-12-07

## 2020-12-07 NOTE — TELEPHONE ENCOUNTER
PATIENT CALLED STATING THE MEDICATION sertraline (ZOLOFT) 25 MG tablet SHE STATED SHE TRIED TO WIEN HERSELF OFF BUT SHE IS UNABLE TO AT THIS TIME. SHE STATED SHE HAS BEEN TAKING 50 MGS WHICH SHE HAS BEEN TAKING DOUBLE THE TABS SINCE SHE ONLY HAS THE 25 MG. SHE IS ASKING IF YOU WILL B WILLING TO PUT HER BACK ON THE 50 mg UNTIL SHE IS READY TO CUT THE MGS DOWN? PLEASE ADVISE.

## 2021-01-15 ENCOUNTER — HOSPITAL ENCOUNTER (OUTPATIENT)
Age: 31
Discharge: HOME | End: 2021-01-15
Payer: COMMERCIAL

## 2021-01-15 ENCOUNTER — TELEPHONE (OUTPATIENT)
Dept: OBGYN CLINIC | Age: 31
End: 2021-01-15

## 2021-01-15 DIAGNOSIS — N92.6 MISSED PERIOD: ICD-10-CM

## 2021-01-15 DIAGNOSIS — N92.6 MISSED PERIOD: Primary | ICD-10-CM

## 2021-01-15 DIAGNOSIS — Z34.90 EARLY STAGE OF PREGNANCY: Primary | ICD-10-CM

## 2021-01-15 LAB — HCG QUANTITATIVE: 28 IU/L

## 2021-01-15 PROCEDURE — 36415 COLL VENOUS BLD VENIPUNCTURE: CPT

## 2021-01-15 PROCEDURE — 84702 CHORIONIC GONADOTROPIN TEST: CPT

## 2021-01-15 RX ORDER — PNV NO.95/FERROUS FUM/FOLIC AC 28MG-0.8MG
1 TABLET ORAL DAILY
Qty: 30 TABLET | Refills: 11 | Status: SHIPPED | OUTPATIENT
Start: 2021-01-15 | End: 2022-02-15 | Stop reason: CLARIF

## 2021-01-17 ENCOUNTER — PATIENT MESSAGE (OUTPATIENT)
Dept: OBGYN CLINIC | Age: 31
End: 2021-01-17

## 2021-01-22 ENCOUNTER — TELEPHONE (OUTPATIENT)
Dept: OBGYN CLINIC | Age: 31
End: 2021-01-22

## 2021-01-22 ENCOUNTER — HOSPITAL ENCOUNTER (OUTPATIENT)
Age: 31
Discharge: HOME | End: 2021-01-22
Payer: COMMERCIAL

## 2021-01-22 DIAGNOSIS — Z34.90 EARLY STAGE OF PREGNANCY: ICD-10-CM

## 2021-01-22 DIAGNOSIS — Z34.90 EARLY STAGE OF PREGNANCY: Primary | ICD-10-CM

## 2021-01-22 DIAGNOSIS — N92.6 MISSED PERIOD: ICD-10-CM

## 2021-01-22 LAB
BILIRUBIN URINE: NEGATIVE
COLOR: YELLOW
COMMENT UA: NORMAL
GLUCOSE URINE: NEGATIVE
HCG QUANTITATIVE: 45 IU/L
KETONES, URINE: NEGATIVE
LEUKOCYTE ESTERASE, URINE: NEGATIVE
NITRITE, URINE: NEGATIVE
PH UA: 6.5 (ref 5–8)
PROTEIN UA: NEGATIVE
SPECIFIC GRAVITY UA: 1.02 (ref 1–1.03)
TURBIDITY: CLEAR
URINE HGB: NEGATIVE
UROBILINOGEN, URINE: NORMAL

## 2021-01-22 PROCEDURE — 36415 COLL VENOUS BLD VENIPUNCTURE: CPT

## 2021-01-22 PROCEDURE — 84702 CHORIONIC GONADOTROPIN TEST: CPT

## 2021-01-22 PROCEDURE — 81003 URINALYSIS AUTO W/O SCOPE: CPT

## 2021-01-22 NOTE — TELEPHONE ENCOUNTER
Spoke with patient in regards to test results, Felicia Curtis review patients quant results and stated for patient to repeat labs in 1 week, order in epic for patient to complete next Friday. Will contact patient once results come back. Instructed patient if she should happen to have any vaginal bleeding, abdominal pain, or fever call the office or go to the ER for evaluation.

## 2021-01-27 RX ORDER — CEPHALEXIN 500 MG/1
500 CAPSULE ORAL 4 TIMES DAILY
Qty: 40 CAPSULE | Refills: 0 | Status: SHIPPED | OUTPATIENT
Start: 2021-01-27 | End: 2021-02-06

## 2021-01-27 NOTE — TELEPHONE ENCOUNTER
From: Jane Abad  Sent: 1/26/2021 10:41 PM EST  To: [address] Ob/Gyn Clinical Support Pool  Subject: RE: Visit Follow-Up Question    Sylvia Laureano, so I'm doing good with the pregnancy, however I now have a clogged milk duct. Im still breast feeding my 18 month old. Marcio Ground been trying to get it out, massaging and hot compress and extra pumping/nursing. Nothing it working and im starting to feel weak and feverish. I took some tylenol. But I wanted to reach out because I'm in pain and unsure of what to do. I go back Friday for my HCG check again.     ----- Message -----  From: PEDRO PRETTY  Sent: 1/21/21 2:17 PM  To: Jane Abad  Subject: RE: Visit Follow-Up Question    Sounds good, definitely keep up on your water intake, let us know if any of your symptoms change and we will call you tomorrow after we have your results back. ----- Message -----   From:Janay Mir   Sent:1/21/2021 12:04 PM EST   To:JANUARY Del Angel CNP   Subject:RE: Visit Follow-Up Question    Last week my hcg was only 28. This is the earliest I've ever found out about pregnancy. I go back tmrw to make sure they are increasing. I won't even have a missed period til next week. If that makes sense. Im only 2 1/2 weeks post period. ----- Message -----   From:PEDRO PRETTY   Sent:1/21/2021 11:54 AM EST   To:Janay Mir   Subject:RE: Visit Follow-Up Question    Lex Umaña, vaginal discharge can be normal, are you having any vaginal bleeding that you have noticed? We could bring you to the office to do vaginal cultures but unfortunately if it were a bacterial vaginosis or something along those lines we do not recommend flagyl until after 14 weeks. ----- Message -----   From:Janay Mir   Sent:1/21/2021 11:07 AM EST   To:JANUARY Del Angel CNP   Subject:RE: Visit Follow-Up Question    Hello,   So I'm supposed to go tmrw morning for my repeat HCG lab.  But today and yesterday I've been having slight cramping/stomach pains/lower back pain. Nothing severe. . but noticeable. And today I noticed a yellowish color discharge in my underwear. I dont know if thats normal or not. Dutch Prim ----- Message -----   From:Nurse Alvarez Sewell   Sent:1/18/2021 10:31 AM EST   To:Janay Mir   Subject:RE: Visit Follow-Up Question    That is completely understandable. Try to be patient, I know it is easier said than done. The blood work on Friday will give us more information. It does appear that you are likely in the very early stages of pregnancy. Please report any vaginal bleeding, severe cramping, or fevers. ----- Message -----   From:Janay Mir   Sent:1/18/2021 9:29 AM EST   To:JANUARY Del Angel - RABIA   Subject:RE: Visit Follow-Up Question    [id number] [address], thank you. I actually feel much more at ease today. I was just flipping out yesterday, nervous, for some reason. The first day of my last period was Jan 4th. That's why I was confused. But I do feel a little tight and crampy in my lower abdomen. Like pregnancy is kicking in a little more. If that makes sense. Thanks for the response. I will definitly go Friday for my blood work.       ----- Message -----   From:Nurse Alvarez Sewell   Sent:1/18/2021 8:41 AM EST   To:Janay Mir   Subject:RE: Visit Follow-Up Question    Jessica Leeroy,     With a quant hcg level of only 28, it is not likely that pregnancy would be detected in your urine yet. The negative tests via urine can be considered normal. It is simply early stages of pregnancy. The best time to repeat the blood level is one week. This will give the most accurate results. While I know that it is hard to wait, try to remain at ease. On Friday, we would expect to see your level increasing. I know that you mentioned that you were confused, do you know the first day of your last menstrual period?      Mariann Diana        ----- Message -----   From:Janay Mir   Sent:1/17/2021 4:03 PM EST   To:JANUARY Del Angel - CNP   Subject:Visit Follow-Up Question    Hello, so I'm really paranoid. I took 2 home pregnancy tests since Friday and both came up negative. I know on Friday my HCG was only 28. Im just feeling really confused and really anxious. Unsure if I should go get my lab work done early, like before this friday. .. to ease my mind. To make sure my levels are increasing. .. sorry to bother you guys. I'm just nervous. Thanks.

## 2021-01-28 ENCOUNTER — HOSPITAL ENCOUNTER (OUTPATIENT)
Age: 31
Discharge: HOME | End: 2021-01-28
Payer: COMMERCIAL

## 2021-01-28 DIAGNOSIS — Z34.90 EARLY STAGE OF PREGNANCY: ICD-10-CM

## 2021-01-28 LAB — HCG QUANTITATIVE: 2 IU/L

## 2021-01-28 PROCEDURE — 84702 CHORIONIC GONADOTROPIN TEST: CPT

## 2021-01-28 PROCEDURE — 36415 COLL VENOUS BLD VENIPUNCTURE: CPT

## 2021-01-29 ENCOUNTER — TELEPHONE (OUTPATIENT)
Dept: OBGYN CLINIC | Age: 31
End: 2021-01-29

## 2021-01-29 DIAGNOSIS — O03.9 SAB (SPONTANEOUS ABORTION): Primary | ICD-10-CM

## 2021-01-29 NOTE — TELEPHONE ENCOUNTER
----- Message from JANUARY Harris CNP sent at 1/29/2021  7:55 AM EST -----  Decreasing Quant HCG level- not viable pregnancy  Repeat in 2 weeks until completely negative.

## 2021-01-29 NOTE — TELEPHONE ENCOUNTER
Patient was notified of results and recommendations, patient going to repeat her lab work next week Friday.

## 2021-02-23 NOTE — PROGRESS NOTES
[facility] PHYSICIANS  [facility] [address] [facility] [address].  [address] [place] [address]  Dept: [phone]     2021   Chief Complaint   Patient presents with    6 Month Follow-Up     HPI  Jackie Doe (:  1990) is a 31 y.o. female is an established patient. Patient has a history of anxiety disorder, anemia, vitamin D deficiency. Lenka Dupree is a 31 y.o. female patient has a known history of anxiety disorder. Patient was Zoloft. Patient was weaning herself off of the Zoloft in January. She found out that she was pregnant. However, she did have a miscarriage few weeks later. Since patient wants to get pregnant again. Patient did not start the Zoloft again. Patient denied any withdrawal symptoms other than some mild dizziness. Patient continues to have some symptoms of irritability, excessive worrying, and inability to relax at times. Patient does not really want to start any SSRIs at all. Patient had taken BuSpar in the past that she took occasionally which she is willing to try again. However she will monitor pregnancy if she does get pregnant she should stop the medication and follow-up. Patient have had some postpartum depression in the past as well. She is denying any suicidal or homicidality. She does not have any psychiatrist that she sees. IRON DEFICIENCY ANEMIA  Patient had some iron deficiency anemia which was discovered when she was pregnant on her last pregnancy. Patient was given some iron supplementation but had stopped since. She is now taking some multivitamin for pregnancy. Patient have not had a CBC done since July. We will recheck this again. No signs of bleeding and no signs of other blood loss. VITAMIN D  Patient has a known Vitamin D Deficiency. she had a course of supplementation for 12 weeks. she is due to get levels check. We continue to discuss ways to manage this condition. We also discussed ways to improve the levels. Such as learning food groups that are high in Vitamin D. We also discuss that sun exposure is beneficial however, too much sun and sun damage can potentially result in skin cancer. Lab Results   Component Value Date/Time    VITD25 23.6 (L) 09/21/2020 05:02 PM      VITAMIN D  Patient has a known Vitamin D Deficiency. she had a course of supplementation for 12 weeks patient will be given a daily dose of vitamin D.. she is due to get levels check. We continue to discuss ways to manage this condition. We also discussed ways to improve the levels. Such as learning food groups that are high in Vitamin D. We also discuss that sun exposure is beneficial however, too much sun and sun damage can potentially result in skin cancer. Lab Results   Component Value Date/Time    VITD25 23.6 (L) 09/21/2020 05:02 PM      PHQ-9 Total Score: 0 (2/25/2021 10:44 AM)     CHP NIMA-7 2/25/2021 8/10/2020   Feeling nervous, anxious, or on edge 1-Several days 2-Over half the days   Not able to stop or control worrying 1-Several days 2-Over half the days   Worrying too much about different things 0-Not at all 1-Several days   Trouble relaxing 1-Several days 0-Not at all   Being so restless that it's hard to sit still 0-Not at all 0-Not at all   Becoming easily annoyed or irritable 3-Nearly every day 0-Not at all   Feeling afraid as if something awful might happen 0-Not at all 2-Over half the days   NIMA-7 Total Score 6 7       [x]Negative depression screening.    []1-4 = Minimal depression   []5-9 = Mild depression   []10-14 = Moderate depression   []15-19 = Moderately severe depression   []20-27 = Severe depression  PHQ Scores 2/25/2021 8/7/2020 4/17/2019 7/25/2018 7/24/2017 3/22/2017 3/8/2017   PHQ2 Score 0 0 0 0 0 0 2   PHQ9 Score 0 0 0 0 0 0 2     Counseling given: Not Answered    Patient Active Problem List   Diagnosis  Generalized anxiety disorder    Adult BMI 25.0-25.9 kg/sq m    Hx GDMA2 (G1)    Anemia    History of gestational diabetes    Excessive fetal growth affecting management of mother    Patient-requested procedure    PLTCS 19 M APG 8/ Wt 9#12    Postpartum state    Corneal rust ring    Elevated fasting blood sugar    Vitamin D deficiency      Past Medical History:   Diagnosis Date    Anemia 2018    Anxiety     Generalized anxiety disorder 10/6/2015    Gestational diabetes mellitus in pregnancy 2016      Past Surgical History:   Procedure Laterality Date     SECTION N/A 2019     SECTION PRIMARY performed by Zuhair Whyte DO at [facility]    WISDOM TOOTH EXTRACTION       Family History   Problem Relation Age of Onset    Hypertension Father     Hypertension Mother     No Known Problems Brother     Breast Cancer Neg Hx     Cancer Neg Hx     Colon Cancer Neg Hx     Diabetes Neg Hx     Eclampsia Neg Hx     Ovarian Cancer Neg Hx      Labor Neg Hx     Spont Abortions Neg Hx     Stroke Neg Hx      Current Outpatient Medications   Medication Sig Dispense Refill    busPIRone (BUSPAR) 10 MG tablet Take 1 tablet by mouth 2 times daily 60 tablet 0    vitamin D (CHOLECALCIFEROL) 25 MCG (1000 UT) TABS tablet Take 1 tablet by mouth daily 90 tablet 5    Prenatal Vit-Fe Fumarate-FA (PRENATAL VITAMINS) 28-0.8 MG TABS Take 1 tablet by mouth daily 30 tablet 11     No current facility-administered medications for this visit. Review of Systems   Constitutional: Negative for chills, fatigue and fever. HENT: Negative. Respiratory: Negative. Negative for cough, shortness of breath and wheezing. Cardiovascular: Negative. Negative for chest pain and palpitations. Gastrointestinal: Negative for abdominal pain. Genitourinary: Negative for dysuria. Musculoskeletal: Negative for arthralgias and myalgias. Skin: Negative for rash. Allergic/Immunologic: Negative for environmental allergies. Neurological: Negative for light-headedness and headaches. Psychiatric/Behavioral: Positive for sleep disturbance. Negative for suicidal ideas. The patient is nervous/anxious. Prior to Visit Medications    Medication Sig Taking? Authorizing Provider   busPIRone (BUSPAR) 10 MG tablet Take 1 tablet by mouth 2 times daily Yes JANUARY Willis CNP   vitamin D (CHOLECALCIFEROL) 25 MCG (1000 UT) TABS tablet Take 1 tablet by mouth daily Yes JANUARY Willis CNP   Prenatal Vit-Fe Fumarate-FA (PRENATAL VITAMINS) 28-0.8 MG TABS Take 1 tablet by mouth daily Yes JANUARY Sampson CNP      Social History     Tobacco Use    Smoking status: Never Smoker    Smokeless tobacco: Never Used   Substance Use Topics    Alcohol use: No     Alcohol/week: 0.0 standard drinks     Comment: rare       Vitals:    02/25/21 1041   BP: 100/70   Pulse: 82   Temp: 97.6 °F (36.4 °C)   SpO2: 97%   Weight: 157 lb (71.2 kg)   Height: 5' 5\" (1.651 m)     Estimated body mass index is 26.13 kg/m² as calculated from the following:    Height as of this encounter: 5' 5\" (1.651 m). Weight as of this encounter: 157 lb (71.2 kg). Physical Exam  Vitals signs and nursing note reviewed. Constitutional:       Appearance: She is well-developed. HENT:      Right Ear: Tympanic membrane normal.      Left Ear: Tympanic membrane normal.      Nose: No mucosal edema or rhinorrhea. Right Sinus: No maxillary sinus tenderness or frontal sinus tenderness. Left Sinus: No maxillary sinus tenderness or frontal sinus tenderness. Mouth/Throat:      Pharynx: Uvula midline. No oropharyngeal exudate or posterior oropharyngeal erythema. Cardiovascular:      Rate and Rhythm: Normal rate. Pulmonary:      Effort: Pulmonary effort is normal.      Breath sounds: Normal breath sounds. Skin:     General: Skin is warm and dry.    Neurological: Mental Status: She is alert and oriented to person, place, and time. Psychiatric:         Mood and Affect: Mood is not anxious. Speech: Speech is rapid and pressured. Behavior: Behavior normal.         Thought Content: Thought content does not include homicidal or suicidal ideation. Most recent labs reviewed with patient, and all questions answered. Lab Results   Component Value Date    WBC 6.0 07/10/2020    HGB 13.9 07/10/2020    HCT 41.8 07/10/2020    MCV 84.2 07/10/2020     07/10/2020     Lab Results   Component Value Date     07/10/2020    K 3.8 07/10/2020     07/10/2020    CO2 25 07/10/2020    BUN 13 07/10/2020    CREATININE 0.40 07/10/2020    GLUCOSE 95 07/10/2020    CALCIUM 9.5 07/10/2020      Lab Results   Component Value Date    ALT 12 09/21/2020    AST 17 09/21/2020    ALKPHOS 73 09/21/2020    BILITOT 0.45 09/21/2020     Lab Results   Component Value Date    TSH 2.06 09/21/2020     No results found for: CHOL  No results found for: TRIG  Lab Results   Component Value Date    HDL 88 09/22/2020     Lab Results   Component Value Date    LDLCHOLESTEROL 81 09/22/2020     Lab Results   Component Value Date    CHOLHDLRATIO 2.0 09/22/2020     Lab Results   Component Value Date    LABA1C 4.9 09/21/2020      No results found for: [id number]  No results found for: FOLATE  Lab Results   Component Value Date    VITD25 23.6 (L) 09/21/2020     ASSESSMENT/PLAN:  1. Generalized anxiety disorder  Failure to Improve  Continue current therapy. Discussed how to recognize anxiety. Advised to relieve tension with exercise or a massage. Advised to get enough rest.  Advised to avoid alcohol, caffeine, nicotine, and illegal drugs. Which can increase anxiety level and cause sleep problems. - busPIRone (BUSPAR) 10 MG tablet; Take 1 tablet by mouth 2 times daily  Dispense: 60 tablet; Refill: 0    2. Anemia, unspecified type  Likely stable  Continue the same therapy. DISCUSSED and ADVISED TO:  Make iron-rich foods a part daily diet. Eat foods with vitamin C along with iron-rich foods. Vitamin C helps absorb more iron from food. Eat meat and vegetables or grains together.       - CBC Auto Differential; Future    3. Vitamin D deficiency  Stable  Continue Vitamin D supplementation  DISCUSSED AND ADVISED TO:  Foods that contain a lot of vitamin D includes [place], tuna, and mackerel. Cheese, egg yolks, and beef liver have small amounts of vit D.  Milk, soy drinks, orange juice, yogurt, margarine, and some kinds of cereal have vitamin D added to them. Continue to use sunblock when out in the sun to prevent skin cancer.    - vitamin D (CHOLECALCIFEROL) 25 MCG (1000 UT) TABS tablet; Take 1 tablet by mouth daily  Dispense: 90 tablet; Refill: 5    4. Need for hepatitis B vaccination  Recommended by CDC. No current infection. Denies previous adverse reaction to vaccination.    - Hep B Vaccine Adult (ENGERIX B)    5. Screening for viral disease  recommended    - Hepatitis C Antibody; Future     Controlled Substance Monitoring:  Acute and Chronic Pain Monitoring:   No flowsheet data found.   Orders Placed This Encounter   Procedures    Hep B Vaccine Adult (ENGERIX B)    Hepatitis C Antibody     Standing Status:   Future     Standing Expiration Date:   8/23/2022    CBC Auto Differential     Standing Status:   Future     Standing Expiration Date:   8/25/2022     Orders Placed This Encounter   Medications    busPIRone (BUSPAR) 10 MG tablet     Sig: Take 1 tablet by mouth 2 times daily     Dispense:  60 tablet     Refill:  0    vitamin D (CHOLECALCIFEROL) 25 MCG (1000 UT) TABS tablet     Sig: Take 1 tablet by mouth daily     Dispense:  90 tablet     Refill:  5      Medications Discontinued During This Encounter   Medication Reason    sertraline (ZOLOFT) 50 MG tablet Therapy completed    Cholecalciferol 1.25 MG (71992 UT) TABS Therapy completed      Health Maintenance Due Topic Date Due    Hepatitis C screen  1990    Flu vaccine (1) 09/01/2020      Return in about 6 months (around 8/25/2021) for Chronic conditions, 30mins. Kaitlyn Kaur received counseling on the following healthy behaviors: nutrition, exercise and medication adherence  Reviewed prior labs and health maintenance  Continue current medications, diet and exercise. Discussed use, benefit, and side effects of prescribed medications. Barriers to medication compliance addressed. Patient given educational materials - see patient instructions  Was a self-tracking handout given in paper form or via [employer]? Yes    Requested Prescriptions     Signed Prescriptions Disp Refills    busPIRone (BUSPAR) 10 MG tablet 60 tablet 0     Sig: Take 1 tablet by mouth 2 times daily    vitamin D (CHOLECALCIFEROL) 25 MCG (1000 UT) TABS tablet 90 tablet 5     Sig: Take 1 tablet by mouth daily       All patient questions answered. Patient voiced understanding. Quality Measures    Body mass index is 26.13 kg/m². Elevated. Weight control planned discussed Healthy diet and regular exercise. BP: 100/70 Blood pressure is normal. Treatment plan consists of No treatment change needed. Lab Results   Component Value Date    LDLCHOLESTEROL 81 09/22/2020    (goal LDL reduction with dx if diabetes is 50% LDL reduction)      PHQ Scores 2/25/2021 8/7/2020 4/17/2019 7/25/2018 7/24/2017 3/22/2017 3/8/2017   PHQ2 Score 0 0 0 0 0 0 2   PHQ9 Score 0 0 0 0 0 0 2     Interpretation of Total Score Depression Severity: 1-4 = Minimal depression, 5-9 = Mild depression, 10-14 = Moderate depression, 15-19 = Moderately severe depression, 20-27 = Severe depression   This note was completed by using the assistance of a speech-recognition program. However, inadvertent computerized transcription errors may be present.  Although every effort was made to ensure accuracy, no guarantees can be provided that every mistake has been identified and corrected by editing

## 2021-02-25 ENCOUNTER — HOSPITAL ENCOUNTER (OUTPATIENT)
Age: 31
Discharge: HOME | End: 2021-02-25
Payer: COMMERCIAL

## 2021-02-25 ENCOUNTER — OFFICE VISIT (OUTPATIENT)
Dept: FAMILY MEDICINE CLINIC | Age: 31
End: 2021-02-25
Payer: COMMERCIAL

## 2021-02-25 ENCOUNTER — TELEPHONE (OUTPATIENT)
Dept: FAMILY MEDICINE CLINIC | Age: 31
End: 2021-02-25

## 2021-02-25 VITALS
HEART RATE: 82 BPM | TEMPERATURE: 97.6 F | WEIGHT: 157 LBS | OXYGEN SATURATION: 97 % | BODY MASS INDEX: 26.16 KG/M2 | DIASTOLIC BLOOD PRESSURE: 70 MMHG | HEIGHT: 65 IN | SYSTOLIC BLOOD PRESSURE: 100 MMHG

## 2021-02-25 DIAGNOSIS — O03.9 SAB (SPONTANEOUS ABORTION): ICD-10-CM

## 2021-02-25 DIAGNOSIS — Z11.59 SCREENING FOR VIRAL DISEASE: ICD-10-CM

## 2021-02-25 DIAGNOSIS — E55.9 VITAMIN D DEFICIENCY: ICD-10-CM

## 2021-02-25 DIAGNOSIS — D64.9 ANEMIA, UNSPECIFIED TYPE: ICD-10-CM

## 2021-02-25 DIAGNOSIS — F41.1 GENERALIZED ANXIETY DISORDER: Primary | ICD-10-CM

## 2021-02-25 DIAGNOSIS — Z23 NEED FOR HEPATITIS B VACCINATION: ICD-10-CM

## 2021-02-25 LAB
ABSOLUTE EOS #: 0.2 K/UL (ref 0–0.4)
ABSOLUTE IMMATURE GRANULOCYTE: ABNORMAL K/UL (ref 0–0.3)
ABSOLUTE LYMPH #: 1.8 K/UL (ref 1–4.8)
ABSOLUTE MONO #: 0.5 K/UL (ref 0.1–1.3)
BASOPHILS # BLD: 0 % (ref 0–2)
BASOPHILS ABSOLUTE: 0 K/UL (ref 0–0.2)
DIFFERENTIAL TYPE: ABNORMAL
EOSINOPHILS RELATIVE PERCENT: 3 % (ref 0–4)
HCG QUANTITATIVE: <1 IU/L
HCT VFR BLD CALC: 40.2 % (ref 36–46)
HEMOGLOBIN: 13.4 G/DL (ref 12–16)
HEPATITIS C ANTIBODY: NONREACTIVE
IMMATURE GRANULOCYTES: ABNORMAL %
LYMPHOCYTES # BLD: 34 % (ref 24–44)
MCH RBC QN AUTO: 28.2 PG (ref 26–34)
MCHC RBC AUTO-ENTMCNC: 33.4 G/DL (ref 31–37)
MCV RBC AUTO: 84.3 FL (ref 80–100)
MONOCYTES # BLD: 10 % (ref 1–7)
NRBC AUTOMATED: ABNORMAL PER 100 WBC
PDW BLD-RTO: 13.1 % (ref 11.5–14.9)
PLATELET # BLD: 223 K/UL (ref 150–450)
PLATELET ESTIMATE: ABNORMAL
PMV BLD AUTO: 7.6 FL (ref 6–12)
RBC # BLD: 4.77 M/UL (ref 4–5.2)
RBC # BLD: ABNORMAL 10*6/UL
SEG NEUTROPHILS: 53 % (ref 36–66)
SEGMENTED NEUTROPHILS ABSOLUTE COUNT: 2.7 K/UL (ref 1.3–9.1)
WBC # BLD: 5.1 K/UL (ref 3.5–11)
WBC # BLD: ABNORMAL 10*3/UL

## 2021-02-25 PROCEDURE — 85025 COMPLETE CBC W/AUTO DIFF WBC: CPT

## 2021-02-25 PROCEDURE — 90746 HEPB VACCINE 3 DOSE ADULT IM: CPT | Performed by: FAMILY MEDICINE

## 2021-02-25 PROCEDURE — 84702 CHORIONIC GONADOTROPIN TEST: CPT

## 2021-02-25 PROCEDURE — 1036F TOBACCO NON-USER: CPT | Performed by: FAMILY MEDICINE

## 2021-02-25 PROCEDURE — G8484 FLU IMMUNIZE NO ADMIN: HCPCS | Performed by: FAMILY MEDICINE

## 2021-02-25 PROCEDURE — G8427 DOCREV CUR MEDS BY ELIG CLIN: HCPCS | Performed by: FAMILY MEDICINE

## 2021-02-25 PROCEDURE — 36415 COLL VENOUS BLD VENIPUNCTURE: CPT

## 2021-02-25 PROCEDURE — 86803 HEPATITIS C AB TEST: CPT

## 2021-02-25 PROCEDURE — G8417 CALC BMI ABV UP PARAM F/U: HCPCS | Performed by: FAMILY MEDICINE

## 2021-02-25 PROCEDURE — 99213 OFFICE O/P EST LOW 20 MIN: CPT | Performed by: FAMILY MEDICINE

## 2021-02-25 RX ORDER — BUSPIRONE HYDROCHLORIDE 10 MG/1
10 TABLET ORAL 2 TIMES DAILY
Qty: 60 TABLET | Refills: 0 | Status: SHIPPED | OUTPATIENT
Start: 2021-02-25 | End: 2021-03-27

## 2021-02-25 ASSESSMENT — ANXIETY QUESTIONNAIRES
6. BECOMING EASILY ANNOYED OR IRRITABLE: 3-NEARLY EVERY DAY
3. WORRYING TOO MUCH ABOUT DIFFERENT THINGS: 0-NOT AT ALL
4. TROUBLE RELAXING: 1-SEVERAL DAYS
7. FEELING AFRAID AS IF SOMETHING AWFUL MIGHT HAPPEN: 0-NOT AT ALL
1. FEELING NERVOUS, ANXIOUS, OR ON EDGE: 1-SEVERAL DAYS

## 2021-02-25 ASSESSMENT — PATIENT HEALTH QUESTIONNAIRE - PHQ9
SUM OF ALL RESPONSES TO PHQ9 QUESTIONS 1 & 2: 0
SUM OF ALL RESPONSES TO PHQ QUESTIONS 1-9: 0
SUM OF ALL RESPONSES TO PHQ QUESTIONS 1-9: 0
2. FEELING DOWN, DEPRESSED OR HOPELESS: 0
1. LITTLE INTEREST OR PLEASURE IN DOING THINGS: 0
SUM OF ALL RESPONSES TO PHQ QUESTIONS 1-9: 0

## 2021-02-25 ASSESSMENT — ENCOUNTER SYMPTOMS
WHEEZING: 0
RESPIRATORY NEGATIVE: 1
ABDOMINAL PAIN: 0
COUGH: 0
SHORTNESS OF BREATH: 0

## 2021-02-25 NOTE — PATIENT INSTRUCTIONS
[facility] COVID-19 Vaccination Hotline: [phone]    COVID-19 vaccine appointments are not available through our practice. As you're eligible to receive the COVID-19 vaccine, as determined by your state's department of health, you will be able to schedule an appointment through [address] or by calling [phone]. Appointments are required to receive the COVID-19 vaccine. As vaccine supply continues to be limited, we anticipate open appointments to fill up quickly and appreciate your patience as we work through the process of providing vaccines to those in our communities. Schedule a Vaccine  When you qualify to receive the vaccine, call the [facility] COVID-19 Vaccination Hotline to schedule your appointment or to get additional information about the [facility] locations which are offering the COVID-19 vaccine. To be 94% effective, it's important that you receive two doses of one of the COVID-19 vaccines. -If you are receiving the [place] vaccine, your second shot will be scheduled as close to 21 days after the first shot as possible. -If you are receiving the Moderna vaccine, your second shot will be scheduled as close to 28 days after the first shot as possible. Links to [facility]) website and [facility] website:    VannessaLignol/mercy-[facility]-monitoring-coronavirus-covid-19/covid-19-vaccine/[place]/gamino-vaccine    [email or web address]    [employer]  https://sites. google. com/view/wchd[place]-coronavirus/home/vaccines/get-vaccinated  LocalElectrolysis.fi. com/r/[place]OVID_Vaccine_On-Call_List      [email or web address]?:toolbar=[email or web address]&:display_count=n&:origin=viz_share_link&:embed=y    PHARMACY LOCATIONS  Https://vaccine. coronavirus. [place].gov/    [facility]  Https://[email or web address]?:toolbar=n&:display_count=n&:origin=viz_share_link&:embed=y    [facility] Https://[email or web address]?:toolbar=n&:display_count=n&:origin=viz_share_link&:embed=y    [address]  Https://[email or web address]?:toolbar=n&:display_count=n&:origin=viz_share_link&:embed=y    [address]  [email or web address]?:toolbar=n&:display_count=n&:origin=CAILabs_share_link&:embed=y    Patient Education        buspirone  Pronunciation:  brennen martinez  Brand: BuSpar  What is the most important information I should know about buspirone? Do not use this medicine if you have used an MAO inhibitor in the past 14 days, such as isocarboxazid, linezolid, methylene blue injection, phenelzine, rasagiline, selegiline, or tranylcypromine. What is buspirone? Buspirone is used to treat symptoms of anxiety, such as fear, tension, irritability, dizziness, pounding heartbeat, and other physical symptoms. Buspirone is not an anti-psychotic medication and should not be used in place of medication prescribed by your doctor for mental illness. Buspirone may also be used for purposes not listed in this medication guide. What should I discuss with my healthcare provider before taking buspirone? You should not use buspirone if you are allergic to it. Do not use buspirone if you have used an MAO inhibitor in the past 14 days. A dangerous drug interaction could occur. MAO inhibitors include isocarboxazid, linezolid, methylene blue injection, phenelzine, rasagiline, selegiline, tranylcypromine, and others. You may need to wait 14 days after stopping buspirone before you can take an MAOI. Tell your doctor if you have ever had:  · kidney disease; or  · liver disease. Be sure your doctor knows if you also take stimulant medicine, opioid medicine, herbal products, or medicine for depression, mental illness, Parkinson's disease, migraine headaches, serious infections, or prevention of nausea and vomiting. These medicines may interact with buspirone and cause a serious condition called serotonin syndrome. Tell your doctor if you are pregnant. You should not breastfeed while using buspirone. Do not give this medicine to a child without medical advice. How should I take buspirone? Follow all directions on your prescription label and read all medication guides or instruction sheets. Your doctor may occasionally change your dose. Use the medicine exactly as directed. You may take buspirone with or without food but take it the same way each time. If you have switched to buspirone from another anxiety medication, you may need to slowly decrease your dose of the other medication rather than stopping suddenly. Some anxiety medications can cause withdrawal symptoms when you stop taking them suddenly after long-term use. Read and carefully follow any Instructions for Use provided with your medicine. Ask your doctor or pharmacist if you do not understand these instructions. Some buspirone tablets are scored so you can break the tablet into 2 or 3 pieces in order to take a smaller dose. Do not use a buspirone tablet if it has not been broken correctly and the piece is too big or too small. Follow your doctor's instructions about how much of the tablet to take. Buspirone can cause false positive results with certain medical tests. You may need to stop using the medicine for at least 48 hours before your test. Tell any doctor who treats you that you are using buspirone. Store at room temperature away from moisture, heat, and light. What happens if I miss a dose? Take the medicine as soon as you can, but skip the missed dose if it is almost time for your next dose. Do not take two doses at one time. What happens if I overdose? Seek emergency medical attention or call the Poison Help line at 1-380.472.9980. What should I avoid while taking buspirone? Avoid driving or hazardous activity until you know how this medicine will affect you. Your reactions could be impaired. Drinking alcohol may increase certain side effects of buspirone. Grapefruit may interact with buspirone and lead to unwanted side effects. Avoid the use of grapefruit products. What are the possible side effects of buspirone? Get emergency medical help if you have signs of an allergic reaction: hives; difficult breathing; swelling of your face, lips, tongue, or throat. Call your doctor at once if you have:  · chest pain;  · shortness of breath; or  · a light-headed feeling, like you might pass out. Seek medical attention right away if you have symptoms of serotonin syndrome, such as: agitation, hallucinations, fever, sweating, shivering, fast heart rate, muscle stiffness, twitching, loss of coordination, nausea, vomiting, or diarrhea. Common side effects may include:  · headache;  · dizziness, feeling light-headed;  · nausea; or  · feeling nervous or excited. This is not a complete list of side effects and others may occur. Call your doctor for medical advice about side effects. You may report side effects to FDA at 6-525-GJE-5450. What other drugs will affect buspirone? Using buspirone with other drugs that make you drowsy or slow your breathing can worsen these effects. Ask your doctor before using opioid medication, a sleeping pill, a muscle relaxer, or medicine for anxiety or seizures. Tell your doctor about all your current medicines.  Many drugs can affect buspirone, especially:  · nefazodone;  · Tito's wort; · an antibiotic --clarithromycin, erythromycin, rifabutin, rifampin, rifapentine, telithromycin;  · antifungal medicine --itraconazole, ketoconazole;  · antiviral medicine to treat HIV/AIDS --efavirenz, indinavir, nelfinavir, nevirapine, ritonavir, saquinavir;  · cancer medicine --apalutamide, enzalutamide, mitotane;  · heart or blood pressure medicines --diltiazem, verapamil;  · seizure medicine --carbamazepine, oxcarbazepine, phenytoin, primidone; or  · steroid medicine --dexamethasone, prednisone. This list is not complete and many other drugs may affect buspirone. This includes prescription and over-the-counter medicines, vitamins, and herbal products. Not all possible drug interactions are listed here. Where can I get more information? Your pharmacist can provide more information about buspirone. Remember, keep this and all other medicines out of the reach of children, never share your medicines with others, and use this medication only for the indication prescribed. Every effort has been made to ensure that the information provided by [address] is accurate, up-to-date, and complete, but no guarantee is made to that effect. Drug information contained herein may be time sensitive. [facility] information has been compiled for use by healthcare practitioners and consumers in the [place] and therefore [facility] does not warrant that uses outside of the [place] are appropriate, unless specifically indicated otherwise. [facility]'s drug information does not endorse drugs, diagnose patients or recommend therapy. [facility]'s drug information is an informational resource designed to assist licensed healthcare practitioners in caring for their patients and/or to serve consumers viewing this service as a supplement to, and not a substitute for, the expertise, skill, knowledge and judgment of healthcare practitioners. The absence of a warning for a given drug or drug combination in no way should be construed to indicate that the drug or drug combination is safe, effective or appropriate for any given patient. [facility] does not assume any responsibility for any aspect of healthcare administered with the aid of information [facility] provides. The information contained herein is not intended to cover all possible uses, directions, precautions, warnings, drug interactions, allergic reactions, or adverse effects. If you have questions about the drugs you are taking, check with your doctor, nurse or pharmacist.  Copyright 4835-3141 [address]. Version: 6.01. Revision date: 2/24/2020. Care instructions adapted under license by [facility] ([facility]). If you have questions about a medical condition or this instruction, always ask your healthcare professional. [place] [address] any warranty or liability for your use of this information.

## 2021-02-25 NOTE — TELEPHONE ENCOUNTER
Her CBC is normal. A slight elevation on her monocytes is not something to be concerned about. If she has other abnormal differential levels that is more concerning. Hers is fine. Her total WBC is perfectly normal. Thanks.

## 2021-02-25 NOTE — TELEPHONE ENCOUNTER
Patient states she reviewed the CBC results on MyChart and was concerned. Patient states she began googling it and wants to talk to someone about the results of her blood work. Please advise.

## 2021-03-23 ENCOUNTER — TELEPHONE (OUTPATIENT)
Dept: OBGYN CLINIC | Age: 31
End: 2021-03-23

## 2021-03-23 RX ORDER — FLUCONAZOLE 150 MG/1
150 TABLET ORAL ONCE
Qty: 2 TABLET | Refills: 0 | Status: SHIPPED | OUTPATIENT
Start: 2021-03-23 | End: 2021-03-23

## 2021-03-23 NOTE — TELEPHONE ENCOUNTER
Patient current with office, due for annual this July. Patient aware to schedule this summer for yearly visit. Requesting diflucan for worsening yeast infection.

## 2021-07-19 ENCOUNTER — HOSPITAL ENCOUNTER (OUTPATIENT)
Age: 31
Discharge: HOME | End: 2021-07-19
Payer: COMMERCIAL

## 2021-07-19 ENCOUNTER — HOSPITAL ENCOUNTER (OUTPATIENT)
Age: 31
Setting detail: SPECIMEN
Discharge: HOME | End: 2021-07-19
Payer: COMMERCIAL

## 2021-07-19 ENCOUNTER — OFFICE VISIT (OUTPATIENT)
Dept: OBGYN CLINIC | Age: 31
End: 2021-07-19
Payer: COMMERCIAL

## 2021-07-19 VITALS
DIASTOLIC BLOOD PRESSURE: 58 MMHG | HEIGHT: 65 IN | BODY MASS INDEX: 25.16 KG/M2 | WEIGHT: 151 LBS | SYSTOLIC BLOOD PRESSURE: 94 MMHG

## 2021-07-19 DIAGNOSIS — Z01.419 VISIT FOR GYNECOLOGIC EXAMINATION: Primary | ICD-10-CM

## 2021-07-19 DIAGNOSIS — R10.2 PELVIC PAIN: ICD-10-CM

## 2021-07-19 DIAGNOSIS — N92.0 MENORRHAGIA WITH REGULAR CYCLE: ICD-10-CM

## 2021-07-19 LAB
ABSOLUTE EOS #: 0.1 K/UL (ref 0–0.4)
ABSOLUTE IMMATURE GRANULOCYTE: ABNORMAL K/UL (ref 0–0.3)
ABSOLUTE LYMPH #: 1.5 K/UL (ref 1–4.8)
ABSOLUTE MONO #: 0.4 K/UL (ref 0.1–1.3)
BASOPHILS # BLD: 1 % (ref 0–2)
BASOPHILS ABSOLUTE: 0 K/UL (ref 0–0.2)
DIFFERENTIAL TYPE: ABNORMAL
DIRECT EXAM: NORMAL
EOSINOPHILS RELATIVE PERCENT: 1 % (ref 0–4)
HCG QUANTITATIVE: <1 IU/L
HCT VFR BLD CALC: 38.8 % (ref 36–46)
HEMOGLOBIN: 12.8 G/DL (ref 12–16)
IMMATURE GRANULOCYTES: ABNORMAL %
INR BLD: 1
LYMPHOCYTES # BLD: 34 % (ref 24–44)
Lab: NORMAL
MCH RBC QN AUTO: 27.3 PG (ref 26–34)
MCHC RBC AUTO-ENTMCNC: 33 G/DL (ref 31–37)
MCV RBC AUTO: 82.8 FL (ref 80–100)
MONOCYTES # BLD: 9 % (ref 1–7)
NRBC AUTOMATED: ABNORMAL PER 100 WBC
PARTIAL THROMBOPLASTIN TIME: 30.2 SEC (ref 24–36)
PDW BLD-RTO: 13.6 % (ref 11.5–14.9)
PLATELET # BLD: 204 K/UL (ref 150–450)
PLATELET ESTIMATE: ABNORMAL
PMV BLD AUTO: 7.7 FL (ref 6–12)
PROTHROMBIN TIME: 13.5 SEC (ref 11.8–14.6)
RBC # BLD: 4.68 M/UL (ref 4–5.2)
RBC # BLD: ABNORMAL 10*6/UL
SEG NEUTROPHILS: 55 % (ref 36–66)
SEGMENTED NEUTROPHILS ABSOLUTE COUNT: 2.5 K/UL (ref 1.3–9.1)
SPECIMEN DESCRIPTION: NORMAL
TSH SERPL DL<=0.05 MIU/L-ACNC: 1.38 MIU/L (ref 0.3–5)
WBC # BLD: 4.5 K/UL (ref 3.5–11)
WBC # BLD: ABNORMAL 10*3/UL

## 2021-07-19 PROCEDURE — 85610 PROTHROMBIN TIME: CPT

## 2021-07-19 PROCEDURE — 87491 CHLMYD TRACH DNA AMP PROBE: CPT

## 2021-07-19 PROCEDURE — 87480 CANDIDA DNA DIR PROBE: CPT

## 2021-07-19 PROCEDURE — 87660 TRICHOMONAS VAGIN DIR PROBE: CPT

## 2021-07-19 PROCEDURE — 85025 COMPLETE CBC W/AUTO DIFF WBC: CPT

## 2021-07-19 PROCEDURE — 87591 N.GONORRHOEAE DNA AMP PROB: CPT

## 2021-07-19 PROCEDURE — 99395 PREV VISIT EST AGE 18-39: CPT | Performed by: NURSE PRACTITIONER

## 2021-07-19 PROCEDURE — 84702 CHORIONIC GONADOTROPIN TEST: CPT

## 2021-07-19 PROCEDURE — 36415 COLL VENOUS BLD VENIPUNCTURE: CPT

## 2021-07-19 PROCEDURE — 84443 ASSAY THYROID STIM HORMONE: CPT

## 2021-07-19 PROCEDURE — 87510 GARDNER VAG DNA DIR PROBE: CPT

## 2021-07-19 PROCEDURE — 85730 THROMBOPLASTIN TIME PARTIAL: CPT

## 2021-07-19 ASSESSMENT — PATIENT HEALTH QUESTIONNAIRE - PHQ9
SUM OF ALL RESPONSES TO PHQ QUESTIONS 1-9: 0
SUM OF ALL RESPONSES TO PHQ QUESTIONS 1-9: 0
2. FEELING DOWN, DEPRESSED OR HOPELESS: 0
SUM OF ALL RESPONSES TO PHQ QUESTIONS 1-9: 0
SUM OF ALL RESPONSES TO PHQ9 QUESTIONS 1 & 2: 0
1. LITTLE INTEREST OR PLEASURE IN DOING THINGS: 0

## 2021-07-19 NOTE — PROGRESS NOTES
History and Physical  [address], [address] [address] [address] [facility] [phone]   Fax [phone]  Twyla Ortiz  2021              31 y.o. Chief Complaint   Patient presents with    Gynecologic Exam       Patient's last menstrual period was 2021 (approximate). Primary Care Physician: Uriel Licea, APRN - CNP    The patient was seen and examined. She has no chief complaint today and is here for her annual exam.  Her bowels are regular. There are no voiding complaints. She denies any bloating. She denies vaginal discharge and was counseled on STD's and the need for barrier contraception. HPI : Twyla Ortiz is a 36 y.o. female [id number]    Annual exam  Complaining of pelvic pain. Reports has pain when around time she ovulates. Pain occurred in middle of her menstrual cycle and lasted 1/2 day. Describes pain as dull ache. Reports noticed pain with intercourse next day. Does not normally have pain with intercourse. Denies new partner. Same partner for many years. Complaining of heavy menses. Periods occur monthly, lasting 7 days long. Bleeds through pad and super tampon at night. Periods have been heavy since birth of her son 2 years ago. Would like to have another child. Is not currently trying to conceive, but would not be upset if it happened.     ________________________________________________________________________  OB History    Para Term  AB Living   4 2 2 0 2 2   SAB TAB Ectopic Molar Multiple Live Births   1 1 0 0 0 2      # Outcome Date GA Lbr Roberto/2nd Weight Sex Delivery Anes PTL Lv   4 SAB 2021           3 Term 19 39w2d  9 lb 12 oz (4.423 kg) M CS-LTranv   JERICHO   2 Term 17 39w2d 02:19 / 03:04 7 lb 10 oz (3.459 kg) M Vag-Spont EPI N JERICHO      Name: Lylia Mouse: 8  Apgar5: 9   1 TAB              Past Medical History:   Diagnosis Date    Anemia 2018    Anxiety  Generalized anxiety disorder 10/6/2015    Gestational diabetes mellitus in pregnancy 2016                                                                   Past Surgical History:   Procedure Laterality Date     SECTION N/A 2019     SECTION PRIMARY performed by Indio Clark DO at [facility]    WISDOM TOOTH EXTRACTION       Family History   Problem Relation Age of Onset    Hypertension Father     Hypertension Mother     No Known Problems Brother     Breast Cancer Neg Hx     Cancer Neg Hx     Colon Cancer Neg Hx     Diabetes Neg Hx     Eclampsia Neg Hx     Ovarian Cancer Neg Hx      Labor Neg Hx     Spont Abortions Neg Hx     Stroke Neg Hx      Social History     Socioeconomic History    Marital status: Single     Spouse name: Not on file    Number of children: Not on file    Years of education: Not on file    Highest education level: Not on file   Occupational History    Not on file   Tobacco Use    Smoking status: Never Smoker    Smokeless tobacco: Never Used   Vaping Use    Vaping Use: Never used   Substance and Sexual Activity    Alcohol use: No     Alcohol/week: 0.0 standard drinks     Comment: rare     Drug use: No    Sexual activity: Yes     Partners: Male   Other Topics Concern    Not on file   Social History Narrative    Not on file     Social Determinants of Health     Financial Resource Strain:     Difficulty of Paying Living Expenses:    Food Insecurity:     Worried About Running Out of Food in the Last Year:     Ran Out of Food in the Last Year:    Transportation Needs:     Lack of Transportation (Medical):      Lack of Transportation (Non-Medical):    Physical Activity:     Days of Exercise per Week:     Minutes of Exercise per Session:    Stress:     Feeling of Stress :    Social Connections:     Frequency of Communication with Friends and Family:     Frequency of Social Gatherings with Friends and Family:     Attends [employer] Services:     Active Member of Clubs or Organizations:     Attends Club or Organization Meetings:     Marital Status:    Intimate Partner Violence:     Fear of Current or Ex-Partner:     Emotionally Abused:     Physically Abused:     Sexually Abused:        MEDICATIONS:  Current Outpatient Medications   Medication Sig Dispense Refill    Prenat w/o A-FeCbn-Meth-FA-DHA (PRENATE MINI) 29-0.6-0.4-350 MG CAPS Take 1 tablet by mouth daily 30 capsule 12    vitamin D (CHOLECALCIFEROL) 25 MCG (1000 UT) TABS tablet Take 1 tablet by mouth daily 90 tablet 5    Prenatal Vit-Fe Fumarate-FA (PRENATAL VITAMINS) 28-0.8 MG TABS Take 1 tablet by mouth daily 30 tablet 11     No current facility-administered medications for this visit. ALLERGIES:  Allergies as of 07/19/2021    (No Known Allergies)       Symptoms of decreased mood absent  Symptoms of anhedonia absent    **If either question is answered in a  positive fashion then complete the PHQ9 Scoring Evaluation and make the appropriate referral**      Immunization status: stated as current, but no records available. Gynecologic History:  Menarche: 17 yo  Menopause at [address] [facility] yo     Patient's last menstrual period was 06/28/2021 (approximate). Sexually Active: Yes    STD History: No     Permanent Sterilization: No   Reversible Birth Control: No        Hormone Replacement Exposure: No      Genetic Qualified Family History of Breast, Ovarian , Colon or Uterine Cancer: No     If YES see scanned worksheet.     Preventative Health Testing:    Health Maintenance:  Health Maintenance Due   Topic Date Due    Cervical cancer screen  07/15/2021       Date of Last Pap Smear: 7/15/2020 negative  Abnormal Pap Smear History: denies  Colposcopy History:   Date of Last Mammogram: NA  Date of Last Colonoscopy:   Date of Last Bone Density:      ________________________________________________________________________        REVIEW OF SYSTEMS:    yes   A minimum of an eleven point review of systems was completed. Review Of Systems (11 point):  Constitutional: No fever, chills or malaise; No weight change or fatigue  Head and Eyes: No vision, Headache, Dizziness or trauma in last 12 months  ENT ROS: No hearing, Tinnitis, sinus or taste problems  Hematological and Lymphatic ROS:No Lymphoma, Von Willebrand's, Hemophillia or Bleeding History  Psych ROS: No Depression, Homicidal thoughts,suicidal thoughts, or anxiety  Breast ROS: No prior breast abnormalities or lumps  Respiratory ROS: No SOB, Pneumoniae,Cough, or Pulmonary Embolism History  Cardiovascular ROS: No Chest Pain with Exertion, Palpitations, Syncope, Edema, Arrhythmia  Gastrointestinal ROS: No Indigestion, Heartburn, Nausea, vomiting, Diarrhea, Constipation,or Bowel Changes; No Bloody Stools or melena  Genito-Urinary ROS: No Dysuria, Hematuria or Nocturia. No Urinary Incontinence or Vaginal Discharge  Musculoskeletal ROS: No Arthralgia, Arthritis,Gout,Osteoporosis or Rheumatism  Neurological ROS: No CVA, Migraines, Epilepsy, Seizure Hx, or Limb Weakness  Dermatological ROS: No Rash, Itching, Hives, Mole Changes or Cancer                                                                                                                                                                                                                                  PHYSICAL Exam:     Constitutional:  Vitals:    07/19/21 0935   BP: (!) 94/58   Site: Right Upper Arm   Position: Sitting   Cuff Size: Medium Adult   Weight: 151 lb (68.5 kg)   Height: 5' 5\" (1.651 m)       Chaperone for Intimate Exam   Chaperone was offered and accepted as part of the rooming process.  Chaperone: Mark Dubon          General Appearance: This  is a well Developed, well Nourished, well groomed female. Her BMI was reviewed. Nutritional decision making was discussed. Skin:  There was a Normal Inspection of the skin without rashes or lesions. There were no rashes. (Papular, Maculopapular, Hives, Pustular, Macular)     There were no lesions (Ulcers, Erythema, Abn. Appearing Nevi)            Lymphatic:  No Lymph Nodes were Palpable in the neck , axilla or groin.  0 # Of Lymph Nodes; Location ; Character [Normal]  [Shotty] [Tender] [Enlarged]     Neck and EENT:  The neck was supple. There were no masses   The thyroid was not enlarged and had no masses. Perrla, EOMI B/L, TMI B/L No Abnormalities. Throat inspected-No exudates or Masses, Nares Patent No Masses        Respiratory: The lungs were auscultated and found to be clear. There were no rales, rhonchi or wheezes. There was a good respiratory effort. Cardiovascular: The heart was in a regular rate and rhythm. . No S3 or S4. There was no murmur appreciated. Location, grade, and radiation are not applicable. Extremities: The patients extremities were without calf tenderness, edema, or varicosities. There was full range of motion in all four extremities. Pulses in all four extremities were appreciated and are 2/4. Abdomen: The abdomen was soft and non-tender. There were good bowel sounds in all quadrants and there was no guarding, rebound or rigidity. On evaluation there was no evidence of hepatosplenomegaly and there was no costal vertebral beverley tenderness bilaterally. No hernias were appreciated. Abdominal Scars: C/S scar    Psych: The patient had a normal Orientation to: Time, Place, Person, and Situation  There is no Mood / Affect changes    Breast:  (Chest)  normal appearance, no masses or tenderness  Self breast exams were reviewed in detail. Literature was given. Pelvic Exam:  Vulva and vagina appear normal. Bimanual exam reveals normal uterus and adnexa. Rectal Exam:  exam declined by patient          Musculosk:  Normal Gait and station was noted. Digits were evaluated without abnormal findings.   Range of motion, stability and strength were evaluated and found to be appropriate for the patients age. ASSESSMENT:      36 y.o. Annual   Diagnosis Orders   1. Visit for gynecologic examination     2. Menorrhagia with regular cycle  US PELVIS COMPLETE    US NON OB TRANSVAGINAL    CBC Auto Differential    TSH with Reflex    HCG, Quantitative, Pregnancy    Protime-INR    APTT   3. Pelvic pain  US PELVIS COMPLETE    US NON OB TRANSVAGINAL    VAGINITIS DNA PROBE    C.trachomatis N.gonorrhoeae DNA          Chief Complaint   Patient presents with    Gynecologic Exam          Past Medical History:   Diagnosis Date    Anemia 6/28/2018    Anxiety     Generalized anxiety disorder 10/6/2015    Gestational diabetes mellitus in pregnancy 12/1/2016         Patient Active Problem List    Diagnosis Date Noted    History of gestational diabetes 09/10/2018     Priority: High     Early 1 hour GTT ordered 9/10/2018  If wnl repeat at 28 weeks      Hx GDMA2 (G1) 01/03/2017     Priority: High     Previous pregnancy        Vitamin D deficiency 09/22/2020    Elevated fasting blood sugar 08/10/2020    Corneal rust ring 05/18/2020    PLTCS 4/2/19 M APG 8/9 Wt 9#12 04/02/2019    Excessive fetal growth affecting management of mother     Patient-requested procedure     Anemia 06/28/2018    Adult BMI 25.0-25.9 kg/sq m 07/13/2016    Generalized anxiety disorder 10/06/2015          Hereditary Breast, Ovarian, Colon and Uterine Cancer screening Done. Tobacco & Secondary smoke risks reviewed; instructed on cessation and avoidance      Counseling Completed:  Preventative Health Recommendations and Follow up. The patient was informed of the recommended preventative health recommendations. 1. Annuals every year; Cytology collections per prevailing guidelines. 2. Mammograms begin every year at 35 yo if no abnormalities are found and no family history. 3. Bone density studies every 2-3 years. Begin at 71 yo. If no fracture history or osteoporosis family history. (significant).   4. Colonoscopy begin at 38 yo. Repeat every ten years if negative and no family history. 5. Calcium of 7633-4394 mg/day in split dosing  6. Vitamin D 400-800 IU/day  7. All other preventative health recommendations will be managed by the patients Primary care physician. PLAN:  Return in about 4 weeks (around 8/16/2021) for DR Ovalle/ heavy menses, pelvic pain. Vaginal cultures collected. Lab slips given. Pelvic ultrasound ordered. Follow up with physician to discuss results/plan of care. Repeat Annual every 1 year  Cervical Cytology Evaluation begins at 24years old. If Negative Cytology, Follow-up screening per current guidelines. Mammograms every 1 year. If 37 yo and last mammogram was negative. Calcium and Vitamin D dosing reviewed. Colonoscopy screening reviewed as well as onset for bone density testing. Birth control and barrier recommendations discussed. STD counseling and prevention reviewed. Gardisil counseling completed for all patients 10-37 yo. Routine health maintenance per patients PCP.   Orders Placed This Encounter   Procedures    VAGINITIS DNA PROBE     Standing Status:   Future     Standing Expiration Date:   7/19/2022    C.trachomatis N.gonorrhoeae DNA     Standing Status:   Future     Standing Expiration Date:   8/19/2021    US PELVIS COMPLETE     Standing Status:   Future     Standing Expiration Date:   10/19/2021     Order Specific Question:   Reason for exam:     Answer:   pelvic pain, heavy menses    US NON OB TRANSVAGINAL     Standing Status:   Future     Standing Expiration Date:   1/19/2022     Order Specific Question:   Reason for exam:     Answer:   heavy menses, pelvic pain    CBC Auto Differential     Standing Status:   Future     Number of Occurrences:   1     Standing Expiration Date:   7/19/2022    TSH with Reflex     Standing Status:   Future     Number of Occurrences:   1     Standing Expiration Date:   7/19/2022    HCG, Quantitative, Pregnancy     Standing Status:   Future     Number of Occurrences:   1     Standing Expiration Date:   7/19/2022    Protime-INR     Standing Status:   Future     Number of Occurrences:   1     Standing Expiration Date:   7/19/2022     Order Specific Question:   Daily Coumadin Dose? Answer:   N    APTT     Standing Status:   Future     Number of Occurrences:   1     Standing Expiration Date:   7/19/2022     Order Specific Question:   Daily Heparin Dose? Answer:   N           The patient, Alirio Garcia is a 36 y.o. female, was seen with a total time spent of 20 minutes for the visit on this date of service by the E/M provider. The time component had both face to face and non face to face time spent in determining the total time component. Counseling and education regarding her diagnosis listed below and her options regarding those diagnoses were also included in determining her time component. Diagnosis Orders   1. Visit for gynecologic examination     2. Menorrhagia with regular cycle  US PELVIS COMPLETE    US NON OB TRANSVAGINAL    CBC Auto Differential    TSH with Reflex    HCG, Quantitative, Pregnancy    Protime-INR    APTT   3. Pelvic pain  US PELVIS COMPLETE    US NON OB TRANSVAGINAL    VAGINITIS DNA PROBE    C.trachomatis N.gonorrhoeae DNA        The patient had her preventative health maintenance recommendations and follow-up reviewed with her at the completion of her visit.

## 2021-07-20 LAB
C TRACH DNA GENITAL QL NAA+PROBE: NEGATIVE
N. GONORRHOEAE DNA: NEGATIVE
SPECIMEN DESCRIPTION: NORMAL

## 2021-07-22 ENCOUNTER — OFFICE VISIT (OUTPATIENT)
Dept: OBGYN CLINIC | Age: 31
End: 2021-07-22
Payer: COMMERCIAL

## 2021-07-22 DIAGNOSIS — N92.0 MENORRHAGIA WITH REGULAR CYCLE: ICD-10-CM

## 2021-07-22 DIAGNOSIS — R10.2 PELVIC PAIN: ICD-10-CM

## 2021-07-22 PROCEDURE — 76830 TRANSVAGINAL US NON-OB: CPT | Performed by: OBSTETRICS & GYNECOLOGY

## 2021-07-22 PROCEDURE — 76856 US EXAM PELVIC COMPLETE: CPT | Performed by: OBSTETRICS & GYNECOLOGY

## 2021-07-26 ENCOUNTER — TELEPHONE (OUTPATIENT)
Dept: OBGYN CLINIC | Age: 31
End: 2021-07-26

## 2021-07-27 ENCOUNTER — TELEPHONE (OUTPATIENT)
Dept: OBGYN CLINIC | Age: 31
End: 2021-07-27

## 2021-07-27 NOTE — TELEPHONE ENCOUNTER
Patient notified of results, was informed that she will be recieving a call from our office to set up her appointment for her [facility]

## 2021-07-27 NOTE — TELEPHONE ENCOUNTER
----- Message from [employer], APRN - CNP sent at 7/23/2021  7:40 AM EDT -----  Thickened endometrial lining. Recommend GYN follow up with physician with endometrial sampling of thickened lining.   Please let patient know and schedule follow up

## 2021-08-03 ENCOUNTER — TELEPHONE (OUTPATIENT)
Dept: OBGYN CLINIC | Age: 31
End: 2021-08-03

## 2021-08-03 ENCOUNTER — HOSPITAL ENCOUNTER (OUTPATIENT)
Age: 31
Discharge: HOME | End: 2021-08-03
Payer: COMMERCIAL

## 2021-08-03 DIAGNOSIS — N92.6 MISSED PERIOD: Primary | ICD-10-CM

## 2021-08-03 DIAGNOSIS — N92.6 MISSED PERIOD: ICD-10-CM

## 2021-08-03 LAB — HCG QUANTITATIVE: 221 IU/L

## 2021-08-03 PROCEDURE — 84702 CHORIONIC GONADOTROPIN TEST: CPT

## 2021-08-03 PROCEDURE — 36415 COLL VENOUS BLD VENIPUNCTURE: CPT

## 2021-08-03 NOTE — TELEPHONE ENCOUNTER
Patient notified of results, Cristo Christensen order placed in epic. Patient to pick PNV previously prescribed while in office.

## 2021-08-03 NOTE — TELEPHONE ENCOUNTER
----- Message from JANUARY Michele CNP sent at 8/3/2021  3:49 PM EDT -----  Repeat quant HCG in 1 week. Prenatal vitamin 1 Po qD with 12 refills.

## 2021-08-09 ENCOUNTER — HOSPITAL ENCOUNTER (OUTPATIENT)
Age: 31
Discharge: HOME | End: 2021-08-09
Payer: COMMERCIAL

## 2021-08-09 ENCOUNTER — TELEPHONE (OUTPATIENT)
Dept: OBGYN CLINIC | Age: 31
End: 2021-08-09

## 2021-08-09 DIAGNOSIS — N92.6 MISSED PERIOD: ICD-10-CM

## 2021-08-09 DIAGNOSIS — Z34.90 PREGNANCY AT EARLY STAGE: Primary | ICD-10-CM

## 2021-08-09 LAB — HCG QUANTITATIVE: 4265 IU/L

## 2021-08-09 PROCEDURE — 36415 COLL VENOUS BLD VENIPUNCTURE: CPT

## 2021-08-09 PROCEDURE — 84702 CHORIONIC GONADOTROPIN TEST: CPT

## 2021-08-09 NOTE — TELEPHONE ENCOUNTER
Patient notified of results. LMP was 7/3/21. Sent to Jovanni Orr for scheduling of intake and sono in office.

## 2021-08-12 ENCOUNTER — TELEPHONE (OUTPATIENT)
Dept: OBGYN CLINIC | Age: 31
End: 2021-08-12

## 2021-08-13 ENCOUNTER — TELEPHONE (OUTPATIENT)
Dept: OBGYN CLINIC | Age: 31
End: 2021-08-13

## 2021-08-13 RX ORDER — AMOXICILLIN 500 MG/1
500 CAPSULE ORAL 2 TIMES DAILY
Qty: 20 CAPSULE | Refills: 0 | Status: SHIPPED | OUTPATIENT
Start: 2021-08-13 | End: 2021-08-23

## 2021-08-16 ENCOUNTER — HOSPITAL ENCOUNTER (OUTPATIENT)
Age: 31
Discharge: HOME | End: 2021-08-16
Payer: COMMERCIAL

## 2021-08-16 ENCOUNTER — TELEPHONE (OUTPATIENT)
Dept: OBGYN CLINIC | Age: 31
End: 2021-08-16

## 2021-08-16 ENCOUNTER — HOSPITAL ENCOUNTER (OUTPATIENT)
Dept: ULTRASOUND IMAGING | Age: 31
Discharge: HOME | End: 2021-08-18
Payer: COMMERCIAL

## 2021-08-16 DIAGNOSIS — Z34.90 PREGNANCY AT EARLY STAGE: ICD-10-CM

## 2021-08-16 DIAGNOSIS — O20.9 VAGINAL BLEEDING IN PREGNANCY, FIRST TRIMESTER: Primary | ICD-10-CM

## 2021-08-16 DIAGNOSIS — O20.9 VAGINAL BLEEDING IN PREGNANCY, FIRST TRIMESTER: ICD-10-CM

## 2021-08-16 LAB — HCG QUANTITATIVE: ABNORMAL IU/L

## 2021-08-16 PROCEDURE — 76817 TRANSVAGINAL US OBSTETRIC: CPT

## 2021-08-16 PROCEDURE — 36415 COLL VENOUS BLD VENIPUNCTURE: CPT

## 2021-08-16 PROCEDURE — 76801 OB US < 14 WKS SINGLE FETUS: CPT

## 2021-08-16 PROCEDURE — 84702 CHORIONIC GONADOTROPIN TEST: CPT

## 2021-08-16 NOTE — TELEPHONE ENCOUNTER
Patient messaged in via [employer] requesting a call back from a nurse regarding vaginal bleeding she is experiencing in early pregnancy. Patient states she is approx 6 weeks pregnant and had a gush of blood a few minutes prior to messaging the office. Patient states she had intercourse this morning, patient states she is wearing a panty liner currently. Spoke with Farhana Isabel who recommends patient go to the hospital for an ultrasound, and also for a repeat HCG quant. Patient advised to go to the ER if she has increased vaginal bleeding, abdominal pain, or fever. Patient voiced understanding.

## 2021-08-17 ENCOUNTER — TELEPHONE (OUTPATIENT)
Dept: OBGYN CLINIC | Age: 31
End: 2021-08-17

## 2021-08-17 NOTE — TELEPHONE ENCOUNTER
----- Message from JANUARY Torres CNP sent at 8/17/2021  7:41 AM EDT -----  IUP at 6 weeks  Please make patient new OB intake appointment if she doesn't already have one. Prenatal vitamin 1 pO QD with 12 refills.

## 2021-08-17 NOTE — TELEPHONE ENCOUNTER
Patient notified of results and recommendations of ultrasound and hcg quant. Patient already has intake scheduled. Patient had repeat quant and ultrasound completed because she had 1 episode of vaginal bleeding which has since stopped. Encouraged patient to keep her intake appointment which is scheduled for 9/1/21.

## 2021-09-07 ENCOUNTER — INITIAL PRENATAL (OUTPATIENT)
Dept: OBGYN CLINIC | Age: 31
End: 2021-09-07
Payer: COMMERCIAL

## 2021-09-07 ENCOUNTER — PROCEDURE VISIT (OUTPATIENT)
Dept: OBGYN CLINIC | Age: 31
End: 2021-09-07
Payer: COMMERCIAL

## 2021-09-07 ENCOUNTER — TELEPHONE (OUTPATIENT)
Dept: OBGYN CLINIC | Age: 31
End: 2021-09-07

## 2021-09-07 DIAGNOSIS — O36.80X0 PREGNANCY WITH INCONCLUSIVE FETAL VIABILITY, SINGLE OR UNSPECIFIED FETUS: Primary | ICD-10-CM

## 2021-09-07 DIAGNOSIS — Z3A.09 9 WEEKS GESTATION OF PREGNANCY: Primary | ICD-10-CM

## 2021-09-07 DIAGNOSIS — O09.91 SUPERVISION OF HIGH RISK PREGNANCY IN FIRST TRIMESTER: Primary | ICD-10-CM

## 2021-09-07 DIAGNOSIS — Z86.32 HISTORY OF GESTATIONAL DIABETES: ICD-10-CM

## 2021-09-07 LAB
CRL: NORMAL
SAC DIAMETER: NORMAL

## 2021-09-07 PROCEDURE — H1000 PRENATAL CARE ATRISK ASSESSM: HCPCS | Performed by: NURSE PRACTITIONER

## 2021-09-07 PROCEDURE — 76801 OB US < 14 WKS SINGLE FETUS: CPT | Performed by: OBSTETRICS & GYNECOLOGY

## 2021-09-07 RX ORDER — ACETAMINOPHEN AND CODEINE PHOSPHATE 300; 30 MG/1; MG/1
TABLET ORAL
COMMUNITY
Start: 2021-09-07 | End: 2021-09-27 | Stop reason: ALTCHOICE

## 2021-09-07 RX ORDER — ONDANSETRON 4 MG/1
4 TABLET, ORALLY DISINTEGRATING ORAL EVERY 8 HOURS PRN
Qty: 15 TABLET | Refills: 1 | Status: SHIPPED | OUTPATIENT
Start: 2021-09-07 | End: 2022-01-10

## 2021-09-07 RX ORDER — AMOXICILLIN 500 MG/1
CAPSULE ORAL
COMMUNITY
Start: 2021-09-07 | End: 2021-09-27 | Stop reason: ALTCHOICE

## 2021-09-07 NOTE — TELEPHONE ENCOUNTER
Patient had intake today in office she is 9 weeks along today, she had some dental work done and was given tylenol with codeine which makes her nauseous. Patient requesting script for zofran.

## 2021-09-13 ENCOUNTER — HOSPITAL ENCOUNTER (OUTPATIENT)
Age: 31
Discharge: HOME | End: 2021-09-13
Payer: COMMERCIAL

## 2021-09-13 DIAGNOSIS — Z3A.09 9 WEEKS GESTATION OF PREGNANCY: ICD-10-CM

## 2021-09-13 LAB
-: ABNORMAL
ABO/RH: NORMAL
ABSOLUTE EOS #: 0.1 K/UL (ref 0–0.4)
ABSOLUTE IMMATURE GRANULOCYTE: ABNORMAL K/UL (ref 0–0.3)
ABSOLUTE LYMPH #: 1.3 K/UL (ref 1–4.8)
ABSOLUTE MONO #: 0.3 K/UL (ref 0.1–1.3)
AMORPHOUS: ABNORMAL
ANTIBODY SCREEN: NEGATIVE
BACTERIA: ABNORMAL
BASOPHILS # BLD: 0 % (ref 0–2)
BASOPHILS ABSOLUTE: 0 K/UL (ref 0–0.2)
BILIRUBIN URINE: NEGATIVE
CASTS UA: ABNORMAL /LPF
COLOR: YELLOW
COMMENT UA: ABNORMAL
CRYSTALS, UA: ABNORMAL /HPF
DIFFERENTIAL TYPE: ABNORMAL
EOSINOPHILS RELATIVE PERCENT: 1 % (ref 0–4)
EPITHELIAL CELLS UA: ABNORMAL /HPF
GLUCOSE URINE: NEGATIVE
HCG QUANTITATIVE: ABNORMAL IU/L
HCT VFR BLD CALC: 37.1 % (ref 36–46)
HEMOGLOBIN: 12.7 G/DL (ref 12–16)
HEPATITIS B SURFACE ANTIGEN: NONREACTIVE
HIV AG/AB: NONREACTIVE
IMMATURE GRANULOCYTES: ABNORMAL %
KETONES, URINE: NEGATIVE
LEUKOCYTE ESTERASE, URINE: ABNORMAL
LYMPHOCYTES # BLD: 22 % (ref 24–44)
MCH RBC QN AUTO: 28.4 PG (ref 26–34)
MCHC RBC AUTO-ENTMCNC: 34.2 G/DL (ref 31–37)
MCV RBC AUTO: 83.1 FL (ref 80–100)
MONOCYTES # BLD: 6 % (ref 1–7)
MUCUS: ABNORMAL
NITRITE, URINE: NEGATIVE
NRBC AUTOMATED: ABNORMAL PER 100 WBC
OTHER OBSERVATIONS UA: ABNORMAL
PDW BLD-RTO: 14.2 % (ref 11.5–14.9)
PH UA: 7.5 (ref 5–8)
PLATELET # BLD: 234 K/UL (ref 150–450)
PLATELET ESTIMATE: ABNORMAL
PMV BLD AUTO: 7.4 FL (ref 6–12)
PROTEIN UA: NEGATIVE
RBC # BLD: 4.47 M/UL (ref 4–5.2)
RBC # BLD: ABNORMAL 10*6/UL
RBC UA: ABNORMAL /HPF
RENAL EPITHELIAL, UA: ABNORMAL /HPF
RUBV IGG SER QL: 357.6 IU/ML
SEG NEUTROPHILS: 71 % (ref 36–66)
SEGMENTED NEUTROPHILS ABSOLUTE COUNT: 4.2 K/UL (ref 1.3–9.1)
SPECIFIC GRAVITY UA: 1.02 (ref 1–1.03)
T. PALLIDUM, IGG: NONREACTIVE
TRICHOMONAS: ABNORMAL
TSH SERPL DL<=0.05 MIU/L-ACNC: 1.11 MIU/L (ref 0.3–5)
TURBIDITY: ABNORMAL
URINE HGB: NEGATIVE
UROBILINOGEN, URINE: NORMAL
WBC # BLD: 5.8 K/UL (ref 3.5–11)
WBC # BLD: ABNORMAL 10*3/UL
WBC UA: ABNORMAL /HPF
YEAST: ABNORMAL

## 2021-09-13 PROCEDURE — 84702 CHORIONIC GONADOTROPIN TEST: CPT

## 2021-09-13 PROCEDURE — 84443 ASSAY THYROID STIM HORMONE: CPT

## 2021-09-13 PROCEDURE — 86901 BLOOD TYPING SEROLOGIC RH(D): CPT

## 2021-09-13 PROCEDURE — 81001 URINALYSIS AUTO W/SCOPE: CPT

## 2021-09-13 PROCEDURE — 86900 BLOOD TYPING SEROLOGIC ABO: CPT

## 2021-09-13 PROCEDURE — 87340 HEPATITIS B SURFACE AG IA: CPT

## 2021-09-13 PROCEDURE — 86780 TREPONEMA PALLIDUM: CPT

## 2021-09-13 PROCEDURE — 86850 RBC ANTIBODY SCREEN: CPT

## 2021-09-13 PROCEDURE — 85025 COMPLETE CBC W/AUTO DIFF WBC: CPT

## 2021-09-13 PROCEDURE — 36415 COLL VENOUS BLD VENIPUNCTURE: CPT

## 2021-09-13 PROCEDURE — 86762 RUBELLA ANTIBODY: CPT

## 2021-09-13 PROCEDURE — 87389 HIV-1 AG W/HIV-1&-2 AB AG IA: CPT

## 2021-09-15 ENCOUNTER — HOSPITAL ENCOUNTER (OUTPATIENT)
Age: 31
Discharge: HOME | End: 2021-09-15
Payer: COMMERCIAL

## 2021-09-15 DIAGNOSIS — Z86.32 HISTORY OF GESTATIONAL DIABETES: ICD-10-CM

## 2021-09-15 DIAGNOSIS — Z3A.09 9 WEEKS GESTATION OF PREGNANCY: ICD-10-CM

## 2021-09-15 LAB
GLUCOSE ADMINISTRATION: NORMAL
GLUCOSE TOLERANCE SCREEN 50G: 106 MG/DL (ref 70–135)

## 2021-09-15 PROCEDURE — 36415 COLL VENOUS BLD VENIPUNCTURE: CPT

## 2021-09-15 PROCEDURE — 82950 GLUCOSE TEST: CPT

## 2021-09-15 NOTE — LACTATION NOTE
[employer] scheduling steps, please call [phone] This note was copied from a baby's chart. Baby nursing well x 2 Yesterday and x 3well and x1 fair last night and x 1well this morning. Baby voiding & stooling  as expected for age. Mother relates it takes him a little while to latch at times but he finally gets it and uses hand expression to entice him to latch. Weight loss within normal limits. Encouraged to notify me if she has any breastfeeding questions or problems after discharge. [facility] - About COVID-19:    [email or web address] N  [place] [address]      Ariane Davies PA-C

## 2021-09-22 ENCOUNTER — HOSPITAL ENCOUNTER (OUTPATIENT)
Age: 31
Setting detail: SPECIMEN
Discharge: HOME | End: 2021-09-22
Payer: COMMERCIAL

## 2021-09-22 ENCOUNTER — ROUTINE PRENATAL (OUTPATIENT)
Dept: OBGYN CLINIC | Age: 31
End: 2021-09-22
Payer: COMMERCIAL

## 2021-09-22 VITALS
SYSTOLIC BLOOD PRESSURE: 100 MMHG | DIASTOLIC BLOOD PRESSURE: 66 MMHG | WEIGHT: 151 LBS | BODY MASS INDEX: 25.13 KG/M2 | HEART RATE: 74 BPM

## 2021-09-22 DIAGNOSIS — Z3A.11 11 WEEKS GESTATION OF PREGNANCY: Primary | ICD-10-CM

## 2021-09-22 DIAGNOSIS — R89.9 ABNORMAL LABORATORY TEST: ICD-10-CM

## 2021-09-22 DIAGNOSIS — Z3A.11 11 WEEKS GESTATION OF PREGNANCY: ICD-10-CM

## 2021-09-22 PROBLEM — D64.9 ANEMIA: Status: RESOLVED | Noted: 2018-06-28 | Resolved: 2021-09-22

## 2021-09-22 PROBLEM — O24.414 INSULIN CONTROLLED GESTATIONAL DIABETES MELLITUS (GDM) IN THIRD TRIMESTER: Status: RESOLVED | Noted: 2017-01-03 | Resolved: 2021-09-22

## 2021-09-22 PROCEDURE — 1036F TOBACCO NON-USER: CPT | Performed by: NURSE PRACTITIONER

## 2021-09-22 PROCEDURE — 99214 OFFICE O/P EST MOD 30 MIN: CPT | Performed by: NURSE PRACTITIONER

## 2021-09-22 PROCEDURE — G8427 DOCREV CUR MEDS BY ELIG CLIN: HCPCS | Performed by: NURSE PRACTITIONER

## 2021-09-22 PROCEDURE — G8417 CALC BMI ABV UP PARAM F/U: HCPCS | Performed by: NURSE PRACTITIONER

## 2021-09-22 RX ORDER — LANOLIN ALCOHOL/MO/W.PET/CERES
50 CREAM (GRAM) TOPICAL 2 TIMES DAILY
Qty: 60 TABLET | Refills: 3 | Status: SHIPPED | OUTPATIENT
Start: 2021-09-22 | End: 2022-01-10

## 2021-09-22 NOTE — PROGRESS NOTES
Kerry Mcclellan  2021    YOB: 1990    2021   Patient's last menstrual period was 2021 (approximate). 11w6d  [id number]      Primary Care Physician: JANUARY Mendoza CNP        CC: Initial Prenatal Visit    Subjective:     Kerry Mcclellan is a 36 y.o. female [id number]    is being seen today for her first obstetrical visit. This is not a planned pregnancy. She is at [address] [place] [address]  Her obstetrical history is significant for hx of LGA, h/o C/S, hx of gestational diabetes. Relationship with FOB: spouse, living together. Patient does intend to breast feed. Pregnancy history fully reviewed. Mother's ethnicity:      Objective:   Blood pressure 100/66, pulse 74, weight 151 lb (68.5 kg), last menstrual period 2021, not currently breastfeeding.     OB History    Para Term  AB Living   5 2 2 0 2 2   SAB TAB Ectopic Molar Multiple Live Births   1 1 0 0 0 2      # Outcome Date GA Lbr Roberto/2nd Weight Sex Delivery Anes PTL Lv   5 Current            4 SAB 2021           3 Term 19 39w2d  9 lb 12 oz (4.423 kg) M CS-LTranv   JERICHO   2 Term 17 39w2d 02:19 / 03:04 7 lb 10 oz (3.459 kg) M Vag-Spont EPI N JERICHO      Name: Yuan Sabrina: 8  Apgar5: 9   1 TAB                Past Medical History:   Diagnosis Date    Anemia 2018    Anxiety     Generalized anxiety disorder 10/6/2015    Gestational diabetes mellitus in pregnancy 2016     Past Surgical History:   Procedure Laterality Date     SECTION N/A 2019     SECTION PRIMARY performed by Yan Faustin DO at [address] [place] [address] L&D OR    WISDOM TOOTH EXTRACTION        Social History     Socioeconomic History    Marital status: Single     Spouse name: Not on file    Number of children: Not on file    Years of education: Not on file    Highest education level: Not on file   Occupational History    Not on file   Tobacco Use    Smoking status: Never Smoker    Smokeless tobacco: Never Used   Vaping Use    Vaping Use: Never used   Substance and Sexual Activity    Alcohol use: No     Alcohol/week: 0.0 standard drinks     Comment: rare     Drug use: No    Sexual activity: Yes     Partners: Male   Other Topics Concern    Not on file   Social History Narrative    Not on file     Social Determinants of Health     Financial Resource Strain:     Difficulty of Paying Living Expenses:    Food Insecurity:     Worried About Running Out of Food in the Last Year:     Ran Out of Food in the Last Year:    Transportation Needs:     Lack of Transportation (Medical):      Lack of Transportation (Non-Medical):    Physical Activity:     Days of Exercise per Week:     Minutes of Exercise per Session:    Stress:     Feeling of Stress :    Social Connections:     Frequency of Communication with Friends and Family:     Frequency of Social Gatherings with Friends and Family:     Attends Buddhism Services:     Active Member of Clubs or Organizations:     Attends Club or Organization Meetings:     Marital Status:    Intimate Partner Violence:     Fear of Current or Ex-Partner:     Emotionally Abused:     Physically Abused:     Sexually Abused:      Family History   Problem Relation Age of Onset    Hypertension Father     Hypertension Mother     No Known Problems Brother     Breast Cancer Neg Hx     Cancer Neg Hx     Colon Cancer Neg Hx     Diabetes Neg Hx     Eclampsia Neg Hx     Ovarian Cancer Neg Hx      Labor Neg Hx     Spont Abortions Neg Hx     Stroke Neg Hx        MEDICATIONS:  Current Outpatient Medications   Medication Sig Dispense Refill    vitamin B-6 (PYRIDOXINE) 50 MG tablet Take 1 tablet by mouth 2 times daily 60 tablet 3    Prenatal Vit-Fe Fumarate-FA (PRENATAL VITAMINS) 28-0.8 MG TABS Take 1 tablet by mouth daily 30 tablet 11    amoxicillin (AMOXIL) 500 MG capsule  (Patient not taking: Reported on 2021)      acetaminophen-codeine (TYLENOL #3) 300-30 MG per tablet  (Patient not taking: Reported on 2021)      ondansetron (ZOFRAN-ODT) 4 MG disintegrating tablet Take 1 tablet by mouth every 8 hours as needed for Nausea or Vomiting (Patient not taking: Reported on 2021) 15 tablet 1     No current facility-administered medications for this visit. ALLERGIES:  Allergies as of 2021    (No Known Allergies)           Physical Exam Completed-See Epic Navigator    Chaperone for Intimate Exam   Chaperone was offered and accepted as part of the rooming process.  Chaperone: Dru Morton                   Assessment:      Pregnancy at 6 and 6/7 weeks    Diagnosis Orders   1. 11 weeks gestation of pregnancy  PAP Smear    Culture, Genital    C.trachomatis N.gonorrhoeae DNA    vitamin B-6 (PYRIDOXINE) 50 MG tablet   2. history of LGA: 9#2019     3.  (spontaneous vaginal delivery)              Patient Active Problem List    Diagnosis Date Noted    history of LGA: 92021     Priority: High     (spontaneous vaginal delivery) 2017 2021     Priority: High    History of gestational diabetes 09/10/2018     Priority: High     Early 1 hour GTT ordered 9/10/2018  If wnl repeat at 28 weeks      Vitamin D deficiency 2020    Elevated fasting blood sugar 08/10/2020    Corneal rust ring 2020    PLTCS 19 M APG 8/9 Wt 9#12 2019    Adult BMI 25.0-25.9 kg/sq m 2016    Generalized anxiety disorder 10/06/2015                    Plan:      Initial labs drawn. Prenatal vitamins. RTO in 4 weeks. Vaginal cultures collected. [facility] 2021  Problem list reviewed and updated. NT Screen/Quad Testing and MSAFP Single Marker: requested  Role of ultrasound in pregnancy discussed; fetal survey: requests  Amniocentesis discussed: Declined  NIPT Testing not indicated  Cultures & Wet Prep Collected  Follow-up in 4 weeks  Repeat Annual every 1 year  Cervical Cytology Evaluation begins at 24years old.   If Negative Cytology, Follow-up screening per current guidelines. [facility] appointment scheduled      COUNSELING COMPLETED:  INITIAL OBSTETRICAL VISIT EVALUATION:  The patient was seen full history and physical was completed/reviewed. Cytology was collected for patients over 24years of age. Cultures were collected. The patient was counseled on office policies and she was counseled on termination of pregnancy in the state of [place]. The patient was counseled on Toxoplasmosis, HIV, Tobacco Abuse, Group Beta Strep Infections, Cystic Fibrosis,  Labor precautions and Sickle Cell disease. The patient was counseled on the risks of tobacco abuse. Both maternal and fetal. She was instructed to stop smoking if currently using tobacco. Morbidity, mortality, and cessation programs were reviewed. The risks include but are not limited to increased risks of  labor,  delivery, premature rupture of membranes, intrauterine growth restriction, intrauterine fetal demise and abruptio placenta. Secondary smoke risks were also reviewed. Increases in cancer, respiratory problems, and sudden infant death syndrome were reviewed as well. The patient was informed of a 2-4% risk of congenital anomalies in the general population. She was also informed that karyotyping is the only way to evaluate the fetus for genetic problems and genetic lethal anomalies. Chorionic villous sampling, amniocentesis and NIPT testing were also discussed with morbidity rates in detail. She requested the procedure. Route of delivery and counseling on vaginal, operative vaginal, and  sections were completed with the risks of each to both the patient as well as her baby. The possibility of a blood transfusion was discussed as well. The patient was not opposed to receiving a transfusion if needed.     Nuchal translucency/Quad Evaluation and MSAFP single marker testing was reviewed in detail with attention to timing of testing and their windows. For patients beyond the gestational age for Nuchal translucency evaluation Quad testing was recommended. Timing for the Quad test was reviewed. Benefits of the above testing was reviewed. A second trimester amniocentesis was also made available to the patient. Risks, Benefits and non-invasive alternative testing was reviewed. The literature regarding a questionable link to pitocin augmentation and induction of labor, the assistance of labor contractions and the initiation of contractions to help delivery, have been reviewed with the patient regarding the increased potential of having a  with Attention Deficit Hyperactivity Disorder and or Autism. These two disorders and the ramifications of their impact on a child and the family caring for that child has been reviewed with the patient in detail. She was given the risks, benefits and alternatives of the use of this medication. She has agreed to its use in the delivery of her unborn child if needed at the time of delivery, Yes. The patient was questioned in detail regarding any genetic misnomer history, chromosomal abnormalities, or learning disabilities in  herself, the father of the baby or their families. SHE DENIED ANY HISTORY AS STATED ABOVE: Yes    Upon completion of the visit all questions were answered and the patients follow-up and testing schedule were reviewed. Prenatal vitamins were given. T-dap Vaccine recommendations reviewed with the patient. Patient notified of timing of vaccination 27-36 weeks gestation. Patient aware Vaccine is NOT Live. Yes. The patient, Farhana Ramirez is a 36 y.o. female, was seen with a total time spent of 20 minutes for the visit on this date of service by the E/M provider. The time component had both face to face and non face to face time spent in determining the total time component.   Counseling and education regarding her diagnosis listed below and her options regarding those diagnoses were

## 2021-09-25 LAB
CULTURE: NORMAL
Lab: NORMAL
SPECIMEN DESCRIPTION: NORMAL

## 2021-09-27 ENCOUNTER — ROUTINE PRENATAL (OUTPATIENT)
Dept: PERINATAL CARE | Age: 31
End: 2021-09-27
Payer: COMMERCIAL

## 2021-09-27 VITALS
HEART RATE: 80 BPM | DIASTOLIC BLOOD PRESSURE: 66 MMHG | HEIGHT: 65 IN | RESPIRATION RATE: 16 BRPM | WEIGHT: 151 LBS | TEMPERATURE: 97.4 F | BODY MASS INDEX: 25.16 KG/M2 | SYSTOLIC BLOOD PRESSURE: 98 MMHG

## 2021-09-27 DIAGNOSIS — Z3A.12 12 WEEKS GESTATION OF PREGNANCY: ICD-10-CM

## 2021-09-27 DIAGNOSIS — O36.80X0 ENCOUNTER TO DETERMINE FETAL VIABILITY OF PREGNANCY, SINGLE OR UNSPECIFIED FETUS: ICD-10-CM

## 2021-09-27 DIAGNOSIS — Z36.9 FIRST TRIMESTER SCREENING: Primary | ICD-10-CM

## 2021-09-27 LAB
CRL: NORMAL
CYTOLOGY REPORT: NORMAL
SAC DIAMETER: NORMAL

## 2021-09-27 PROCEDURE — 76813 OB US NUCHAL MEAS 1 GEST: CPT | Performed by: OBSTETRICS & GYNECOLOGY

## 2021-09-27 PROCEDURE — 76801 OB US < 14 WKS SINGLE FETUS: CPT | Performed by: OBSTETRICS & GYNECOLOGY

## 2021-09-29 DIAGNOSIS — O28.9 ABNORMAL FIRST TRIMESTER SCREEN: Primary | ICD-10-CM

## 2021-09-29 LAB
HPV SAMPLE: NORMAL
HPV, GENOTYPE 16: NOT DETECTED
HPV, GENOTYPE 18: NOT DETECTED
HPV, HIGH RISK OTHER: NOT DETECTED
HPV, INTERPRETATION: NORMAL
SPECIMEN DESCRIPTION: NORMAL

## 2021-10-01 ENCOUNTER — TELEPHONE (OUTPATIENT)
Dept: PERINATAL CARE | Age: 31
End: 2021-10-01

## 2021-10-01 PROBLEM — O28.0 HIGH RISK PREGNANCY WITH LOW PAPPA: Status: ACTIVE | Noted: 2021-10-01

## 2021-10-01 PROBLEM — O09.899 HIGH RISK PREGNANCY WITH LOW PAPPA: Status: ACTIVE | Noted: 2021-10-01

## 2021-10-01 NOTE — TELEPHONE ENCOUNTER
Patient was contacted by phone and notified that Dr. Kayleigh Oliva would like to have a consult with the patient to discuss the abnormal values on her First Trimester Screening results done on 09/29/2021. Patient has a consult apt scheduled for 11/23/2021 to discuss.

## 2021-10-21 ENCOUNTER — ROUTINE PRENATAL (OUTPATIENT)
Dept: OBGYN CLINIC | Age: 31
End: 2021-10-21
Payer: COMMERCIAL

## 2021-10-21 VITALS
HEART RATE: 67 BPM | SYSTOLIC BLOOD PRESSURE: 102 MMHG | DIASTOLIC BLOOD PRESSURE: 64 MMHG | WEIGHT: 153 LBS | BODY MASS INDEX: 25.46 KG/M2

## 2021-10-21 DIAGNOSIS — O09.899 HIGH RISK PREGNANCY WITH LOW PAPPA: Primary | ICD-10-CM

## 2021-10-21 DIAGNOSIS — R89.9 ABNORMAL LABORATORY TEST: ICD-10-CM

## 2021-10-21 DIAGNOSIS — Z3A.15 15 WEEKS GESTATION OF PREGNANCY: ICD-10-CM

## 2021-10-21 DIAGNOSIS — Z86.32 HISTORY OF GESTATIONAL DIABETES: ICD-10-CM

## 2021-10-21 DIAGNOSIS — O28.0 HIGH RISK PREGNANCY WITH LOW PAPPA: Primary | ICD-10-CM

## 2021-10-21 DIAGNOSIS — Z23 NEED FOR IMMUNIZATION AGAINST INFLUENZA: ICD-10-CM

## 2021-10-21 PROCEDURE — G8484 FLU IMMUNIZE NO ADMIN: HCPCS | Performed by: OBSTETRICS & GYNECOLOGY

## 2021-10-21 PROCEDURE — 1036F TOBACCO NON-USER: CPT | Performed by: OBSTETRICS & GYNECOLOGY

## 2021-10-21 PROCEDURE — G8417 CALC BMI ABV UP PARAM F/U: HCPCS | Performed by: OBSTETRICS & GYNECOLOGY

## 2021-10-21 PROCEDURE — 99213 OFFICE O/P EST LOW 20 MIN: CPT | Performed by: OBSTETRICS & GYNECOLOGY

## 2021-10-21 PROCEDURE — G8427 DOCREV CUR MEDS BY ELIG CLIN: HCPCS | Performed by: OBSTETRICS & GYNECOLOGY

## 2021-10-21 PROCEDURE — 90674 CCIIV4 VAC NO PRSV 0.5 ML IM: CPT | Performed by: OBSTETRICS & GYNECOLOGY

## 2021-10-21 RX ORDER — PRENATAL WITH FERROUS FUM AND FOLIC ACID 3080; 920; 120; 400; 22; 1.84; 3; 20; 10; 1; 12; 200; 27; 25; 2 [IU]/1; [IU]/1; MG/1; [IU]/1; MG/1; MG/1; MG/1; MG/1; MG/1; MG/1; UG/1; MG/1; MG/1; MG/1; MG/1
TABLET ORAL
COMMUNITY
Start: 2021-10-14 | End: 2021-11-23 | Stop reason: ALTCHOICE

## 2021-10-21 NOTE — PROGRESS NOTES
David Caro is a 36 y.o. female 15w6d    [id number]    OB History    Para Term  AB Living   5 2 2 0 2 2   SAB TAB Ectopic Molar Multiple Live Births   1 1 0   0 2      # Outcome Date GA Lbr Roberto/2nd Weight Sex Delivery Anes PTL Lv   5 Current            4 SAB 2021           3 Term 19 39w2d  9 lb 12 oz (4.423 kg) M CS-LTranv   JERICHO   2 Term 17 39w2d 02:19 / 03:04 7 lb 10 oz (3.459 kg) M Vag-Spont EPI N JERICHO   1 TAB                      Vitals  BP: 102/64  Weight: 153 lb (69.4 kg)  Pulse: 67  Patient Position: Sitting      The patient was seen and evaluated. There was Positive fetal movements. No contractions or leakage of fluid. Signs and symptoms of  labor as well as labor were reviewed. The Nuchal Translucency testing was reviewed and found to be abnormal  low WINNIE-A. A single marker MSAFP was ordered for a 15-20 week gestational age window. TOP ST [place] Reviewed. Dates were reviewed with the patient. An 18-22 week anatomy ultrasound has been ordered. The patient will return to the office for her next visit in 4 weeks. See antepartum flow sheet. Pt unsure if she wants a  vs repeat . If  pt counseled that she must deliver at [facility]. [place]. Risks/ benefits/ alternative options reviewed with pt. No Patient Care Coordination Note on file. Assessment:  1. David Caro is a 36 y.o. female  2.  [id number]  3. 15w6d    Patient Active Problem List    Diagnosis Date Noted    High risk pregnancy with low PAPPA 10/01/2021     Priority: High     10/1/2021 consult MFM      history of LGA: 9#12oz 2021     Priority: High     (spontaneous vaginal delivery) 2021     Priority: High    History of gestational diabetes 09/10/2018     Priority: High     Early 1 hour GTT ordered 9/10/2018  If wnl repeat at 28 weeks      Vitamin D deficiency 2020    Elevated fasting blood sugar 08/10/2020    Corneal rust ring 2020    PLTCS 19 M APG  Wt 9#12 2019    Adult BMI 25.0-25.9 kg/sq m 2016    Generalized anxiety disorder 10/06/2015        Diagnosis Orders   1. High risk pregnancy with low PAPPA  Alpha Fetoprotein, Maternal   2. Abnormal laboratory test     3.  (spontaneous vaginal delivery)     4. History of gestational diabetes     5. 15 weeks gestation of pregnancy  Alpha Fetoprotein, Maternal         Plan:  Anatomy sono  MSAFP      [facility]  [facility] [address]  [phone]    Suz Anabel  10/21/2021  Patient's last menstrual period was 2021 (exact date). INITIAL OBSTETRICAL VISIT EVALUATION:  The patient was seen full history and physical was completed/reviewed. Cytology was collected for patients over 24years of age. Cultures were collected. The patient was counseled on office policies and she was counseled on termination of pregnancy in the state of [place]. The patient was counseled on Toxoplasmosis, HIV, Tobacco Abuse, Group Beta Strep Infections, Cystic Fibrosis,  Labor precautions and Sickle Cell disease. The patient was counseled on the risks of tobacco abuse. Both maternal and fetal. She was instructed to stop smoking if currently using tobacco. Morbidity, mortality, and cessation programs were reviewed. The risks include but are not limited to increased risks of  labor,  delivery, premature rupture of membranes, intrauterine growth restriction, intrauterine fetal demise and abruptio placenta. Secondary smoke risks were also reviewed. Increases in cancer, respiratory problems, and sudden infant death syndrome were reviewed as well. The patient was informed of a 2-4% risk of congenital anomalies in the general population. She was also informed that karyotyping is the only way to evaluate the fetus for genetic problems and genetic lethal anomalies.  Chorionic villous sampling, amniocentesis and Maternal Genetic Blood Sampling-(NIPT Testing) were also discussed with morbidity rates in detail. She declined any of the options. Route of delivery and counseling on vaginal, operative vaginal, and  sections were completed with the risks of each to both the patient as well as her baby. The possibility of a blood transfusion was discussed as well. The patient was not opposed to receiving a transfusion if needed. Nuchal translucency and MSAFP single marker testing was reviewed in detail with attention to timing of testing and their windows. For patients beyond the gestational age for Nuchal translucency evaluation, ([id number]) Quad testing was recommended. Timing for the Quad test was reviewed. Benefits of the above testing was reviewed. A second trimester amniocentesis was also made available to the patient. Risks, Benefits and non-invasive alternative testing was reviewed. The patients diagnosed with Advanced maternal age, AMA (age of 27 yo or beyond at delivery), had NIPT recommended. This was discussed in lay terms with Risks and Benefits reviewed. The literature regarding a questionable link to pitocin augmentation and induction of labor, the assistance of labor contractions and the initiation of contractions to help delivery, have been reviewed with the patient regarding the increased potential of having a  with Attention Deficit Hyperactivity Disorder and or Autism. These two disorders and the ramifications of their impact on a child and the family caring for that child has been reviewed with the patient in detail. She was given the risks, benefits and alternatives of the use of this medication. She has agreed to its use in the delivery of her unborn child if needed at the time of delivery, Yes. The patient was questioned in detail regarding any genetic misnomer history, chromosomal abnormalities, or learning disabilities in  herself, the father of the baby or their families.  SHE DENIED ANY HISTORY AS STATED ABOVE: Yes    Upon completion of the visit all questions were answered and the patients follow-up and testing schedule were reviewed. Prenatal vitamins were given. Patient was seen with total face to face time of 20 minutes. More than 50% of this visit was on counseling and education regarding her    Diagnosis Orders   1. High risk pregnancy with low PAPPA  Alpha Fetoprotein, Maternal   2. Abnormal laboratory test     3.  (spontaneous vaginal delivery)     4. History of gestational diabetes     5. 15 weeks gestation of pregnancy  Alpha Fetoprotein, Maternal    and her options. She was also counseled on her preventative health maintenance recommendations and follow-up.

## 2021-11-16 ENCOUNTER — ROUTINE PRENATAL (OUTPATIENT)
Dept: OBGYN CLINIC | Age: 31
End: 2021-11-16
Payer: COMMERCIAL

## 2021-11-16 VITALS
SYSTOLIC BLOOD PRESSURE: 100 MMHG | HEART RATE: 76 BPM | DIASTOLIC BLOOD PRESSURE: 56 MMHG | BODY MASS INDEX: 26.29 KG/M2 | WEIGHT: 158 LBS

## 2021-11-16 DIAGNOSIS — O09.92 HIGH-RISK PREGNANCY IN SECOND TRIMESTER: Primary | ICD-10-CM

## 2021-11-16 DIAGNOSIS — O28.0 HIGH RISK PREGNANCY WITH LOW PAPPA: ICD-10-CM

## 2021-11-16 DIAGNOSIS — Z86.32 HISTORY OF GESTATIONAL DIABETES: ICD-10-CM

## 2021-11-16 DIAGNOSIS — O09.899 HIGH RISK PREGNANCY WITH LOW PAPPA: ICD-10-CM

## 2021-11-16 DIAGNOSIS — R89.9 ABNORMAL LABORATORY TEST: ICD-10-CM

## 2021-11-16 DIAGNOSIS — Z3A.19 19 WEEKS GESTATION OF PREGNANCY: ICD-10-CM

## 2021-11-16 PROCEDURE — 1036F TOBACCO NON-USER: CPT | Performed by: NURSE PRACTITIONER

## 2021-11-16 PROCEDURE — G8427 DOCREV CUR MEDS BY ELIG CLIN: HCPCS | Performed by: NURSE PRACTITIONER

## 2021-11-16 PROCEDURE — G8417 CALC BMI ABV UP PARAM F/U: HCPCS | Performed by: NURSE PRACTITIONER

## 2021-11-16 PROCEDURE — G8482 FLU IMMUNIZE ORDER/ADMIN: HCPCS | Performed by: NURSE PRACTITIONER

## 2021-11-16 PROCEDURE — 99213 OFFICE O/P EST LOW 20 MIN: CPT | Performed by: NURSE PRACTITIONER

## 2021-11-16 NOTE — PROGRESS NOTES
Yas Lomeli is a 36 y.o. female 19w4d    [id number]    OB History    Para Term  AB Living   5 2 2 0 2 2   SAB IAB Ectopic Molar Multiple Live Births   1 1 0   0 2      # Outcome Date GA Lbr Roberto/2nd Weight Sex Delivery Anes PTL Lv   5 Current            4 SAB 2021           3 Term 19 39w2d  9 lb 12 oz (4.423 kg) M CS-LTranv   JERICHO   2 Term 17 39w2d 02:19 / 03:04 7 lb 10 oz (3.459 kg) M Vag-Spont EPI N JERICHO   1 IAB                Vitals  BP: (!) 100/56  Weight: 158 lb (71.7 kg)  Pulse: 76  Patient Position: Sitting  Albumin: Negative  Glucose: Negative    The patient was seen and evaluated. There was positive fetal movements. No contractions or leakage of fluid. Signs and symptoms of  labor as well as labor were reviewed. The Nuchal Translucency testing was reviewed and found to be Low PAPPA. A single marker MSAFP was reviewed and found to be not completed yet. The patients anatomy ultrasound has been completed and reviewed with patient. TOP ST OH Reviewed. A 28 week lab panel was ordered. This includes a (HH, 1 hr GTT, U/A C&S). The patient is to complete this in the next two to four weeks. The S/S of Pre-Eclampsia were reviewed with the patient in detail. She is to report any of these if they occur. She currently denies any of these. The patient is RH positive Rhogam Ordered no    The patient was instructed on fetal kick counts and was given a kick sheet to complete every 8 hours. This is to begin at 28 weeks gestation. She was instructed that the baby should move at a minimum of ten times within one hour after a meal. The patient was instructed to lay down on her left side twenty minutes after eating and count movements for up to one hour with a target value of ten movements. She was instructed to notify the office if she did not make that target after two attempts or if after any attempt there was less than four movements.       10/21/21 Pt given Flu Vacc  10/21/22 Tdap @ 27 wk gest       Assessment:  1Martin Beach is a 36 y.o. female  2. [id number]  3. 19w4d    Patient Active Problem List    Diagnosis Date Noted    High risk pregnancy with low PAPPA 10/01/2021     Priority: High     10/1/2021 consult [facility]      history of LGA: 9#12oz 2021     Priority: High     (spontaneous vaginal delivery) 2017 2021     Priority: High    History of gestational diabetes 09/10/2018     Priority: High     Early 1 hour GTT ordered 9/10/2018  If wnl repeat at 28 weeks      Vitamin D deficiency 2020    Elevated fasting blood sugar 08/10/2020    Corneal rust ring 2020    PLTCS 19 M APG 8/9 Wt 9#12 2019     Pt considering       Adult BMI 25.0-25.9 kg/sq m 2016    Generalized anxiety disorder 10/06/2015        Diagnosis Orders   1. High-risk pregnancy in second trimester     2. Abnormal laboratory test     3.  (spontaneous vaginal delivery)     4. History of gestational diabetes     5. High risk pregnancy with low PAPPA     6. 19 weeks gestation of pregnancy           Plan:  The patient will return to the office for her next visit in 4 weeks. See antepartum flow sheet. Lab slip reprinted for [facility]. [facility] 2021        Patient was seen with total face to face time of 20 minutes. More than 50% of this visit was on counseling and education regarding her    Diagnosis Orders   1. High-risk pregnancy in second trimester     2. Abnormal laboratory test     3.  (spontaneous vaginal delivery)     4. History of gestational diabetes     5. High risk pregnancy with low PAPPA     6. 19 weeks gestation of pregnancy      and her options. She was also counseled on her preventative health maintenance recommendations and follow-up.

## 2021-11-17 ENCOUNTER — HOSPITAL ENCOUNTER (OUTPATIENT)
Age: 31
Discharge: HOME | End: 2021-11-17
Payer: COMMERCIAL

## 2021-11-17 DIAGNOSIS — O09.899 HIGH RISK PREGNANCY WITH LOW PAPPA: ICD-10-CM

## 2021-11-17 DIAGNOSIS — Z3A.15 15 WEEKS GESTATION OF PREGNANCY: ICD-10-CM

## 2021-11-17 DIAGNOSIS — O28.0 HIGH RISK PREGNANCY WITH LOW PAPPA: ICD-10-CM

## 2021-11-17 PROCEDURE — 36415 COLL VENOUS BLD VENIPUNCTURE: CPT

## 2021-11-17 PROCEDURE — 82105 ALPHA-FETOPROTEIN SERUM: CPT

## 2021-11-20 LAB
AFP INTERPRETATION: NORMAL
AFP MOM: 1.35
AFP SPECIMEN: NORMAL
AFP: 74 NG/ML
DATE OF BIRTH: NORMAL
DATING METHOD: NORMAL
DETERMINED BY: NORMAL
DIABETIC: NO
DONOR EGG?: NO
DUE DATE: NORMAL
ESTIMATED DUE DATE: NORMAL
FAMILY HISTORY NTD: NO
GESTATIONAL AGE: NORMAL
IN VITRO FERTILIZATION: NO
INSULIN REQ DIABETES: NO
LAST MENSTRUAL PERIOD: NORMAL
MATERNAL AGE AT EDD: 30.1 YR
MATERNAL WEIGHT: 159
MONOCHORIONIC TWINS: NO
NUMBER OF FETUSES: NORMAL
PATIENT WEIGHT UNITS: NORMAL
PATIENT WEIGHT: NORMAL
RACE (MATERNAL): NORMAL
RACE: NORMAL
REPEAT SPECIMEN?: NO
SMOKING: NO
SMOKING: NO
VALPROIC/CARBAMAZEP: NO
ZZ NTE CLEAN UP: HISTORY: NO

## 2021-11-23 ENCOUNTER — ROUTINE PRENATAL (OUTPATIENT)
Dept: PERINATAL CARE | Age: 31
End: 2021-11-23
Payer: COMMERCIAL

## 2021-11-23 VITALS
TEMPERATURE: 96.8 F | RESPIRATION RATE: 16 BRPM | WEIGHT: 163 LBS | HEIGHT: 65 IN | SYSTOLIC BLOOD PRESSURE: 100 MMHG | BODY MASS INDEX: 27.16 KG/M2 | HEART RATE: 80 BPM | DIASTOLIC BLOOD PRESSURE: 60 MMHG

## 2021-11-23 DIAGNOSIS — Z3A.20 20 WEEKS GESTATION OF PREGNANCY: ICD-10-CM

## 2021-11-23 DIAGNOSIS — Z36.86 SCREENING, ANTENATAL, FOR RISK OF PRE-TERM LABOR: ICD-10-CM

## 2021-11-23 DIAGNOSIS — O34.219 PREVIOUS CESAREAN DELIVERY, ANTEPARTUM CONDITION OR COMPLICATION: ICD-10-CM

## 2021-11-23 DIAGNOSIS — O09.899 HIGH RISK PREGNANCY WITH LOW PAPPA (PREGNANCY-ASSOCIATED PLASMA PROTEIN A): Primary | ICD-10-CM

## 2021-11-23 DIAGNOSIS — O28.0 HIGH RISK PREGNANCY WITH LOW PAPPA (PREGNANCY-ASSOCIATED PLASMA PROTEIN A): Primary | ICD-10-CM

## 2021-11-23 LAB
ABDOMINAL CIRCUMFERENCE: NORMAL
ABDOMINAL CIRCUMFERENCE: NORMAL
BIPARIETAL DIAMETER: NORMAL
BIPARIETAL DIAMETER: NORMAL
ESTIMATED FETAL WEIGHT: NORMAL
ESTIMATED FETAL WEIGHT: NORMAL
FEMORAL DIAMETER: NORMAL
FEMORAL DIAMETER: NORMAL
HC/AC: NORMAL
HC/AC: NORMAL
HEAD CIRCUMFERENCE: NORMAL
HEAD CIRCUMFERENCE: NORMAL

## 2021-11-23 PROCEDURE — 76811 OB US DETAILED SNGL FETUS: CPT | Performed by: OBSTETRICS & GYNECOLOGY

## 2021-11-23 PROCEDURE — 76817 TRANSVAGINAL US OBSTETRIC: CPT | Performed by: OBSTETRICS & GYNECOLOGY

## 2021-11-23 NOTE — PROGRESS NOTES
Advise repeat 1-hour glucola between 24-28 weeks' gestation to evaluate for gestational diabetes. Patient declined invasive prenatal diagnostic testing (including evaluating for fetal aneuploidy, fetal microdeletions, fetal single gene etiologies, fetal microarray, etc.) today. Patient has opted for non-invasive prenatal diagnosis testing (with the [place] Complete test from [employer]) today. Patient declines Maternal-Fetal Medicine complete consultation at this time regarding the medical/obstetrical complications of pregnancy. Maternal-Fetal Medicine (MFM) attending physician will defer all management for the medical/obstetrical complications of pregnancy to the primary attending obstetrical physician, as a result. Therefore, only an ultrasound evaluation was completed today in the MFM office.

## 2021-12-16 ENCOUNTER — ROUTINE PRENATAL (OUTPATIENT)
Dept: OBGYN CLINIC | Age: 31
End: 2021-12-16
Payer: COMMERCIAL

## 2021-12-16 VITALS
DIASTOLIC BLOOD PRESSURE: 60 MMHG | BODY MASS INDEX: 27.62 KG/M2 | WEIGHT: 166 LBS | SYSTOLIC BLOOD PRESSURE: 102 MMHG | HEART RATE: 78 BPM

## 2021-12-16 DIAGNOSIS — O09.899 HIGH RISK PREGNANCY WITH LOW PAPPA: ICD-10-CM

## 2021-12-16 DIAGNOSIS — O28.0 HIGH RISK PREGNANCY WITH LOW PAPPA: ICD-10-CM

## 2021-12-16 DIAGNOSIS — R89.9 ABNORMAL LABORATORY TEST: ICD-10-CM

## 2021-12-16 DIAGNOSIS — Z86.32 HISTORY OF GESTATIONAL DIABETES: ICD-10-CM

## 2021-12-16 DIAGNOSIS — Z3A.23 23 WEEKS GESTATION OF PREGNANCY: Primary | ICD-10-CM

## 2021-12-16 PROCEDURE — G8417 CALC BMI ABV UP PARAM F/U: HCPCS | Performed by: OBSTETRICS & GYNECOLOGY

## 2021-12-16 PROCEDURE — 1036F TOBACCO NON-USER: CPT | Performed by: OBSTETRICS & GYNECOLOGY

## 2021-12-16 PROCEDURE — G8427 DOCREV CUR MEDS BY ELIG CLIN: HCPCS | Performed by: OBSTETRICS & GYNECOLOGY

## 2021-12-16 PROCEDURE — 99213 OFFICE O/P EST LOW 20 MIN: CPT | Performed by: OBSTETRICS & GYNECOLOGY

## 2021-12-16 PROCEDURE — G8482 FLU IMMUNIZE ORDER/ADMIN: HCPCS | Performed by: OBSTETRICS & GYNECOLOGY

## 2021-12-16 NOTE — PROGRESS NOTES
Gt Meyer is a 36 y.o. female 23w6d    [id number]    OB History    Para Term  AB Living   5 2 2 0 2 2   SAB IAB Ectopic Molar Multiple Live Births   1 1 0   0 2      # Outcome Date GA Lbr Roberto/2nd Weight Sex Delivery Anes PTL Lv   5 Current            4 SAB 2021           3 Term 19 39w2d  9 lb 12 oz (4.423 kg) M CS-LTranv   JERICHO   2 Term 17 39w2d 02:19 / 03:04 7 lb 10 oz (3.459 kg) M Vag-Spont EPI N JERICHO   1 IAB                Vitals  BP: 102/60  Weight: 166 lb (75.3 kg)  Pulse: 78  Patient Position: Sitting    The patient was seen and evaluated. There was positive fetal movements. No contractions or leakage of fluid. Signs and symptoms of  labor as well as labor were reviewed. The Nuchal Translucency testing was reviewed and found to be abnormal: low PAPP_A A single marker MSAFP was reviewed and found to be normal. The patients anatomy ultrasound has been completed and reviewed with patient. TOP ST OH Reviewed. A 28 week lab panel was ordered. This includes a (HH, 1 hr GTT, U/A C&S). The patient is to complete this in the next two to four weeks. The S/S of Pre-Eclampsia were reviewed with the patient in detail. She is to report any of these if they occur. She currently denies any of these. The patient is RH positive Rhogam Ordered no    The patient was instructed on fetal kick counts and was given a kick sheet to complete every 8 hours. This is to begin at 28 weeks gestation. She was instructed that the baby should move at a minimum of ten times within one hour after a meal. The patient was instructed to lay down on her left side twenty minutes after eating and count movements for up to one hour with a target value of ten movements. She was instructed to notify the office if she did not make that target after two attempts or if after any attempt there was less than four movements. 10/21/21 Pt given Flu Vacc  10/21/22 Tdap @ 27 wk gest       Assessment:  1.  Tavia Yee Fenton Aase is a 36 y.o. female  2. [id number]  3. 23w6d    Patient Active Problem List    Diagnosis Date Noted    High risk pregnancy with low PAPPA 10/01/2021     Priority: High     10/1/2021 consult MFM      history of LGA: 9#12oz 2021     Priority: High     (spontaneous vaginal delivery) 2021     Priority: High    PLTCS / M APG 8/9 Wt 9#12 2019     Priority: High     Pt considering       History of gestational diabetes 09/10/2018     Priority: High     Early 1 hour GTT ordered 9/10/2018  If wnl repeat at 28 weeks      Vitamin D deficiency 2020    Elevated fasting blood sugar 08/10/2020    Corneal rust ring 2020    Adult BMI 25.0-25.9 kg/sq m 2016    Generalized anxiety disorder 10/06/2015        Diagnosis Orders   1. 23 weeks gestation of pregnancy     2. Abnormal laboratory test     3.  (spontaneous vaginal delivery)     4. History of gestational diabetes     5. High risk pregnancy with low PAPPA           Plan:  The patient will return to the office for her next visit in 3 weeks. See antepartum flow sheet. [id number] [facility]  [address]  [phone]    [facility]  2021  Patient's last menstrual period was 2021 (exact date). INITIAL OBSTETRICAL VISIT EVALUATION:  The patient was seen full history and physical was completed/reviewed. Cytology was collected for patients over 24years of age. Cultures were collected. The patient was counseled on office policies and she was counseled on termination of pregnancy in the state of [place]. The patient was counseled on Toxoplasmosis, HIV, Tobacco Abuse, Group Beta Strep Infections, Cystic Fibrosis,  Labor precautions and Sickle Cell disease. The patient was counseled on the risks of tobacco abuse.  Both maternal and fetal. She was instructed to stop smoking if currently using tobacco. Morbidity, mortality, and cessation programs were reviewed. The risks include but are not limited to increased risks of  labor,  delivery, premature rupture of membranes, intrauterine growth restriction, intrauterine fetal demise and abruptio placenta. Secondary smoke risks were also reviewed. Increases in cancer, respiratory problems, and sudden infant death syndrome were reviewed as well. The patient was informed of a 2-4% risk of congenital anomalies in the general population. She was also informed that karyotyping is the only way to evaluate the fetus for genetic problems and genetic lethal anomalies. Chorionic villous sampling, amniocentesis and Maternal Genetic Blood Sampling-(NIPT Testing) were also discussed with morbidity rates in detail. She declined any of the options. Route of delivery and counseling on vaginal, operative vaginal, and  sections were completed with the risks of each to both the patient as well as her baby. The possibility of a blood transfusion was discussed as well. The patient was not opposed to receiving a transfusion if needed. Nuchal translucency and MSAFP single marker testing was reviewed in detail with attention to timing of testing and their windows. For patients beyond the gestational age for Nuchal translucency evaluation, ([id number]) Quad testing was recommended. Timing for the Quad test was reviewed. Benefits of the above testing was reviewed. A second trimester amniocentesis was also made available to the patient. Risks, Benefits and non-invasive alternative testing was reviewed. The patients diagnosed with Advanced maternal age, AMA (age of 27 yo or beyond at delivery), had NIPT recommended. This was discussed in lay terms with Risks and Benefits reviewed.     The literature regarding a questionable link to pitocin augmentation and induction of labor, the assistance of labor contractions and the initiation of contractions to help delivery, have been reviewed with the patient regarding the increased potential of having a  with Attention Deficit Hyperactivity Disorder and or Autism. These two disorders and the ramifications of their impact on a child and the family caring for that child has been reviewed with the patient in detail. She was given the risks, benefits and alternatives of the use of this medication. She has agreed to its use in the delivery of her unborn child if needed at the time of delivery, Yes. The patient was questioned in detail regarding any genetic misnomer history, chromosomal abnormalities, or learning disabilities in  herself, the father of the baby or their families. SHE DENIED ANY HISTORY AS STATED ABOVE: Yes    Upon completion of the visit all questions were answered and the patients follow-up and testing schedule were reviewed. Prenatal vitamins were given. Pt wishes to have a repeat  2022 at [facility]. Pt declined TOLAC and was counseled    Patient was seen with total face to face time of 20 minutes. More than 50% of this visit was on counseling and education regarding her    Diagnosis Orders   1. 23 weeks gestation of pregnancy     2. Abnormal laboratory test     3.  (spontaneous vaginal delivery)     4. History of gestational diabetes     5. High risk pregnancy with low PAPPA      and her options. She was also counseled on her preventative health maintenance recommendations and follow-up.

## 2021-12-30 ENCOUNTER — ROUTINE PRENATAL (OUTPATIENT)
Dept: PERINATAL CARE | Age: 31
End: 2021-12-30
Payer: COMMERCIAL

## 2021-12-30 VITALS
BODY MASS INDEX: 27.99 KG/M2 | SYSTOLIC BLOOD PRESSURE: 118 MMHG | TEMPERATURE: 97.1 F | DIASTOLIC BLOOD PRESSURE: 62 MMHG | WEIGHT: 168 LBS | HEIGHT: 65 IN | RESPIRATION RATE: 16 BRPM | HEART RATE: 92 BPM

## 2021-12-30 DIAGNOSIS — O34.219 PREVIOUS CESAREAN DELIVERY AFFECTING PREGNANCY: ICD-10-CM

## 2021-12-30 DIAGNOSIS — O28.0 HIGH RISK PREGNANCY WITH LOW PAPPA (PREGNANCY-ASSOCIATED PLASMA PROTEIN A): Primary | ICD-10-CM

## 2021-12-30 DIAGNOSIS — O09.899 HIGH RISK PREGNANCY WITH LOW PAPPA (PREGNANCY-ASSOCIATED PLASMA PROTEIN A): Primary | ICD-10-CM

## 2021-12-30 LAB
ABDOMINAL CIRCUMFERENCE: NORMAL
BIPARIETAL DIAMETER: NORMAL
ESTIMATED FETAL WEIGHT: NORMAL
FEMORAL DIAMETER: NORMAL
HC/AC: NORMAL
HEAD CIRCUMFERENCE: NORMAL

## 2021-12-30 PROCEDURE — 76816 OB US FOLLOW-UP PER FETUS: CPT | Performed by: OBSTETRICS & GYNECOLOGY

## 2021-12-30 PROCEDURE — 76820 UMBILICAL ARTERY ECHO: CPT | Performed by: OBSTETRICS & GYNECOLOGY

## 2022-01-10 ENCOUNTER — PROCEDURE VISIT (OUTPATIENT)
Dept: OBGYN CLINIC | Age: 32
End: 2022-01-10
Payer: COMMERCIAL

## 2022-01-10 ENCOUNTER — ROUTINE PRENATAL (OUTPATIENT)
Dept: OBGYN CLINIC | Age: 32
End: 2022-01-10
Payer: COMMERCIAL

## 2022-01-10 VITALS
DIASTOLIC BLOOD PRESSURE: 78 MMHG | HEART RATE: 80 BPM | WEIGHT: 173 LBS | SYSTOLIC BLOOD PRESSURE: 118 MMHG | BODY MASS INDEX: 28.79 KG/M2

## 2022-01-10 DIAGNOSIS — U07.1 COVID-19 AFFECTING PREGNANCY, ANTEPARTUM: ICD-10-CM

## 2022-01-10 DIAGNOSIS — R29.898: Primary | ICD-10-CM

## 2022-01-10 DIAGNOSIS — O98.519 COVID-19 AFFECTING PREGNANCY, ANTEPARTUM: ICD-10-CM

## 2022-01-10 DIAGNOSIS — Z86.32 HISTORY OF GESTATIONAL DIABETES: ICD-10-CM

## 2022-01-10 DIAGNOSIS — O09.899 HIGH RISK PREGNANCY WITH LOW PAPPA: ICD-10-CM

## 2022-01-10 DIAGNOSIS — R89.9 ABNORMAL LABORATORY TEST: Primary | ICD-10-CM

## 2022-01-10 DIAGNOSIS — O26.849 UTERINE SIZE-DATE DISCREPANCY, ANTEPARTUM: ICD-10-CM

## 2022-01-10 DIAGNOSIS — Z3A.27 27 WEEKS GESTATION OF PREGNANCY: ICD-10-CM

## 2022-01-10 DIAGNOSIS — O28.0 HIGH RISK PREGNANCY WITH LOW PAPPA: ICD-10-CM

## 2022-01-10 PROCEDURE — G8482 FLU IMMUNIZE ORDER/ADMIN: HCPCS | Performed by: NURSE PRACTITIONER

## 2022-01-10 PROCEDURE — G8427 DOCREV CUR MEDS BY ELIG CLIN: HCPCS | Performed by: NURSE PRACTITIONER

## 2022-01-10 PROCEDURE — 99213 OFFICE O/P EST LOW 20 MIN: CPT | Performed by: NURSE PRACTITIONER

## 2022-01-10 PROCEDURE — 76815 OB US LIMITED FETUS(S): CPT | Performed by: OBSTETRICS & GYNECOLOGY

## 2022-01-10 PROCEDURE — G8417 CALC BMI ABV UP PARAM F/U: HCPCS | Performed by: NURSE PRACTITIONER

## 2022-01-10 PROCEDURE — 1036F TOBACCO NON-USER: CPT | Performed by: NURSE PRACTITIONER

## 2022-01-10 NOTE — PROGRESS NOTES
Berenice Aldridge is a 36 y.o. female 27w3d    [id number]    OB History    Para Term  AB Living   5 2 2 0 2 2   SAB IAB Ectopic Molar Multiple Live Births   1 1 0   0 2      # Outcome Date GA Lbr Roberto/2nd Weight Sex Delivery Anes PTL Lv   5 Current            4 SAB 2021           3 Term 19 39w2d  9 lb 12 oz (4.423 kg) M CS-LTranv   JERICHO   2 Term 17 39w2d 02:19 / 03:04 7 lb 10 oz (3.459 kg) M Vag-Spont EPI N JERICHO   1 IAB                Vitals  BP: 118/78  Weight: 173 lb (78.5 kg)  Pulse: 80  Patient Position: Sitting      The patient was seen and evaluated. There was positive fetal movements. No contractions or leakage of fluid. Signs and symptoms of  labor as well as labor were reviewed. The S/S of Pre-Eclampsia were reviewed with the patient in detail. She is to report any of these if they occur. She currently denies any of these. Patient recently recovered from Health system.  1 1/2 weeks ago- called on call physician due to symptoms with COVID- constant coughing. Patient reports suggested to have follow up ultrasound after clear from Health system. Patient currently denies SROM, vaginal bleeding, or cramping, contractions. The patient had her 28 week labs ordered. No visits with results within 5 Week(s) from this visit.    Latest known visit with results is:   Hospital Outpatient Visit on 2021   Component Date Value Ref Range Status    Date of Birth 2021 7,007,745   Final    Maternal Weight 2021 159   Final    Patient Weight Units 2021 POUNDS   Final    Due Date 2021 SEE NOTE   Final    Comment: Results for Estimated Due Date: 22       Determined by 2021 Ultrasound   Final    Last Menstrual Period 2021 7,177,944   Final    Monochorionic Twins 2021 NO   Final    Race (Maternal) 2021    Final    Diabetic 2021 NO   Final    Smoking 2021 NO   Final    Valproic/Carbamazep 2021 NO   Final    awaiting    The patient was instructed on fetal kick counts and was given a kick sheet to complete every 8 hours. She was instructed that the baby should move at a minimum of ten times within one hour after a meal. The patient was instructed to lay down on her left side twenty minutes after eating and count movements for up to one hour with a target value of ten movements. She was instructed to notify the office if she did not make that target after two attempts or if after any attempt there was less than four movements. The patient reports that the targets have been made Yes.  Testing:  Weekly testing starting at 32 weeks    Assessment:  Camryn Kent is a 36 y.o. female  2. [id number]  3. 27w3d    Patient Active Problem List    Diagnosis Date Noted    High risk pregnancy with low PAPPA 10/01/2021     Priority: High     10/1/2021 consult MFM      history of LGA: 9#12oz 2021     Priority: High     (spontaneous vaginal delivery) 2021     Priority: High    History of gestational diabetes 09/10/2018     Priority: High     Early 1 hour GTT ordered 9/10/2018  If wnl repeat at 28 weeks      Vitamin D deficiency 2020    Elevated fasting blood sugar 08/10/2020    Corneal rust ring 2020    PLTCS 19 M APG 8/9 Wt 9#12 2019     Previous LTC C/S wants repeat      Adult BMI 25.0-25.9 kg/sq m 2016    Generalized anxiety disorder 10/06/2015        Diagnosis Orders   1. Abnormal laboratory test     2.  (spontaneous vaginal delivery)     3. History of gestational diabetes     4. High risk pregnancy with low PAPPA     5. 27 weeks gestation of pregnancy  Glucose tolerance, 1 hour    CBC Auto Differential    Urinalysis Reflex to Culture    US OB 1 or More Fetus Limited - Clinic Performed   6.  Uterine size-date discrepancy, antepartum  US OB 1 or More Fetus Limited - Clinic Performed             Plan:  The patient will return to the office for her next visit in 2 weeks. See antepartum flow sheet. Follow up ultrasound in office. 28 week labs given. Fetal kick count sheet. Counseling on Fetal Movement Kick Counts: The patient was instructed on fetal kick counts and was given a kick sheet to complete every 8 hours. She was instructed that the baby should move at a minimum of ten times within one hour after a meal. The patient was instructed to lay down on her left side twenty minutes after eating and count movements for up to one hour with a target value of ten movements. She was instructed to notify the office if she did not make that target after two attempts or if after any attempt there was less than four movements. The patient reports that the targets have been made.  Testing Indicated: Yes  Scheduled with Nursing-Pt notified: Yes      Patient was seen with total face to face time of 20 minutes. More than 50% of this visit was on counseling and education regarding her    Diagnosis Orders   1. Abnormal laboratory test     2.  (spontaneous vaginal delivery)     3. History of gestational diabetes     4. High risk pregnancy with low PAPPA     5. 27 weeks gestation of pregnancy  Glucose tolerance, 1 hour    CBC Auto Differential    Urinalysis Reflex to Culture    US OB 1 or More Fetus Limited - Clinic Performed   6. Uterine size-date discrepancy, antepartum  US OB 1 or More Fetus Limited - Clinic Performed    and her options. She was also counseled on her preventative health maintenance recommendations and follow-up.

## 2022-01-18 ENCOUNTER — HOSPITAL ENCOUNTER (OUTPATIENT)
Age: 32
Discharge: HOME | End: 2022-01-18
Payer: COMMERCIAL

## 2022-01-18 DIAGNOSIS — Z3A.27 27 WEEKS GESTATION OF PREGNANCY: ICD-10-CM

## 2022-01-18 LAB
ABSOLUTE EOS #: 0 K/UL (ref 0–0.4)
ABSOLUTE IMMATURE GRANULOCYTE: ABNORMAL K/UL (ref 0–0.3)
ABSOLUTE LYMPH #: 1.2 K/UL (ref 1–4.8)
ABSOLUTE MONO #: 0.4 K/UL (ref 0.1–1.3)
BASOPHILS # BLD: 0 % (ref 0–2)
BASOPHILS ABSOLUTE: 0 K/UL (ref 0–0.2)
BILIRUBIN URINE: NEGATIVE
COLOR: YELLOW
COMMENT UA: NORMAL
DIFFERENTIAL TYPE: ABNORMAL
EOSINOPHILS RELATIVE PERCENT: 1 % (ref 0–4)
GLUCOSE ADMINISTRATION: NORMAL
GLUCOSE TOLERANCE SCREEN 50G: 103 MG/DL (ref 70–135)
GLUCOSE URINE: NEGATIVE
HCT VFR BLD CALC: 34 % (ref 36–46)
HEMOGLOBIN: 11.6 G/DL (ref 12–16)
IMMATURE GRANULOCYTES: ABNORMAL %
KETONES, URINE: NEGATIVE
LEUKOCYTE ESTERASE, URINE: NEGATIVE
LYMPHOCYTES # BLD: 18 % (ref 24–44)
MCH RBC QN AUTO: 29.1 PG (ref 26–34)
MCHC RBC AUTO-ENTMCNC: 34.1 G/DL (ref 31–37)
MCV RBC AUTO: 85.3 FL (ref 80–100)
MONOCYTES # BLD: 6 % (ref 1–7)
NITRITE, URINE: NEGATIVE
NRBC AUTOMATED: ABNORMAL PER 100 WBC
PDW BLD-RTO: 13.3 % (ref 11.5–14.9)
PH UA: 7 (ref 5–8)
PLATELET # BLD: 214 K/UL (ref 150–450)
PLATELET ESTIMATE: ABNORMAL
PMV BLD AUTO: 7.4 FL (ref 6–12)
PROTEIN UA: NEGATIVE
RBC # BLD: 3.98 M/UL (ref 4–5.2)
RBC # BLD: ABNORMAL 10*6/UL
SEG NEUTROPHILS: 75 % (ref 36–66)
SEGMENTED NEUTROPHILS ABSOLUTE COUNT: 5.2 K/UL (ref 1.3–9.1)
SPECIFIC GRAVITY UA: 1.01 (ref 1–1.03)
TURBIDITY: CLEAR
URINE HGB: NEGATIVE
UROBILINOGEN, URINE: NORMAL
WBC # BLD: 6.8 K/UL (ref 3.5–11)
WBC # BLD: ABNORMAL 10*3/UL

## 2022-01-18 PROCEDURE — 85025 COMPLETE CBC W/AUTO DIFF WBC: CPT

## 2022-01-18 PROCEDURE — 82950 GLUCOSE TEST: CPT

## 2022-01-18 PROCEDURE — 81003 URINALYSIS AUTO W/O SCOPE: CPT

## 2022-01-18 PROCEDURE — 36415 COLL VENOUS BLD VENIPUNCTURE: CPT

## 2022-01-25 ENCOUNTER — ROUTINE PRENATAL (OUTPATIENT)
Dept: OBGYN CLINIC | Age: 32
End: 2022-01-25
Payer: COMMERCIAL

## 2022-01-25 VITALS
BODY MASS INDEX: 29.12 KG/M2 | DIASTOLIC BLOOD PRESSURE: 60 MMHG | WEIGHT: 175 LBS | SYSTOLIC BLOOD PRESSURE: 102 MMHG | HEART RATE: 93 BPM

## 2022-01-25 DIAGNOSIS — R89.9 ABNORMAL LABORATORY TEST: ICD-10-CM

## 2022-01-25 DIAGNOSIS — Z23 NEED FOR PROPHYLACTIC VACCINATION WITH COMBINED DIPHTHERIA-TETANUS-PERTUSSIS (DTP) VACCINE: ICD-10-CM

## 2022-01-25 DIAGNOSIS — U07.1 COVID-19 AFFECTING PREGNANCY, ANTEPARTUM: ICD-10-CM

## 2022-01-25 DIAGNOSIS — Z3A.29 29 WEEKS GESTATION OF PREGNANCY: ICD-10-CM

## 2022-01-25 DIAGNOSIS — Z86.32 HISTORY OF GESTATIONAL DIABETES: ICD-10-CM

## 2022-01-25 DIAGNOSIS — O09.899 HIGH RISK PREGNANCY WITH LOW PAPPA: Primary | ICD-10-CM

## 2022-01-25 DIAGNOSIS — O98.519 COVID-19 AFFECTING PREGNANCY, ANTEPARTUM: ICD-10-CM

## 2022-01-25 DIAGNOSIS — O28.0 HIGH RISK PREGNANCY WITH LOW PAPPA: Primary | ICD-10-CM

## 2022-01-25 PROCEDURE — 99213 OFFICE O/P EST LOW 20 MIN: CPT | Performed by: OBSTETRICS & GYNECOLOGY

## 2022-01-25 PROCEDURE — 1036F TOBACCO NON-USER: CPT | Performed by: OBSTETRICS & GYNECOLOGY

## 2022-01-25 PROCEDURE — G8427 DOCREV CUR MEDS BY ELIG CLIN: HCPCS | Performed by: OBSTETRICS & GYNECOLOGY

## 2022-01-25 PROCEDURE — 90471 IMMUNIZATION ADMIN: CPT | Performed by: OBSTETRICS & GYNECOLOGY

## 2022-01-25 PROCEDURE — G8417 CALC BMI ABV UP PARAM F/U: HCPCS | Performed by: OBSTETRICS & GYNECOLOGY

## 2022-01-25 PROCEDURE — G8482 FLU IMMUNIZE ORDER/ADMIN: HCPCS | Performed by: OBSTETRICS & GYNECOLOGY

## 2022-01-25 PROCEDURE — 90715 TDAP VACCINE 7 YRS/> IM: CPT | Performed by: OBSTETRICS & GYNECOLOGY

## 2022-01-25 RX ORDER — VITAMIN A, VITAMIN C, VITAMIN D-3, VITAMIN E, VITAMIN B-1, VITAMIN B-2, NIACIN, VITAMIN B-6, CALCIUM, IRON, ZINC, COPPER 4000; 120; 400; 22; 1.84; 3; 20; 10; 1; 12; 200; 27; 25; 2 [IU]/1; MG/1; [IU]/1; MG/1; MG/1; MG/1; MG/1; MG/1; MG/1; UG/1; MG/1; MG/1; MG/1; MG/1
TABLET ORAL
COMMUNITY
Start: 2022-01-17

## 2022-01-25 NOTE — PROGRESS NOTES
Delroy Candelaria is a 36 y.o. female 29w4d    [id number]    OB History    Para Term  AB Living   5 2 2 0 2 2   SAB IAB Ectopic Molar Multiple Live Births   1 1 0   0 2      # Outcome Date GA Lbr Roberto/2nd Weight Sex Delivery Anes PTL Lv   5 Current            4 SAB 2021           3 Term 19 39w2d  9 lb 12 oz (4.423 kg) M CS-LTranv   JERICHO   2 Term 17 39w2d 02:19 / 03:04 7 lb 10 oz (3.459 kg) M Vag-Spont EPI N JERICHO   1 IAB                Vitals  BP: 102/60  Weight: 175 lb (79.4 kg)  Pulse: 93  Patient Position: Sitting  Albumin: Negative  Glucose: Negative      The patient was seen and evaluated. There was positive fetal movements. No contractions or leakage of fluid. Signs and symptoms of  labor as well as labor were reviewed. The S/S of Pre-Eclampsia were reviewed with the patient in detail. She is to report any of these if they occur. She currently denies any of these. The patient had her 28 week labs completed. Hospital Outpatient Visit on 2022   Component Date Value Ref Range Status    Color, UA 2022 Yellow  Yellow Final    Turbidity UA 2022 Clear  Clear Final    Glucose, Ur 2022 NEGATIVE  NEGATIVE Final    Bilirubin Urine 2022 NEGATIVE  NEGATIVE Final    Ketones, Urine 2022 NEGATIVE  NEGATIVE Final    Specific Gravity, UA 2022 1.009  1.000 - 1.030 Final    Urine Hgb 2022 NEGATIVE  NEGATIVE Final    pH, UA 2022 7.0  5.0 - 8.0 Final    Protein, UA 2022 NEGATIVE  NEGATIVE Final    Urobilinogen, Urine 2022 Normal  Normal Final    Nitrite, Urine 2022 NEGATIVE  NEGATIVE Final    Leukocyte Esterase, Urine 2022 NEGATIVE  NEGATIVE Final    Urinalysis Comments 2022 Microscopic exam not performed based on chemical results unless requested in original order.    Final    WBC 2022 6.8  3.5 - 11.0 k/uL Final    RBC 2022 3.98* 4.0 - 5.2 m/uL Final    Hemoglobin 2022 11.6* 12.0 - 16.0 g/dL Final    Hematocrit 2022 34.0* 36 - 46 % Final    MCV 2022 85.3  80 - 100 fL Final    MCH 2022 29.1  26 - 34 pg Final    MCHC 2022 34.1  31 - 37 g/dL Final    RDW 2022 13.3  11.5 - 14.9 % Final    Platelets  214  150 - 450 k/uL Final    MPV 2022 7.4  6.0 - 12.0 fL Final    NRBC Automated 2022 NOT REPORTED  per 100 WBC Final    Differential Type 2022 NOT REPORTED   Final    Seg Neutrophils 2022 75* 36 - 66 % Final    Lymphocytes 2022 18* 24 - 44 % Final    Monocytes 2022 6  1 - 7 % Final    Eosinophils % 2022 1  0 - 4 % Final    Basophils 2022 0  0 - 2 % Final    Immature Granulocytes 2022 NOT REPORTED  0 % Final    Segs Absolute 2022 5.20  1.3 - 9.1 k/uL Final    Absolute Lymph # 2022 1.20  1.0 - 4.8 k/uL Final    Absolute Mono # 2022 0.40  0.1 - 1.3 k/uL Final    Absolute Eos # 2022 0.00  0.0 - 0.4 k/uL Final    Basophils Absolute 2022 0.00  0.0 - 0.2 k/uL Final    Absolute Immature Granulocyte 2022 NOT REPORTED  0.00 - 0.30 k/uL Final    WBC Morphology 2022 NOT REPORTED   Final    RBC Morphology 2022 NOT REPORTED   Final    Platelet Estimate  NOT REPORTED   Final    GLU ADMN 2022 Glucola   Final    Glucose tolerance screen 50g 2022 103  70 - 135 mg/dL Final   ]    22 Tdap given  10/21/21 Pt given Flu Vacc    Previous  wishes to have repeat . [facility]      T-Dap Vaccine (27-36 weeks): completed    The patient was instructed on fetal kick counts and was given a kick sheet to complete every 8 hours.  She was instructed that the baby should move at a minimum of ten times within one hour after a meal. The patient was instructed to lay down on her left side twenty minutes after eating and count movements for up to one hour with a target value of ten movements. She was instructed to notify the office if she did not make that target after two attempts or if after any attempt there was less than four movements. The patient reports that the targets have been made Yes.  Testing:  Starting at 32 weeks with MFM    Assessment:  Camryn Aldridge is a 36 y.o. female  2. [id number]  3. 29w4d    Patient Active Problem List    Diagnosis Date Noted    COVID-19 affecting pregnancy 01/10/2022     Priority: High    High risk pregnancy with low PAPPA 10/01/2021     Priority: High     10/1/2021 consult MFM      history of LGA: 9#12oz 2021     Priority: High     (spontaneous vaginal delivery) 2021     Priority: High    PLTCS 19 M APG 8/9 Wt 9#12 2019     Priority: High     Previous LTC C/S wants repeat      History of gestational diabetes 09/10/2018     Priority: High     Early 1 hour GTT ordered 9/10/2018  If wnl repeat at 28 weeks      Vitamin D deficiency 2020    Elevated fasting blood sugar 08/10/2020    Corneal rust ring 2020    Adult BMI 25.0-25.9 kg/sq m 2016    Generalized anxiety disorder 10/06/2015        Diagnosis Orders   1. High risk pregnancy with low PAPPA  Tdap (age 6y and older) IM ([address])   2. COVID-19 affecting pregnancy, antepartum     3. Abnormal laboratory test     4.  (spontaneous vaginal delivery)     5. History of gestational diabetes     6. Need for prophylactic vaccination with combined diphtheria-tetanus-pertussis (DTP) vaccine  Tdap (age 6y and older) IM ([address])   7. 34 weeks gestation of pregnancy  Tdap (age 6y and older) IM ([address])             Plan:  The patient will return to the office for her next visit in 2 weeks. See antepartum flow sheet.  Testing Indicated: Yes  Scheduled with Nursing-Pt notified: Yes      Patient was seen with total face to face time of 20 minutes.  More than 50% of this visit was on counseling and education regarding her Diagnosis Orders   1. High risk pregnancy with low PAPPA  Tdap (age 6y and older) IM ([address])   2. COVID-19 affecting pregnancy, antepartum     3. Abnormal laboratory test     4.  (spontaneous vaginal delivery)     5. History of gestational diabetes     6. Need for prophylactic vaccination with combined diphtheria-tetanus-pertussis (DTP) vaccine  Tdap (age 6y and older) IM ([address])   7. 34 weeks gestation of pregnancy  Tdap (age 6y and older) IM ([address])    and her options. She was also counseled on her preventative health maintenance recommendations and follow-up.

## 2022-01-28 ENCOUNTER — HOSPITAL ENCOUNTER (OUTPATIENT)
Age: 32
Discharge: HOME | End: 2022-01-28
Attending: OBSTETRICS & GYNECOLOGY | Admitting: OBSTETRICS & GYNECOLOGY
Payer: COMMERCIAL

## 2022-01-28 ENCOUNTER — TELEPHONE (OUTPATIENT)
Dept: OBGYN CLINIC | Age: 32
End: 2022-01-28

## 2022-01-28 VITALS
DIASTOLIC BLOOD PRESSURE: 72 MMHG | TEMPERATURE: 98 F | OXYGEN SATURATION: 97 % | BODY MASS INDEX: 29.88 KG/M2 | RESPIRATION RATE: 18 BRPM | HEIGHT: 64 IN | SYSTOLIC BLOOD PRESSURE: 111 MMHG | HEART RATE: 78 BPM | WEIGHT: 175 LBS

## 2022-01-28 PROBLEM — Z3A.30 30 WEEKS GESTATION OF PREGNANCY: Status: ACTIVE | Noted: 2022-01-28

## 2022-01-28 PROCEDURE — 99213 OFFICE O/P EST LOW 20 MIN: CPT

## 2022-01-28 NOTE — H&P
OBSTETRICAL HISTORY [facility]    Date: 2022       Time: 12:55 PM   Patient Name: Hallie Armas     Patient : 1990  Room/Bed: [place] [address]    Admission Date/Time: 2022 11:51 AM    CC: Decreased Fetal Activity and right sided abdominal pain     HPI: Hallie Armas is a 36 y.o. [id number] at 30w0d who presents with decreased fetal activity this morning as well as right sided abdominal pain. Now, patient feels more fetal activity. Over the last few days she has been having an ache in her abdomen just right of midline. She describes it as a dull ache that does not radiate and is only present for a few seconds/minutes at a time. She had a similar pain intermittently over the last week, but it has not been as frequent as today. She also woke nauseated this morning, but this has since resolved. Patient also endorses white vaginal discharge which she has had throughout this pregnancy. The discharge has not increased or changed. No vaginal bleeding or loss of fluid. No dysuria or hematuria. Patient denies any fever, chills, N/V, headaches, vision changes, chest pain, shortness of breath, RUQ pain, and increased swelling/tenderness in bilateral lower extremities. DATING:  LMP: Patient's last menstrual period was 2021 (exact date).   Estimated Date of Delivery: 22   Based on: LMP c/w early ultrasound at 9 weeks 6 days    PREGNANCY RISK FACTORS:  Patient Active Problem List   Diagnosis    Generalized anxiety disorder    Adult BMI 25.0-25.9 kg/sq m    History of gestational diabetes    PLTCS 19 M APG  Wt 9#12    Corneal rust ring    Elevated fasting blood sugar    Vitamin D deficiency    history of LGA: 9#12oz 2019     (spontaneous vaginal delivery) 2017    High risk pregnancy with low PAPPA    COVID-19 affecting pregnancy    30 weeks gestation of pregnancy      Steroids Given In This Pregnancy:  no    REVIEW OF SYSTEMS:  Constitutional: negative fever, chills  HEENT: negative headaches, visual disturbances  Respiratory: negative dyspnea, cough, wheezing  Cardiovascular: negative chest pain, palpitations  Gastrointestinal: positive abdominal pain, negative RUQ pain, N/V, diarrhea, constipation  Genitourinary: positive vaginal discharge, negative dysuria, frequency, hesitancy, hematuria  Dermatological: negative rash  Hematologic: negative bruising  Immunologic/Lymphatic: negative recent illness, recent sick contact, LE swelling   Musculoskeletal: negative back pain, myalgias, arthralgias  Neurological: negative dizziness, weakness, loss of sensation, tingling  Behavior/Psych: negative depression, anxiety  Obstetrics: decreased fetal movement, negative LOF, negative vaginal bleeding, negative contractions, negative pelvic cramping    OBSTETRICAL HISTORY:   OB History    Para Term  AB Living   5 2 2 0 2 2   SAB IAB Ectopic Molar Multiple Live Births   1 1 0 0 0 2      # Outcome Date GA Lbr Roberto/2nd Weight Sex Delivery Anes PTL Lv   5 Current            4 SAB 2021           3 Term 19 39w2d  9 lb 12 oz (4.423 kg) M CS-LTranv   JERICHO   2 Term 17 39w2d 02:19 / 03:04 7 lb 10 oz (3.459 kg) M Vag-Spont EPI N JERICHO      Name: Joe Ramires: 8  Apgar5: 9   1 IAB              PAST MEDICAL HISTORY:   has a past medical history of Anemia, Anxiety, Generalized anxiety disorder, and Gestational diabetes mellitus in pregnancy. PAST SURGICAL HISTORY:   has a past surgical history that includes [place] tooth extraction and  section (N/A, 2019). ALLERGIES:  has No Known Allergies. MEDICATIONS:  Prior to Admission medications    Medication Sig Start Date End Date Taking?  Authorizing Provider   Prenatal Vit-Fe Fumarate-FA (PRENATAL VITAMIN PLUS LOW IRON) 27-1 MG TABS TAKE 1 TABLET BY MOUTH ONCE DAILY 22   Historical Provider, MD   Prenatal Vit-Fe Fumarate-FA (PRENATAL VITAMINS) 28-0.8 MG TABS Take 1 tablet by mouth daily 1/15/21 1/10/22  Lavelle Dickensdavid Rosa, APRN - CNP     FAMILY HISTORY:  family history includes Hypertension in her father and mother; No Known Problems in her brother. SOCIAL HISTORY:   reports that she has never smoked. She has never used smokeless tobacco. She reports previous alcohol use. She reports that she does not use drugs. VITALS:  Vitals:    01/28/22 1202   BP: 111/72   Pulse: 78   Resp: 18   Temp: 98 °F (36.7 °C)   TempSrc: Oral   SpO2: 97%   Weight: 175 lb (79.4 kg)   Height: 5' 4\" (1.626 m)     PHYSICAL EXAM:  Fetal Heart Monitor:  Baseline Heart Rate 135, moderate   variability, present accelerations, absent decelerations  Poteet: Luzma none     General appearance:  awake, alert, cooperative, no apparent distress, and appears stated age  HEENT: head atraumatic, normocephalic, moist mucous membranes, trachea midline  Neurologic:  alert, oriented, normal speech, no focal findings or movement disorder noted  Lungs:  No increased work of breathing, good air exchange, clear to auscultation bilaterally, no crackles or wheezing  Heart:  Normal apical impulse, regular rate and rhythm, normal S1 and S2, no S3 or S4, and no murmur noted    Abdomen:  gravid, tender just right of midline, and no rebound, guarding, or rigidity  Extremities:  no calf tenderness, non edematous  Musculoskeletal: Gross strength equal and intact throughout, no gross abnormalities  Psychiatric: Mood appropriate, normal affect   Rectal Exam: not indicated    ASSESSMENT & PLAN:  Jack Howard is a 36 y.o. female [id number] at 30w0d [facility]    Further communication regarding assessment and plan reviewed and discussed between nursing staff and Dr. Remi Vargas. Resident did not part take in this discussion.      Attending's Name: Dr. Tarik Tejada MD  Off service Ob/Gyn Resident  [facility]  1/28/2022, 12:55 PM

## 2022-01-28 NOTE — FLOWSHEET NOTE
Pt arrives with c/o decreased fetal movement and right sided abd pain. Denies leaking of fluid and vag bleeding. Pt to triage 2, given gown to change into and asked to obtain a urine sample.

## 2022-01-28 NOTE — FLOWSHEET NOTE
@1400  updated over the phone on pt's arrival, c/o, ctx pattern, and fht's. Plan of care discussed and v/o for d/c to home received.

## 2022-01-31 ENCOUNTER — TELEPHONE (OUTPATIENT)
Dept: FAMILY MEDICINE CLINIC | Age: 32
End: 2022-01-31

## 2022-01-31 NOTE — TELEPHONE ENCOUNTER
[facility] Initial Follow Up Call    Outreach made within 2 business days of discharge: Yes    Patient: Jefry Espinoza Patient : 1990   MRN: [id number]  Reason for Admission: There are no discharge diagnoses documented for the most recent discharge. Discharge Date: 22       Spoke with: JESUS    Discharge department/facility: [facility] Interactive Patient Contact:  Was patient able to fill all prescriptions: Yes  Was patient instructed to bring all medications to the follow-up visit: Yes  Is patient taking all medications as directed in the discharge summary?  Yes  Does patient understand their discharge instructions: Yes  Does patient have questions or concerns that need addressed prior to 7-14 day follow up office visit: no    Scheduled appointment with PCP within 7-14 days    Follow Up  Future Appointments   Date Time Provider Enrique Marie   2022  2:00 PM [address] [place] [address]   2/15/2022  9:30 AM ULTRASONOGRAPHER 1 Mat Fetal [facility]   2022  9:15 AM ULTRASONOGRAPHER 1 Mat Fetal [facility]   2022  9:30 AM ULTRASONOGRAPHER 2 Mat Fetal [facility]   3/7/2022  9:30 AM ULTRASONOGRAPHER 2 Mat Fetal [facility]   3/14/2022  9:30 AM ULTRASONOGRAPHER 2 Mat Fetal [facility]   3/21/2022  9:45 AM ULTRASONOGRAPHER 2 Mat Fetal [facility]   3/28/2022  9:45 AM ULTRASONOGRAPHER 2 Mat Fetal [facility]       Dustin Spring MA

## 2022-02-09 ENCOUNTER — ROUTINE PRENATAL (OUTPATIENT)
Dept: OBGYN CLINIC | Age: 32
End: 2022-02-09
Payer: COMMERCIAL

## 2022-02-09 VITALS
HEART RATE: 82 BPM | BODY MASS INDEX: 31.07 KG/M2 | WEIGHT: 181 LBS | DIASTOLIC BLOOD PRESSURE: 74 MMHG | SYSTOLIC BLOOD PRESSURE: 122 MMHG

## 2022-02-09 DIAGNOSIS — U07.1 COVID-19 AFFECTING PREGNANCY, ANTEPARTUM: ICD-10-CM

## 2022-02-09 DIAGNOSIS — O98.519 COVID-19 AFFECTING PREGNANCY, ANTEPARTUM: ICD-10-CM

## 2022-02-09 DIAGNOSIS — O28.0 HIGH RISK PREGNANCY WITH LOW PAPPA: ICD-10-CM

## 2022-02-09 DIAGNOSIS — R89.9 ABNORMAL LABORATORY TEST: ICD-10-CM

## 2022-02-09 DIAGNOSIS — Z86.32 HISTORY OF GESTATIONAL DIABETES: ICD-10-CM

## 2022-02-09 DIAGNOSIS — O09.899 HIGH RISK PREGNANCY WITH LOW PAPPA: ICD-10-CM

## 2022-02-09 PROCEDURE — 1036F TOBACCO NON-USER: CPT | Performed by: NURSE PRACTITIONER

## 2022-02-09 PROCEDURE — G8482 FLU IMMUNIZE ORDER/ADMIN: HCPCS | Performed by: NURSE PRACTITIONER

## 2022-02-09 PROCEDURE — G8417 CALC BMI ABV UP PARAM F/U: HCPCS | Performed by: NURSE PRACTITIONER

## 2022-02-09 PROCEDURE — G8427 DOCREV CUR MEDS BY ELIG CLIN: HCPCS | Performed by: NURSE PRACTITIONER

## 2022-02-09 PROCEDURE — 99213 OFFICE O/P EST LOW 20 MIN: CPT | Performed by: NURSE PRACTITIONER

## 2022-02-09 NOTE — PROGRESS NOTES
Nola Aguilar is a 36 y.o. female 31w5d    [id number]    OB History    Para Term  AB Living   5 2 2 0 2 2   SAB IAB Ectopic Molar Multiple Live Births   1 1 0   0 2      # Outcome Date GA Lbr Roberto/2nd Weight Sex Delivery Anes PTL Lv   5 Current            4 SAB 2021           3 Term 19 39w2d  9 lb 12 oz (4.423 kg) M CS-LTranv   JERICHO   2 Term 17 39w2d 02:19 / 03:04 7 lb 10 oz (3.459 kg) M Vag-Spont EPI N JERICHO   1 IAB                Vitals  BP: 122/74  Weight: 181 lb (82.1 kg)  Pulse: 82  Patient Position: Sitting  Albumin: Negative  Glucose: Negative      The patient was seen and evaluated. There was positive fetal movements. No contractions or leakage of fluid. Signs and symptoms of  labor as well as labor were reviewed. The S/S of Pre-Eclampsia were reviewed with the patient in detail. She is to report any of these if they occur. She currently denies any of these. The patient had her 28 week labs completed. Hospital Outpatient Visit on 2022   Component Date Value Ref Range Status    Color, UA 2022 Yellow  Yellow Final    Turbidity UA 2022 Clear  Clear Final    Glucose, Ur 2022 NEGATIVE  NEGATIVE Final    Bilirubin Urine 2022 NEGATIVE  NEGATIVE Final    Ketones, Urine 2022 NEGATIVE  NEGATIVE Final    Specific Gravity, UA 2022 1.009  1.000 - 1.030 Final    Urine Hgb 2022 NEGATIVE  NEGATIVE Final    pH, UA 2022 7.0  5.0 - 8.0 Final    Protein, UA 2022 NEGATIVE  NEGATIVE Final    Urobilinogen, Urine 2022 Normal  Normal Final    Nitrite, Urine 2022 NEGATIVE  NEGATIVE Final    Leukocyte Esterase, Urine 2022 NEGATIVE  NEGATIVE Final    Urinalysis Comments 2022 Microscopic exam not performed based on chemical results unless requested in original order.    Final    WBC 2022 6.8  3.5 - 11.0 k/uL Final    RBC 2022 3.98* 4.0 - 5.2 m/uL Final    Hemoglobin 2022 11.6* 12.0 - 16.0 g/dL Final    Hematocrit 2022 34.0* 36 - 46 % Final    MCV 2022 85.3  80 - 100 fL Final    MCH 2022 29.1  26 - 34 pg Final    MCHC 2022 34.1  31 - 37 g/dL Final    RDW 2022 13.3  11.5 - 14.9 % Final    Platelets  214  150 - 450 k/uL Final    MPV 2022 7.4  6.0 - 12.0 fL Final    NRBC Automated 2022 NOT REPORTED  per 100 WBC Final    Differential Type 2022 NOT REPORTED   Final    Seg Neutrophils 2022 75* 36 - 66 % Final    Lymphocytes 2022 18* 24 - 44 % Final    Monocytes 2022 6  1 - 7 % Final    Eosinophils % 2022 1  0 - 4 % Final    Basophils 2022 0  0 - 2 % Final    Immature Granulocytes 2022 NOT REPORTED  0 % Final    Segs Absolute 2022 5.20  1.3 - 9.1 k/uL Final    Absolute Lymph # 2022 1.20  1.0 - 4.8 k/uL Final    Absolute Mono # 2022 0.40  0.1 - 1.3 k/uL Final    Absolute Eos # 2022 0.00  0.0 - 0.4 k/uL Final    Basophils Absolute 2022 0.00  0.0 - 0.2 k/uL Final    Absolute Immature Granulocyte 2022 NOT REPORTED  0.00 - 0.30 k/uL Final    WBC Morphology 2022 NOT REPORTED   Final    RBC Morphology 2022 NOT REPORTED   Final    Platelet Estimate  NOT REPORTED   Final    GLU ADMN 2022 Glucola   Final    Glucose tolerance screen 50g 2022 103  70 - 135 mg/dL Final   ]    22 Tdap given  10/21/21 Pt given Flu Vacc    Previous  wishes to have repeat . Repeat  scheduled 2022 at [facility]-Dap Vaccine (27-36 weeks): completed    The patient was instructed on fetal kick counts and was given a kick sheet to complete every 8 hours.  She was instructed that the baby should move at a minimum of ten times within one hour after a meal. The patient was instructed to lay down on her left side twenty minutes after eating and count movements for up to one hour with a target value of ten movements. She was instructed to notify the office if she did not make that target after two attempts or if after any attempt there was less than four movements. The patient reports that the targets have been made Yes.  Testing:  Weekly with MFM starting at 32 weeks. Assessment:  1Martin Kent is a 36 y.o. female  2. [id number]  3. 31w5d    Patient Active Problem List    Diagnosis Date Noted    COVID-19 affecting pregnancy 01/10/2022     Priority: High    High risk pregnancy with low PAPPA 10/01/2021     Priority: High     10/1/2021 consult MFM      history of LGA: 9#12oz 2021     Priority: High     (spontaneous vaginal delivery) 2021     Priority: High    History of gestational diabetes 09/10/2018     Priority: High     Early 1 hour GTT ordered 9/10/2018  If wnl repeat at 28 weeks      30 weeks gestation of pregnancy 2022    Vitamin D deficiency 2020    Elevated fasting blood sugar 08/10/2020    Corneal rust ring 2020    PLTCS 19 M APG 8/9 Wt 9#12 2019     Previous LTC C/S wants repeat      Adult BMI 25.0-25.9 kg/sq m 2016    Generalized anxiety disorder 10/06/2015        Diagnosis Orders   1. COVID-19 affecting pregnancy, antepartum     2. High risk pregnancy with low PAPPA     3. Abnormal laboratory test     4.  (spontaneous vaginal delivery)     5. History of gestational diabetes               Plan:  The patient will return to the office for her next visit in 2 weeks. See antepartum flow sheet. [facility] 2/15/2022     Testing Indicated: Yes  Scheduled with Nursing-Pt notified: Yes      Patient was seen with total face to face time of 20 minutes. More than 50% of this visit was on counseling and education regarding her    Diagnosis Orders   1. COVID-19 affecting pregnancy, antepartum     2. High risk pregnancy with low PAPPA     3. Abnormal laboratory test     4.  (spontaneous vaginal delivery)     5.  History of gestational diabetes      and her options. She was also counseled on her preventative health maintenance recommendations and follow-up.

## 2022-02-15 ENCOUNTER — ROUTINE PRENATAL (OUTPATIENT)
Dept: PERINATAL CARE | Age: 32
End: 2022-02-15
Payer: COMMERCIAL

## 2022-02-15 VITALS
TEMPERATURE: 97.3 F | BODY MASS INDEX: 30.32 KG/M2 | WEIGHT: 182 LBS | HEART RATE: 90 BPM | DIASTOLIC BLOOD PRESSURE: 78 MMHG | HEIGHT: 65 IN | SYSTOLIC BLOOD PRESSURE: 116 MMHG

## 2022-02-15 DIAGNOSIS — Z13.89 ENCOUNTER FOR ROUTINE SCREENING FOR MALFORMATION USING ULTRASONICS: ICD-10-CM

## 2022-02-15 DIAGNOSIS — O34.219 PREVIOUS CESAREAN DELIVERY AFFECTING PREGNANCY: ICD-10-CM

## 2022-02-15 DIAGNOSIS — Z36.4 ANTENATAL SCREENING FOR FETAL GROWTH RETARDATION USING ULTRASONICS: ICD-10-CM

## 2022-02-15 DIAGNOSIS — O09.899 HIGH RISK PREGNANCY WITH LOW PAPPA (PREGNANCY-ASSOCIATED PLASMA PROTEIN A): Primary | ICD-10-CM

## 2022-02-15 DIAGNOSIS — Z3A.32 32 WEEKS GESTATION OF PREGNANCY: ICD-10-CM

## 2022-02-15 DIAGNOSIS — O28.0 HIGH RISK PREGNANCY WITH LOW PAPPA (PREGNANCY-ASSOCIATED PLASMA PROTEIN A): Primary | ICD-10-CM

## 2022-02-15 PROCEDURE — 76820 UMBILICAL ARTERY ECHO: CPT | Performed by: OBSTETRICS & GYNECOLOGY

## 2022-02-15 PROCEDURE — 76805 OB US >/= 14 WKS SNGL FETUS: CPT | Performed by: OBSTETRICS & GYNECOLOGY

## 2022-02-15 PROCEDURE — 99999 PR OFFICE/OUTPT VISIT,PROCEDURE ONLY: CPT | Performed by: OBSTETRICS & GYNECOLOGY

## 2022-02-15 PROCEDURE — 76818 FETAL BIOPHYS PROFILE W/NST: CPT | Performed by: OBSTETRICS & GYNECOLOGY

## 2022-02-21 ENCOUNTER — ROUTINE PRENATAL (OUTPATIENT)
Dept: PERINATAL CARE | Age: 32
End: 2022-02-21
Payer: COMMERCIAL

## 2022-02-21 VITALS
WEIGHT: 183 LBS | HEIGHT: 65 IN | BODY MASS INDEX: 30.49 KG/M2 | TEMPERATURE: 97.1 F | DIASTOLIC BLOOD PRESSURE: 74 MMHG | RESPIRATION RATE: 16 BRPM | HEART RATE: 80 BPM | SYSTOLIC BLOOD PRESSURE: 118 MMHG

## 2022-02-21 DIAGNOSIS — O28.0 HIGH RISK PREGNANCY WITH LOW PAPPA (PREGNANCY-ASSOCIATED PLASMA PROTEIN A): Primary | ICD-10-CM

## 2022-02-21 DIAGNOSIS — Z3A.33 33 WEEKS GESTATION OF PREGNANCY: ICD-10-CM

## 2022-02-21 DIAGNOSIS — O09.899 HIGH RISK PREGNANCY WITH LOW PAPPA (PREGNANCY-ASSOCIATED PLASMA PROTEIN A): Primary | ICD-10-CM

## 2022-02-21 PROCEDURE — 76820 UMBILICAL ARTERY ECHO: CPT | Performed by: OBSTETRICS & GYNECOLOGY

## 2022-02-21 PROCEDURE — 99999 PR OFFICE/OUTPT VISIT,PROCEDURE ONLY: CPT | Performed by: OBSTETRICS & GYNECOLOGY

## 2022-02-21 PROCEDURE — 76818 FETAL BIOPHYS PROFILE W/NST: CPT | Performed by: OBSTETRICS & GYNECOLOGY

## 2022-02-28 ENCOUNTER — ROUTINE PRENATAL (OUTPATIENT)
Dept: PERINATAL CARE | Age: 32
End: 2022-02-28
Payer: COMMERCIAL

## 2022-02-28 VITALS
HEART RATE: 88 BPM | DIASTOLIC BLOOD PRESSURE: 76 MMHG | TEMPERATURE: 97.1 F | SYSTOLIC BLOOD PRESSURE: 120 MMHG | WEIGHT: 183 LBS | HEIGHT: 65 IN | RESPIRATION RATE: 16 BRPM | BODY MASS INDEX: 30.49 KG/M2

## 2022-02-28 DIAGNOSIS — O09.899 HIGH RISK PREGNANCY WITH LOW PAPPA (PREGNANCY-ASSOCIATED PLASMA PROTEIN A): Primary | ICD-10-CM

## 2022-02-28 DIAGNOSIS — Z3A.34 34 WEEKS GESTATION OF PREGNANCY: ICD-10-CM

## 2022-02-28 DIAGNOSIS — O28.0 HIGH RISK PREGNANCY WITH LOW PAPPA (PREGNANCY-ASSOCIATED PLASMA PROTEIN A): Primary | ICD-10-CM

## 2022-02-28 PROCEDURE — 76820 UMBILICAL ARTERY ECHO: CPT | Performed by: OBSTETRICS & GYNECOLOGY

## 2022-02-28 PROCEDURE — 99999 PR OFFICE/OUTPT VISIT,PROCEDURE ONLY: CPT | Performed by: OBSTETRICS & GYNECOLOGY

## 2022-02-28 PROCEDURE — 76818 FETAL BIOPHYS PROFILE W/NST: CPT | Performed by: OBSTETRICS & GYNECOLOGY

## 2022-03-01 ENCOUNTER — ROUTINE PRENATAL (OUTPATIENT)
Dept: OBGYN CLINIC | Age: 32
End: 2022-03-01
Payer: COMMERCIAL

## 2022-03-01 ENCOUNTER — TELEPHONE (OUTPATIENT)
Dept: OBGYN CLINIC | Age: 32
End: 2022-03-01

## 2022-03-01 VITALS
HEART RATE: 88 BPM | DIASTOLIC BLOOD PRESSURE: 78 MMHG | WEIGHT: 184 LBS | SYSTOLIC BLOOD PRESSURE: 118 MMHG | BODY MASS INDEX: 30.62 KG/M2

## 2022-03-01 DIAGNOSIS — O28.0 HIGH RISK PREGNANCY WITH LOW PAPPA: ICD-10-CM

## 2022-03-01 DIAGNOSIS — O09.899 HIGH RISK PREGNANCY WITH LOW PAPPA: ICD-10-CM

## 2022-03-01 DIAGNOSIS — U07.1 COVID-19 AFFECTING PREGNANCY, ANTEPARTUM: ICD-10-CM

## 2022-03-01 DIAGNOSIS — Z86.32 HISTORY OF GESTATIONAL DIABETES: ICD-10-CM

## 2022-03-01 DIAGNOSIS — R89.9 ABNORMAL LABORATORY TEST: ICD-10-CM

## 2022-03-01 DIAGNOSIS — O98.519 COVID-19 AFFECTING PREGNANCY, ANTEPARTUM: ICD-10-CM

## 2022-03-01 DIAGNOSIS — Z3A.34 34 WEEKS GESTATION OF PREGNANCY: Primary | ICD-10-CM

## 2022-03-01 PROCEDURE — 1036F TOBACCO NON-USER: CPT | Performed by: OBSTETRICS & GYNECOLOGY

## 2022-03-01 PROCEDURE — G8417 CALC BMI ABV UP PARAM F/U: HCPCS | Performed by: OBSTETRICS & GYNECOLOGY

## 2022-03-01 PROCEDURE — G8482 FLU IMMUNIZE ORDER/ADMIN: HCPCS | Performed by: OBSTETRICS & GYNECOLOGY

## 2022-03-01 PROCEDURE — 99213 OFFICE O/P EST LOW 20 MIN: CPT | Performed by: OBSTETRICS & GYNECOLOGY

## 2022-03-01 PROCEDURE — G8427 DOCREV CUR MEDS BY ELIG CLIN: HCPCS | Performed by: OBSTETRICS & GYNECOLOGY

## 2022-03-01 NOTE — PROGRESS NOTES
Breezy Ball is a 36 y.o. female 34w4d    [id number]    OB History    Para Term  AB Living   5 2 2 0 2 2   SAB IAB Ectopic Molar Multiple Live Births   1 1 0   0 2      # Outcome Date GA Lbr Roberto/2nd Weight Sex Delivery Anes PTL Lv   5 Current            4 SAB 2021           3 Term 19 39w2d  9 lb 12 oz (4.423 kg) M CS-LTranv   JERICHO   2 Term 17 39w2d 02:19 / 03:04 7 lb 10 oz (3.459 kg) M Vag-Spont EPI N JERICHO   1 IAB                Vitals  BP: 118/78  Weight: 184 lb (83.5 kg)  Pulse: 88  Patient Position: Sitting  Albumin: Negative  Glucose: Negative      The patient was seen and evaluated. There was positive fetal movements. No contractions or leakage of fluid. Signs and symptoms of  labor as well as labor were reviewed. The S/S of Pre-Eclampsia were reviewed with the patient in detail. She is to report any of these if they occur. She currently denies any of these. The patient had her 28 week labs completed. No visits with results within 5 Week(s) from this visit. Latest known visit with results is:   Hospital Outpatient Visit on 2022   Component Date Value Ref Range Status    Color, UA 2022 Yellow  Yellow Final    Turbidity UA 2022 Clear  Clear Final    Glucose, Ur 2022 NEGATIVE  NEGATIVE Final    Bilirubin Urine 2022 NEGATIVE  NEGATIVE Final    Ketones, Urine 2022 NEGATIVE  NEGATIVE Final    Specific Gravity, UA 2022 1.009  1.000 - 1.030 Final    Urine Hgb 2022 NEGATIVE  NEGATIVE Final    pH, UA 2022 7.0  5.0 - 8.0 Final    Protein, UA 2022 NEGATIVE  NEGATIVE Final    Urobilinogen, Urine 2022 Normal  Normal Final    Nitrite, Urine 2022 NEGATIVE  NEGATIVE Final    Leukocyte Esterase, Urine 2022 NEGATIVE  NEGATIVE Final    Urinalysis Comments 2022 Microscopic exam not performed based on chemical results unless requested in original order.    Final    WBC 2022 6.8  3.5 - 11.0 k/uL Final    RBC 2022 3.98* 4.0 - 5.2 m/uL Final    Hemoglobin 2022 11.6* 12.0 - 16.0 g/dL Final    Hematocrit 2022 34.0* 36 - 46 % Final    MCV 2022 85.3  80 - 100 fL Final    MCH 2022 29.1  26 - 34 pg Final    MCHC 2022 34.1  31 - 37 g/dL Final    RDW 2022 13.3  11.5 - 14.9 % Final    Platelets  214  150 - 450 k/uL Final    MPV 2022 7.4  6.0 - 12.0 fL Final    NRBC Automated 2022 NOT REPORTED  per 100 WBC Final    Differential Type 2022 NOT REPORTED   Final    Seg Neutrophils 2022 75* 36 - 66 % Final    Lymphocytes 2022 18* 24 - 44 % Final    Monocytes 2022 6  1 - 7 % Final    Eosinophils % 2022 1  0 - 4 % Final    Basophils 2022 0  0 - 2 % Final    Immature Granulocytes 2022 NOT REPORTED  0 % Final    Segs Absolute 2022 5.20  1.3 - 9.1 k/uL Final    Absolute Lymph # 2022 1.20  1.0 - 4.8 k/uL Final    Absolute Mono # 2022 0.40  0.1 - 1.3 k/uL Final    Absolute Eos # 2022 0.00  0.0 - 0.4 k/uL Final    Basophils Absolute 2022 0.00  0.0 - 0.2 k/uL Final    Absolute Immature Granulocyte 2022 NOT REPORTED  0.00 - 0.30 k/uL Final    WBC Morphology 2022 NOT REPORTED   Final    RBC Morphology 2022 NOT REPORTED   Final    Platelet Estimate  NOT REPORTED   Final    GLU ADMN 2022 Glucola   Final    Glucose tolerance screen 50g 2022 103  70 - 135 mg/dL Final   ]    22 Tdap given  10/21/21 Pt given Flu Vacc    Previous  wishes to have repeat . [facility]      T-Dap Vaccine (27-36 weeks): completed    The patient was instructed on fetal kick counts and was given a kick sheet to complete every 8 hours.  She was instructed that the baby should move at a minimum of ten times within one hour after a meal. The patient was instructed to lay down on her left side twenty minutes after eating and count movements for up to one hour with a target value of ten movements. She was instructed to notify the office if she did not make that target after two attempts or if after any attempt there was less than four movements. The patient reports that the targets have been made Yes.  Testing:  Weekly at [facility]    Assessment:  Camryn Mondragon is a 36 y.o. female  2. [id number]  3. 34w4d    Patient Active Problem List    Diagnosis Date Noted    COVID-19 affecting pregnancy 01/10/2022     Priority: High    High risk pregnancy with low PAPPA 10/01/2021     Priority: High     10/1/2021 consult [facility]      history of LGA: 9#12oz 2021     Priority: High     (spontaneous vaginal delivery) 2021     Priority: High    PLTCS 19 M APG 8/9 Wt 9#12 2019     Priority: High     Previous LTC C/S wants repeat      History of gestational diabetes 09/10/2018     Priority: High     Early 1 hour GTT ordered 9/10/2018  If wnl repeat at 28 weeks      30 weeks gestation of pregnancy 2022    Vitamin D deficiency 2020    Elevated fasting blood sugar 08/10/2020    Corneal rust ring 2020    Adult BMI 25.0-25.9 kg/sq m 2016    Generalized anxiety disorder 10/06/2015        Diagnosis Orders   1. 34 weeks gestation of pregnancy     2. High risk pregnancy with low PAPPA     3. Abnormal laboratory test     4.  (spontaneous vaginal delivery)     5. History of gestational diabetes     6. COVID-19 affecting pregnancy, antepartum               Plan:  The patient will return to the office for her next visit in 2 weeks. See antepartum flow sheet.  Testing Indicated: Yes  Scheduled with Nursing-Pt notified: Yes  Pt is scheduled for a repeat  and declined TOLAC. Pt was counseled on risks/ benefits/ alternative options    Patient was seen with total face to face time of 20 minutes.  More than 50% of this visit was on counseling

## 2022-03-07 ENCOUNTER — ROUTINE PRENATAL (OUTPATIENT)
Dept: PERINATAL CARE | Age: 32
End: 2022-03-07
Payer: COMMERCIAL

## 2022-03-07 VITALS
SYSTOLIC BLOOD PRESSURE: 117 MMHG | HEIGHT: 65 IN | TEMPERATURE: 96.8 F | WEIGHT: 185 LBS | RESPIRATION RATE: 16 BRPM | DIASTOLIC BLOOD PRESSURE: 77 MMHG | BODY MASS INDEX: 30.82 KG/M2 | HEART RATE: 84 BPM

## 2022-03-07 DIAGNOSIS — Z36.4 ANTENATAL SCREENING FOR FETAL GROWTH RETARDATION USING ULTRASONICS: ICD-10-CM

## 2022-03-07 DIAGNOSIS — O28.0 HIGH RISK PREGNANCY WITH LOW PAPPA (PREGNANCY-ASSOCIATED PLASMA PROTEIN A): Primary | ICD-10-CM

## 2022-03-07 DIAGNOSIS — Z3A.35 35 WEEKS GESTATION OF PREGNANCY: ICD-10-CM

## 2022-03-07 DIAGNOSIS — O09.899 HIGH RISK PREGNANCY WITH LOW PAPPA (PREGNANCY-ASSOCIATED PLASMA PROTEIN A): Primary | ICD-10-CM

## 2022-03-07 DIAGNOSIS — O34.219 PREVIOUS CESAREAN DELIVERY AFFECTING PREGNANCY: ICD-10-CM

## 2022-03-07 PROCEDURE — 76820 UMBILICAL ARTERY ECHO: CPT | Performed by: OBSTETRICS & GYNECOLOGY

## 2022-03-07 PROCEDURE — 76818 FETAL BIOPHYS PROFILE W/NST: CPT | Performed by: OBSTETRICS & GYNECOLOGY

## 2022-03-07 PROCEDURE — 99999 PR OFFICE/OUTPT VISIT,PROCEDURE ONLY: CPT | Performed by: OBSTETRICS & GYNECOLOGY

## 2022-03-07 PROCEDURE — 76816 OB US FOLLOW-UP PER FETUS: CPT | Performed by: OBSTETRICS & GYNECOLOGY

## 2022-03-08 ENCOUNTER — ROUTINE PRENATAL (OUTPATIENT)
Dept: OBGYN CLINIC | Age: 32
End: 2022-03-08
Payer: COMMERCIAL

## 2022-03-08 VITALS
WEIGHT: 185 LBS | DIASTOLIC BLOOD PRESSURE: 70 MMHG | BODY MASS INDEX: 30.79 KG/M2 | HEART RATE: 76 BPM | SYSTOLIC BLOOD PRESSURE: 108 MMHG

## 2022-03-08 DIAGNOSIS — O09.93 HIGH-RISK PREGNANCY IN THIRD TRIMESTER: Primary | ICD-10-CM

## 2022-03-08 DIAGNOSIS — O98.519 COVID-19 AFFECTING PREGNANCY, ANTEPARTUM: ICD-10-CM

## 2022-03-08 DIAGNOSIS — R89.9 ABNORMAL LABORATORY TEST: ICD-10-CM

## 2022-03-08 DIAGNOSIS — Z3A.35 35 WEEKS GESTATION OF PREGNANCY: ICD-10-CM

## 2022-03-08 DIAGNOSIS — O28.0 HIGH RISK PREGNANCY WITH LOW PAPPA: ICD-10-CM

## 2022-03-08 DIAGNOSIS — Z86.32 HISTORY OF GESTATIONAL DIABETES: ICD-10-CM

## 2022-03-08 DIAGNOSIS — U07.1 COVID-19 AFFECTING PREGNANCY, ANTEPARTUM: ICD-10-CM

## 2022-03-08 DIAGNOSIS — O09.899 HIGH RISK PREGNANCY WITH LOW PAPPA: ICD-10-CM

## 2022-03-08 PROCEDURE — G8417 CALC BMI ABV UP PARAM F/U: HCPCS | Performed by: OBSTETRICS & GYNECOLOGY

## 2022-03-08 PROCEDURE — 99213 OFFICE O/P EST LOW 20 MIN: CPT | Performed by: OBSTETRICS & GYNECOLOGY

## 2022-03-08 PROCEDURE — 1036F TOBACCO NON-USER: CPT | Performed by: OBSTETRICS & GYNECOLOGY

## 2022-03-08 PROCEDURE — G8427 DOCREV CUR MEDS BY ELIG CLIN: HCPCS | Performed by: OBSTETRICS & GYNECOLOGY

## 2022-03-08 PROCEDURE — G8482 FLU IMMUNIZE ORDER/ADMIN: HCPCS | Performed by: OBSTETRICS & GYNECOLOGY

## 2022-03-08 NOTE — PROGRESS NOTES
Ja Valentino is a 36 y.o. female 35w4d    [id number]    OB History    Para Term  AB Living   5 2 2 0 2 2   SAB IAB Ectopic Molar Multiple Live Births   1 1 0   0 2      # Outcome Date GA Lbr Roberto/2nd Weight Sex Delivery Anes PTL Lv   5 Current            4 SAB 2021           3 Term 19 39w2d  9 lb 12 oz (4.423 kg) M CS-LTranv   JERICHO   2 Term 17 39w2d 02:19 / 03:04 7 lb 10 oz (3.459 kg) M Vag-Spont EPI N JERICHO   1 IAB                Vitals  BP: 108/70  Weight: 185 lb (83.9 kg)  Pulse: 76  Patient Position: Sitting      The patient was seen and evaluated. There was positive fetal movements. No contractions or leakage of fluid. Signs and symptoms of  labor as well as labor were reviewed. The S/S of Pre-Eclampsia were reviewed with the patient in detail. She is to report any of these if they occur. She currently denies any of these. The patient had her 28 week labs completed. No visits with results within 5 Week(s) from this visit. Latest known visit with results is:   Hospital Outpatient Visit on 2022   Component Date Value Ref Range Status    Color, UA 2022 Yellow  Yellow Final    Turbidity UA 2022 Clear  Clear Final    Glucose, Ur 2022 NEGATIVE  NEGATIVE Final    Bilirubin Urine 2022 NEGATIVE  NEGATIVE Final    Ketones, Urine 2022 NEGATIVE  NEGATIVE Final    Specific Gravity, UA 2022 1.009  1.000 - 1.030 Final    Urine Hgb 2022 NEGATIVE  NEGATIVE Final    pH, UA 2022 7.0  5.0 - 8.0 Final    Protein, UA 2022 NEGATIVE  NEGATIVE Final    Urobilinogen, Urine 2022 Normal  Normal Final    Nitrite, Urine 2022 NEGATIVE  NEGATIVE Final    Leukocyte Esterase, Urine 2022 NEGATIVE  NEGATIVE Final    Urinalysis Comments 2022 Microscopic exam not performed based on chemical results unless requested in original order.    Final    WBC 2022 6.8  3.5 - 11.0 k/uL Final    RBC 2022 Patient's grandson is calling about the patient not sleeping or eating. Patient is very fatigued. Patient also has diabetes. Grandson has checked his sugar levels and they were 130 & 110. Patient has been very shaky.   3.98* 4.0 - 5.2 m/uL Final    Hemoglobin 2022 11.6* 12.0 - 16.0 g/dL Final    Hematocrit 2022 34.0* 36 - 46 % Final    MCV 2022 85.3  80 - 100 fL Final    MCH 2022 29.1  26 - 34 pg Final    MCHC 2022 34.1  31 - 37 g/dL Final    RDW 2022 13.3  11.5 - 14.9 % Final    Platelets  214  150 - 450 k/uL Final    MPV 2022 7.4  6.0 - 12.0 fL Final    NRBC Automated 2022 NOT REPORTED  per 100 WBC Final    Differential Type 2022 NOT REPORTED   Final    Seg Neutrophils 2022 75* 36 - 66 % Final    Lymphocytes 2022 18* 24 - 44 % Final    Monocytes 2022 6  1 - 7 % Final    Eosinophils % 2022 1  0 - 4 % Final    Basophils 2022 0  0 - 2 % Final    Immature Granulocytes 2022 NOT REPORTED  0 % Final    Segs Absolute 2022 5.20  1.3 - 9.1 k/uL Final    Absolute Lymph # 2022 1.20  1.0 - 4.8 k/uL Final    Absolute Mono # 2022 0.40  0.1 - 1.3 k/uL Final    Absolute Eos # 2022 0.00  0.0 - 0.4 k/uL Final    Basophils Absolute 2022 0.00  0.0 - 0.2 k/uL Final    Absolute Immature Granulocyte 2022 NOT REPORTED  0.00 - 0.30 k/uL Final    WBC Morphology 2022 NOT REPORTED   Final    RBC Morphology 2022 NOT REPORTED   Final    Platelet Estimate  NOT REPORTED   Final    GLU ADMN 2022 Glucola   Final    Glucose tolerance screen 50g 2022 103  70 - 135 mg/dL Final   ]    22 Tdap given  10/21/21 Pt given Flu Vacc    Previous  wishes to have repeat . [facility]      T-Dap Vaccine (27-36 weeks): completed    The patient was instructed on fetal kick counts and was given a kick sheet to complete every 8 hours.  She was instructed that the baby should move at a minimum of ten times within one hour after a meal. The patient was instructed to lay down on her left side twenty minutes after eating and count movements for up to one hour with a target value of ten movements. She was instructed to notify the office if she did not make that target after two attempts or if after any attempt there was less than four movements. The patient reports that the targets have been made Yes.  Testing:  Not indicated  The recommendation to proceed to fetal kick counts every 8 hours daily instead of Non stress testing, (as per Dianna BENNETT, Jaxon Lo, [facility] guidance for Covid-19 testing, American Journal of Obstetrics & Gynecology, 6407)    Assessment:  1Martin Wyman is a 36 y.o. female  2. [id number]  3. 35w4d    Patient Active Problem List    Diagnosis Date Noted    COVID-19 affecting pregnancy 01/10/2022     Priority: High    High risk pregnancy with low PAPPA 10/01/2021     Priority: High     10/1/2021 consult MFM      history of LGA: 9#12oz 2021     Priority: High     (spontaneous vaginal delivery) 2021     Priority: High    PLTCS // M APG 8/9 Wt 9#12 2019     Priority: High     Previous LTC C/S wants repeat      History of gestational diabetes 09/10/2018     Priority: High     Early 1 hour GTT ordered 9/10/2018  If wnl repeat at 28 weeks      30 weeks gestation of pregnancy 2022    Vitamin D deficiency 2020    Elevated fasting blood sugar 08/10/2020    Corneal rust ring 2020    Adult BMI 25.0-25.9 kg/sq m 2016    Generalized anxiety disorder 10/06/2015        Diagnosis Orders   1. High-risk pregnancy in third trimester     2. 35 weeks gestation of pregnancy     3. COVID-19 affecting pregnancy, antepartum     4. High risk pregnancy with low PAPPA     5. Abnormal laboratory test     6.  (spontaneous vaginal delivery)     7. History of gestational diabetes               Plan:  The patient will return to the office for her next visit in 1 weeks. See antepartum flow sheet.       Testing Indicated: No  Scheduled with Nursing-Pt notified: No      Patient was seen with total face to face time of 20 minutes. More than 50% of this visit was on counseling and education regarding her    Diagnosis Orders   1. High-risk pregnancy in third trimester     2. 35 weeks gestation of pregnancy     3. COVID-19 affecting pregnancy, antepartum     4. High risk pregnancy with low PAPPA     5. Abnormal laboratory test     6.  (spontaneous vaginal delivery)     7. History of gestational diabetes      and her options. She was also counseled on her preventative health maintenance recommendations and follow-up.

## 2022-03-14 ENCOUNTER — ROUTINE PRENATAL (OUTPATIENT)
Dept: PERINATAL CARE | Age: 32
End: 2022-03-14
Payer: COMMERCIAL

## 2022-03-14 VITALS
HEART RATE: 83 BPM | BODY MASS INDEX: 31.22 KG/M2 | SYSTOLIC BLOOD PRESSURE: 111 MMHG | HEIGHT: 65 IN | WEIGHT: 187.39 LBS | TEMPERATURE: 97.4 F | RESPIRATION RATE: 16 BRPM | DIASTOLIC BLOOD PRESSURE: 73 MMHG

## 2022-03-14 DIAGNOSIS — O09.899 HIGH RISK PREGNANCY WITH LOW PAPPA (PREGNANCY-ASSOCIATED PLASMA PROTEIN A): Primary | ICD-10-CM

## 2022-03-14 DIAGNOSIS — O34.219 PREVIOUS CESAREAN DELIVERY AFFECTING PREGNANCY: ICD-10-CM

## 2022-03-14 DIAGNOSIS — Z3A.36 36 WEEKS GESTATION OF PREGNANCY: ICD-10-CM

## 2022-03-14 DIAGNOSIS — O28.0 HIGH RISK PREGNANCY WITH LOW PAPPA (PREGNANCY-ASSOCIATED PLASMA PROTEIN A): Primary | ICD-10-CM

## 2022-03-14 PROCEDURE — 76820 UMBILICAL ARTERY ECHO: CPT | Performed by: OBSTETRICS & GYNECOLOGY

## 2022-03-14 PROCEDURE — 76819 FETAL BIOPHYS PROFIL W/O NST: CPT | Performed by: OBSTETRICS & GYNECOLOGY

## 2022-03-14 PROCEDURE — 99999 PR OFFICE/OUTPT VISIT,PROCEDURE ONLY: CPT | Performed by: OBSTETRICS & GYNECOLOGY

## 2022-03-15 ENCOUNTER — HOSPITAL ENCOUNTER (OUTPATIENT)
Age: 32
Setting detail: SPECIMEN
Discharge: HOME | End: 2022-03-15

## 2022-03-15 ENCOUNTER — ROUTINE PRENATAL (OUTPATIENT)
Dept: OBGYN CLINIC | Age: 32
End: 2022-03-15
Payer: COMMERCIAL

## 2022-03-15 VITALS
DIASTOLIC BLOOD PRESSURE: 69 MMHG | HEART RATE: 97 BPM | WEIGHT: 187 LBS | BODY MASS INDEX: 31.12 KG/M2 | SYSTOLIC BLOOD PRESSURE: 114 MMHG

## 2022-03-15 DIAGNOSIS — O09.899 HIGH RISK PREGNANCY WITH LOW PAPPA: ICD-10-CM

## 2022-03-15 DIAGNOSIS — Z86.32 HISTORY OF GESTATIONAL DIABETES: ICD-10-CM

## 2022-03-15 DIAGNOSIS — R89.9 ABNORMAL LABORATORY TEST: ICD-10-CM

## 2022-03-15 DIAGNOSIS — Z3A.36 36 WEEKS GESTATION OF PREGNANCY: ICD-10-CM

## 2022-03-15 DIAGNOSIS — O98.519 COVID-19 AFFECTING PREGNANCY, ANTEPARTUM: ICD-10-CM

## 2022-03-15 DIAGNOSIS — U07.1 COVID-19 AFFECTING PREGNANCY, ANTEPARTUM: ICD-10-CM

## 2022-03-15 DIAGNOSIS — O28.0 HIGH RISK PREGNANCY WITH LOW PAPPA: ICD-10-CM

## 2022-03-15 DIAGNOSIS — Z3A.36 36 WEEKS GESTATION OF PREGNANCY: Primary | ICD-10-CM

## 2022-03-15 PROCEDURE — G8427 DOCREV CUR MEDS BY ELIG CLIN: HCPCS | Performed by: OBSTETRICS & GYNECOLOGY

## 2022-03-15 PROCEDURE — G8417 CALC BMI ABV UP PARAM F/U: HCPCS | Performed by: OBSTETRICS & GYNECOLOGY

## 2022-03-15 PROCEDURE — 59025 FETAL NON-STRESS TEST: CPT | Performed by: OBSTETRICS & GYNECOLOGY

## 2022-03-15 PROCEDURE — 1036F TOBACCO NON-USER: CPT | Performed by: OBSTETRICS & GYNECOLOGY

## 2022-03-15 PROCEDURE — G8482 FLU IMMUNIZE ORDER/ADMIN: HCPCS | Performed by: OBSTETRICS & GYNECOLOGY

## 2022-03-15 PROCEDURE — 99214 OFFICE O/P EST MOD 30 MIN: CPT | Performed by: OBSTETRICS & GYNECOLOGY

## 2022-03-15 NOTE — PROGRESS NOTES
Absolute 2022 0.00  0.0 - 0.2 k/uL Final    Absolute Immature Granulocyte 2022 NOT REPORTED  0.00 - 0.30 k/uL Final    WBC Morphology 2022 NOT REPORTED   Final    RBC Morphology 2022 NOT REPORTED   Final    Platelet Estimate  NOT REPORTED   Final    GLU ADMN 2022 Glucola   Final    Glucose tolerance screen 50g 2022 103  70 - 135 mg/dL Final   ]  Sensitivities for clindamycin and erythromycin were ordered. No      The patient was counseled on the mandatory call ahead policy. She has been instructed to call the office at anytime prior to going into the hospital so the on-call physician may direct her to the appropriate facility for care. Exceptions were reviewed including but not limited to: Decreased fetal movement, vaginal Bleeding or hemorrhage, trauma, readily expectant delivery, or any instance where she feels 911 should be utilized. The patient was counseled on Labor & Delivery. Route of delivery and counseling on vaginal, operative vaginal, and  sections were completed with the risks of each to both the patient as well as her baby. The possibility of a blood transfusion was discussed as well. The patient was not opposed to receiving a transfusion if needed. The patient was counseled on types of analgesia during labor and is considering either Regional or IV medication the risks, benefits and alternatives were discussed.  Testing:  REACTIVE NST  CATEGORY 1 TRACING  Moderate Variability  Baseline of 130 bpm  SEE SCANNED DOCUMENT  RTO 2-5 DAYS FOR A FOLLOW UP APPOINTMENT WITH  TESTING. Assessment:  Camryn Aguilar is a 36 y.o. female  2.  [id number]  3. 36w4d      Patient Active Problem List    Diagnosis Date Noted    COVID-19 affecting pregnancy 01/10/2022     Priority: High    High risk pregnancy with low PAPPA 10/01/2021     Priority: High     Overview Note:     10/1/2021 consult M      history of LGA: 9#12oz 2019 2021     Priority: High     (spontaneous vaginal delivery) 2021     Priority: High    PLTCS 19 M APG 8/9 Wt 9#12 2019     Priority: High     Overview Note:     Previous LTC C/S wants repeat      History of gestational diabetes 09/10/2018     Priority: High     Overview Note:     Early 1 hour GTT ordered 9/10/2018  If wnl repeat at 28 weeks      30 weeks gestation of pregnancy 2022    Vitamin D deficiency 2020    Elevated fasting blood sugar 08/10/2020    Corneal rust ring 2020    Adult BMI 25.0-25.9 kg/sq m 2016    Generalized anxiety disorder 10/06/2015        Diagnosis Orders   1. 36 weeks gestation of pregnancy  [place] FETAL NON-STRESS TEST    Culture, Strep B Screen, Vaginal/Rectal   2. COVID-19 affecting pregnancy, antepartum     3. High risk pregnancy with low PAPPA     4. Abnormal laboratory test     5.  (spontaneous vaginal delivery)     6. History of gestational diabetes               Plan:  The patient will return to the office for her next visit in 1 weeks. See antepartum flow sheet. Repeat  2022      Patient was seen with total face to face time of 20 minutes. More than 50% of this visit was on counseling and education regarding her    Diagnosis Orders   1. 36 weeks gestation of pregnancy  [place] FETAL NON-STRESS TEST    Culture, Strep B Screen, Vaginal/Rectal   2. COVID-19 affecting pregnancy, antepartum     3. High risk pregnancy with low PAPPA     4. Abnormal laboratory test     5.  (spontaneous vaginal delivery)     6. History of gestational diabetes      and her options. She was also counseled on her preventative health maintenance recommendations and follow-up.

## 2022-03-18 LAB
CULTURE: NORMAL
SPECIMEN DESCRIPTION: NORMAL

## 2022-03-21 ENCOUNTER — ROUTINE PRENATAL (OUTPATIENT)
Dept: PERINATAL CARE | Age: 32
End: 2022-03-21
Payer: COMMERCIAL

## 2022-03-21 VITALS
HEART RATE: 82 BPM | WEIGHT: 188 LBS | BODY MASS INDEX: 31.32 KG/M2 | DIASTOLIC BLOOD PRESSURE: 66 MMHG | RESPIRATION RATE: 16 BRPM | TEMPERATURE: 97.2 F | HEIGHT: 65 IN | SYSTOLIC BLOOD PRESSURE: 114 MMHG

## 2022-03-21 DIAGNOSIS — O28.0 HIGH RISK PREGNANCY WITH LOW PAPPA (PREGNANCY-ASSOCIATED PLASMA PROTEIN A): Primary | ICD-10-CM

## 2022-03-21 DIAGNOSIS — O34.219 PREVIOUS CESAREAN DELIVERY AFFECTING PREGNANCY: ICD-10-CM

## 2022-03-21 DIAGNOSIS — O09.899 HIGH RISK PREGNANCY WITH LOW PAPPA (PREGNANCY-ASSOCIATED PLASMA PROTEIN A): Primary | ICD-10-CM

## 2022-03-21 DIAGNOSIS — Z3A.37 37 WEEKS GESTATION OF PREGNANCY: ICD-10-CM

## 2022-03-21 PROCEDURE — 76819 FETAL BIOPHYS PROFIL W/O NST: CPT | Performed by: OBSTETRICS & GYNECOLOGY

## 2022-03-21 PROCEDURE — 76820 UMBILICAL ARTERY ECHO: CPT | Performed by: OBSTETRICS & GYNECOLOGY

## 2022-03-21 PROCEDURE — 99999 PR OFFICE/OUTPT VISIT,PROCEDURE ONLY: CPT | Performed by: OBSTETRICS & GYNECOLOGY

## 2022-03-22 ENCOUNTER — ROUTINE PRENATAL (OUTPATIENT)
Dept: OBGYN CLINIC | Age: 32
End: 2022-03-22
Payer: COMMERCIAL

## 2022-03-22 VITALS
SYSTOLIC BLOOD PRESSURE: 111 MMHG | BODY MASS INDEX: 31.45 KG/M2 | WEIGHT: 189 LBS | HEART RATE: 85 BPM | DIASTOLIC BLOOD PRESSURE: 73 MMHG

## 2022-03-22 DIAGNOSIS — Z86.32 HISTORY OF GESTATIONAL DIABETES: ICD-10-CM

## 2022-03-22 DIAGNOSIS — O09.899 HIGH RISK PREGNANCY WITH LOW PAPPA: Primary | ICD-10-CM

## 2022-03-22 DIAGNOSIS — O28.0 HIGH RISK PREGNANCY WITH LOW PAPPA: Primary | ICD-10-CM

## 2022-03-22 DIAGNOSIS — O98.519 COVID-19 AFFECTING PREGNANCY, ANTEPARTUM: ICD-10-CM

## 2022-03-22 DIAGNOSIS — Z3A.37 37 WEEKS GESTATION OF PREGNANCY: ICD-10-CM

## 2022-03-22 DIAGNOSIS — R89.9 ABNORMAL LABORATORY TEST: ICD-10-CM

## 2022-03-22 DIAGNOSIS — U07.1 COVID-19 AFFECTING PREGNANCY, ANTEPARTUM: ICD-10-CM

## 2022-03-22 PROCEDURE — 99213 OFFICE O/P EST LOW 20 MIN: CPT | Performed by: NURSE PRACTITIONER

## 2022-03-22 PROCEDURE — G8427 DOCREV CUR MEDS BY ELIG CLIN: HCPCS | Performed by: NURSE PRACTITIONER

## 2022-03-22 PROCEDURE — 59025 FETAL NON-STRESS TEST: CPT | Performed by: NURSE PRACTITIONER

## 2022-03-22 PROCEDURE — G8482 FLU IMMUNIZE ORDER/ADMIN: HCPCS | Performed by: NURSE PRACTITIONER

## 2022-03-22 PROCEDURE — G8417 CALC BMI ABV UP PARAM F/U: HCPCS | Performed by: NURSE PRACTITIONER

## 2022-03-22 PROCEDURE — 1036F TOBACCO NON-USER: CPT | Performed by: NURSE PRACTITIONER

## 2022-03-22 NOTE — PROGRESS NOTES
REACTIVE NST  CATEGORY 1 TRACING  Moderate Variability  Baseline of 140 bpm  SEE SCANNED DOCUMENT  RTO 2-5 DAYS FOR A FOLLOW UP APPOINTMENT WITH  TESTING. Cam Pimentel is a 29 y.o. female 37w4d    [id number]    OB History    Para Term  AB Living   5 2 2 0 2 2   SAB IAB Ectopic Molar Multiple Live Births   1 1 0   0 2      # Outcome Date GA Lbr Roberto/2nd Weight Sex Delivery Anes PTL Lv   5 Current            4 SAB 2021           3 Term 19 39w2d  9 lb 12 oz (4.423 kg) M CS-LTranv   JERICHO   2 Term 17 39w2d 02:19 / 03:04 7 lb 10 oz (3.459 kg) M Vag-Spont EPI N JERICHO   1 IAB                  Vitals  BP: 111/73  Weight: 189 lb (85.7 kg)  Pulse: 85  Patient Position: Sitting      The patient was seen and evaluated. There was positive fetal movements. No contractions or leakage of fluid. Signs and symptoms of labor were reviewed. The S/S of Pre-Eclampsia were reviewed with the patient in detail. She is to report any of these if they occur. She currently denies any of these. The patient was instructed on fetal kick counts and was given a kick sheet to complete every 8 hours. She was instructed that the baby should move at a minimum of ten times within one hour after a meal. The patient was instructed to lay down on her left side twenty minutes after eating and count movements for up to one hour with a target value of ten movements. She was instructed to notify the office if she did not make that target after two attempts or if after any attempt there was less than four movements. The patient reports that the targets have been made Yes.    22 Tdap given  10/21/21 Pt given Flu Vacc    Previous  wishes to have repeat . Repeat  scheduled 2022 at [address] [place] [address]      T-Dap Vaccine (27-36 weeks) Completed: Yes    Allergies: Allergies as of 2022    (No Known Allergies)       Group Beta Strep collection was completed.  Yes   GBS Results:   No visits with results within 1 Week(s) from this visit. Latest known visit with results is:   Hospital Outpatient Visit on 03/15/2022   Component Date Value Ref Range Status    Specimen Description 03/15/2022 . VAGINA   Final    Culture 03/15/2022 NEGATIVE FOR GROUP B STREPTOCOCCI   Final   ]  Sensitivities for clindamycin and erythromycin were ordered. No      The patient was counseled on the mandatory call ahead policy. She has been instructed to call the office at anytime prior to going into the hospital so the on-call physician may direct her to the appropriate facility for care. Exceptions were reviewed including but not limited to: Decreased fetal movement, vaginal Bleeding or hemorrhage, trauma, readily expectant delivery, or any instance where she feels 911 should be utilized. The patient was counseled on Labor & Delivery. yes  Route of delivery and counseling on vaginal, operative vaginal, and  sections were completed with the risks of each to both the patient as well as her baby. The possibility of a blood transfusion was discussed as well. The patient was not opposed to receiving a transfusion if needed. The patient was counseled on types of analgesia during labor and is considering either Regional or IV medication the risks, benefits and alternatives were discussed.  Testing:  Weekly NSTs      Assessment:  Camryn Gray is a 29 y.o. female  2.  [id number]  3. 37w4d      Patient Active Problem List    Diagnosis Date Noted    COVID-19 affecting pregnancy 01/10/2022     Priority: High    High risk pregnancy with low PAPPA 10/01/2021     Priority: High     Overview Note:     10/1/2021 consult M      history of LGA: 9#12oz 2021     Priority: High     (spontaneous vaginal delivery) 2021     Priority: High    History of gestational diabetes 09/10/2018     Priority: High     Overview Note:     Early 1 hour GTT ordered 9/10/2018  If wnl repeat at 28 weeks      30 weeks gestation of pregnancy 2022    Vitamin D deficiency 2020    Elevated fasting blood sugar 08/10/2020    Corneal rust ring 2020    PLTCS 19 M APG 8/9 Wt 9#12 2019     Overview Note:     Previous LTC C/S wants repeat      Adult BMI 25.0-25.9 kg/sq m 2016    Generalized anxiety disorder 10/06/2015        Diagnosis Orders   1. High risk pregnancy with low PAPPA  [place] FETAL NON-STRESS TEST   2. Abnormal laboratory test     3.  (spontaneous vaginal delivery)     4. History of gestational diabetes     5. COVID-19 affecting pregnancy, antepartum     6. 37 weeks gestation of pregnancy  [place] FETAL NON-STRESS TEST             Plan:  The patient will return to the office for her next visit in 1 weeks. See antepartum flow sheet. NST reactive. Instructed on S/S of labor. [facility] 3/28/2022        Patient was seen with total face to face time of 20 minutes. More than 50% of this visit was on counseling and education regarding her    Diagnosis Orders   1. High risk pregnancy with low PAPPA  [place] FETAL NON-STRESS TEST   2. Abnormal laboratory test     3.  (spontaneous vaginal delivery)     4. History of gestational diabetes     5. COVID-19 affecting pregnancy, antepartum     6. 37 weeks gestation of pregnancy  [place] FETAL NON-STRESS TEST    and her options. She was also counseled on her preventative health maintenance recommendations and follow-up.

## 2022-03-28 ENCOUNTER — ROUTINE PRENATAL (OUTPATIENT)
Dept: PERINATAL CARE | Age: 32
End: 2022-03-28
Payer: COMMERCIAL

## 2022-03-28 VITALS
TEMPERATURE: 97.4 F | BODY MASS INDEX: 31.32 KG/M2 | SYSTOLIC BLOOD PRESSURE: 121 MMHG | WEIGHT: 188 LBS | RESPIRATION RATE: 16 BRPM | DIASTOLIC BLOOD PRESSURE: 76 MMHG | HEIGHT: 65 IN | HEART RATE: 70 BPM

## 2022-03-28 DIAGNOSIS — O34.219 PREVIOUS CESAREAN DELIVERY AFFECTING PREGNANCY: ICD-10-CM

## 2022-03-28 DIAGNOSIS — O28.0 HIGH RISK PREGNANCY WITH LOW PAPPA (PREGNANCY-ASSOCIATED PLASMA PROTEIN A): Primary | ICD-10-CM

## 2022-03-28 DIAGNOSIS — Z3A.38 38 WEEKS GESTATION OF PREGNANCY: ICD-10-CM

## 2022-03-28 DIAGNOSIS — O36.60X0 EXCESSIVE FETAL GROWTH AFFECTING PREGNANCY, ANTEPARTUM, SINGLE OR UNSPECIFIED FETUS: ICD-10-CM

## 2022-03-28 DIAGNOSIS — Z13.89 ENCOUNTER FOR ROUTINE SCREENING FOR MALFORMATION USING ULTRASONICS: ICD-10-CM

## 2022-03-28 DIAGNOSIS — O09.899 HIGH RISK PREGNANCY WITH LOW PAPPA (PREGNANCY-ASSOCIATED PLASMA PROTEIN A): Primary | ICD-10-CM

## 2022-03-28 PROCEDURE — 76818 FETAL BIOPHYS PROFILE W/NST: CPT | Performed by: OBSTETRICS & GYNECOLOGY

## 2022-03-28 PROCEDURE — 76820 UMBILICAL ARTERY ECHO: CPT | Performed by: OBSTETRICS & GYNECOLOGY

## 2022-03-28 PROCEDURE — 76816 OB US FOLLOW-UP PER FETUS: CPT | Performed by: OBSTETRICS & GYNECOLOGY

## 2022-03-28 PROCEDURE — 99999 PR OFFICE/OUTPT VISIT,PROCEDURE ONLY: CPT | Performed by: OBSTETRICS & GYNECOLOGY

## 2022-03-29 ENCOUNTER — ROUTINE PRENATAL (OUTPATIENT)
Dept: OBGYN CLINIC | Age: 32
End: 2022-03-29
Payer: COMMERCIAL

## 2022-03-29 VITALS
DIASTOLIC BLOOD PRESSURE: 80 MMHG | WEIGHT: 188 LBS | HEART RATE: 82 BPM | BODY MASS INDEX: 31.28 KG/M2 | SYSTOLIC BLOOD PRESSURE: 118 MMHG

## 2022-03-29 DIAGNOSIS — Z3A.38 38 WEEKS GESTATION OF PREGNANCY: Primary | ICD-10-CM

## 2022-03-29 DIAGNOSIS — Z86.32 HISTORY OF GESTATIONAL DIABETES: ICD-10-CM

## 2022-03-29 DIAGNOSIS — O98.519 COVID-19 AFFECTING PREGNANCY, ANTEPARTUM: ICD-10-CM

## 2022-03-29 DIAGNOSIS — U07.1 COVID-19 AFFECTING PREGNANCY, ANTEPARTUM: ICD-10-CM

## 2022-03-29 DIAGNOSIS — O28.0 HIGH RISK PREGNANCY WITH LOW PAPPA: ICD-10-CM

## 2022-03-29 DIAGNOSIS — R89.9 ABNORMAL LABORATORY TEST: ICD-10-CM

## 2022-03-29 DIAGNOSIS — O09.899 HIGH RISK PREGNANCY WITH LOW PAPPA: ICD-10-CM

## 2022-03-29 PROCEDURE — G8417 CALC BMI ABV UP PARAM F/U: HCPCS | Performed by: OBSTETRICS & GYNECOLOGY

## 2022-03-29 PROCEDURE — G8482 FLU IMMUNIZE ORDER/ADMIN: HCPCS | Performed by: OBSTETRICS & GYNECOLOGY

## 2022-03-29 PROCEDURE — G8427 DOCREV CUR MEDS BY ELIG CLIN: HCPCS | Performed by: OBSTETRICS & GYNECOLOGY

## 2022-03-29 PROCEDURE — 1036F TOBACCO NON-USER: CPT | Performed by: OBSTETRICS & GYNECOLOGY

## 2022-03-29 PROCEDURE — 99213 OFFICE O/P EST LOW 20 MIN: CPT | Performed by: OBSTETRICS & GYNECOLOGY

## 2022-03-29 NOTE — PROGRESS NOTES
Ja Valentino is a 29 y.o. female 38w4d    [id number]    OB History    Para Term  AB Living   5 2 2 0 2 2   SAB IAB Ectopic Molar Multiple Live Births   1 1 0   0 2      # Outcome Date GA Lbr Roberto/2nd Weight Sex Delivery Anes PTL Lv   5 Current            4 SAB 2021           3 Term 19 39w2d  9 lb 12 oz (4.423 kg) M CS-LTranv   JERICHO   2 Term 17 39w2d 02:19 / 03:04 7 lb 10 oz (3.459 kg) M Vag-Spont EPI N JERICHO   1 IAB                Vitals  BP: 118/80  Weight: 188 lb (85.3 kg)  Pulse: 82  Patient Position: Sitting  Albumin: Negative  Glucose: Negative      The patient was seen and evaluated. There was positive fetal movements. No contractions or leakage of fluid. Signs and symptoms of labor were reviewed. The S/S of Pre-Eclampsia were reviewed with the patient in detail. She is to report any of these if they occur. She currently denies any of these. The patient was instructed on fetal kick counts and was given a kick sheet to complete every 8 hours. She was instructed that the baby should move at a minimum of ten times within one hour after a meal. The patient was instructed to lay down on her left side twenty minutes after eating and count movements for up to one hour with a target value of ten movements. She was instructed to notify the office if she did not make that target after two attempts or if after any attempt there was less than four movements. The patient reports that the targets have been made Yes.    22 Tdap given  10/21/21 Pt given Flu Vacc    [facility] . [facility] Completed     Allergies: Allergies as of 2022    (No Known Allergies)         Group Beta Strep collection was completed. Yes  GBS Results:   Hospital Outpatient Visit on 03/15/2022   Component Date Value Ref Range Status    Specimen Description 03/15/2022 . VAGINA   Final  Culture 03/15/2022 NEGATIVE FOR GROUP B STREPTOCOCCI   Final   ]        Cervical Exam was:   Closed/ soft cm dilated    The literature regarding a questionable link to pitocin augmentation and induction of labor, the assistance of labor contractions and the initiation of contractions to help delivery, have been reviewed with the patient regarding the increased potential of having a  with Attention Deficit Hyperactivity Disorder and or Autism. These two disorders and the ramifications of their impact on a child and the family caring for that child has been reviewed with the patient in detail. She was given the risks, benefits and alternatives of the use of this medication. She has agreed to its use in the delivery of her unborn child if needed at the time of delivery, Yes. The patient was counseled on the mandatory call ahead policy. She has been instructed to call the office at anytime prior to going into the hospital so the on-call physician may direct her to the appropriate facility for care. Exceptions were reviewed including but not limited to: Decreased fetal movement, vaginal Bleeding or hemorrhage, trauma, readily expectant delivery, or any instance where she feels 911 should be utilized. The patient was counseled on Labor & Delivery. Route of delivery and counseling on vaginal, operative vaginal, and  sections were completed with the risks of each to both the patient as well as her baby. The possibility of a blood transfusion was discussed as well. The patient was not opposed to receiving a transfusion if needed. The patient was counseled on types of analgesia during labor and is considering either Regional or IV medication the risks, benefits and alternatives were discussed.       Testing:  Not indicated  The recommendation to proceed to fetal kick counts every 8 hours daily instead of Non stress testing, (as per Dianna BENNETT, FED[facility]inton preventative health maintenance recommendations and follow-up.

## 2022-03-31 ENCOUNTER — ANESTHESIA EVENT (OUTPATIENT)
Dept: LABOR AND DELIVERY | Age: 32
DRG: 540 | End: 2022-03-31
Payer: COMMERCIAL

## 2022-04-01 ENCOUNTER — ANESTHESIA (OUTPATIENT)
Dept: LABOR AND DELIVERY | Age: 32
DRG: 540 | End: 2022-04-01
Payer: COMMERCIAL

## 2022-04-01 ENCOUNTER — HOSPITAL ENCOUNTER (INPATIENT)
Age: 32
LOS: 3 days | Discharge: HOME | DRG: 540 | End: 2022-04-04
Attending: OBSTETRICS & GYNECOLOGY | Admitting: OBSTETRICS & GYNECOLOGY
Payer: COMMERCIAL

## 2022-04-01 VITALS — TEMPERATURE: 98 F | OXYGEN SATURATION: 100 % | SYSTOLIC BLOOD PRESSURE: 108 MMHG | DIASTOLIC BLOOD PRESSURE: 68 MMHG

## 2022-04-01 DIAGNOSIS — Z98.891 S/P CESAREAN SECTION: Primary | ICD-10-CM

## 2022-04-01 PROBLEM — Z3A.39 39 WEEKS GESTATION OF PREGNANCY: Status: ACTIVE | Noted: 2022-04-01

## 2022-04-01 LAB
ABO/RH: NORMAL
AMPHETAMINE SCREEN URINE: NEGATIVE
ANTIBODY SCREEN: NEGATIVE
ARM BAND NUMBER: NORMAL
BARBITURATE SCREEN URINE: NEGATIVE
BENZODIAZEPINE SCREEN, URINE: NEGATIVE
CANNABINOID SCREEN URINE: NEGATIVE
COCAINE METABOLITE, URINE: NEGATIVE
EXPIRATION DATE: NORMAL
HCT VFR BLD CALC: 35.9 % (ref 36–46)
HEMOGLOBIN: 12.5 G/DL (ref 12–16)
MCH RBC QN AUTO: 29 PG (ref 26–34)
MCHC RBC AUTO-ENTMCNC: 34.9 G/DL (ref 31–37)
MCV RBC AUTO: 83.2 FL (ref 80–100)
METHADONE SCREEN, URINE: NEGATIVE
OPIATES, URINE: NEGATIVE
OXYCODONE SCREEN URINE: NEGATIVE
PDW BLD-RTO: 14.1 % (ref 11.5–14.9)
PHENCYCLIDINE, URINE: NEGATIVE
PLATELET # BLD: 209 K/UL (ref 150–450)
PMV BLD AUTO: 7.6 FL (ref 6–12)
RBC # BLD: 4.31 M/UL (ref 4–5.2)
T. PALLIDUM, IGG: NONREACTIVE
TEST INFORMATION: NORMAL
WBC # BLD: 7.5 K/UL (ref 3.5–11)

## 2022-04-01 PROCEDURE — 2580000003 HC RX 258

## 2022-04-01 PROCEDURE — 76815 OB US LIMITED FETUS(S): CPT

## 2022-04-01 PROCEDURE — 85027 COMPLETE CBC AUTOMATED: CPT

## 2022-04-01 PROCEDURE — 1220000000 HC SEMI PRIVATE OB R&B

## 2022-04-01 PROCEDURE — 3609079900 HC CESAREAN SECTION: Performed by: OBSTETRICS & GYNECOLOGY

## 2022-04-01 PROCEDURE — 6360000002 HC RX W HCPCS: Performed by: ANESTHESIOLOGY

## 2022-04-01 PROCEDURE — 86900 BLOOD TYPING SEROLOGIC ABO: CPT

## 2022-04-01 PROCEDURE — 88307 TISSUE EXAM BY PATHOLOGIST: CPT

## 2022-04-01 PROCEDURE — 86901 BLOOD TYPING SEROLOGIC RH(D): CPT

## 2022-04-01 PROCEDURE — 3700000000 HC ANESTHESIA ATTENDED CARE: Performed by: OBSTETRICS & GYNECOLOGY

## 2022-04-01 PROCEDURE — 59514 CESAREAN DELIVERY ONLY: CPT | Performed by: OBSTETRICS & GYNECOLOGY

## 2022-04-01 PROCEDURE — 2500000003 HC RX 250 WO HCPCS

## 2022-04-01 PROCEDURE — 86780 TREPONEMA PALLIDUM: CPT

## 2022-04-01 PROCEDURE — 2709999900 HC NON-CHARGEABLE SUPPLY: Performed by: OBSTETRICS & GYNECOLOGY

## 2022-04-01 PROCEDURE — 2500000003 HC RX 250 WO HCPCS: Performed by: ANESTHESIOLOGY

## 2022-04-01 PROCEDURE — 7100000000 HC PACU RECOVERY - FIRST 15 MIN: Performed by: OBSTETRICS & GYNECOLOGY

## 2022-04-01 PROCEDURE — 6360000002 HC RX W HCPCS

## 2022-04-01 PROCEDURE — 3700000001 HC ADD 15 MINUTES (ANESTHESIA): Performed by: OBSTETRICS & GYNECOLOGY

## 2022-04-01 PROCEDURE — 86850 RBC ANTIBODY SCREEN: CPT

## 2022-04-01 PROCEDURE — 80307 DRUG TEST PRSMV CHEM ANLYZR: CPT

## 2022-04-01 PROCEDURE — 7100000001 HC PACU RECOVERY - ADDTL 15 MIN: Performed by: OBSTETRICS & GYNECOLOGY

## 2022-04-01 PROCEDURE — 36415 COLL VENOUS BLD VENIPUNCTURE: CPT

## 2022-04-01 PROCEDURE — 6370000000 HC RX 637 (ALT 250 FOR IP)

## 2022-04-01 RX ORDER — OXYCODONE HYDROCHLORIDE AND ACETAMINOPHEN 5; 325 MG/1; MG/1
2 TABLET ORAL EVERY 4 HOURS PRN
Status: DISCONTINUED | OUTPATIENT
Start: 2022-04-02 | End: 2022-04-04 | Stop reason: HOSPADM

## 2022-04-01 RX ORDER — SODIUM CHLORIDE 9 MG/ML
INJECTION, SOLUTION INTRAVENOUS PRN
Status: DISCONTINUED | OUTPATIENT
Start: 2022-04-01 | End: 2022-04-01

## 2022-04-01 RX ORDER — ONDANSETRON 2 MG/ML
4 INJECTION INTRAMUSCULAR; INTRAVENOUS EVERY 6 HOURS PRN
Status: DISCONTINUED | OUTPATIENT
Start: 2022-04-01 | End: 2022-04-01

## 2022-04-01 RX ORDER — SODIUM CHLORIDE 9 MG/ML
25 INJECTION, SOLUTION INTRAVENOUS PRN
Status: DISCONTINUED | OUTPATIENT
Start: 2022-04-01 | End: 2022-04-04 | Stop reason: HOSPADM

## 2022-04-01 RX ORDER — DIPHENHYDRAMINE HYDROCHLORIDE 50 MG/ML
25 INJECTION INTRAMUSCULAR; INTRAVENOUS EVERY 6 HOURS PRN
Status: DISCONTINUED | OUTPATIENT
Start: 2022-04-01 | End: 2022-04-04 | Stop reason: HOSPADM

## 2022-04-01 RX ORDER — ONDANSETRON 2 MG/ML
4 INJECTION INTRAMUSCULAR; INTRAVENOUS EVERY 6 HOURS PRN
Status: DISCONTINUED | OUTPATIENT
Start: 2022-04-01 | End: 2022-04-03

## 2022-04-01 RX ORDER — KETOROLAC TROMETHAMINE 30 MG/ML
30 INJECTION, SOLUTION INTRAMUSCULAR; INTRAVENOUS EVERY 6 HOURS
Status: COMPLETED | OUTPATIENT
Start: 2022-04-01 | End: 2022-04-02

## 2022-04-01 RX ORDER — SWAB
1 SWAB, NON-MEDICATED MISCELLANEOUS DAILY
Status: DISCONTINUED | OUTPATIENT
Start: 2022-04-01 | End: 2022-04-04 | Stop reason: HOSPADM

## 2022-04-01 RX ORDER — SODIUM CHLORIDE 9 MG/ML
25 INJECTION, SOLUTION INTRAVENOUS PRN
Status: DISCONTINUED | OUTPATIENT
Start: 2022-04-01 | End: 2022-04-01

## 2022-04-01 RX ORDER — SODIUM CHLORIDE 0.9 % (FLUSH) 0.9 %
10 SYRINGE (ML) INJECTION PRN
Status: DISCONTINUED | OUTPATIENT
Start: 2022-04-01 | End: 2022-04-04 | Stop reason: HOSPADM

## 2022-04-01 RX ORDER — OXYCODONE HYDROCHLORIDE AND ACETAMINOPHEN 5; 325 MG/1; MG/1
1 TABLET ORAL EVERY 4 HOURS PRN
Status: DISCONTINUED | OUTPATIENT
Start: 2022-04-02 | End: 2022-04-04 | Stop reason: HOSPADM

## 2022-04-01 RX ORDER — DOCUSATE SODIUM 100 MG/1
100 CAPSULE, LIQUID FILLED ORAL DAILY
Status: DISCONTINUED | OUTPATIENT
Start: 2022-04-01 | End: 2022-04-03

## 2022-04-01 RX ORDER — DIPHENHYDRAMINE HYDROCHLORIDE 50 MG/ML
INJECTION INTRAMUSCULAR; INTRAVENOUS PRN
Status: DISCONTINUED | OUTPATIENT
Start: 2022-04-01 | End: 2022-04-01 | Stop reason: SDUPTHER

## 2022-04-01 RX ORDER — TRISODIUM CITRATE DIHYDRATE AND CITRIC ACID MONOHYDRATE 500; 334 MG/5ML; MG/5ML
30 SOLUTION ORAL ONCE
Status: COMPLETED | OUTPATIENT
Start: 2022-04-01 | End: 2022-04-01

## 2022-04-01 RX ORDER — SODIUM CHLORIDE, SODIUM LACTATE, POTASSIUM CHLORIDE, AND CALCIUM CHLORIDE .6; .31; .03; .02 G/100ML; G/100ML; G/100ML; G/100ML
1000 INJECTION, SOLUTION INTRAVENOUS ONCE
Status: DISCONTINUED | OUTPATIENT
Start: 2022-04-01 | End: 2022-04-01

## 2022-04-01 RX ORDER — ACETAMINOPHEN 500 MG
1000 TABLET ORAL ONCE
Status: COMPLETED | OUTPATIENT
Start: 2022-04-01 | End: 2022-04-01

## 2022-04-01 RX ORDER — LIDOCAINE HYDROCHLORIDE 10 MG/ML
1 INJECTION, SOLUTION EPIDURAL; INFILTRATION; INTRACAUDAL; PERINEURAL
Status: DISCONTINUED | OUTPATIENT
Start: 2022-04-01 | End: 2022-04-01

## 2022-04-01 RX ORDER — NALOXONE HYDROCHLORIDE 0.4 MG/ML
0.4 INJECTION, SOLUTION INTRAMUSCULAR; INTRAVENOUS; SUBCUTANEOUS PRN
Status: DISCONTINUED | OUTPATIENT
Start: 2022-04-01 | End: 2022-04-04 | Stop reason: HOSPADM

## 2022-04-01 RX ORDER — LANOLIN 100 %
OINTMENT (GRAM) TOPICAL
Status: DISCONTINUED | OUTPATIENT
Start: 2022-04-01 | End: 2022-04-04 | Stop reason: HOSPADM

## 2022-04-01 RX ORDER — BUPIVACAINE HYDROCHLORIDE 7.5 MG/ML
INJECTION, SOLUTION INTRASPINAL PRN
Status: DISCONTINUED | OUTPATIENT
Start: 2022-04-01 | End: 2022-04-01 | Stop reason: SDUPTHER

## 2022-04-01 RX ORDER — BISACODYL 10 MG
10 SUPPOSITORY, RECTAL RECTAL DAILY PRN
Status: DISCONTINUED | OUTPATIENT
Start: 2022-04-01 | End: 2022-04-04 | Stop reason: HOSPADM

## 2022-04-01 RX ORDER — SODIUM CHLORIDE, SODIUM LACTATE, POTASSIUM CHLORIDE, CALCIUM CHLORIDE 600; 310; 30; 20 MG/100ML; MG/100ML; MG/100ML; MG/100ML
INJECTION, SOLUTION INTRAVENOUS CONTINUOUS
Status: DISCONTINUED | OUTPATIENT
Start: 2022-04-01 | End: 2022-04-04 | Stop reason: HOSPADM

## 2022-04-01 RX ORDER — MORPHINE SULFATE 1 MG/ML
INJECTION, SOLUTION EPIDURAL; INTRATHECAL; INTRAVENOUS PRN
Status: DISCONTINUED | OUTPATIENT
Start: 2022-04-01 | End: 2022-04-01 | Stop reason: SDUPTHER

## 2022-04-01 RX ORDER — NALBUPHINE HCL 10 MG/ML
5 AMPUL (ML) INJECTION
Status: DISCONTINUED | OUTPATIENT
Start: 2022-04-01 | End: 2022-04-03

## 2022-04-01 RX ORDER — POLYETHYLENE GLYCOL 3350 17 G/17G
17 POWDER, FOR SOLUTION ORAL DAILY PRN
Status: DISCONTINUED | OUTPATIENT
Start: 2022-04-01 | End: 2022-04-04 | Stop reason: HOSPADM

## 2022-04-01 RX ORDER — PHENYLEPHRINE HYDROCHLORIDE 10 MG/ML
INJECTION INTRAVENOUS PRN
Status: DISCONTINUED | OUTPATIENT
Start: 2022-04-01 | End: 2022-04-01 | Stop reason: SDUPTHER

## 2022-04-01 RX ORDER — SODIUM CHLORIDE, SODIUM LACTATE, POTASSIUM CHLORIDE, CALCIUM CHLORIDE 600; 310; 30; 20 MG/100ML; MG/100ML; MG/100ML; MG/100ML
INJECTION, SOLUTION INTRAVENOUS CONTINUOUS
Status: DISCONTINUED | OUTPATIENT
Start: 2022-04-01 | End: 2022-04-01

## 2022-04-01 RX ORDER — ACETAMINOPHEN 325 MG/1
325 TABLET ORAL EVERY 4 HOURS PRN
Status: DISCONTINUED | OUTPATIENT
Start: 2022-04-01 | End: 2022-04-01

## 2022-04-01 RX ORDER — ACETAMINOPHEN 500 MG
1000 TABLET ORAL EVERY 6 HOURS
Status: DISCONTINUED | OUTPATIENT
Start: 2022-04-01 | End: 2022-04-04 | Stop reason: HOSPADM

## 2022-04-01 RX ORDER — SIMETHICONE 80 MG
80 TABLET,CHEWABLE ORAL EVERY 6 HOURS PRN
Status: DISCONTINUED | OUTPATIENT
Start: 2022-04-01 | End: 2022-04-04 | Stop reason: HOSPADM

## 2022-04-01 RX ORDER — SODIUM CHLORIDE 0.9 % (FLUSH) 0.9 %
10 SYRINGE (ML) INJECTION PRN
Status: DISCONTINUED | OUTPATIENT
Start: 2022-04-01 | End: 2022-04-01

## 2022-04-01 RX ORDER — EPHEDRINE SULFATE 50 MG/ML
INJECTION INTRAVENOUS PRN
Status: DISCONTINUED | OUTPATIENT
Start: 2022-04-01 | End: 2022-04-01 | Stop reason: SDUPTHER

## 2022-04-01 RX ORDER — ONDANSETRON 2 MG/ML
INJECTION INTRAMUSCULAR; INTRAVENOUS PRN
Status: DISCONTINUED | OUTPATIENT
Start: 2022-04-01 | End: 2022-04-01 | Stop reason: SDUPTHER

## 2022-04-01 RX ORDER — IBUPROFEN 800 MG/1
800 TABLET ORAL EVERY 8 HOURS
Status: DISCONTINUED | OUTPATIENT
Start: 2022-04-02 | End: 2022-04-04 | Stop reason: HOSPADM

## 2022-04-01 RX ORDER — SODIUM CHLORIDE 0.9 % (FLUSH) 0.9 %
5-40 SYRINGE (ML) INJECTION EVERY 12 HOURS SCHEDULED
Status: DISCONTINUED | OUTPATIENT
Start: 2022-04-01 | End: 2022-04-01

## 2022-04-01 RX ORDER — SODIUM CHLORIDE 0.9 % (FLUSH) 0.9 %
5-40 SYRINGE (ML) INJECTION PRN
Status: DISCONTINUED | OUTPATIENT
Start: 2022-04-01 | End: 2022-04-01

## 2022-04-01 RX ADMIN — PHENYLEPHRINE HYDROCHLORIDE 100 MCG: 10 INJECTION INTRAVENOUS at 10:58

## 2022-04-01 RX ADMIN — ACETAMINOPHEN 1000 MG: 500 TABLET ORAL at 19:16

## 2022-04-01 RX ADMIN — PHENYLEPHRINE HYDROCHLORIDE 100 MCG: 10 INJECTION INTRAVENOUS at 11:01

## 2022-04-01 RX ADMIN — SODIUM CHLORIDE, POTASSIUM CHLORIDE, SODIUM LACTATE AND CALCIUM CHLORIDE: 600; 310; 30; 20 INJECTION, SOLUTION INTRAVENOUS at 13:47

## 2022-04-01 RX ADMIN — CEFAZOLIN SODIUM 2000 MG: 10 INJECTION, POWDER, FOR SOLUTION INTRAVENOUS at 10:38

## 2022-04-01 RX ADMIN — BUPIVACAINE HYDROCHLORIDE IN DEXTROSE 1.5 ML: 7.5 INJECTION, SOLUTION SUBARACHNOID at 10:56

## 2022-04-01 RX ADMIN — SODIUM CITRATE AND CITRIC ACID MONOHYDRATE 30 ML: 500; 334 SOLUTION ORAL at 10:31

## 2022-04-01 RX ADMIN — EPHEDRINE SULFATE 10 MG: 50 INJECTION INTRAVENOUS at 11:05

## 2022-04-01 RX ADMIN — DIPHENHYDRAMINE HYDROCHLORIDE 25 MG: 50 INJECTION, SOLUTION INTRAMUSCULAR; INTRAVENOUS at 17:20

## 2022-04-01 RX ADMIN — ACETAMINOPHEN 1000 MG: 500 TABLET ORAL at 15:04

## 2022-04-01 RX ADMIN — PHENYLEPHRINE HYDROCHLORIDE 100 MCG: 10 INJECTION INTRAVENOUS at 11:07

## 2022-04-01 RX ADMIN — PHENYLEPHRINE HYDROCHLORIDE 100 MCG: 10 INJECTION INTRAVENOUS at 11:10

## 2022-04-01 RX ADMIN — ONDANSETRON 4 MG: 2 INJECTION, SOLUTION INTRAMUSCULAR; INTRAVENOUS at 10:44

## 2022-04-01 RX ADMIN — PHENYLEPHRINE HYDROCHLORIDE 50 MCG: 10 INJECTION INTRAVENOUS at 11:21

## 2022-04-01 RX ADMIN — SODIUM CHLORIDE, POTASSIUM CHLORIDE, SODIUM LACTATE AND CALCIUM CHLORIDE: 600; 310; 30; 20 INJECTION, SOLUTION INTRAVENOUS at 09:56

## 2022-04-01 RX ADMIN — EPHEDRINE SULFATE 15 MG: 50 INJECTION INTRAVENOUS at 11:01

## 2022-04-01 RX ADMIN — FAMOTIDINE 20 MG: 10 INJECTION, SOLUTION INTRAVENOUS at 10:05

## 2022-04-01 RX ADMIN — ACETAMINOPHEN 1000 MG: 500 TABLET ORAL at 10:09

## 2022-04-01 RX ADMIN — EPHEDRINE SULFATE 10 MG: 50 INJECTION INTRAVENOUS at 11:09

## 2022-04-01 RX ADMIN — MORPHINE SULFATE 0.1 MG: 1 INJECTION EPIDURAL; INTRATHECAL; INTRAVENOUS at 10:56

## 2022-04-01 RX ADMIN — DIPHENHYDRAMINE HYDROCHLORIDE 12.5 MG: 50 INJECTION INTRAMUSCULAR; INTRAVENOUS at 11:24

## 2022-04-01 RX ADMIN — Medication 909 ML/HR: at 11:19

## 2022-04-01 RX ADMIN — KETOROLAC TROMETHAMINE 30 MG: 30 INJECTION, SOLUTION INTRAMUSCULAR; INTRAVENOUS at 13:43

## 2022-04-01 RX ADMIN — KETOROLAC TROMETHAMINE 30 MG: 30 INJECTION, SOLUTION INTRAMUSCULAR; INTRAVENOUS at 19:16

## 2022-04-01 RX ADMIN — SODIUM CHLORIDE, POTASSIUM CHLORIDE, SODIUM LACTATE AND CALCIUM CHLORIDE: 600; 310; 30; 20 INJECTION, SOLUTION INTRAVENOUS at 11:07

## 2022-04-01 RX ADMIN — PHENYLEPHRINE HYDROCHLORIDE 100 MCG: 10 INJECTION INTRAVENOUS at 11:04

## 2022-04-01 RX ADMIN — NALBUPHINE HYDROCHLORIDE 5 MG: 10 INJECTION, SOLUTION INTRAMUSCULAR; INTRAVENOUS; SUBCUTANEOUS at 22:28

## 2022-04-01 RX ADMIN — CEFAZOLIN SODIUM 2000 MG: 10 INJECTION, POWDER, FOR SOLUTION INTRAVENOUS at 18:37

## 2022-04-01 ASSESSMENT — PULMONARY FUNCTION TESTS
PIF_VALUE: 0
PIF_VALUE: 1
PIF_VALUE: 0
PIF_VALUE: 1
PIF_VALUE: 0
PIF_VALUE: 1
PIF_VALUE: 0
PIF_VALUE: 1
PIF_VALUE: 0
PIF_VALUE: 1
PIF_VALUE: 0
PIF_VALUE: 0
PIF_VALUE: 1
PIF_VALUE: 0
PIF_VALUE: 1
PIF_VALUE: 0
PIF_VALUE: 1
PIF_VALUE: 1
PIF_VALUE: 0
PIF_VALUE: 1
PIF_VALUE: 0
PIF_VALUE: 1

## 2022-04-01 ASSESSMENT — PAIN SCALES - GENERAL
PAINLEVEL_OUTOF10: 6
PAINLEVEL_OUTOF10: 8
PAINLEVEL_OUTOF10: 6
PAINLEVEL_OUTOF10: 8
PAINLEVEL_OUTOF10: 1

## 2022-04-01 NOTE — H&P
OBSTETRICAL HISTORY AND PHYSICAL    Provider:  JANUARY Dior CNM      Date: 2022  Time: 9:41 AM    Patient Name: Alida Escalante  Patient : 1990  Room/Bed: 3387/9342-48  Admission Date/Time: 2022  8:46 AM  MRN #: [id number]  [facility] #: [id number]    Primary Care Physician: JANUARY Baird CNP  Admitting Provider: Dr. Guillermo Iqbal is a 29 y.o. female [id number]    CC:  Section (Repeat)     HPI: Alida Escalante is a 29 y.o. [id number] at 39w0d who presents for a repeat  section. The patient reports fetal movement is present, complains of occasional contractions, denies loss of fluid, denies vaginal bleeding. DATING:  LMP: Patient's last menstrual period was 2021 (exact date). Estimated Date of Delivery: 22   Based on: [place] [address] [facility]  Patient Active Problem List   Diagnosis    Generalized anxiety disorder    Adult BMI 25.0-25.9 kg/sq m    History of gestational diabetes    PLTCS 19 M APG 8/9 Wt 9#12    Corneal rust ring    Elevated fasting blood sugar    Vitamin D deficiency    history of LGA: 9#12oz 2019     (spontaneous vaginal delivery) 2017    High risk pregnancy with low PAPPA    COVID-19 affecting pregnancy    30 weeks gestation of pregnancy    39 weeks gestation of pregnancy         Allergies: Allergies as of 2022    (No Known Allergies)       Medications:  No current facility-administered medications on file prior to encounter.      Current Outpatient Medications on File Prior to Encounter   Medication Sig Dispense Refill    Prenatal Vit-Fe Fumarate-FA (PRENATAL VITAMIN PLUS LOW IRON) 27-1 MG TABS TAKE 1 TABLET BY MOUTH ONCE DAILY            Steroids Given In This Pregnancy:  no     Past Medical History:   Diagnosis Date    Anemia 2018    Anxiety     Generalized anxiety disorder 10/6/2015    Gestational diabetes mellitus in pregnancy 2016       Past Surgical History:   Procedure Laterality Date     SECTION N/A 2019     SECTION PRIMARY performed by Nenita Guzman DO at [facility] L[facility]D OR    WISDOM TOOTH EXTRACTION         OB History    Para Term  AB Living   5 2 2 0 2 2   SAB IAB Ectopic Molar Multiple Live Births   1 1 0 0 0 2      # Outcome Date GA Lbr Roberto/2nd Weight Sex Delivery Anes PTL Lv   5 Current            4 SAB 2021           3 Term 19 39w2d  9 lb 12 oz (4.423 kg) M CS-LTranv   JERICHO   2 Term 17 39w2d 02:19 / 03:04 7 lb 10 oz (3.459 kg) M Vag-Spont EPI N JERICHO      Name: Ronie Moritz: 8  Apgar5: 5   1 IAB                Family History   Problem Relation Age of Onset    Hypertension Father     Hypertension Mother     No Known Problems Brother     Breast Cancer Neg Hx     Cancer Neg Hx     Colon Cancer Neg Hx     Diabetes Neg Hx     Eclampsia Neg Hx     Ovarian Cancer Neg Hx      Labor Neg Hx     Spont Abortions Neg Hx     Stroke Neg Hx          Social History     Socioeconomic History    Marital status: Single     Spouse name: Not on file    Number of children: Not on file    Years of education: Not on file    Highest education level: Not on file   Occupational History    Not on file   Tobacco Use    Smoking status: Never Smoker    Smokeless tobacco: Never Used   Vaping Use    Vaping Use: Never used   Substance and Sexual Activity    Alcohol use: Not Currently     Alcohol/week: 0.0 standard drinks     Comment: rare     Drug use: Never    Sexual activity: Yes     Partners: Male   Other Topics Concern    Not on file   Social History Narrative    Not on file     Social Determinants of Health     Financial Resource Strain:     Difficulty of Paying Living Expenses: Not on file   Food Insecurity:     Worried About Running Out of Food in the Last Year: Not on file    Jenny of Food in the Last Year: Not on file Transportation Needs:     Lack of Transportation (Medical): Not on file    Lack of Transportation (Non-Medical): Not on file   Physical Activity:     Days of Exercise per Week: Not on file    Minutes of Exercise per Session: Not on file   Stress:     Feeling of Stress : Not on file   Social Connections:     Frequency of Communication with Friends and Family: Not on file    Frequency of Social Gatherings with Friends and Family: Not on file    Attends Restorationist Services: Not on file    Active Member of [address] [place] [address] or Organizations: Not on file    Attends Club or Organization Meetings: Not on file    Marital Status: Not on file   Intimate Partner Violence:     Fear of Current or Ex-Partner: Not on file    Emotionally Abused: Not on file    Physically Abused: Not on file    Sexually Abused: Not on file   Housing Stability:     Unable to Pay for Housing in the Last Year: Not on file    Number of [place] in the Last Year: Not on file    Unstable Housing in the Last Year: Not on file       Review Of Systems:  A minimum of an eleven point review of systems was completed.     REVIEW OF SYSTEMS:   Constitutional: negative fever, negative chills, negative weight changes   HEENT: negative visual disturbances, negative headaches, negative dizziness, negative hearing loss  Breast: Negative breast abnormalities, negative breast lumps, negative nipple discharge  Respiratory: negative dyspnea, negative cough, negative SOB  Cardiovascular: negative chest pain,  negative palpitations, negative arrhythmia, negative syncope   Gastrointestinal: negative abdominal pain, negative RUQ pain, negative N/V, negative diarrhea, negative constipation, negative bowel changes, negative heartburn   Genitourinary: negative dysuria, negative hematuria, negative urinary incontinence, negative vaginal discharge, negative vaginal bleeding or spotting. + contractions  Dermatological: negative rash, negative pruritis, negative mole or other skin changes  Hematologic: negative bruising  Immunologic/Lymphatic: negative recent illness, negative recent sick contact  Musculoskeletal: negative back pain, negative myalgias, negative arthralgias  Neurological:  negative dizziness, negative migraines, negative seizures, negative weakness  Behavior/Psych: negative depression, negative anxiety, negative SI, negative HI    Physical Exam:  Vitals:    04/01/22 0924   Temp: 98 °F (36.7 °C)   TempSrc: Infrared       General appearance:  Alert, no apparent distress, and cooperative  Skin:  Warm, dry, no rashes or erythema  Neurologic:  alert, oriented, normal speech and gait, normal reflexes  Lungs:  No increased work of breathing, good air exchange, clear to auscultation bilaterally, no crackles or wheezing  Heart:  regular rate and rhythm and no murmur   Breast:  Deferred   Abdomen:  soft, non-tender, no right upper quadrant tenderness, no CVA tenderness. Gravid and consistent with her gestational age, EFW by Leopold's Maneuver was 8 pounds 8 ounces. Uterus non-tender, no signs of abruption and no signs of chorioamnionitis  Extremities:  no calf tenderness, non edematous, DTRs: normal    PELVIC EXAM:               Proven to 7 pounds 10 ounces    LIMITED BEDSIDE US:  Position: Cephalic  Placental Location: posterior  Fetal Heart Tones: Present  Fetal Movement: Present  Amniotic Fluid Index/Volume:  adequate 2x2 cm fluid pocket  Estimated Fetal Weight:  9 lbs 0 oz      MEMBRANES:  Intact                      FETAL HEART RATE:       Baseline: 135     Variability: moderate     Accelerations: absent     Decelerations: absent            CONTRACTIONS: Frequency: irregular    PRENATAL LAB RESULTS:   Blood Type/Rh: A neg  Antibody Screen: negative  Hemoglobin, Hematocrit, Platelets: Hgb 23.6/ZXH 35.9/Plt 209  Rubella: immune  T.  Pallidum, IgG: non-reactive  Hepatitis B Surface Antigen: non-reactive   Hepatitis C Antibody: not done   HIV: non-reactive   Sickle Cell Screen: not done  Gonorrhea: negative  Chlamydia: negative  Urine culture: negative, date: 21    Early 1 hour Glucose Tolerance Test: 106  1 hour Glucose Tolerace Test: 103    Group B Strep: negative RV culture on 3/15/22  Cystic Fibrosis Screen: not done  First Trimester Screen: low WINNIE-A  MSAFP/Multiple Markers: low risk for aneuploidy  Non-Invasive Prenatal Testing: not done  Anatomy US: posterior placenta, 3 VC, female gender, normal anatomy    ASSESSMENT/PLAN:  Anny Mckinney is a 29 y.o. female  [id number] At 39w0d  Admit: repeat  section  - cEFM/Sabina  - IV LR at 125ml/hr  - COVID testing not indicated - patient vaccinated. - Written consent for UDS obtained, specimen collected  - Diet: NPO  - 2g Ancef ordered    FHR: Category 1    Hx of LGA  - G2: 9 pound 12 oz    Low WINNIE-A    Hx of GDM  - Early 1 hour and 24-28 week 1 hour WNL    Plan discussed with Dr. Benito Raygoza, who is agreeable. Steroids given this admission: No    Risks, benefits, alternatives and possible complications have been discussed in detail with the patient. Admission, and post admission procedures and expectations were discussed in detail. All questions were answered.       JANUARY Morse CNM  Midwife SERENA  2022, 9:41 AM

## 2022-04-01 NOTE — PROGRESS NOTES
11-Jul-2017 Update History & Physical- (Obstetrics)      Patient Name: Edward Marie  Patient : 1990  Room/Bed: 7712/0253-45  Admission Date/Time: 2022  8:46 AM  Primary Care Physician: JANUARY Lamar CNP  MRN #: [id number]  [facility] #: [id number]        Date: 2022  Time: 10:33 AM      The patient's History and Physical was reviewed with the patient and there were no significant changes. I examined the patient and there were no significant changes from the previous History and Physical.    Plan: The risk, benefits, expected outcome, and alternative to the recommended procedure have been discussed with the patient. Patient understands and wants to proceed with the procedure. She is aware that there may be a need for a second procedure and there may be incomplete removal of any abnormal tissue. She was also counseled on the possibility of Bleeding, Infection, possible damage to bowel, bladder, baby, vasculature and surrounding organs. The labs and consent were reviewed prior to the patient going to the Operating Room.      bpm Baseline  Variability- moderate   Category Tracing- 1          Vitals:    22 0924 22 0954   BP: 118/79    Pulse: 83    Resp: 16    Temp: 98 °F (36.7 °C)    TempSrc: Infrared    Weight:  188 lb (85.3 kg)   Height:  5' 5\" (1.651 m)     PE-per resident note-Unchanged    Patient Active Problem List    Diagnosis Date Noted    COVID-19 affecting pregnancy 01/10/2022     Priority: High    High risk pregnancy with low PAPPA 10/01/2021     Priority: High     Overview Note:     10/1/2021 consult MFM      history of LGA: 9#12oz 2021     Priority: High     (spontaneous vaginal delivery) 2021     Priority: High    PLTCS / M APG 8/9 Wt 9#12 2019     Priority: High     Overview Note:     Previous LTC C/S wants repeat      History of gestational diabetes 09/10/2018     Priority: High     Overview Note:     Early 1 hour GTT ordered 9/10/2018  If wnl repeat at 28 weeks      39 weeks gestation of pregnancy 04/01/2022    30 weeks gestation of pregnancy 01/28/2022    Vitamin D deficiency 09/22/2020    Elevated fasting blood sugar 08/10/2020    Corneal rust ring 05/18/2020    Adult BMI 25.0-25.9 kg/sq m 07/13/2016    Generalized anxiety disorder 10/06/2015         Lab Results:  Admission on 04/01/2022   Component Date Value Ref Range Status    Expiration Date 04/01/2022 04/04/2022,2359   Final    Arm Band Number 04/01/2022 [id number]   Final    ABO/Rh 04/01/2022 A POSITIVE   Final    Antibody Screen 04/01/2022 NEGATIVE   Final    WBC 04/01/2022 7.5  3.5 - 11.0 k/uL Final    RBC 04/01/2022 4.31  4.0 - 5.2 m/uL Final    Hemoglobin 04/01/2022 12.5  12.0 - 16.0 g/dL Final    Hematocrit 04/01/2022 35.9* 36 - 46 % Final    MCV 04/01/2022 83.2  80 - 100 fL Final    MCH 04/01/2022 29.0  26 - 34 pg Final    MCHC 04/01/2022 34.9  31 - 37 g/dL Final    RDW 04/01/2022 14.1  11.5 - 14.9 % Final    Platelets 71/92/8170 209  150 - 450 k/uL Final    MPV 04/01/2022 7.6  6.0 - 12.0 fL Final    Amphetamine Screen, Ur 04/01/2022 NEGATIVE  NEGATIVE Final    Comment:       (Positive cutoff 1000 ng/mL)                  Barbiturate Screen, Ur 04/01/2022 NEGATIVE  NEGATIVE Final    Comment:       (Positive cutoff 200 ng/mL)                  Benzodiazepine Screen, Urine 04/01/2022 NEGATIVE  NEGATIVE Final    Comment:       (Positive cutoff 200 ng/mL)                  Cocaine Metabolite, Urine 04/01/2022 NEGATIVE  NEGATIVE Final    Comment:       (Positive cutoff 300 ng/mL)                  Methadone Screen, Urine 04/01/2022 NEGATIVE  NEGATIVE Final    Comment:       (Positive cutoff 300 ng/mL)                  Opiates, Urine 04/01/2022 NEGATIVE  NEGATIVE Final    Comment:       (Positive cutoff 300 ng/mL)                  Phencyclidine, Urine 04/01/2022 NEGATIVE  NEGATIVE Final    Comment:       (Positive cutoff 25 ng/mL)                  Cannabinoid Scrn, Ur 2022 NEGATIVE  NEGATIVE Final    Comment:       (Positive cutoff 50 ng/mL)                  Oxycodone Screen, Ur 2022 NEGATIVE  NEGATIVE Final    Comment:       (Positive cutoff 100 ng/mL)                  Test Information 2022 Assay provides medical screening only. The absence of expected drug(s) and/or metabolite(s) may indicate diluted or adulterated urine, limitations of testing or timing of collection. Final    Comment: Testing for legal purposes should be confirmed by another method. To request confirmation   of test result, please call the lab within 7 days of sample submission. Hospital Outpatient Visit on 03/15/2022   Component Date Value Ref Range Status    Specimen Description 03/15/2022 . VAGINA   Final    Culture 03/15/2022 NEGATIVE FOR GROUP B STREPTOCOCCI   Final   ]    Assessment:  1. Kerry Mcclellan is a 29 y.o. female  2. IUP @ 39w0d  3. Previous  declined TOLAC  Patient Active Problem List    Diagnosis Date Noted    COVID-19 affecting pregnancy 01/10/2022     Priority: High    High risk pregnancy with low PAPPA 10/01/2021     Priority: High     Overview Note:     10/1/2021 consult MFM      history of LGA: 9#12oz 2019 2021     Priority: High     (spontaneous vaginal delivery) 2017 2021     Priority: High    PLTCS 19 M APG 8/9 Wt 9#12 2019     Priority: High     Overview Note:     Previous LTC C/S wants repeat      History of gestational diabetes 09/10/2018     Priority: High     Overview Note:     Early 1 hour GTT ordered 9/10/2018  If wnl repeat at 28 weeks      39 weeks gestation of pregnancy 2022    30 weeks gestation of pregnancy 2022    Vitamin D deficiency 2020    Elevated fasting blood sugar 08/10/2020    Corneal rust ring 2020    Adult BMI 25.0-25.9 kg/sq m 2016    Generalized anxiety disorder 10/06/2015          Plan:  1.   2.  Procedure risk and complications reviewed  3. SCIP ABX ordered  4. Thromboembolic prophylaxis ordered with EPC's BL  5.  Consent Obtained      Electronically signed by Ankur Martin DO  on 4/1/2022 at 10:33 AM 11-Jul-2017 11:39

## 2022-04-01 NOTE — LACTATION NOTE
This note was copied from a baby's chart. Lactation round made. Mother reports cluster feeding. Writer assist mother with positioning and aligning baby. Writer demonstrates football hold to mother. Large drops of colostrum expressed and offered to baby. Baby latches well. Audible swallows present. Encouraged to call out.

## 2022-04-01 NOTE — ANESTHESIA PRE PROCEDURE
Department of Anesthesiology  Preprocedure Note       Name:  Dena Casillas   Age:  29 y.o.  :  1990                                          MRN:  [id number]         Date:  2022      Surgeon: Haydee Noonan):  Christina Guajardo DO    Procedure: Procedure(s):   SECTION    Medications prior to admission:   Prior to Admission medications    Medication Sig Start Date End Date Taking?  Authorizing Provider   Prenatal Vit-Fe Fumarate-FA (PRENATAL VITAMIN PLUS LOW IRON) 27-1 MG TABS TAKE 1 TABLET BY MOUTH ONCE DAILY 22   Historical Provider, MD       Current medications:    Current Facility-Administered Medications   Medication Dose Route Frequency Provider Last Rate Last Admin    lidocaine PF 1 % injection 1 mL  1 mL IntraDERmal Once PRN Keanu Key MD        lactated ringers infusion   IntraVENous Continuous Keanu Key MD        sodium chloride flush 0.9 % injection 5-40 mL  5-40 mL IntraVENous 2 times per day Keanu Key MD        sodium chloride flush 0.9 % injection 5-40 mL  5-40 mL IntraVENous PRN Keanu Key MD        0.9 % sodium chloride infusion  25 mL IntraVENous PRN Keanu Key MD        lactated ringers infusion   IntraVENous Continuous Leigh Coleen, APRN - CNM        lactated ringers bolus  1,000 mL IntraVENous Once Leigh Coleen, APRN - CNM        sodium chloride flush 0.9 % injection 10 mL  10 mL IntraVENous PRN Leigh Coleen, APRN - CNM        0.9 % sodium chloride infusion   IntraVENous PRN Leigh Coleen, APRN - CNM        citric acid-sodium citrate (BICITRA) solution 30 mL  30 mL Oral Once Leigh Coleen, APRN - CNM        famotidine (PEPCID) injection 20 mg  20 mg IntraVENous Once Leigh Coleen, APRN - CNM        acetaminophen (TYLENOL) tablet 1,000 mg  1,000 mg Oral Once Leigh Coleen, APRN - CNM        oxytocin (PITOCIN) 30 units in 500 mL infusion  87.3 arnie-units/min IntraVENous Continuous PRN Leigh Coleen, APRN - CNM        And   [facility] oxytocin (PITOCIN) 10 unit bolus from the bag  10 Units IntraVENous PRN JANUARY Sr CNM        ondansetron [facility]) injection 4 mg  4 mg IntraVENous Q6H PRN JANUARY Sr CNM        ceFAZolin (ANCEF) 2000 mg in dextrose 5 % 50 mL IVPB  2,000 mg IntraVENous Once JANUARY Sr CNM           Allergies:  No Known Allergies    Problem List:    Patient Active Problem List   Diagnosis Code    Generalized anxiety disorder F41.1    Adult BMI 25.0-25.9 kg/sq m Z68.25    History of gestational diabetes Z86.32    PLTCS 19 M APG 8/9 Wt 9#12 O82    Corneal rust ring H18.899    Elevated fasting blood sugar R73.01    Vitamin D deficiency E55.9    history of LGA: 9#12oz 2019 R89.9     (spontaneous vaginal delivery) 2017 O80    High risk pregnancy with low PAPPA O28.0, O09.899    COVID-19 affecting pregnancy O98.519, U07.1    30 weeks gestation of pregnancy Z3A.30    39 weeks gestation of pregnancy Z3A.39       Past Medical History:        Diagnosis Date    Anemia 2018    Anxiety     Generalized anxiety disorder 10/6/2015    Gestational diabetes mellitus in pregnancy 2016       Past Surgical History:        Procedure Laterality Date     SECTION N/A 2019     SECTION PR[facility] Liberty [facility]    WISDOM TOOTH EXTRACTION         Social History:    Social History     Tobacco Use    Smoking status: Never Smoker    Smokeless tobacco: Never Used   Substance Use Topics    Alcohol use: Not Currently     Alcohol/week: 0.0 standard drinks     Comment: rare                                 Counseling given: Not Answered      Vital Signs (Current):   Vitals:    22 0924   Temp: 98 °F (36.7 °C)   TempSrc: Infrared                                              BP Readings from Last 3 Encounters:   22 118/80   22 121/76   22 111/73       NPO Status: BMI:   Wt Readings from Last 3 Encounters:   03/29/22 188 lb (85.3 kg)   03/28/22 188 lb (85.3 kg)   03/22/22 189 lb (85.7 kg)     There is no height or weight on file to calculate BMI.    CBC:   Lab Results   Component Value Date    WBC 7.5 04/01/2022    RBC 4.31 04/01/2022    HGB 12.5 04/01/2022    HCT 35.9 04/01/2022    MCV 83.2 04/01/2022    RDW 14.1 04/01/2022     04/01/2022       CMP:   Lab Results   Component Value Date     07/10/2020    K 3.8 07/10/2020     07/10/2020    CO2 25 07/10/2020    BUN 13 07/10/2020    CREATININE 0.40 07/10/2020    GFRAA >60 07/10/2020    LABGLOM >60 07/10/2020    GLUCOSE 103 01/18/2022    GLUCOSE 95 07/10/2020    PROT 6.9 09/21/2020    CALCIUM 9.5 07/10/2020    BILITOT 0.45 09/21/2020    ALKPHOS 73 09/21/2020    AST 17 09/21/2020    ALT 12 09/21/2020       POC Tests: No results for input(s): POCGLU, POCNA, POCK, POCCL, POCBUN, POCHEMO, POCHCT in the last 72 hours.     Coags:   Lab Results   Component Value Date    PROTIME 13.5 07/19/2021    INR 1.0 07/19/2021    APTT 30.2 07/19/2021       HCG (If Applicable):   Lab Results   Component Value Date    PREGTESTUR negative 07/25/2018    HCGQUANT 75,890 (H) 09/13/2021        ABGs: No results found for: PHART, PO2ART, QLP3DZI, MRN4ABF, BEART, X0LTKLLH     Type & Screen (If Applicable):  No results found for: LABABO, LABRH    Drug/Infectious Status (If Applicable):  Lab Results   Component Value Date    HEPCAB NONREACTIVE 02/25/2021       COVID-19 Screening (If Applicable): No results found for: COVID19        Anesthesia Evaluation  Patient summary reviewed and Nursing notes reviewed no history of anesthetic complications:   Airway: Mallampati: III  TM distance: >3 FB   Neck ROM: full  Mouth opening: > = 3 FB Dental: normal exam         Pulmonary:normal exam  breath sounds clear to auscultation  (+) asthma:                            Cardiovascular:Negative CV ROS          ECG reviewed  Rhythm: regular  Rate: normal                    Neuro/Psych:   (+) psychiatric history:depression/anxiety             GI/Hepatic/Renal: Neg GI/Hepatic/Renal ROS            Endo/Other:    (+) Diabetes ( H/O gestational diabetes), .                  ROS comment:   GA - 39 wks  Previous C/S - 2019 Abdominal:             Vascular: negative vascular ROS. Other Findings:             Anesthesia Plan      spinal     ASA 2           MIPS: Postoperative opioids intended and Prophylactic antiemetics administered. Anesthetic plan and risks discussed with patient. Plan discussed with CRNA.                   Kenisha Fajardo MD   2022

## 2022-04-01 NOTE — LACTATION NOTE
This note was copied from a baby's chart. Lactation round made. Mother reports breastfeeding went well during recovery. Baby latched for 15 minutes with strong tug. Mother nursed first son for 1.5 years and second son for 2.5 years. Mother has 2 breast pumps at home. Writer answers questions on feeding cues and when to put baby to breast. Writer educates on sleepy phase and cluster feeding. Encourages mother to put baby to breast rather than a pacifier or artifical nipple to help establish milk supply and avoid nipple confusion. Encourages to call out for breastfeeding questions and concerns. Handouts given and explained to mother:  Breastfeeding resource Guide; Breastfeeding Log for the first week; When to Call a Lactation Consultant, Colostrum First, Norms in the First 3 days; feeding cues; Baby's second Night; [employer]'s inside track, a resource for breastfeeding mothers: Milk Expression and Pumping; How to Achieve a Deep Latch; Tips for breastfeeding Moms/Daily meal plan; [employer]'s Inside Track, a resource for breastfeeding mother: Managing Your Milk Supply:Going with the Flow;  [employer]'s Inside Track, a resource for breastfeeding mothers: Using Your Hands to Express Your Milk; Signs of a Good Feeding, Signs of a Poor Feeding. Discussed handouts with mother verbalizing understanding and encouraged mother  to view video clips.

## 2022-04-01 NOTE — OP NOTE
[facility]  OBSTETRICAL  PHYSICIAN POST-OPERATIVE  NOTE:      Patient Name: Vamsi Lucas  Patient : 1990  Room/Bed: 2177/8054-14  Admission Date/Time: 2022  8:46 AM  Primary Care Physician: JANUARY Portillo CNP  MRN #: [id number]  CSN #: [id number]        Date: 2022  Time: 11:49 AM        Pre-operative Diagnosis:   Vamsi Lucas is a 29 y.o. female at 36w0d [id number]     Term pregnancy, Single fetus and Pregnancy complicated by: see problem list  Patient Active Problem List    Diagnosis Date Noted    REPEAT LTC- section 2022     Priority: High    COVID-19 affecting pregnancy 01/10/2022     Priority: High    High risk pregnancy with low PAPPA 10/01/2021     Priority: High     Overview Note:     10/1/2021 consult MFM      history of LGA: 9#12oz 2021     Priority: High     (spontaneous vaginal delivery) 2021     Priority: High    History of gestational diabetes 09/10/2018     Priority: High     Overview Note:     Early 1 hour GTT ordered 9/10/2018  If wnl repeat at 28 weeks      39 weeks gestation of pregnancy 2022    30 weeks gestation of pregnancy 2022    Vitamin D deficiency 2020    Elevated fasting blood sugar 08/10/2020    Corneal rust ring 2020    PLTCS 19 M APG 8/9 Wt 9#12 2019     Overview Note:     Previous LTC C/S wants repeat      Adult BMI 25.0-25.9 kg/sq m 2016    Generalized anxiety disorder 10/06/2015       1. IUP@ 39w0d  2. See Problem List  3. Previous  declined TOLAC      Post-operative Diagnosis:    Living  infant(s) and Female  Same as Pre-Op        Procedures:  1.  Section- repeat : Low Cervical, Transverse    2. Abdominal Delivery of a Live Born     female          Surgeon:  Larry Parrish, DO      Assistants:  1.  Tyler Lisa DO (see below for indication for surgical assistant)  2. Sanford Gannon CNM      OR Staff:  First Assistant: JANUARY Gandara CNM  Scrub Person Second: Anisha Rocha LPN      Anesthesia:  spinal    Duramorph Utilized: Yes        Estimated blood loss:  500 ML    Fluids:     IV: 1500 ml   Blood Products:  na   Cell Saver: No    Urine Output[de-identified]  150 ml (clear)    Drains:   TYPE: Goins  * No LDAs found *      TRS Tissue Retention System Skin Retractor Utilized and placed under sterile conditions: No      Complications:  None      Findings/ Delivery Summary:  Mother's Information    Labor Events     Labor?: No  Cervical Ripening:   Now         Paulene Francitas Girl Lowell Duarte [[id number]]    Labor Events     Labor?: No   Steroids?: None  Cervical Ripening Date/Time:     Antibiotics Received during Labor?: No  Rupture Identifier: Sac 1   Rupture Date/Time: 22 11:16:39   Rupture Type: AROM  Fluid Color: Clear  Fluid Odor: None  Fluid Volume:  Moderate  Induction: None  Labor Complications: None     Anesthesia    Method: Spinal     Start Pushing    Labor onset date/time:   Now   Dilation complete date/time:   Now   Start pushing date/time:    Decision date/time (emergent ):       Delivery ([place])    Delivery Date/Time:  22 11:16:55   Delivery Type: , Low Transverse    Details:  Trial of Labor?: No    Categorization: Repeat    Priority: Scheduled   Indications for : Prior Uterine Surgery   Skin Incision Type: Pfannenstiel   Uterine Incision: Low Transverse          Presentation    Presentation: Vertex  Position: Right  _: Occiput  _: Posterior     Shoulder Dystocia    Shoulder Dystocia Present?: No  Add Second Maneuver  Add Third Maneuver  Add Fourth Maneuver  Add Fifth Maneuver  Add Sixth Maneuver  Add Seventh Maneuver  Add Eighth Maneuver  Add Ninth Maneuver     Assisted Delivery Details    Forceps Attempted?: No  Vacuum Extractor Attempted?: No     Document Additional Attempt       Document Additional Attempt Cord    Vessels: 3 Vessels  Complications: None  Delayed Cord Clamping?: Yes  Cord Clamped Date/Time: 2022 11:18:00  Cord Blood Disposition: Refrigerator  Gases Sent?: Yes     Placenta    Date/Time: 2022 11:20:06  Removal: Expressed  Appearance: Intact  Disposition: Pathology     Lacerations    Episiotomy: None  Perineal Lacerations: None  Other Lacerations: no non-perineal laceration     Vaginal Counts      Sponges Needles Instruments   Initial Counts      Final Counts      If the count is incorrect due to Intentionally Retained Foreign Object (IRFO) add the IRFO LDA in Lines/Drains. Add LDA: Link to [facility]     Blood Loss  Mother: Favian Verde #[id number]   Start of Mother's Information    Delivery Blood Loss  22 1042 - 22 1149    None           End of Mother's Information  Mother: Favian Verde #[id number]          Delivery Providers    Delivering clinician: Winston Muniz DO     Provider Role    Winston Muniz, DO Surgeon    Rafael العراقي, DO Assistant Surgeon    Liset Caal Primary  Nurse     NICU Nurse     Neonatologist     Anesthesiologist     Nurse Anesthetist     Nurse Practitioner    Brisa Almeida, APRN - CNM Midwife     Nursery Nurse    Nic Lock, RN Primary Nurse           Assessment    Living Status: Living  Delivery Location: L&D     Apgar Scoring Key:    0 1 2    Skin Color: Blue or pale Acrocyanotic Completely pink    Heart Rate: Absent <100 bpm >100 bpm    Reflex Irritability: No response Grimace Cry or active withdrawal    Muscle Tone: Limp Some flexion Active motion    Respiratory Effort: Absent Weak cry; hypoventilation Good, crying                  Skin Color:   Heart Rate:   Reflex Irritability:   Muscle Tone:   Respiratory Effort:    Total:            1 Minute:    0    2    2    2    2    8        Apgar 1 total from OB History    5 Minute:    1    2    2    2    2    9        Apgar 5 total from OB History    10 Minute: 15 Minute:              20 Minute:                      Apgars Assigned By: [facility]          Resuscitation    Method: Bulb Suction, Stimulation           Mather Measurements    Birth Weight: 3459 g Birth Length: 0.521 m   Head Circumference: 0.355 m Chest Circumference: 0.35 m   Abdominal Girth: 0.33 m           Title    Skin to Skin Initiation Date/Time:     Skin to Skin End Date/Time:                    Living  infant(s) and Female    Cephalic  right occiput posterior  Other:       Amniotic Fluid was: Clear  A Nuchal Cord: was not present   A Spontaneous Cry Was Noted: Yes  The Baby: was suctioned        The Placenta Was Removed:  intact, whole and that the umbilical cord had three vessels noted    cord gasses were obtained and sent to the lab, cord blood was obtained and sent to the lab and Pitocin, 20 milliunits in 1 liter of ringers lactate was administered, wide open, to assist with uterine contraction    The umbilical cord had delayed clamping of 1 minute: Yes    The Maternal Adnexa was Visualized. There were not any adnexal masses. Body mass index is 31.28 kg/m². (Wound Vac indicated for BMI Value>35)    Wound Vac Applied to Incision: No      Specimen:  Placenta sent to pathology yes  * No specimens in log *     Condition:  infant stable to general nursery and mother stable    Blood Type and Rh: A POSITIVE        Rubella Immunity Status:    Rubella Antibody, IGG   Date Value Ref Range Status   2021 357.6 IU/mL Final     Comment:                 REFERENCE RANGE:  <5.0       NON-REACTIVE (non-immune)  5.0 TO 9.9 EQUIVOCAL  >=10.0     REACTIVE     (immune)                 Infant Feeding:    breast    Circumcision: na    All Sponge Counts Were Correct x 3 calls-Prior to closure of Peritoneum, Fascia, and Skin Layers: Yes        Attending Attestation: I was present and scrubbed for the entire procedure.     SCIP will be utilized for Antibiotics: Ancef  Allergies as of 2022    (No Known Allergies)         EPC's in  Place for DVT prophylaxis      Procedure: (Understanding of limitations from template op-reports exist)  Edward Marie is a 29 y.o. female [id number] @ 39w0d for  delivery. The risks, benefits, complications, alternative treatment options, and expected outcomes were discussed with the patient. Risks of surgery were discussed, including but not limited to: pain, bleeding, need for blood transfusion, infection, injury to internal organs including intestines, bladder, uterus, fallopian tubes, ovaries and rarely injury to the fetus. Postoperative complications including pain, bleeding, need for blood transfusion, infection, re-operation, infection of the incisions were also explained. The patient verbalized understanding and agreed to proceed, giving informed consent. The patient was taken to Operating Room, identified as Edward Marie and the procedure verified as  Delivery. A Time Out was held and the above information confirmed. Procedure Details:  After Spinal Anesthetic with Duramorph was instilled in the seated position the patient was returned to the supine position with a wedge placed under her right hip. FHT's were obtained, the patient was prepped and draped in the usual sterile manner. A three minute drape delay was completed. The bladder was draining clear urine. SCIP antibiotics were infused, EPC's were in place and operating. A time out was completed. There was an adequate skin check both high and low. The old cicatrix was removed with the #10 blade #1 . A Pfannenstiel incision was made with a #10 scalpel and carried down to the fascia with bovie cautery. Fascial incision was made and extended transversely. The fascia was  from the underlying rectus tissue superiorly and inferiorly. The peritoneum was identified and entered superiorly.   The peritoneal incision was extended superiorly and inferiorly, care not to involve the bowel or the bladder. The [place] SHRUTI retractor was placed inferiorly into the incision. The vesico-uterine reflection was developed and skeletonized off the lower uterine segment with blunt and sharp dissection. The [facility] retractor was reapproximated. A low transverse uterine incision was made and the uterine cavity was entered with extreme caution with the blunt end of the scalpel. This incision was extended using digital dissection in a cephalad to caudad manner. A live female infant was delivered without complications. The head was delivered through the incision and fundal pressure was used for the remaining body of the . There was not a nuchal cord. The  had bulb suction of the mouth and nares. There was a spontaneous cry. The infant was vigorous  and there was delayed cord clamping of 1 minute. Please find the  Apgar scores and weight above in the delivery summary. The umbilical cord was then clamped and cut, the infant was handed off to the awaiting neonatology team staff member attending the delivery. Then cord specimen and cord blood was collected and the placenta was delivered using gentle traction and fundal massage, (Expressed), it was intact and appeared normal.  The uterus was cleared of all clots and debris and was firm and contracted. IV Pitocin was infusing. An outflow tract was confirmed. The uterine incision was closed with running locked sutures of 0 Vicryl, starting at each corner with figure of \"8's\" and moving to the midline. Excellent hemostasis was observed. Gutters were cleared of all clots and debris. The pelvis was irrigated with warm, sterile water. Uterine incision was reinspected and hemostatic. The uterus, bilateral tubes and ovaries were normal.   The peritoneum was closed with 2-0 vicryl. The fascia was then reapproximated with running sutures of 0 Vicryl, starting at each corner and moving to the midline. It was checked for any defects and there were none.  The subcutaneous tissue was irrigated, any bleeding sites were cauterized. The skin was closed in a subcuticular fashion with 4-0 vicryl, then covered with tincture of benzoin and steri-strips,  It was dressed with Telfa and an ABD pad then covered with a large Tegaderm dressing. Sponge, Needle and instrument counts were called for and found to be correct prior to closure of the peritoneum, fascia, and skin layers. The urine was clear in the tubing and the bag at the end of the procedure. The patient received preoperative antibiotics and intraoperative analgesic. EPC's were in place at the beginning of the case. Patient was returned to her LDRP in stable condition. See orders. Surgical Assistant documentation [address]:  The assistant surgeon was present to assist in the surgery and to give adequate visualization and intervene with occasional clamping and suturing so the procedure could be completed in a safe manner with limited risks to the patient. The patients co-morbidities identified in the History & Physical required the need for additional trained personnel to complete this procedure. There are NO RESIDENTS present for obstetrical cases at this hospital.      I understand that section 1842(b)(7)(D) of the Social Security Act generally prohibits  Medicare physician fee schedule payment for the services of assistants at surgery in teaching  hospitals when qualified residents are available to furnish such services. I certify that the  services for which payment is to be claimed were medically necessary and that no qualified resident  was available to perform the services in a safe manner without another surgeon present to directly view their technique while developing their skill set and intervene when necessary to ensure the best outcome for the patient with the least risk of morbidity.  I further understand that these services are subject to  post-payment review by the Medicare carrier.         Attending's Name: Raudel Jordan DO      Physician Completing Document: Raudel Jordan DO    Electronically signed by Raudel Jordan DO on 4/1/2022 at 11:49 AM

## 2022-04-01 NOTE — ANESTHESIA POSTPROCEDURE EVALUATION
Department of Anesthesiology  Postprocedure Note    Patient: Anny Mckinney  MRN: [id number]  YOB: 1990  Date of evaluation: 2022  Time:  1:40 PM     Procedure Summary     Date: 22 Room / Location: [address] [place] [address] L&D OR / [facility]: BAYLEE VALENCIA    Anesthesia Start: 1042 Anesthesia Stop: 1214    Procedure:  SECTION (N/A Abdomen) Diagnosis:       (REPEAT )      (PT FULLY VACCINATED)      (DUE DATE 2022)    Surgeons: Doris Owens DO Responsible Provider: Judd Das MD    Anesthesia Type: spinal ASA Status: 2          Anesthesia Type: spinal    Kendrick Phase I: Kendrick Score: 10    Kendrick Phase II: Kendrick Score: 10    Last vitals: Reviewed and per EMR flowsheets.        Anesthesia Post Evaluation    Comments: POST- ANESTHESIA EVALUATION       Pt Name: Anny Mckinney  MRN: [id number]  YOB: 1990  Date of evaluation: 2022  Time:  1:40 PM      /71   Pulse 60   Temp 95.9 °F (35.5 °C) (Infrared)   Resp 16   Ht 5' 5\" (1.651 m)   Wt 188 lb (85.3 kg)   LMP 2021 (Exact Date)   SpO2 97%   BMI 31.28 kg/m²      Consciousness Level  Awake  Cardiopulmonary Status  Stable  Pain Adequately Treated YES  Nausea / Vomiting  NO  Adequate Hydration  YES  Anesthesia Related Complications NONE      Electronically signed by Ravi Marroquin MD on 2022 at 1:40 PM

## 2022-04-01 NOTE — ANESTHESIA PROCEDURE NOTES
Spinal Block    Patient location during procedure: OR  Start time: 4/1/2022 10:48 AM  End time: 4/1/2022 10:56 AM  Reason for block: primary anesthetic  Staffing  Performed: anesthesiologist   Anesthesiologist: Chavez Rodriguez MD  Preanesthetic Checklist  Completed: patient identified, IV checked, site marked, risks and benefits discussed, surgical consent, monitors and equipment checked, pre-op evaluation, timeout performed, anesthesia consent given, oxygen available and patient being monitored  Spinal Block  Patient position: sitting  Prep: Betasept  Patient monitoring: cardiac monitor, continuous pulse ox and frequent blood pressure checks  Approach: midline  Location: L3/L4  Guidance: paresthesia technique  Provider prep: mask and sterile gloves  Local infiltration: lidocaine  Dose: 0.1  Agent: bupivacaine  Adjuvant: duramorph  Dose: 1.5  Dose: 1.5  Needle  Needle type: pencil-tip   Needle gauge: 25 G  Needle length: 3.5 in  Needle insertion depth: 5 cm  Assessment  Sensory level: T8  Swirl obtained: Yes  CSF: clear  Attempts: 3+  Hemodynamics: stable  Additional Notes  First 2 attempts by [facility] and CRNA. Patient was repositioned and spinal done by MD, one attempt.

## 2022-04-01 NOTE — PROGRESS NOTES
Operative Note SURGICAL ASSIST DOCUMENTATION:      NAME: Cristel Garcia   MRN: [id number]  CSN: [id number]  : 1990    PROCEDURE DATE: 2022     Pre-op Diagnosis: REPEAT   PT FULLY VACCINATED  DUE DATE 2022     Post-op Diagnosis: Same;     Procedure: Procedure(s):   SECTION    Surgeon: Dr. Jenni Chapman    Assistant:   1. JANUARY Jimenez CNM (See below for indication for surgical assistant)  2. Dr. Fredi Tracy       Surgical Assistant documentation:  I was present for the entire surgical case as the assistant surgeon to assist in the surgery and to give adequate visualization and intervene with occasional clamping and suturing and delivery of the  so the procedure could be completed in a safe manner with limited risks to the patient. The patients comorbidities identified in the History & Physical required the need for additional trained personnel to complete this procedure. I understand that section 1842(b)(7)(D) of the Social Security Act generally prohibits  Medicare physician fee schedule payment for the services of assistants at surgery in teaching  hospitals when qualified residents are available to furnish such services. I certify that the  services for which payment is to be claimed were medically necessary and that no qualified resident  was available to perform the services in a safe manner without another surgeon present to directly view their technique while developing their skill set and intervene when necessary to ensure the best outcome for the patient with the least risk of morbidity. I further understand that these services are subject to  post-payment review by the Medicare carrier.           Electronically signed by JANUARY Jimenez CNM on 22 at 12:06 PM EDT

## 2022-04-01 NOTE — L&D DELIVERY NOTE
Mother's Information    Labor Events     Labor?: No  Cervical Ripening:   Now         Claudia Matute Girl Brandon Dykes [[id number]]    Labor Events     Labor?: No   Steroids?: None  Cervical Ripening Date/Time:     Antibiotics Received during Labor?: No  Rupture Identifier: Sac 1   Rupture Date/Time: 22 11:16:39   Rupture Type: AROM  Fluid Color: Clear  Fluid Odor: None  Fluid Volume:  Moderate  Induction: None  Labor Complications: None     Anesthesia    Method: Spinal     Start Pushing    Labor onset date/time:   Now   Dilation complete date/time:   Now   Start pushing date/time:    Decision date/time (emergent ):       Delivery ([place])    Delivery Date/Time:  22 11:16:55   Delivery Type: , Low Transverse    Details:  Trial of Labor?: No    Categorization: Repeat    Priority: Scheduled   Indications for : Prior Uterine Surgery   Skin Incision Type: Pfannenstiel   Uterine Incision: Low Transverse          Presentation    Presentation: Vertex  Position: Right  _: Occiput  _: Posterior     Shoulder Dystocia    Shoulder Dystocia Present?: No  Add Second Maneuver  Add Third Maneuver  Add Fourth Maneuver  Add Fifth Maneuver  Add Sixth Maneuver  Add Seventh Maneuver  Add Eighth Maneuver  Add Ninth Maneuver     Assisted Delivery Details    Forceps Attempted?: No  Vacuum Extractor Attempted?: No     Document Additional Attempt       Document Additional Attempt             Cord    Vessels: 3 Vessels  Complications: None  Delayed Cord Clamping?: Yes  Cord Clamped Date/Time: 2022 11:18:00  Cord Blood Disposition: Refrigerator  Gases Sent?: Yes     Placenta    Date/Time: 2022 11:20:06  Removal: Expressed  Appearance: Intact  Disposition: Pathology     Lacerations    Episiotomy: None  Perineal Lacerations: None  Other Lacerations: no non-perineal laceration     Vaginal Counts      Sponges Needles Instruments   Initial Counts      Final Counts If the count is incorrect due to Intentionally Retained Foreign Object (IRFO) add the IRFO LDA in Lines/Drains. Add LDA: Link to [facility]     Blood Loss  Mother: Thania Joaquin #[id number]   Start of Mother's Information    Delivery Blood Loss  22 1042 - 22 1148    None           End of Mother's Information  Mother: Thania Joaquin #[id number]          Delivery Providers    Delivering clinician: Everett Alvarenga DO     Provider Role    Everett Alvarenga DO Surgeon    Nenita Guzman,  Assistant Surgeon    Fuchs Screen Primary  Nurse     NICU Nurse     Neonatologist     Anesthesiologist     Nurse Anesthetist     Nurse Practitioner    JANUARY Michel - AYANM Midwife     Nursery Nurse    Jaleesa Watt, RN Primary Nurse           Assessment    Living Status: Living  Delivery Location: L&D     Apgar Scoring Key:    0 1 2    Skin Color: Blue or pale Acrocyanotic Completely pink    Heart Rate: Absent <100 bpm >100 bpm    Reflex Irritability: No response Grimace Cry or active withdrawal    Muscle Tone: Limp Some flexion Active motion    Respiratory Effort: Absent Weak cry; hypoventilation Good, crying                  Skin Color:   Heart Rate:   Reflex Irritability:   Muscle Tone:   Respiratory Effort:    Total:            1 Minute:    0    2    2    2    2    8        Apgar 1 total from OB History    5 Minute:    1    2    2    2    2    9        Apgar 5 total from OB History    10 Minute:              15 Minute:              20 Minute:                      Apgars Assigned By: [facility]          Resuscitation    Method: Bulb Suction, Stimulation            Measurements    Birth Weight: 3459 g Birth Length: 0.521 m   Head Circumference: 0.355 m Chest Circumference: 0.35 m   Abdominal Girth: 0.33 m           Title    Skin to Skin Initiation Date/Time:     Skin to Skin End Date/Time:

## 2022-04-02 LAB
HCT VFR BLD CALC: 31.5 % (ref 36–46)
HEMOGLOBIN: 10.8 G/DL (ref 12–16)

## 2022-04-02 PROCEDURE — 6370000000 HC RX 637 (ALT 250 FOR IP)

## 2022-04-02 PROCEDURE — 2580000003 HC RX 258

## 2022-04-02 PROCEDURE — 99024 POSTOP FOLLOW-UP VISIT: CPT | Performed by: SPECIALIST

## 2022-04-02 PROCEDURE — 1220000000 HC SEMI PRIVATE OB R&B

## 2022-04-02 PROCEDURE — 6360000002 HC RX W HCPCS

## 2022-04-02 PROCEDURE — 85018 HEMOGLOBIN: CPT

## 2022-04-02 PROCEDURE — 36415 COLL VENOUS BLD VENIPUNCTURE: CPT

## 2022-04-02 PROCEDURE — 85014 HEMATOCRIT: CPT

## 2022-04-02 RX ADMIN — OXYCODONE HYDROCHLORIDE AND ACETAMINOPHEN 2 TABLET: 5; 325 TABLET ORAL at 18:35

## 2022-04-02 RX ADMIN — KETOROLAC TROMETHAMINE 30 MG: 30 INJECTION, SOLUTION INTRAMUSCULAR; INTRAVENOUS at 00:08

## 2022-04-02 RX ADMIN — CEFAZOLIN SODIUM 2000 MG: 10 INJECTION, POWDER, FOR SOLUTION INTRAVENOUS at 10:28

## 2022-04-02 RX ADMIN — ONDANSETRON 4 MG: 2 INJECTION INTRAMUSCULAR; INTRAVENOUS at 10:24

## 2022-04-02 RX ADMIN — MAGNESIUM HYDROXIDE 30 ML: 400 SUSPENSION ORAL at 14:41

## 2022-04-02 RX ADMIN — IBUPROFEN 800 MG: 800 TABLET, FILM COATED ORAL at 22:42

## 2022-04-02 RX ADMIN — Medication 1 TABLET: at 08:10

## 2022-04-02 RX ADMIN — OXYCODONE HYDROCHLORIDE AND ACETAMINOPHEN 2 TABLET: 5; 325 TABLET ORAL at 10:18

## 2022-04-02 RX ADMIN — OXYCODONE HYDROCHLORIDE AND ACETAMINOPHEN 2 TABLET: 5; 325 TABLET ORAL at 14:40

## 2022-04-02 RX ADMIN — KETOROLAC TROMETHAMINE 30 MG: 30 INJECTION, SOLUTION INTRAMUSCULAR; INTRAVENOUS at 06:18

## 2022-04-02 RX ADMIN — ONDANSETRON 4 MG: 2 INJECTION INTRAMUSCULAR; INTRAVENOUS at 21:29

## 2022-04-02 RX ADMIN — ACETAMINOPHEN 1000 MG: 500 TABLET ORAL at 00:08

## 2022-04-02 RX ADMIN — OXYCODONE HYDROCHLORIDE AND ACETAMINOPHEN 1 TABLET: 5; 325 TABLET ORAL at 06:18

## 2022-04-02 RX ADMIN — OXYCODONE HYDROCHLORIDE AND ACETAMINOPHEN 2 TABLET: 5; 325 TABLET ORAL at 22:42

## 2022-04-02 RX ADMIN — CEFAZOLIN SODIUM 2000 MG: 10 INJECTION, POWDER, FOR SOLUTION INTRAVENOUS at 02:32

## 2022-04-02 RX ADMIN — ONDANSETRON 4 MG: 2 INJECTION INTRAMUSCULAR; INTRAVENOUS at 03:34

## 2022-04-02 RX ADMIN — DOCUSATE SODIUM 100 MG: 100 CAPSULE, LIQUID FILLED ORAL at 08:10

## 2022-04-02 RX ADMIN — IBUPROFEN 800 MG: 800 TABLET, FILM COATED ORAL at 14:40

## 2022-04-02 RX ADMIN — SODIUM CHLORIDE, POTASSIUM CHLORIDE, SODIUM LACTATE AND CALCIUM CHLORIDE: 600; 310; 30; 20 INJECTION, SOLUTION INTRAVENOUS at 00:28

## 2022-04-02 ASSESSMENT — PAIN SCALES - GENERAL
PAINLEVEL_OUTOF10: 7
PAINLEVEL_OUTOF10: 8
PAINLEVEL_OUTOF10: 6
PAINLEVEL_OUTOF10: 6
PAINLEVEL_OUTOF10: 0
PAINLEVEL_OUTOF10: 8
PAINLEVEL_OUTOF10: 7

## 2022-04-02 ASSESSMENT — PAIN DESCRIPTION - PROGRESSION
CLINICAL_PROGRESSION: GRADUALLY IMPROVING

## 2022-04-02 NOTE — FLOWSHEET NOTE
Patient laying in bed, reports feeling \"gushes\" of blood. Assisted to sit at EOB. Agreeable to ambulate to BR. Reports mild dizziness, once resolved, assisted to BR. Gabriela care provided, pad changed. Two large clots noted in the toilet. Patient assisted to stand, abd binder applied- reports dizziness and nausea. Assisted back to bed. Dizziness and nausea improved. Call light in reach. Will continue to monitor.

## 2022-04-02 NOTE — FLOWSHEET NOTE
Patient assisted to EOB. C/o dizziness. Assisted to stand, sani-panties and hailey-pad applied. Pt reports feeling nauseous. Assisted to return to bed. Denies any further needs at this time. Call light in reach.

## 2022-04-02 NOTE — PLAN OF CARE
Problem: Discharge Planning:  Goal: Discharged to appropriate level of care  Description: Discharged to appropriate level of care  Outcome: Not Met This Shift     Problem: Fluid Volume - Imbalance:  Goal: Absence of postpartum hemorrhage signs and symptoms  Description: Absence of postpartum hemorrhage signs and symptoms  Outcome: Met This Shift  Goal: Absence of imbalanced fluid volume signs and symptoms  Description: Absence of imbalanced fluid volume signs and symptoms  Outcome: Met This Shift     Problem: Infection - Surgical Site:  Goal: Will show no infection signs and symptoms  Description: Will show no infection signs and symptoms  Outcome: Met This Shift     Problem: Mood - Altered:  Goal: Mood stable  Description: Mood stable  Outcome: Met This Shift     Problem: Nausea/Vomiting:  Goal: Absence of nausea/vomiting  Description: Absence of nausea/vomiting  Outcome: Met This Shift     Problem: Pain - Acute:  Goal: Pain level will decrease  Description: Pain level will decrease  Outcome: Met This Shift     Problem: Urinary Retention:  Goal: Urinary elimination within specified parameters  Description: Urinary elimination within specified parameters  Outcome: Met This Shift     Problem: Venous Thromboembolism:  Goal: Will show no signs or symptoms of venous thromboembolism  Description: Will show no signs or symptoms of venous thromboembolism  Outcome: Met This Shift  Goal: Absence of signs or symptoms of impaired coagulation  Description: Absence of signs or symptoms of impaired coagulation  Outcome: Met This Shift     Problem: Pain:  Goal: Pain level will decrease  Description: Pain level will decrease  Outcome: Met This Shift  Goal: Control of acute pain  Description: Control of acute pain  Outcome: Met This Shift  Goal: Control of chronic pain  Description: Control of chronic pain  Outcome: Met This Shift

## 2022-04-02 NOTE — PROGRESS NOTES
POST OPERATIVE DAY # 1    Letty Ramsay is a 29 y.o. female   This patient was seen and examined today. Her pregnancy was complicated by:   Patient Active Problem List   Diagnosis    Generalized anxiety disorder    Adult BMI 25.0-25.9 kg/sq m    History of gestational diabetes    PLTCS 19 M APG 8/9 Wt 9#12    Corneal rust ring    Elevated fasting blood sugar    Vitamin D deficiency    history of LGA: 9#12oz 2019     (spontaneous vaginal delivery) 2017    High risk pregnancy with low PAPPA    COVID-19 affecting pregnancy    30 weeks gestation of pregnancy    39 weeks gestation of pregnancy    REPEAT LTC- section 2022    Delivery with history of        Today she is doing well without any chief complaint. Her lochia is light. She denies chest pain. She is  breast feeding and she denies any signs or symptoms of mastitis. She is not ambulating well. She is voiding without difficulty. She currently denies S/S of postpartum depression. Flatus present. Bowel movement present. She is not tolerating solids.     Vital Signs:  See nurses note    Urine Input & Output last 24hrs:     Intake/Output Summary (Last 24 hours) at 2022 0403  Last data filed at 2022 0028  Gross per 24 hour   Intake 4095.95 ml   Output 3670.77 ml   Net 425.18 ml       Physical Exam:  General:  awake, alert, cooperative, no apparent distress, and appears stated age, no apparent distress, alert and cooperative  Neurologic:  alert, oriented, normal speech, no focal findings or movement disorder noted  Lungs:  No increased work of breathing, good air exchange, clear to auscultation bilaterally, no crackles or wheezing  Heart:  regular rate and rhythm    Abdomen: abdomen soft, non-distended, non-tender  Fundus: non-tender, normal size, firm, below umbilicus  Incision: clean  Extremities:  no calf tenderness, non edematous    Labs:  Lab Results   Component Value Date    WBC 7.5 2022    HGB 12.5 04/01/2022    HCT 35.9 (L) 04/01/2022    MCV 83.2 04/01/2022     04/01/2022       Assessment/Plan:  1. Colette Freire is a [id number] POD # 1 s/p repeat LTCS   - Doing well, stable VSS    - female infant in [address], circumcision N/A   - Encourage ambulation and use of incentive spirometer   - D/C win catheter and saline lock IV on POD #1    - CBC awaiting  2. Rh positive/Rubella unknown  3. breast   4.   5.   6. Continue post-op care. Counseling Completed:  Secondary Smoke risks and Sudden Infant Death Syndrome were reviewed with recommendations. Infant sleeping, \"back to sleep\" and avoidance of co-sleeping recommendations were reviewed. Signs and Symptoms of Post Partum Depression were reviewed. The patient is to call if any occur. Signs and symptoms of Mastitis were reviewed. The patient is to call if any occur for follow up.   Discharge instructions including pelvic rest, incision care, 15 lb weight restriction, no driving with pain medicine and office follow-up were reviewed with patient     Providers Name: Dr. Maryam Paul MD  Ob/Gyn Resident  4/2/2022, 4:03 AM

## 2022-04-03 PROCEDURE — 6370000000 HC RX 637 (ALT 250 FOR IP)

## 2022-04-03 PROCEDURE — 6370000000 HC RX 637 (ALT 250 FOR IP): Performed by: OBSTETRICS & GYNECOLOGY

## 2022-04-03 PROCEDURE — 1220000000 HC SEMI PRIVATE OB R&B

## 2022-04-03 PROCEDURE — 6360000002 HC RX W HCPCS

## 2022-04-03 RX ORDER — SCOLOPAMINE TRANSDERMAL SYSTEM 1 MG/1
1 PATCH, EXTENDED RELEASE TRANSDERMAL
Status: DISCONTINUED | OUTPATIENT
Start: 2022-04-03 | End: 2022-04-04 | Stop reason: HOSPADM

## 2022-04-03 RX ORDER — IBUPROFEN 800 MG/1
800 TABLET ORAL EVERY 8 HOURS
Qty: 60 TABLET | Refills: 1 | Status: SHIPPED | OUTPATIENT
Start: 2022-04-03 | End: 2022-04-20

## 2022-04-03 RX ORDER — ONDANSETRON 4 MG/1
4 TABLET, FILM COATED ORAL EVERY 8 HOURS PRN
Status: DISCONTINUED | OUTPATIENT
Start: 2022-04-03 | End: 2022-04-04 | Stop reason: HOSPADM

## 2022-04-03 RX ORDER — SENNA AND DOCUSATE SODIUM 50; 8.6 MG/1; MG/1
1 TABLET, FILM COATED ORAL 2 TIMES DAILY PRN
Qty: 60 TABLET | Refills: 1 | Status: SHIPPED | OUTPATIENT
Start: 2022-04-03 | End: 2022-04-20

## 2022-04-03 RX ORDER — SENNA AND DOCUSATE SODIUM 50; 8.6 MG/1; MG/1
2 TABLET, FILM COATED ORAL 2 TIMES DAILY
Status: DISCONTINUED | OUTPATIENT
Start: 2022-04-03 | End: 2022-04-04 | Stop reason: HOSPADM

## 2022-04-03 RX ORDER — HYDROCODONE BITARTRATE AND ACETAMINOPHEN 5; 325 MG/1; MG/1
1 TABLET ORAL EVERY 4 HOURS PRN
Status: DISCONTINUED | OUTPATIENT
Start: 2022-04-03 | End: 2022-04-04 | Stop reason: HOSPADM

## 2022-04-03 RX ORDER — OXYCODONE HYDROCHLORIDE AND ACETAMINOPHEN 5; 325 MG/1; MG/1
1 TABLET ORAL EVERY 6 HOURS PRN
Qty: 15 TABLET | Refills: 0 | Status: SHIPPED | OUTPATIENT
Start: 2022-04-03 | End: 2022-04-10

## 2022-04-03 RX ORDER — ONDANSETRON 4 MG/1
4 TABLET, FILM COATED ORAL EVERY 8 HOURS PRN
Qty: 25 TABLET | Refills: 1 | Status: SHIPPED | OUTPATIENT
Start: 2022-04-03 | End: 2022-04-20

## 2022-04-03 RX ORDER — HYDROCODONE BITARTRATE AND ACETAMINOPHEN 5; 325 MG/1; MG/1
2 TABLET ORAL EVERY 4 HOURS PRN
Status: DISCONTINUED | OUTPATIENT
Start: 2022-04-03 | End: 2022-04-04 | Stop reason: HOSPADM

## 2022-04-03 RX ADMIN — POLYETHYLENE GLYCOL 3350 17 G: 17 POWDER, FOR SOLUTION ORAL at 20:11

## 2022-04-03 RX ADMIN — OXYCODONE HYDROCHLORIDE AND ACETAMINOPHEN 2 TABLET: 5; 325 TABLET ORAL at 04:18

## 2022-04-03 RX ADMIN — HYDROCODONE BITARTRATE AND ACETAMINOPHEN 2 TABLET: 5; 325 TABLET ORAL at 14:19

## 2022-04-03 RX ADMIN — Medication 1 TABLET: at 08:42

## 2022-04-03 RX ADMIN — DOCUSATE SODIUM 100 MG: 100 CAPSULE, LIQUID FILLED ORAL at 08:42

## 2022-04-03 RX ADMIN — IBUPROFEN 800 MG: 800 TABLET, FILM COATED ORAL at 15:03

## 2022-04-03 RX ADMIN — OXYCODONE HYDROCHLORIDE AND ACETAMINOPHEN 2 TABLET: 5; 325 TABLET ORAL at 08:42

## 2022-04-03 RX ADMIN — SENNOSIDES AND DOCUSATE SODIUM 2 TABLET: 50; 8.6 TABLET ORAL at 14:05

## 2022-04-03 RX ADMIN — ONDANSETRON HYDROCHLORIDE 4 MG: 4 TABLET, FILM COATED ORAL at 22:38

## 2022-04-03 RX ADMIN — IBUPROFEN 800 MG: 800 TABLET, FILM COATED ORAL at 05:58

## 2022-04-03 RX ADMIN — SIMETHICONE 80 MG: 80 TABLET, CHEWABLE ORAL at 18:07

## 2022-04-03 RX ADMIN — ONDANSETRON 4 MG: 2 INJECTION INTRAMUSCULAR; INTRAVENOUS at 04:21

## 2022-04-03 RX ADMIN — MAGNESIUM HYDROXIDE 30 ML: 400 SUSPENSION ORAL at 15:04

## 2022-04-03 RX ADMIN — ONDANSETRON HYDROCHLORIDE 4 MG: 4 TABLET, FILM COATED ORAL at 10:17

## 2022-04-03 RX ADMIN — IBUPROFEN 800 MG: 800 TABLET, FILM COATED ORAL at 23:10

## 2022-04-03 RX ADMIN — HYDROCODONE BITARTRATE AND ACETAMINOPHEN 2 TABLET: 5; 325 TABLET ORAL at 18:15

## 2022-04-03 RX ADMIN — SENNOSIDES AND DOCUSATE SODIUM 2 TABLET: 50; 8.6 TABLET ORAL at 23:10

## 2022-04-03 RX ADMIN — OXYCODONE HYDROCHLORIDE AND ACETAMINOPHEN 2 TABLET: 5; 325 TABLET ORAL at 22:34

## 2022-04-03 ASSESSMENT — PAIN SCALES - GENERAL
PAINLEVEL_OUTOF10: 5
PAINLEVEL_OUTOF10: 6
PAINLEVEL_OUTOF10: 7
PAINLEVEL_OUTOF10: 5
PAINLEVEL_OUTOF10: 7
PAINLEVEL_OUTOF10: 5
PAINLEVEL_OUTOF10: 3
PAINLEVEL_OUTOF10: 6
PAINLEVEL_OUTOF10: 7

## 2022-04-03 ASSESSMENT — PAIN DESCRIPTION - DESCRIPTORS
DESCRIPTORS: ACHING
DESCRIPTORS: ACHING

## 2022-04-03 ASSESSMENT — PAIN DESCRIPTION - LOCATION
LOCATION: ABDOMEN
LOCATION: ABDOMEN

## 2022-04-03 ASSESSMENT — PAIN DESCRIPTION - PAIN TYPE
TYPE: SURGICAL PAIN
TYPE: SURGICAL PAIN

## 2022-04-03 ASSESSMENT — PAIN DESCRIPTION - FREQUENCY: FREQUENCY: INTERMITTENT

## 2022-04-03 NOTE — PLAN OF CARE
Problem: Discharge Planning:  Goal: Discharged to appropriate level of care  4/3/2022 1526 by Jessica Mera RN  Outcome: Ongoing  4/3/2022 0511 by Amy Hardin RN  Outcome: Not Met This Shift     Problem: Fluid Volume - Imbalance:  Goal: Absence of postpartum hemorrhage signs and symptoms  4/3/2022 1526 by Jessica Mera RN  Outcome: Ongoing  4/3/2022 0511 by Amy Hardin RN  Outcome: Met This Shift  Goal: Absence of imbalanced fluid volume signs and symptoms  4/3/2022 1526 by Jessica Mera RN  Outcome: Ongoing  4/3/2022 0511 by Amy Hardin RN  Outcome: Met This Shift     Problem: Infection - Surgical Site:  Goal: Will show no infection signs and symptoms  4/3/2022 1526 by Jessica Mera RN  Outcome: Ongoing  4/3/2022 0511 by Amy Hardin RN  Outcome: Met This Shift     Problem: Mood - Altered:  Goal: Mood stable  4/3/2022 1526 by Jessica Mera RN  Outcome: Ongoing  4/3/2022 0511 by Amy Hardin RN  Outcome: Met This Shift     Problem: Nausea/Vomiting:  Goal: Absence of nausea/vomiting  4/3/2022 1526 by Jessica Mera RN  Outcome: Ongoing  4/3/2022 0511 by Amy Hardin RN  Outcome: Ongoing     Problem: Pain - Acute:  Goal: Pain level will decrease  4/3/2022 1526 by Jessica Mera RN  Outcome: Ongoing  4/3/2022 0511 by Amy Hardin RN  Outcome: Met This Shift     Problem: Urinary Retention:  Goal: Urinary elimination within specified parameters  4/3/2022 1526 by Jessica Mera RN  Outcome: Ongoing  4/3/2022 0511 by Amy Hardin RN  Outcome: Met This Shift     Problem: Venous Thromboembolism:  Goal: Will show no signs or symptoms of venous thromboembolism  4/3/2022 1526 by Jessica Mera RN  Outcome: Ongoing  4/3/2022 0511 by Amy Hardin RN  Outcome: Met This Shift  Goal: Absence of signs or symptoms of impaired coagulation  4/3/2022 1526 by Jessica Mera RN  Outcome: Ongoing  4/3/2022 0511 by Amy Hardin RN  Outcome: Met This Shift     Problem: Pain:  Goal: Pain level will decrease  4/3/2022 1526 by Alexis Eckert RN  Outcome: Ongoing  4/3/2022 0511 by Samuel Cornelius RN  Outcome: Met This Shift  Goal: Control of acute pain  4/3/2022 1526 by Alexis Eckert RN  Outcome: Ongoing  4/3/2022 0511 by Samuel Cornelius RN  Outcome: Met This Shift  Goal: Control of chronic pain  4/3/2022 1526 by Alexis Eckert RN  Outcome: Ongoing  4/3/2022 0511 by Samuel Cornelius RN  Outcome: Met This Shift

## 2022-04-03 NOTE — PLAN OF CARE
Problem: Discharge Planning:  Goal: Discharged to appropriate level of care  Description: Discharged to appropriate level of care  Outcome: Not Met This Shift     Problem: Fluid Volume - Imbalance:  Goal: Absence of postpartum hemorrhage signs and symptoms  Description: Absence of postpartum hemorrhage signs and symptoms  Outcome: Met This Shift  Goal: Absence of imbalanced fluid volume signs and symptoms  Description: Absence of imbalanced fluid volume signs and symptoms  Outcome: Met This Shift     Problem: Infection - Surgical Site:  Goal: Will show no infection signs and symptoms  Description: Will show no infection signs and symptoms  Outcome: Met This Shift     Problem: Mood - Altered:  Goal: Mood stable  Description: Mood stable  Outcome: Met This Shift     Problem: Nausea/Vomiting:  Goal: Absence of nausea/vomiting  Description: Absence of nausea/vomiting  Outcome: Ongoing     Problem: Pain - Acute:  Goal: Pain level will decrease  Description: Pain level will decrease  Outcome: Met This Shift     Problem: Urinary Retention:  Goal: Urinary elimination within specified parameters  Description: Urinary elimination within specified parameters  Outcome: Met This Shift     Problem: Venous Thromboembolism:  Goal: Will show no signs or symptoms of venous thromboembolism  Description: Will show no signs or symptoms of venous thromboembolism  Outcome: Met This Shift  Goal: Absence of signs or symptoms of impaired coagulation  Description: Absence of signs or symptoms of impaired coagulation  Outcome: Met This Shift     Problem: Pain:  Goal: Pain level will decrease  Description: Pain level will decrease  Outcome: Met This Shift  Goal: Control of acute pain  Description: Control of acute pain  Outcome: Met This Shift  Goal: Control of chronic pain  Description: Control of chronic pain  Outcome: Met This Shift

## 2022-04-03 NOTE — FLOWSHEET NOTE
Patient states she would like to stay another night. Dr. Den Bruce notified. OK to discontinue discharge order.

## 2022-04-03 NOTE — PROGRESS NOTES
POST OPERATIVE DAY # 2    Janey Crowe is a 29 y.o. female   This patient was seen and examined today. Her pregnancy was complicated by:   Patient Active Problem List   Diagnosis    Generalized anxiety disorder    Adult BMI 25.0-25.9 kg/sq m    History of gestational diabetes    PLTCS 19 M APG 8/9 Wt 9#12    Corneal rust ring    Elevated fasting blood sugar    Vitamin D deficiency    history of LGA: 9#12oz 2019     (spontaneous vaginal delivery) 2017    High risk pregnancy with low PAPPA    COVID-19 affecting pregnancy    30 weeks gestation of pregnancy    39 weeks gestation of pregnancy    REPEAT LTC- section 2022    Delivery with history of        Today she is doing well without any chief complaint. Her lochia is light. She denies chest pain. She is  breast feeding and she denies any signs or symptoms of mastitis. She is not ambulating well. She is voiding without difficulty. She currently denies S/S of postpartum depression. Flatus present. Bowel movement present. She is not tolerating solids.     Vital Signs:  Vitals:    22 1439 22 0421 22 0750   BP: 118/77 108/68 111/62 104/66   Pulse: 63 63 73 64   Resp: 16 16 16 16   Temp: 97 °F (36.1 °C) 97.9 °F (36.6 °C) 97.5 °F (36.4 °C) 98.2 °F (36.8 °C)   TempSrc: Infrared Infrared Infrared Infrared   SpO2:  98%     Weight:       Height:             Urine Input & Output last 24hrs:   No intake or output data in the 24 hours ending 22 0936    Physical Exam:  General:  awake, alert, cooperative, no apparent distress, and appears stated age, no apparent distress, alert and cooperative  Neurologic:  alert, oriented, normal speech, no focal findings or movement disorder noted  Lungs:  No increased work of breathing, good air exchange, clear to auscultation bilaterally, no crackles or wheezing  Heart:  regular rate and rhythm    Abdomen: abdomen soft, non-distended, non-tender  Fundus: non-tender, normal size, firm, below umbilicus  Incision: clean, incision examined, counseled on care and removal  Extremities:  no calf tenderness, non edematous    Labs:  Lab Results   Component Value Date    WBC 7.5 2022    HGB 10.8 (L) 2022    HCT 31.5 (L) 2022    MCV 83.2 2022     2022       Assessment/Plan:  1. Anny Mckinney is a [id number] POD # 2 s/p repeat LTCS   - Doing well, stable VSS    - female infant in [address], circumcision N/A   - Encourage ambulation and use of incentive spirometer   - Labs and vitals reviewed  2. Rh positive/Rubella unknown  3. breastfreeding  4. Increase PO hydration and ambulation  5. Continue post-op care. Counseling Completed:  Secondary Smoke risks and Sudden Infant Death Syndrome were reviewed with recommendations. Infant sleeping, \"back to sleep\" and avoidance of co-sleeping recommendations were reviewed. Signs and Symptoms of Post Partum Depression were reviewed. The patient is to call if any occur. Signs and symptoms of Mastitis were reviewed. The patient is to call if any occur for follow up. Discharge instructions including pelvic rest, incision care, 15 lb weight restriction, no driving with pain medicine and office follow-up were reviewed with patient     Providers Name: Dr. Benito Raygoza            Discharge Instructions for  Birth     During a  section (), an incision is made in the abdomen and uterus (womb) to deliver the baby. The normal hospital stay is 2-4 days. Steps to Take   Home Care    · For the first 1-2 weeks, ask for someone to help you at home. · Let people help you. Take frequent rest breaks. · For vaginal bleeding, use extra absorbent pads. · Keep the incision area clean and dry. · Ask your doctor about when it is safe to shower, bathe, or soak in water. · Avoid heavy lifting for six weeks.     Diet    After a  birth, you will start with a clear liquid diet. Examples include: Jell-o, broth, and ginger ale. If you tolerate that, you can slowly go back to your regular diet. Stay away from anything greasy or spicy right after surgery. These types of foods can upset your stomach. Drink lots of fluids to prevent constipation. Physical Activity    · Do not lift anything heavier than your baby. · When shifting positions, use a pillow to support the area where the incisions were made. · Get up slowly. This will help you to avoid feeling dizzy or light headed. · Try to move around each day. Light physical activity will help with your recovery. · Ask your doctor when you will be able to go back to work. · Do not drive unless your doctor has given you permission to do so. Do not drive if you are taking prescription pain medicine. · Ask your doctor when you will be able to resume sexual activity. If you have not done so already, talk to your doctor about birth control options. Medications    Your doctor may recommend pain medicine to ease discomfort. If you are taking medicines, follow these general guidelines:   · Take your medicine as directed. Do not change the amount or the schedule. · Do not stop taking them without talking to your doctor. · Do not share them. · Know what the results and side effects. Report them to your doctor. · Some drugs can be dangerous when mixed. Talk to a doctor or pharmacist if you are taking more than one drug. This includes over-the-counter medicine and herb or dietary supplements. · Plan ahead for refills so you don't run out. Lifestyle Changes    You and your doctor will plan lifestyle changes that will help you recover. To get encouragement and learn strategies, consider joining a support group for new mothers. Follow-up   Make a follow-up appointment as directed by your doctor.    Call Your Doctor If Any of the Following Occurs   After you leave the hospital, call your doctor if any of the following occurs:   · Signs of infection, including fever and chills   · Heavy vaginal bleeding   · Foul-smelling vaginal discharge   · Excessive bleeding, redness, swelling, increasing pain or discharge from the incision site   · Nausea and/or vomiting that you cannot control with the medicines you were given after surgery, or which persist for more than two days after discharge from the hospital   · Pain that you cannot control with the medicines you have been given   · Swelling and/or pain in one or both legs   · Cough, shortness of breath, or chest pain   · Joint pain, fatigue, stiffness, rash, or other new symptoms   · Become dizzy or faint   If you think you have an emergency,  CALL 911  .          Gabriela Lopez DO  Ob/Gyn   4/3/2022, 9:36 AM

## 2022-04-03 NOTE — CARE COORDINATION
Social work: spoke to pt who has stated she only suffers mild anxiety symptoms. Primary Care physician does the treatment at this time. Pt made aware of options in the community for [facility] resources and care. Pt states she has everything she needs for baby. This is her 3rd child. States she has supports also. Is aware of IWIC and other programs for nutrition support also. Will follow as needed. Pt agrees to call if symptoms are worse than expected.   Emmy Heart [facility]

## 2022-04-04 VITALS
DIASTOLIC BLOOD PRESSURE: 68 MMHG | BODY MASS INDEX: 31.32 KG/M2 | SYSTOLIC BLOOD PRESSURE: 114 MMHG | WEIGHT: 188 LBS | TEMPERATURE: 97.9 F | OXYGEN SATURATION: 98 % | RESPIRATION RATE: 16 BRPM | HEIGHT: 65 IN | HEART RATE: 71 BPM

## 2022-04-04 PROCEDURE — 6370000000 HC RX 637 (ALT 250 FOR IP)

## 2022-04-04 PROCEDURE — 6370000000 HC RX 637 (ALT 250 FOR IP): Performed by: OBSTETRICS & GYNECOLOGY

## 2022-04-04 RX ADMIN — OXYCODONE HYDROCHLORIDE AND ACETAMINOPHEN 2 TABLET: 5; 325 TABLET ORAL at 11:37

## 2022-04-04 RX ADMIN — OXYCODONE HYDROCHLORIDE AND ACETAMINOPHEN 2 TABLET: 5; 325 TABLET ORAL at 03:27

## 2022-04-04 RX ADMIN — IBUPROFEN 800 MG: 800 TABLET, FILM COATED ORAL at 07:00

## 2022-04-04 RX ADMIN — ONDANSETRON HYDROCHLORIDE 4 MG: 4 TABLET, FILM COATED ORAL at 16:24

## 2022-04-04 RX ADMIN — MAGNESIUM HYDROXIDE 30 ML: 400 SUSPENSION ORAL at 16:32

## 2022-04-04 RX ADMIN — ONDANSETRON HYDROCHLORIDE 4 MG: 4 TABLET, FILM COATED ORAL at 07:40

## 2022-04-04 RX ADMIN — OXYCODONE HYDROCHLORIDE AND ACETAMINOPHEN 2 TABLET: 5; 325 TABLET ORAL at 16:24

## 2022-04-04 RX ADMIN — SENNOSIDES AND DOCUSATE SODIUM 2 TABLET: 50; 8.6 TABLET ORAL at 08:57

## 2022-04-04 RX ADMIN — OXYCODONE HYDROCHLORIDE AND ACETAMINOPHEN 2 TABLET: 5; 325 TABLET ORAL at 07:38

## 2022-04-04 RX ADMIN — IBUPROFEN 800 MG: 800 TABLET, FILM COATED ORAL at 15:09

## 2022-04-04 ASSESSMENT — PAIN SCALES - GENERAL
PAINLEVEL_OUTOF10: 6
PAINLEVEL_OUTOF10: 6
PAINLEVEL_OUTOF10: 7

## 2022-04-04 NOTE — PLAN OF CARE
Problem: Discharge Planning:  Goal: Discharged to appropriate level of care  Description: Discharged to appropriate level of care  4/4/2022 0919 by Rg Short RN  Outcome: Ongoing  4/4/2022 0538 by Giuliana Dumont RN  Outcome: Ongoing     Problem: Infection - Surgical Site:  Goal: Will show no infection signs and symptoms  Description: Will show no infection signs and symptoms  4/4/2022 0919 by Rg Short RN  Outcome: Ongoing  4/4/2022 0538 by Giuliana Dumont RN  Outcome: Ongoing     Problem: Nausea/Vomiting:  Goal: Absence of nausea/vomiting  Description: Absence of nausea/vomiting  4/4/2022 0919 by Rg Short RN  Outcome: Ongoing  4/4/2022 0538 by Giuliana Dumont RN  Outcome: Ongoing     Problem: Pain - Acute:  Goal: Pain level will decrease  Description: Pain level will decrease  4/4/2022 0919 by Rg Short RN  Outcome: Ongoing  4/4/2022 0538 by Giuliana Dumont RN  Outcome: Ongoing     Problem: Pain:  Goal: Pain level will decrease  Description: Pain level will decrease  4/4/2022 0919 by Rg Short RN  Outcome: Ongoing  4/4/2022 0538 by Giuliana Dumont RN  Outcome: Ongoing

## 2022-04-04 NOTE — FLOWSHEET NOTE
Discharge instructions given per order, patient signs and states understanding. Copies given. Rx delivered to room. Gift bags given. Patient instructed to call office to schedule follow up appointment in 1 week.

## 2022-04-04 NOTE — PROGRESS NOTES
CLINICAL PHARMACY NOTE: MEDS TO BEDS    Total # of Prescriptions Filled: 4   The following medications were delivered to the patient:  · Stool Softener Plus Lax 8.6-50  · Ondansetron HCL 4mg  · Ibuprofen 800mg  · Percocet     Additional Documentation:  Delivered Medication to Patients Room

## 2022-04-04 NOTE — LACTATION NOTE
This note was copied from a baby's chart. Lactation round made. Mother reports soreness and pain with latch. Writer observed latch. Nipple pointed directly at infant mouths and infant latched. Mother reports pain. Writer demonstrates how to de latch and relatch with nipple aimed towards nose, wide mouth opened by infant and nipple placed in mouth. Deep latch. Mother reports it does not hurt. Writer demonstrates to mother with visual hand out on how to achieve deep latch. Mother verbalizes understanding. Encouraged to call out. Weight loss WNL. Voiding and stooling as expected for age. Bilirubin reviewed, 4.8, low risk.

## 2022-04-04 NOTE — PLAN OF CARE
Problem: Discharge Planning:  Goal: Discharged to appropriate level of care  Description: Discharged to appropriate level of care  Outcome: Ongoing     Problem: Fluid Volume - Imbalance:  Goal: Absence of postpartum hemorrhage signs and symptoms  Description: Absence of postpartum hemorrhage signs and symptoms  Outcome: Ongoing  Goal: Absence of imbalanced fluid volume signs and symptoms  Description: Absence of imbalanced fluid volume signs and symptoms  Outcome: Ongoing     Problem: Infection - Surgical Site:  Goal: Will show no infection signs and symptoms  Description: Will show no infection signs and symptoms  Outcome: Ongoing     Problem: Mood - Altered:  Goal: Mood stable  Description: Mood stable  Outcome: Ongoing     Problem: Nausea/Vomiting:  Goal: Absence of nausea/vomiting  Description: Absence of nausea/vomiting  Outcome: Ongoing     Problem: Pain - Acute:  Goal: Pain level will decrease  Description: Pain level will decrease  Outcome: Ongoing     Problem: Urinary Retention:  Goal: Urinary elimination within specified parameters  Description: Urinary elimination within specified parameters  Outcome: Ongoing     Problem: Venous Thromboembolism:  Goal: Will show no signs or symptoms of venous thromboembolism  Description: Will show no signs or symptoms of venous thromboembolism  Outcome: Ongoing  Goal: Absence of signs or symptoms of impaired coagulation  Description: Absence of signs or symptoms of impaired coagulation  Outcome: Ongoing     Problem: Pain:  Description: Pain management should include both nonpharmacologic and pharmacologic interventions.   Goal: Pain level will decrease  Description: Pain level will decrease  Outcome: Ongoing  Goal: Control of acute pain  Description: Control of acute pain  Outcome: Ongoing  Goal: Control of chronic pain  Description: Control of chronic pain  Outcome: Ongoing

## 2022-04-04 NOTE — PROGRESS NOTES
POST OPERATIVE DAY #  3 S/P  Section     Patient Name: Vamsi Lucas  Patient : 1990  Room/Bed: 0572/7882-21  Admission Date/Time: 2022  8:46 AM  MRN #: [id number]  [facility] #: [id number]    Date: 2022  Time: 9:04 AM        Vamsi Lucas is a 29 y.o. female [id number]    This patient was seen today. She Delivered on 22. Her pregnancy was complicated by:     Patient Active Problem List   Diagnosis    Generalized anxiety disorder    Adult BMI 25.0-25.9 kg/sq m    History of gestational diabetes    PLTCS 19 M APG 8/9 Wt 9#12    Corneal rust ring    Elevated fasting blood sugar    Vitamin D deficiency    history of LGA: 9#12oz 2019     (spontaneous vaginal delivery) 2017    High risk pregnancy with low PAPPA    COVID-19 affecting pregnancy    30 weeks gestation of pregnancy    39 weeks gestation of pregnancy    REPEAT LTC- section 2022    Delivery with history of        Today she is doing well without any chief complaint. She reports she was feeling nauseous and having pain yesterday, but is feeling much better this morning. She reports she was able to get up and take a shower and her pain and nausea are more under control. She reports she feels she will be okay to go home later this evening. Her lochia is light. She denies chest pain, shortness of breath, headache, lightheadedness, blurred vision, peripheral edema, palpitations and dry cough. She is  breast feeding and she denies any signs or symptoms of mastitis. These were reviewed with her in detail. She is ambulating well. Her bowels are not regular. Her voiding pattern is Normal. I reviewed signs and symptoms of post partum depression with the patient, she currently denies any of these symptoms. I have counseled this patient on secondary smoke risks and the increased incidence of sudden infant death syndrome.      Vitals:    22 1613 22 1859 22 2309 22 0701   BP: 128/75 111/72 112/70 111/61   Pulse: 65 63 60 66   Resp: 16 16 16 16   Temp: 98.6 °F (37 °C) 98.5 °F (36.9 °C) 98.4 °F (36.9 °C) 98.2 °F (36.8 °C)   TempSrc: Oral Oral Oral Oral   SpO2:  97% 100% 99%   Weight:       Height:             Physical Exam:  General:  awake, alert, cooperative, no apparent distress, and appears stated age  Neurologic:  alert, oriented, normal speech, no focal findings or movement disorder noted  Lungs:  No increased work of breathing, good air exchange, clear to auscultation bilaterally, no crackles or wheezing  Heart:  Normal apical impulse, regular rate and rhythm, normal S1 and S2, no S3 or S4, and no murmur noted    Abdomen: Soft, nontender, nondistended, bowel sounds present, no rebound, rigidity or guarding   Fundus: non-tender, firm, below umbilicus  Incision: Dressing in place. Extremities:  no calf tenderness, non edematous      LABS:  Admission on 04/01/2022   Component Date Value Ref Range Status    Expiration Date 04/01/2022 04/04/2022,2359   Final    Arm Band Number 04/01/2022 [id number]   Final    ABO/Rh 04/01/2022 A POSITIVE   Final    Antibody Screen 04/01/2022 NEGATIVE   Final    WBC 04/01/2022 7.5  3.5 - 11.0 k/uL Final    RBC 04/01/2022 4.31  4.0 - 5.2 m/uL Final    Hemoglobin 04/01/2022 12.5  12.0 - 16.0 g/dL Final    Hematocrit 04/01/2022 35.9* 36 - 46 % Final    MCV 04/01/2022 83.2  80 - 100 fL Final    MCH 04/01/2022 29.0  26 - 34 pg Final    MCHC 04/01/2022 34.9  31 - 37 g/dL Final    RDW 04/01/2022 14.1  11.5 - 14.9 % Final    Platelets 43/18/2610 209  150 - 450 k/uL Final    MPV 04/01/2022 7.6  6.0 - 12.0 fL Final    T. pallidum, IgG 04/01/2022 NONREACTIVE  NONREACTIVE Final    Comment:       T. pallidum antibodies are not detected. There is no serological evidence of infection with T. pallidum (early primary syphilis   cannot be excluded). Retest in 2-4 weeks if syphilis is clinically suspect.             Amphetamine Screen, Ur 04/01/2022 NEGATIVE  NEGATIVE Final    Comment:       (Positive cutoff 1000 ng/mL)                  Barbiturate Screen, Ur 2022 NEGATIVE  NEGATIVE Final    Comment:       (Positive cutoff 200 ng/mL)                  Benzodiazepine Screen, Urine 2022 NEGATIVE  NEGATIVE Final    Comment:       (Positive cutoff 200 ng/mL)                  Cocaine Metabolite, Urine 2022 NEGATIVE  NEGATIVE Final    Comment:       (Positive cutoff 300 ng/mL)                  Methadone Screen, Urine 2022 NEGATIVE  NEGATIVE Final    Comment:       (Positive cutoff 300 ng/mL)                  Opiates, Urine 2022 NEGATIVE  NEGATIVE Final    Comment:       (Positive cutoff 300 ng/mL)                  Phencyclidine, Urine 2022 NEGATIVE  NEGATIVE Final    Comment:       (Positive cutoff 25 ng/mL)                  Cannabinoid Scrn, Ur 2022 NEGATIVE  NEGATIVE Final    Comment:       (Positive cutoff 50 ng/mL)                  Oxycodone Screen, Ur 2022 NEGATIVE  NEGATIVE Final    Comment:       (Positive cutoff 100 ng/mL)                  Test Information 2022 Assay provides medical screening only. The absence of expected drug(s) and/or metabolite(s) may indicate diluted or adulterated urine, limitations of testing or timing of collection. Final    Comment: Testing for legal purposes should be confirmed by another method. To request confirmation   of test result, please call the lab within 7 days of sample submission.  Hemoglobin 2022 10.8* 12.0 - 16.0 g/dL Final    Hematocrit 2022 31.5* 36 - 46 % Final   ]        Assessment/Plan:  1.  Donn Travis is a  POD # 3 s/p  section        - Doing well, VSS               - female infant in [address]              - Encourage ambulation and use of incentive spirometer              - Post-op Hgb 10.8              - Motrin/percocet PRN for pain control               - Pressure dressing in place               - Continue post-op care    - Discharge planned for today   - Follow up in office in 1 week   - Reviewed home medications and patient desires to have Zofran to go home as well. - Abdominal binder in place and encouraged continued use. 2. Rh positive   - Rhogam not indicated     3. Breastfeeding   - Lactation to see patient today     - Counseling Completed: Secondary Smoke risks and Sudden Infant Death Syndrome were reviewed with recommendations. Signs and Symptoms of Post Partum Depression were reviewed. The patient is to call if any occur. Signs and symptoms of Mastitis were reviewed. The patient is to call if any occur for follow up.       Attending Physician: Dr. Lila Herron Name:   Electronically signed by JANUARY Sr CNM on 4/4/2022 at 9:04 AM

## 2022-04-04 NOTE — DISCHARGE SUMMARY
Obstetric Discharge Summary  [facility]    Patient Name: Colette Freire  Patient : 1990  Room/Bed: 7529/8104-06  Primary Care Physician: Merlin Leriche, APRN - CNP  Admit Date: 2022  8:46 AM  MRN #: [id number]  CSN #: [id number]  [facility]GYN: [facility]    Principal Diagnosis: IUP at 39w0d, admitted for  Section (Repeat)     Her pregnancy has been complicated by:   Patient Active Problem List   Diagnosis    Generalized anxiety disorder    Adult BMI 25.0-25.9 kg/sq m    History of gestational diabetes    PLTCS 19 M APG 8/9 Wt 9#12    Corneal rust ring    Elevated fasting blood sugar    Vitamin D deficiency    history of LGA: 9#12oz 2019     (spontaneous vaginal delivery) 2017    High risk pregnancy with low PAPPA    COVID-19 affecting pregnancy    30 weeks gestation of pregnancy    39 weeks gestation of pregnancy    REPEAT LTC- section 2022    Delivery with history of        Infection Present?: No  Hospital Acquired: No    Surgical Operations & Procedures:  [] Pitocin Induction of Labor  [] Pitocin Augmentation of Labor  [] Prostaglandin Induction of Labor  [] Cytotec (Misoprostol)  [] Mechanical Induction of Labor  [] Artificial Rupture of Membranes  [] Intrauterine Pressure Catheter  [] Fetal Scalp Electrode  [] Amnioinfusion  [x] Antibiotic Prophylaxis    Analgesia: spinal  Delivery Type:  Delivery: : Low Cervical, Transverse   Laceration(s): Absent    Consultations: NICU and Anesthesia    Pertinent Findings & Procedures:   Colette Freire is a 29 y.o. female [id number] at 39w0d admitted for repeat  section; received ancef and postpartum pitocin.  She delivered by  without labor a Live Born      Information for the patient's :  Giuseppe Patterson Girl Ramiro Fairly [[id number]]   female   Birth Weight: 7 lb 10 oz (3.459 kg)       Apgars:   Information for the patient's :  Chin Ganarnold Girl Dayannaita Fairly [[id number]] Postpartum course: normal.      Course of patient: uncomplicated    Discharge to: Home    Readmission planned: no     Recommendations on Discharge:     Medications:        Medication List      START taking these medications    ibuprofen 800 MG tablet  Commonly known as: ADVIL;MOTRIN  Take 1 tablet by mouth every 8 hours     ondansetron 4 MG tablet  Commonly known as: ZOFRAN  Take 1 tablet by mouth every 8 hours as needed for Nausea or Vomiting     oxyCODONE-acetaminophen 5-325 MG per tablet  Commonly known as: PERCOCET  Take 1 tablet by mouth every 6 hours as needed for Pain for up to 7 days. sennosides-docusate sodium 8.6-50 MG tablet  Commonly known as: SENOKOT-S  Take 1 tablet by mouth 2 times daily as needed for Constipation        CONTINUE taking these medications    Prenatal Vitamin Plus Low Iron 27-1 MG Tabs           Where to Get Your Medications      You can get these medications from any pharmacy    Bring a paper prescription for each of these medications  · ibuprofen 800 MG tablet  · ondansetron 4 MG tablet  · oxyCODONE-acetaminophen 5-325 MG per tablet  · sennosides-docusate sodium 8.6-50 MG tablet         Activity: Slow return to ADLs  Diet: Regular  Follow up: 1 weeks with Judson Fulton on discharge: stable    Discharge Date: 4/4/2022    JANUARY Sr CNM    Comments:   Home care, Follow-up care and birth control were reviewed. Signs and symptoms of mastitis and Post Partum Depression were reviewed. The patient is to notify her physician if any of these occur. The patient was counseled on secondary smoke risks and the increased risk of sudden infant death syndrome and respiratory problems to her baby with exposure. She was counseled on various alternate recommendations to decrease the exposure to secondary smoke to her children.

## 2022-04-05 LAB — SURGICAL PATHOLOGY REPORT: NORMAL

## 2022-04-08 ENCOUNTER — OFFICE VISIT (OUTPATIENT)
Dept: OBGYN CLINIC | Age: 32
End: 2022-04-08

## 2022-04-08 VITALS
WEIGHT: 175 LBS | DIASTOLIC BLOOD PRESSURE: 64 MMHG | HEIGHT: 65 IN | SYSTOLIC BLOOD PRESSURE: 102 MMHG | BODY MASS INDEX: 29.16 KG/M2

## 2022-04-08 PROCEDURE — 99024 POSTOP FOLLOW-UP VISIT: CPT | Performed by: NURSE PRACTITIONER

## 2022-04-08 NOTE — PROGRESS NOTES
Pt seen in office s/p repeat  22 for pressure bandage removal.  Pt tolerated bandage removal without difficulty or pain. Pt incision has intact steri strips and were not removed today. Pt incision is clean and clear of any redness. Pt states she is not having increased pain to incision site. Pt scheduled to follow up for PP check in one week. Pt instructed on post op incision care.

## 2022-04-20 ENCOUNTER — OFFICE VISIT (OUTPATIENT)
Dept: OBGYN CLINIC | Age: 32
End: 2022-04-20
Payer: COMMERCIAL

## 2022-04-20 VITALS
SYSTOLIC BLOOD PRESSURE: 114 MMHG | BODY MASS INDEX: 28.49 KG/M2 | HEIGHT: 65 IN | WEIGHT: 171 LBS | DIASTOLIC BLOOD PRESSURE: 70 MMHG

## 2022-04-20 PROBLEM — Z3A.30 30 WEEKS GESTATION OF PREGNANCY: Status: RESOLVED | Noted: 2022-01-28 | Resolved: 2022-04-20

## 2022-04-20 PROBLEM — O98.519 COVID-19 AFFECTING PREGNANCY, ANTEPARTUM: Status: RESOLVED | Noted: 2022-01-10 | Resolved: 2022-04-20

## 2022-04-20 PROBLEM — O28.0 HIGH RISK PREGNANCY WITH LOW PAPPA: Status: RESOLVED | Noted: 2021-10-01 | Resolved: 2022-04-20

## 2022-04-20 PROBLEM — O09.899 HIGH RISK PREGNANCY WITH LOW PAPPA: Status: RESOLVED | Noted: 2021-10-01 | Resolved: 2022-04-20

## 2022-04-20 PROBLEM — Z3A.39 39 WEEKS GESTATION OF PREGNANCY: Status: RESOLVED | Noted: 2022-04-01 | Resolved: 2022-04-20

## 2022-04-20 PROBLEM — U07.1 COVID-19 AFFECTING PREGNANCY, ANTEPARTUM: Status: RESOLVED | Noted: 2022-01-10 | Resolved: 2022-04-20

## 2022-04-20 PROCEDURE — 1111F DSCHRG MED/CURRENT MED MERGE: CPT | Performed by: NURSE PRACTITIONER

## 2022-04-20 PROCEDURE — 1036F TOBACCO NON-USER: CPT | Performed by: NURSE PRACTITIONER

## 2022-04-20 PROCEDURE — G8417 CALC BMI ABV UP PARAM F/U: HCPCS | Performed by: NURSE PRACTITIONER

## 2022-04-20 PROCEDURE — 99213 OFFICE O/P EST LOW 20 MIN: CPT | Performed by: NURSE PRACTITIONER

## 2022-04-20 PROCEDURE — G8427 DOCREV CUR MEDS BY ELIG CLIN: HCPCS | Performed by: NURSE PRACTITIONER

## 2022-04-20 NOTE — PROGRESS NOTES
Belinda Harrell  3:25 PM  22            The patient was seen. She has no chief complaints today. She delivered by  section on 2022. She is  breast feeding and there is not any signs or symptoms of mastitis. The patient completed the E.P.D.S. Evaluation form and scored 7. She does not have any signs or symptoms of post partum depression. She denies any suicidal thoughts with a plan, intent to harm others and delusional ideas. Today her lochia is light she denies any dizziness or shortness of breath. Her pregnancy was complicated by:   Patient Active Problem List    Diagnosis Date Noted    REPEAT LTC- section 2022     Priority: High    history of LGA: 9#12oz 2021     Priority: High     (spontaneous vaginal delivery) 2021     Priority: High    History of gestational diabetes 09/10/2018     Priority: High     Overview Note:     Early 1 hour GTT ordered 9/10/2018  If wnl repeat at 28 weeks      Delivery with history of      Vitamin D deficiency 2020    Elevated fasting blood sugar 08/10/2020    Corneal rust ring 2020    PLTCS 19 M APG 8/9 Wt 9#12 2019     Overview Note:     Previous LTC C/S wants repeat      Adult BMI 25.0-25.9 kg/sq m 2016    Generalized anxiety disorder 10/06/2015         She does admit to having good home support. Her bowels are regular and she denies any urinary tract symptomology. OB History    Para Term  AB Living   5 3 3 0 2 3   SAB IAB Ectopic Molar Multiple Live Births   1 1 0 0 0 3           Blood pressure 114/70, height 5' 5\" (1.651 m), weight 171 lb (77.6 kg), currently breastfeeding. Abdomen: Soft and non-tender; good bowel sounds; no guarding, rebound or rigidity; no CVA tenderness bilaterally. Incision: Clean, Dry and Intact without signs or symptoms of infection. Extremities: No calf tenderness bilaterally. DTR 2/4 bilaterally.  No

## 2022-04-21 ENCOUNTER — TELEPHONE (OUTPATIENT)
Dept: OBGYN CLINIC | Age: 32
End: 2022-04-21

## 2022-04-21 NOTE — TELEPHONE ENCOUNTER
Please make sure patient is not having changes in vision or RUQ pain. ( sx of Pre-e) if any of the theses and /or if this is the worse headache that feels like a band that is wrapped around her head- go to ER     Instruct patient that headaches may occur with lack of sleep, hydrate, and nutrition. Please increase theses. May take tylenol, benadryl and a daily magnesium supplement to help with headaches.

## 2022-04-21 NOTE — TELEPHONE ENCOUNTER
Per Lamonte Siddiqi NP, returned pt call re: daily \"bad\" headaches. Pt reports no vision changes or RUQ pain; does feel like its the worse headache/pain in frontal forehead; c/o light and noise being bothersome. Pt instructed that lack of sleep, dehydration, lack of nutrition can cause. May take tylenol, benadryl, and magnesium supplement. Pt instructed to go to ER. Pt verbalized understanding.

## 2022-04-21 NOTE — TELEPHONE ENCOUNTER
Pt seen yesterday for PP chk ( delivered 2 wks ago ) and forgot to mention that she has had headaches everyday, and has been taking Motrin, but hasn't really helped. Pt states there was difficulty with administering spinal at time of induction, and wasn't sure if the headaches are \"normal\" and/or result because of that.     Please call to discuss [phone]

## 2022-08-28 ASSESSMENT — ENCOUNTER SYMPTOMS
COUGH: 0
SHORTNESS OF BREATH: 0
ABDOMINAL PAIN: 0
WHEEZING: 0
RESPIRATORY NEGATIVE: 1

## 2022-08-28 NOTE — PROGRESS NOTES
[facility]  [facility]  [address] [facility] [address]  [address] [facility] [address]  Dept: [phone]     2022   Chief Complaint   Patient presents with    Ankle Pain     HPI  Troy Mcarthur (:  1990) is a 29 y.o. female is an established patient. Patient has a history of anxiety disorder, anemia, vitamin D deficiency. Not seen sine 2021    ANKLE PAIN  Patient reported some bilateral posterior ankle pain for the past 2 mos. patient reported that pain usually occurs every morning. She describes it as achy. Usually just very early morning. However when she gets moving after stretching for a while the pain goes away. However, after being on her feet and doing a lot of walking it starts to hurt again. Patient denies any trauma or any inciting events before this started to happen. Patient has never had this evaluated in the past.       GESTATIONAL DIABETES MELLITUS  Lab Results   Component Value Date    LABA1C 4.9 2020     Lab Results   Component Value Date    EAG 94 2020     ANXIETY  Troy Mcarthur is a 29 y.o. female patient has a known history of anxiety disorder. Patient had significant depression anxiety in the past was on Zoloft and buspirone but had weaned herself off of the Zoloft since she got pregnant. Patient reported some ongoing intermittent anxiety which she thinks is worsening in the past few weeks since her oldest child had started regular school in the middle started . She is staying home but she now has a 11month-old daughter. She also has been very busy at home since having 3 kids she is also breast-feeding. PREVIOUS NOTES: Patient was Zoloft. Patient was weaning herself off of the Zoloft in January. She found out that she was pregnant. However, she did have a miscarriage few weeks later. Since patient wants to get pregnant again. Patient did not start the Zoloft again.   Patient denied any withdrawal symptoms other stop or control worrying 1-Several days 2-Over half the days   Worrying too much about different things 0-Not at all 1-Several days   Trouble relaxing 1-Several days 0-Not at all   Being so restless that it's hard to sit still 0-Not at all 0-Not at all   Becoming easily annoyed or irritable 3-Nearly every day 0-Not at all   Feeling afraid as if something awful might happen 0-Not at all 2-Over half the days   NIMA-7 Total Score 6 7       [x]Negative depression screening. []1-4 = Minimal depression   []5-9 = Mild depression   []10-14 = Moderate depression   []15-19 = Moderately severe depression   []20-27 = Severe depression  PHQ Scores 2022   PHQ2 Score 0 0 0 0 0 0 0   PHQ9 Score 0 0 0 0 0 0 0     Counseling given: Kathy Schreiber is due for annual preventative health screening including annual blood work. We will place the orders for these today. .    AMENORRHEA  Patient also reported being a few days late on her periods and wanted to get her checked for pregnancy.     Patient Active Problem List   Diagnosis    Generalized anxiety disorder    Adult BMI 25.0-25.9 kg/sq m    Gestational diabetes mellitus (GDM), antepartum    History of gestational diabetes    PLTCS 19 M APG 8/9 Wt 9#12    Corneal rust ring    Elevated fasting blood sugar    Vitamin D deficiency    history of LGA: 9#12oz      (spontaneous vaginal delivery) 2017    REPEAT [facility]- section 2022    Delivery with history of     Chronic ankle pain, bilateral    Iron deficiency anemia secondary to inadequate dietary iron intake      Past Medical History:   Diagnosis Date    Anemia 2018    Anxiety     Generalized anxiety disorder 10/6/2015    Gestational diabetes mellitus in pregnancy 2016      Past Surgical History:   Procedure Laterality Date     SECTION N/A 2019     SECTION PRIMARY performed by Emilia Rojo DO at [facility] L&D OR     SECTION N/A 2022     SECTION performed by rAnold Conner DO at [facility] L&D OR    WISDOM TOOTH EXTRACTION       Family History   Problem Relation Age of Onset    Hypertension Father     Hypertension Mother     No Known Problems Brother     Breast Cancer Neg Hx     Cancer Neg Hx     Colon Cancer Neg Hx     Diabetes Neg Hx     Eclampsia Neg Hx     Ovarian Cancer Neg Hx      Labor Neg Hx     Spont Abortions Neg Hx     Stroke Neg Hx      Current Outpatient Medications   Medication Sig Dispense Refill    busPIRone (BUSPAR) 10 MG tablet Take 1 tablet by mouth 3 times daily 90 tablet 0    Prenatal Vit-Fe Fumarate-FA (PRENATAL VITAMIN PLUS LOW IRON) 27-1 MG TABS TAKE 1 TABLET BY MOUTH ONCE DAILY       No current facility-administered medications for this visit. Review of Systems   Constitutional:  Negative for activity change, chills, fatigue, fever and unexpected weight change. HENT: Negative. Respiratory: Negative. Negative for cough, shortness of breath and wheezing. Cardiovascular: Negative. Negative for chest pain and palpitations. Gastrointestinal:  Negative for abdominal pain. Genitourinary:  Negative for dysuria. Musculoskeletal:  Positive for arthralgias and myalgias. Bilateral ankle pain   Skin:  Negative for rash. Allergic/Immunologic: Negative for environmental allergies. Neurological:  Negative for light-headedness and headaches. Psychiatric/Behavioral:  Negative for dysphoric mood, sleep disturbance and suicidal ideas. The patient is nervous/anxious. Prior to Visit Medications    Medication Sig Taking?  Authorizing Provider   busPIRone (BUSPAR) 10 MG tablet Take 1 tablet by mouth 3 times daily Yes Emily Esquivel, APRN - CNP   Prenatal Vit-Fe Fumarate-FA (PRENATAL VITAMIN PLUS LOW IRON) 27-1 MG TABS TAKE 1 TABLET BY MOUTH ONCE DAILY Yes Historical Provider, MD      Social History     Tobacco Use    Smoking status: Never    Smokeless tobacco: Never   Substance Use Topics    Alcohol use: Not Currently     Alcohol/week: 0.0 standard drinks     Comment: rare       Vitals:    08/30/22 0947   BP: 100/68   Site: Left Upper Arm   Position: Sitting   Cuff Size: Large Adult   Pulse: 67   SpO2: 99%   Weight: 165 lb (74.8 kg)   Height: 5' 5\" (1.651 m)     Estimated body mass index is 27.46 kg/m² as calculated from the following:    Height as of this encounter: 5' 5\" (1.651 m). Weight as of this encounter: 165 lb (74.8 kg). Physical Exam  Vitals and nursing note reviewed. Constitutional:       Appearance: She is well-developed. HENT:      Right Ear: Tympanic membrane normal.      Left Ear: Tympanic membrane normal.      Nose: No mucosal edema. Right Sinus: No maxillary sinus tenderness or frontal sinus tenderness. Left Sinus: No maxillary sinus tenderness or frontal sinus tenderness. Mouth/Throat:      Pharynx: Uvula midline. Cardiovascular:      Rate and Rhythm: Normal rate. Pulmonary:      Effort: Pulmonary effort is normal.      Breath sounds: Normal breath sounds. Musculoskeletal:      Right ankle: No deformity. No tenderness. Normal range of motion. Right Achilles Tendon: Normal.      Left ankle: No deformity. No tenderness. Normal range of motion. Left Achilles Tendon: Normal.   Skin:     General: Skin is warm and dry. Neurological:      Mental Status: She is alert and oriented to person, place, and time. Psychiatric:         Mood and Affect: Mood is not anxious. Speech: Speech is rapid and pressured. Behavior: Behavior normal.         Thought Content: Thought content does not include homicidal or suicidal ideation. ASSESSMENT/PLAN:  1. Chronic ankle pain, bilateral  Failure to Improve  Likely arthritis  Continue current therapy. DISCUSSED and ADVISED TO:  Stay at a healthy weight. Continue exercises/PT  Stretch to help prevent stiffness and to prevent injury before exercise. Gentle forms of yoga help keep joints and muscles flexible. Walk instead of jog, ride a bike, swim, and water exercise. Lift weights as tolerated. strong muscles help reduce stress on joints. Take pain medicines exactly as directed and only as needed. - Comprehensive Metabolic Panel, Fasting; Future  - External Referral To Sports Medicine  - Uric Acid; Future    2. Iron deficiency anemia secondary to inadequate dietary iron intake  Failure to Improve  Continue the same therapy. DISCUSSED and ADVISED TO:  Make iron-rich foods a part daily diet. Eat foods with vitamin C along with iron-rich foods. Vitamin C helps absorb more iron from food. Eat meat and vegetables or grains together.     - CBC with Auto Differential; Future  - Urinalysis with Reflex to Culture; Future  - Ferritin; Future  - Iron and TIBC; Future  - Reticulocytes; Future  - Vitamin B12 & Folate; Future  - Path Review, Smear; Future    3. Generalized anxiety disorder  Stable  Continue current therapy. Discussed how to recognize anxiety. Advised to relieve tension with exercise or a massage. Advised to get enough rest.  Advised to avoid alcohol, caffeine, nicotine, and illegal drugs. Which can increase anxiety level and cause sleep problems. - busPIRone (BUSPAR) 10 MG tablet; Take 1 tablet by mouth 3 times daily  Dispense: 90 tablet; Refill: 0    4. Vitamin D deficiency  Stable  Continue Vitamin D supplementation  DISCUSSED AND ADVISED TO:  Foods that contain a lot of vitamin D includes [place], tuna, and mackerel. Cheese, egg yolks, and beef liver have small amounts of vit D.  Milk, soy drinks, orange juice, yogurt, margarine, and some kinds of cereal have vitamin D added to them. Continue to use sunblock when out in the sun to prevent skin cancer.    - Vitamin D 25 Hydroxy; Future    5.  Gestational diabetes mellitus (GDM), antepartum, gestational diabetes method of control unspecified  Failure to Improve  Continue to evaluate  Recommend labs    - CBC with Auto Differential; Future  - Comprehensive Metabolic Panel, Fasting; Future  - Hemoglobin A1C; Future  - Urinalysis with Reflex to Culture; Future    6. Amenorrhea  Failure to Improve  Continue to evaluate  Request to get pregnancy test  - Pregnancy, Urine; Future    7. Chronic fatigue  Failure to Improve  Continue to evaluate  Recommend tsh level    - TSH; Future    8. Hyperglycemia  Failure to Improve  Continue to evaluate  Recommend labs    - CBC with Auto Differential; Future  - Comprehensive Metabolic Panel, Fasting; Future  - Hemoglobin A1C; Future  - Urinalysis with Reflex to Culture; Future    9. Elevated LDL cholesterol level  Failure to Improve  Continue to evaluate  Recommend labs    - Comprehensive Metabolic Panel, Fasting; Future  - Lipid, Fasting; Future    This note was completed by using the assistance of a speech-recognition program. However, inadvertent computerized transcription errors may be present. Although every effort was made to ensure accuracy, no guarantees can be provided that every mistake has been identified and corrected by editing   An electronic signature was used to authenticate this note.   --Saul Rosales, JANUARY - CNP on 8/30/2022 at 4:14 PM

## 2022-08-30 ENCOUNTER — OFFICE VISIT (OUTPATIENT)
Dept: FAMILY MEDICINE CLINIC | Age: 32
End: 2022-08-30
Payer: COMMERCIAL

## 2022-08-30 ENCOUNTER — TELEPHONE (OUTPATIENT)
Dept: FAMILY MEDICINE CLINIC | Age: 32
End: 2022-08-30

## 2022-08-30 VITALS
SYSTOLIC BLOOD PRESSURE: 100 MMHG | HEIGHT: 65 IN | DIASTOLIC BLOOD PRESSURE: 68 MMHG | WEIGHT: 165 LBS | OXYGEN SATURATION: 99 % | HEART RATE: 67 BPM | BODY MASS INDEX: 27.49 KG/M2

## 2022-08-30 DIAGNOSIS — R73.9 HYPERGLYCEMIA: ICD-10-CM

## 2022-08-30 DIAGNOSIS — D50.8 IRON DEFICIENCY ANEMIA SECONDARY TO INADEQUATE DIETARY IRON INTAKE: ICD-10-CM

## 2022-08-30 DIAGNOSIS — M25.571 CHRONIC ANKLE PAIN, BILATERAL: Primary | ICD-10-CM

## 2022-08-30 DIAGNOSIS — G89.29 CHRONIC ANKLE PAIN, BILATERAL: Primary | ICD-10-CM

## 2022-08-30 DIAGNOSIS — F41.1 GENERALIZED ANXIETY DISORDER: ICD-10-CM

## 2022-08-30 DIAGNOSIS — E78.00 ELEVATED LDL CHOLESTEROL LEVEL: ICD-10-CM

## 2022-08-30 DIAGNOSIS — M25.572 CHRONIC ANKLE PAIN, BILATERAL: Primary | ICD-10-CM

## 2022-08-30 DIAGNOSIS — R53.82 CHRONIC FATIGUE: ICD-10-CM

## 2022-08-30 DIAGNOSIS — E55.9 VITAMIN D DEFICIENCY: ICD-10-CM

## 2022-08-30 DIAGNOSIS — O24.419 GESTATIONAL DIABETES MELLITUS (GDM), ANTEPARTUM, GESTATIONAL DIABETES METHOD OF CONTROL UNSPECIFIED: ICD-10-CM

## 2022-08-30 DIAGNOSIS — N91.2 AMENORRHEA: ICD-10-CM

## 2022-08-30 PROCEDURE — 1036F TOBACCO NON-USER: CPT | Performed by: FAMILY MEDICINE

## 2022-08-30 PROCEDURE — G8417 CALC BMI ABV UP PARAM F/U: HCPCS | Performed by: FAMILY MEDICINE

## 2022-08-30 PROCEDURE — 99214 OFFICE O/P EST MOD 30 MIN: CPT | Performed by: FAMILY MEDICINE

## 2022-08-30 PROCEDURE — G8427 DOCREV CUR MEDS BY ELIG CLIN: HCPCS | Performed by: FAMILY MEDICINE

## 2022-08-30 RX ORDER — BUSPIRONE HYDROCHLORIDE 10 MG/1
10 TABLET ORAL 3 TIMES DAILY
Qty: 90 TABLET | Refills: 0 | Status: SHIPPED | OUTPATIENT
Start: 2022-08-30 | End: 2022-09-29

## 2022-08-30 SDOH — ECONOMIC STABILITY: FOOD INSECURITY: WITHIN THE PAST 12 MONTHS, THE FOOD YOU BOUGHT JUST DIDN'T LAST AND YOU DIDN'T HAVE MONEY TO GET MORE.: NEVER TRUE

## 2022-08-30 SDOH — ECONOMIC STABILITY: FOOD INSECURITY: WITHIN THE PAST 12 MONTHS, YOU WORRIED THAT YOUR FOOD WOULD RUN OUT BEFORE YOU GOT MONEY TO BUY MORE.: NEVER TRUE

## 2022-08-30 ASSESSMENT — PATIENT HEALTH QUESTIONNAIRE - PHQ9
SUM OF ALL RESPONSES TO PHQ QUESTIONS 1-9: 0
SUM OF ALL RESPONSES TO PHQ9 QUESTIONS 1 & 2: 0
SUM OF ALL RESPONSES TO PHQ QUESTIONS 1-9: 0
SUM OF ALL RESPONSES TO PHQ QUESTIONS 1-9: 0
1. LITTLE INTEREST OR PLEASURE IN DOING THINGS: 0
2. FEELING DOWN, DEPRESSED OR HOPELESS: 0
SUM OF ALL RESPONSES TO PHQ QUESTIONS 1-9: 0

## 2022-08-30 ASSESSMENT — SOCIAL DETERMINANTS OF HEALTH (SDOH): HOW HARD IS IT FOR YOU TO PAY FOR THE VERY BASICS LIKE FOOD, HOUSING, MEDICAL CARE, AND HEATING?: NOT HARD AT ALL

## 2022-08-30 NOTE — TELEPHONE ENCOUNTER
Pt called in asking if a prgnancy test can be added to her blood work. She plans on having her blood work done tomorrow.

## 2022-08-30 NOTE — PROGRESS NOTES
Patient states that she is having ankle pain for about 2 months, worse in the morning to the point where she cannot bear weight

## 2022-08-30 NOTE — PATIENT INSTRUCTIONS
New Updates for [facility] MyChart/ [employer] ([facility]) TATY    Thank you for choosing [facility] to give you the best care! [employer] ([facility]) is always trying to think of new ways to help their patients. We are asking all patients to try out the new digital registration that is now available through your [facility] account or the new TATY, [employer] ([facility]). Via the taty you're now able to update your personal and registration information prior to your upcoming appointment. This will save you time once you arrive at the office to check-in, not to mention your information remains safe!! Many other perks come from signing up for an account, such as:  Requesting refills  Scheduling an appointment  Completing an E-Visit  Sending a message to the office/provider  Having access to your medication list  Paying your bill/copay prior to your appointment  Scheduling your yearly mammogram  Review your test results    If you are not familiar with [facility] or the [employer] ([facility]) TATY, please ask one of us and we will be happy to answer any questions or help you set-up your account.       Your [facility] office,  [address]

## 2022-08-31 ENCOUNTER — HOSPITAL ENCOUNTER (OUTPATIENT)
Age: 32
Discharge: HOME | End: 2022-08-31
Payer: COMMERCIAL

## 2022-08-31 DIAGNOSIS — D50.8 IRON DEFICIENCY ANEMIA SECONDARY TO INADEQUATE DIETARY IRON INTAKE: ICD-10-CM

## 2022-08-31 DIAGNOSIS — E78.00 ELEVATED LDL CHOLESTEROL LEVEL: ICD-10-CM

## 2022-08-31 DIAGNOSIS — N91.2 AMENORRHEA: ICD-10-CM

## 2022-08-31 DIAGNOSIS — R73.9 HYPERGLYCEMIA: ICD-10-CM

## 2022-08-31 DIAGNOSIS — M25.572 CHRONIC ANKLE PAIN, BILATERAL: ICD-10-CM

## 2022-08-31 DIAGNOSIS — E55.9 VITAMIN D DEFICIENCY: ICD-10-CM

## 2022-08-31 DIAGNOSIS — G89.29 CHRONIC ANKLE PAIN, BILATERAL: ICD-10-CM

## 2022-08-31 DIAGNOSIS — O24.419 GESTATIONAL DIABETES MELLITUS (GDM), ANTEPARTUM, GESTATIONAL DIABETES METHOD OF CONTROL UNSPECIFIED: ICD-10-CM

## 2022-08-31 DIAGNOSIS — M25.571 CHRONIC ANKLE PAIN, BILATERAL: ICD-10-CM

## 2022-08-31 DIAGNOSIS — R53.82 CHRONIC FATIGUE: ICD-10-CM

## 2022-08-31 LAB
ABSOLUTE RETIC #: 0.06 M/UL (ref 0.02–0.1)
ALBUMIN SERPL-MCNC: 4.8 G/DL (ref 3.5–5.2)
ALP BLD-CCNC: 81 U/L (ref 35–104)
ALT SERPL-CCNC: 15 U/L (ref 5–33)
ANION GAP SERPL CALCULATED.3IONS-SCNC: 9 MMOL/L (ref 9–17)
AST SERPL-CCNC: 20 U/L
BILIRUB SERPL-MCNC: 0.69 MG/DL (ref 0.3–1.2)
BILIRUBIN URINE: NEGATIVE
BUN BLDV-MCNC: 13 MG/DL (ref 6–20)
CALCIUM SERPL-MCNC: 9.3 MG/DL (ref 8.6–10.4)
CHLORIDE BLD-SCNC: 106 MMOL/L (ref 98–107)
CHOLESTEROL, FASTING: 193 MG/DL
CHOLESTEROL/HDL RATIO: 2.4
CO2: 25 MMOL/L (ref 20–31)
COLOR: YELLOW
COMMENT UA: NORMAL
CREAT SERPL-MCNC: 0.55 MG/DL (ref 0.5–0.9)
ESTIMATED AVERAGE GLUCOSE: 94 MG/DL
FERRITIN: 19 NG/ML (ref 13–150)
FOLATE: 19.5 NG/ML
GFR AFRICAN AMERICAN: >60 ML/MIN
GFR NON-AFRICAN AMERICAN: >60 ML/MIN
GFR SERPL CREATININE-BSD FRML MDRD: NORMAL ML/MIN/{1.73_M2}
GLUCOSE BLD-MCNC: 96 MG/DL (ref 70–99)
GLUCOSE URINE: NEGATIVE
HBA1C MFR BLD: 4.9 % (ref 4–6)
HCG(URINE) PREGNANCY TEST: NEGATIVE
HDLC SERPL-MCNC: 79 MG/DL
IRON SATURATION: 25 % (ref 20–55)
IRON: 76 UG/DL (ref 37–145)
KETONES, URINE: NEGATIVE
LDL CHOLESTEROL: 106 MG/DL (ref 0–130)
LEUKOCYTE ESTERASE, URINE: NEGATIVE
NITRITE, URINE: NEGATIVE
PH UA: 6 (ref 5–8)
POTASSIUM SERPL-SCNC: 4.5 MMOL/L (ref 3.7–5.3)
PROTEIN UA: NEGATIVE
RETIC %: 1.3 % (ref 0.5–2)
SODIUM BLD-SCNC: 140 MMOL/L (ref 135–144)
SPECIFIC GRAVITY UA: 1.02 (ref 1–1.03)
TOTAL IRON BINDING CAPACITY: 307 UG/DL (ref 250–450)
TOTAL PROTEIN: 7.3 G/DL (ref 6.4–8.3)
TRIGLYCERIDE, FASTING: 38 MG/DL
TSH SERPL DL<=0.05 MIU/L-ACNC: 2.52 UIU/ML (ref 0.3–5)
TURBIDITY: CLEAR
UNSATURATED IRON BINDING CAPACITY: 231 UG/DL (ref 112–347)
URIC ACID: 4.3 MG/DL (ref 2.4–5.7)
URINE HGB: NEGATIVE
UROBILINOGEN, URINE: NORMAL
VITAMIN B-12: 620 PG/ML (ref 232–1245)
VITAMIN D 25-HYDROXY: 25.5 NG/ML

## 2022-08-31 PROCEDURE — 84443 ASSAY THYROID STIM HORMONE: CPT

## 2022-08-31 PROCEDURE — 83036 HEMOGLOBIN GLYCOSYLATED A1C: CPT

## 2022-08-31 PROCEDURE — 83540 ASSAY OF IRON: CPT

## 2022-08-31 PROCEDURE — 80053 COMPREHEN METABOLIC PANEL: CPT

## 2022-08-31 PROCEDURE — 82306 VITAMIN D 25 HYDROXY: CPT

## 2022-08-31 PROCEDURE — 80061 LIPID PANEL: CPT

## 2022-08-31 PROCEDURE — 36415 COLL VENOUS BLD VENIPUNCTURE: CPT

## 2022-08-31 PROCEDURE — 82728 ASSAY OF FERRITIN: CPT

## 2022-08-31 PROCEDURE — 84550 ASSAY OF BLOOD/URIC ACID: CPT

## 2022-08-31 PROCEDURE — 83550 IRON BINDING TEST: CPT

## 2022-08-31 PROCEDURE — 81003 URINALYSIS AUTO W/O SCOPE: CPT

## 2022-08-31 PROCEDURE — 82746 ASSAY OF FOLIC ACID SERUM: CPT

## 2022-08-31 PROCEDURE — 85045 AUTOMATED RETICULOCYTE COUNT: CPT

## 2022-08-31 PROCEDURE — 81025 URINE PREGNANCY TEST: CPT

## 2022-08-31 PROCEDURE — 82607 VITAMIN B-12: CPT

## 2022-09-01 ENCOUNTER — TELEPHONE (OUTPATIENT)
Dept: FAMILY MEDICINE CLINIC | Age: 32
End: 2022-09-01

## 2022-09-01 DIAGNOSIS — D50.8 OTHER IRON DEFICIENCY ANEMIA: Primary | ICD-10-CM

## 2022-09-01 DIAGNOSIS — E55.9 VITAMIN D DEFICIENCY: ICD-10-CM

## 2022-09-01 LAB
PATHOLOGIST REVIEW: NORMAL
SURGICAL PATHOLOGY REPORT: NORMAL

## 2022-09-01 RX ORDER — FERROUS SULFATE 325(65) MG
325 TABLET ORAL 2 TIMES DAILY
Qty: 180 TABLET | Refills: 1 | Status: SHIPPED | OUTPATIENT
Start: 2022-09-01

## 2022-09-01 NOTE — TELEPHONE ENCOUNTER
Please let the patient know of the recent results. These can also be discussed further on the future visits. No gout      Patient's blood count is low blood count. Anemia. I sent ferrous sufate twice a day taken with an orange juice. Lab Results   Component Value Date    WBC 7.5 04/01/2022    HGB 10.8 (L) 04/02/2022    HCT 31.5 (L) 04/02/2022    MCV 83.2 04/01/2022     04/01/2022    LYMPHOPCT 18 (L) 01/18/2022    RBC 4.31 04/01/2022    MCH 29.0 04/01/2022    MCHC 34.9 04/01/2022    RDW 14.1 04/01/2022       Patient's thyroid function is WNL    Lab Results   Component Value Date    TSH 2.52 08/31/2022       Patient's kidney function is WNL      Patient's liver function is WNL  . Lab Results   Component Value Date     08/31/2022    K 4.5 08/31/2022     08/31/2022    CO2 25 08/31/2022    BUN 13 08/31/2022    CREATININE 0.55 08/31/2022    GLUCOSE 96 08/31/2022    CALCIUM 9.3 08/31/2022    PROT 7.3 08/31/2022    LABALBU 4.8 08/31/2022    BILITOT 0.69 08/31/2022    ALKPHOS 81 08/31/2022    AST 20 08/31/2022    ALT 15 08/31/2022    LABGLOM >60 08/31/2022    GFRAA >60 08/31/2022    GLOB NOT REPORTED 09/21/2020       Patient's urinalysis results WNL    Lab Results   Component Value Date/Time    COLORU Yellow 08/31/2022 08:58 AM    NITRU NEGATIVE 08/31/2022 08:58 AM    LEUKOCYTESUR NEGATIVE 08/31/2022 08:58 AM    GLUCOSEU NEGATIVE 08/31/2022 08:58 AM    KETUA NEGATIVE 08/31/2022 08:58 AM    UROBILINOGEN Normal 08/31/2022 08:58 AM    BILIRUBINUR NEGATIVE 08/31/2022 08:58 AM    BILIRUBINUR neg 10/25/2019 10:02 AM     Diabetes screening WNL    Lab Results   Component Value Date/Time    LABA1C 4.9 08/31/2022 08:58 AM        Vitamin D Screening  Please let the patient know that the patient's recent vitamin d level was insufficient. I will send an oral supplementation that needs to be taken weekly. We will be checking his levels on an annual basis from now on.  We can discuss this condition further on her

## 2022-09-15 ENCOUNTER — HOSPITAL ENCOUNTER (EMERGENCY)
Age: 32
Discharge: HOME | End: 2022-09-15
Attending: EMERGENCY MEDICINE
Payer: COMMERCIAL

## 2022-09-15 VITALS
TEMPERATURE: 98.6 F | HEIGHT: 65 IN | RESPIRATION RATE: 14 BRPM | SYSTOLIC BLOOD PRESSURE: 108 MMHG | HEART RATE: 64 BPM | BODY MASS INDEX: 28.32 KG/M2 | OXYGEN SATURATION: 100 % | DIASTOLIC BLOOD PRESSURE: 82 MMHG | WEIGHT: 170 LBS

## 2022-09-15 DIAGNOSIS — N93.9 VAGINAL BLEEDING: Primary | ICD-10-CM

## 2022-09-15 LAB
ABO/RH: NORMAL
ABSOLUTE EOS #: 0.04 K/UL (ref 0–0.4)
ABSOLUTE LYMPH #: 1.52 K/UL (ref 1–4.8)
ABSOLUTE MONO #: 0.35 K/UL (ref 0.1–1.3)
ANTIBODY SCREEN: NEGATIVE
ARM BAND NUMBER: NORMAL
ATYPICAL LYMPHOCYTE ABSOLUTE COUNT: 0.04 K/UL
ATYPICAL LYMPHOCYTES: 1 %
BASOPHILS # BLD: 0 % (ref 0–2)
BASOPHILS ABSOLUTE: 0 K/UL (ref 0–0.2)
EOSINOPHILS RELATIVE PERCENT: 1 % (ref 0–4)
EXPIRATION DATE: NORMAL
HCG QUALITATIVE: NEGATIVE
HCT VFR BLD CALC: 38.2 % (ref 36–46)
HEMOGLOBIN: 13.1 G/DL (ref 12–16)
INR BLD: 1
LYMPHOCYTES # BLD: 39 % (ref 24–44)
MCH RBC QN AUTO: 28.6 PG (ref 26–34)
MCHC RBC AUTO-ENTMCNC: 34.3 G/DL (ref 31–37)
MCV RBC AUTO: 83.3 FL (ref 80–100)
MONOCYTES # BLD: 9 % (ref 1–7)
MORPHOLOGY: NORMAL
PDW BLD-RTO: 13.2 % (ref 11.5–14.9)
PLATELET # BLD: 186 K/UL (ref 150–450)
PMV BLD AUTO: 7.3 FL (ref 6–12)
PROTHROMBIN TIME: 13.3 SEC (ref 11.8–14.6)
RBC # BLD: 4.58 M/UL (ref 4–5.2)
SEG NEUTROPHILS: 50 % (ref 36–66)
SEGMENTED NEUTROPHILS ABSOLUTE COUNT: 1.95 K/UL (ref 1.3–9.1)
WBC # BLD: 3.9 K/UL (ref 3.5–11)

## 2022-09-15 PROCEDURE — 99283 EMERGENCY DEPT VISIT LOW MDM: CPT

## 2022-09-15 PROCEDURE — 86850 RBC ANTIBODY SCREEN: CPT

## 2022-09-15 PROCEDURE — 85025 COMPLETE CBC W/AUTO DIFF WBC: CPT

## 2022-09-15 PROCEDURE — 36415 COLL VENOUS BLD VENIPUNCTURE: CPT

## 2022-09-15 PROCEDURE — 86900 BLOOD TYPING SEROLOGIC ABO: CPT

## 2022-09-15 PROCEDURE — 84703 CHORIONIC GONADOTROPIN ASSAY: CPT

## 2022-09-15 PROCEDURE — 85610 PROTHROMBIN TIME: CPT

## 2022-09-15 PROCEDURE — 86901 BLOOD TYPING SEROLOGIC RH(D): CPT

## 2022-09-15 ASSESSMENT — ENCOUNTER SYMPTOMS
ABDOMINAL PAIN: 0
NAUSEA: 0
BACK PAIN: 0

## 2022-09-15 ASSESSMENT — PAIN - FUNCTIONAL ASSESSMENT: PAIN_FUNCTIONAL_ASSESSMENT: NONE - DENIES PAIN

## 2022-09-15 NOTE — ED NOTES
Presents to the ED with large amount of vaginal bleeding. Denies pregnancy. Going through a tampon every 1/2 hour. Delivered a baby 5 months ago.   C section     Ashley Christopher RN  09/15/22 Enrique Arora RN  09/15/22 8700

## 2022-09-15 NOTE — ED NOTES
Mode of arrival (squad #, walk in, police, etc) : walk in        Chief complaint(s): vaginal bleeding        Arrival Note (brief scenario, treatment PTA, etc). : Pt arrives to ED c/o vaginal bleeding for 15 hours. Pt states she is starting to feel light headed. Pt states she is going through a super tampon every hour. Pt states she had a baby around 5 months ago. Pt is A&Ox4, eupneic, PWD. GCS=15. Call light in reach. C= \"Have you ever felt that you should Cut down on your drinking? \"  No  A= \"Have people Annoyed you by criticizing your drinking? \"  No  G= \"Have you ever felt bad or Guilty about your drinking? \"  No  E= \"Have you ever had a drink as an Eye-opener first thing in the morning to steady your nerves or to help a hangover? \"  No      Deferred []      Reason for deferring: N/A    *If yes to two or more: probable alcohol abuse. Severo Connor RN  09/15/22 5325

## 2022-09-15 NOTE — ED PROVIDER NOTES
EMERGENCY DEPARTMENT ENCOUNTER    Pt Name: Sadaf Murrieta  MRN: [id number]  [place] 1990  Date of evaluation: 9/15/22  CHIEF COMPLAINT       Chief Complaint   Patient presents with    Vaginal Bleeding     HISTORY OF PRESENT ILLNESS     Vaginal Bleeding  Quality:  Dark red  Severity:  Moderate  Onset quality:  Gradual  Duration:  2 days  Timing:  Constant  Progression:  Unchanged  Chronicity:  New  Number of pads used:  1 per hour  Possible pregnancy: no    Context comment:  Period started yesterday  Worsened by:  Nothing  Ineffective treatments:  None tried  Associated symptoms: no abdominal pain, no back pain, no dizziness, no dyspareunia, no dysuria, no fatigue, no fever, no nausea and no vaginal discharge      4 months post partum  Called ob office, told to come here for eval    REVIEW OF SYSTEMS     Review of Systems   Constitutional:  Negative for fatigue and fever. Gastrointestinal:  Negative for abdominal pain and nausea. Genitourinary:  Positive for vaginal bleeding. Negative for dyspareunia, dysuria and vaginal discharge. Musculoskeletal:  Negative for back pain. Neurological:  Negative for dizziness. All other systems reviewed and are negative.   PASTMEDICAL HISTORY     Past Medical History:   Diagnosis Date    Anemia 2018    Anxiety     Generalized anxiety disorder 10/6/2015    Gestational diabetes mellitus in pregnancy 2016     Past Problem List  Patient Active Problem List   Diagnosis Code    Generalized anxiety disorder F41.1    Adult BMI 25.0-25.9 kg/sq m Z68.25    Gestational diabetes mellitus (GDM), antepartum O24.419    History of gestational diabetes Z86.32    PLTCS 19 M APG 8/9 Wt 9#12 O82    Corneal rust ring H18.899    Elevated fasting blood sugar R73.01    Vitamin D deficiency E55.9    history of LGA: 9#12oz  R89.9     (spontaneous vaginal delivery) 2017 O80    REPEAT [facility]- section 2022 Z98.891    Delivery with history of  O34.219    Chronic ankle pain, bilateral M25.571, G89.29, M25.572    Iron deficiency anemia secondary to inadequate dietary iron intake D50.8     SURGICAL HISTORY       Past Surgical History:   Procedure Laterality Date     SECTION N/A 2019     SECTION PRIMARY performed by Nasrin Sarmiento DO at [facility] L&D [facility] N/A 2022     SECTION performed by Idalmis Santiago DO at [facility] L&D [address] [address]       Discharge Medication List as of 9/15/2022 12:10 PM        CONTINUE these medications which have NOT CHANGED    Details   Cholecalciferol 1.25 MG (98052 UT) TABS Take 1 tablet by mouth once a week, Disp-12 tablet, R-0Normal      ferrous sulfate (IRON 325) 325 (65 Fe) MG tablet Take 1 tablet by mouth 2 times daily, Disp-180 tablet, R-1Normal      busPIRone (BUSPAR) 10 MG tablet Take 1 tablet by mouth 3 times daily, Disp-90 tablet, R-0Normal      Prenatal Vit-Fe Fumarate-FA (PRENATAL VITAMIN PLUS LOW IRON) 27-1 MG TABS TAKE 1 TABLET BY MOUTH ONCE DAILYHistorical Med           ALLERGIES     has No Known Allergies. FAMILY HISTORY     She indicated that her mother is alive. She indicated that her father is alive. She indicated that her brother is alive. She indicated that the status of her neg hx is unknown. SOCIAL HISTORY       Social History     Tobacco Use    Smoking status: Never    Smokeless tobacco: Never   Vaping Use    Vaping Use: Never used   Substance Use Topics    Alcohol use: Not Currently     Alcohol/week: 0.0 standard drinks     Comment: rare     Drug use: Never     PHYSICAL EXAM     INITIAL VITALS: /82   Pulse 64   Temp 98.6 °F (37 °C) (Oral)   Resp 14   Ht 5' 5\" (1.651 m)   Wt 170 lb (77.1 kg)   SpO2 100%   Breastfeeding Yes   BMI 28.29 kg/m²    Physical Exam  Constitutional:       General: She is not in acute distress. Appearance: Normal appearance. She is well-developed. She is not diaphoretic.    HENT: WITH AUTO DIFFERENTIAL - Abnormal; Notable for the following components:       Result Value    Monocytes 9 (*)     All other components within normal limits   HCG, SERUM, QUALITATIVE   PROTIME-INR   TYPE AND SCREEN       EMERGENCY DEPARTMENTCOURSE:         Vitals:    Vitals:    09/15/22 1002 09/15/22 1207   BP: 98/65 108/82   Pulse: 70 64   Resp: 17 14   Temp: 98.6 °F (37 °C)    TempSrc: Oral    SpO2: 99% 100%   Weight: 170 lb (77.1 kg)    Height: 5' 5\" (1.651 m)        FINAL IMPRESSION      1. Vaginal bleeding          DISPOSITION/PLAN   DISPOSITION Decision To Discharge 09/15/2022 12:10:26 PM      PATIENT REFERRED TO:  Jamaal Flores, [address] [place] [id number]  [phone]    Schedule an appointment as soon as possible for a visit in 1 day      DISCHARGE MEDICATIONS:  Discharge Medication List as of 9/15/2022 12:10 PM        The care is provided during an unprecedented national emergency due to the novel coronavirus, COVID 19.   MD Nkechi Egan MD  09/15/22 4848

## 2022-11-06 ASSESSMENT — ENCOUNTER SYMPTOMS
SHORTNESS OF BREATH: 0
COUGH: 0
WHEEZING: 0
RESPIRATORY NEGATIVE: 1
ABDOMINAL PAIN: 0

## 2022-11-07 NOTE — PROGRESS NOTES
[facility]  [facility]  [address]  [address] [facility]  Dept: [phone]     2022   Chief Complaint   Patient presents with    Insect Bite     Left lower arm     HPI  Brannon Root (:  1990) is a 29 y.o. female is an established patient. Patient has a history of anxiety disorder, iron deficiency anemia, eczema, foot pain, gestational diet Beatties and vitamin D deficiency. Patient is here for a chronic condition follow-up and lab review results    ANNULAR ECZEMA  Patient presented with annular eczema on her left elbow. Is been going on for several weeks denies any itchiness or pain. Has been using some antifungal cream with no success. We will send some triamcinolone. Unknown contact. FOOT/ ANKLE PAIN  Patient reported some bilateral posterior ankle pain for the past several mos. declined to go to podiatry on her previous visit. Agreeable to be referred to the podiatrist at this time. Patient has Norph[employer] Fur[place] and would like to wait until she gets under her fiancé's insurance when to get  in the fall. Patient reported that pain usually occurs every morning. She describes it as achy. Usually just very early morning. However when she gets moving after stretching for a while the pain goes away. However, after being on her feet and doing a lot of walking it starts to hurt again. Patient denies any trauma or any inciting events before this started to happen. Patient has never had this evaluated in the past.       Harper Cowart is a 29 y.o. female patient has a known history of gestational diabetes mellitus. Patient is very concerned about diet Beatties since she also has a family history of dye abuse in the family. Previous A1c was 4.9%. Denies any weight gain or weight loss. Does not watch carbohydrate intake.   Lab Results   Component Value Date    LABA1C 4.8 2022    LABA1C 4.9 2022 ANXIETY--improved no meds  Verónica Kim is a 29 y.o. female patient has a known history of anxiety disorder. Patient had significant depression anxiety in the past was on Zoloft and buspirone but had weaned herself off of the Zoloft since she got pregnant. Patient reported some ongoing intermittent anxiety which she thinks is worsening in the past few weeks since her oldest child had started regular school in the middle started . She is staying home but she now has a 11month-old daughter. She also has been very busy at home since having 3 kids she is also breast-feeding. PREVIOUS NOTES: Patient was Zoloft. Patient was weaning herself off of the Zoloft in January. She found out that she was pregnant. However, she did have a miscarriage few weeks later. Since patient wants to get pregnant again. Patient did not start the Zoloft again. Patient denied any withdrawal symptoms other than some mild dizziness. Patient continues to have some symptoms of irritability, excessive worrying, and inability to relax at times. Patient does not really want to start any SSRIs at all. Patient had taken BuSpar in the past that she took occasionally which she is willing to try again. However she will monitor pregnancy if she does get pregnant she should stop the medication and follow-up. Patient have had some postpartum depression in the past as well. She is denying any suicidal or homicidality. She does not have any psychiatrist that she sees. IRON DEFICIENCY ANEMIA-- fatigue--restarted on iron after lab work  Patient had some iron deficiency anemia which was discovered when she was pregnant on her last pregnancy. Patient was given some iron supplementation but had stopped since. She is now taking some multivitamin for pregnancy. Patient have not had a CBC done since July. We will recheck this again. No signs of bleeding and no signs of other blood loss.   Verónica Kim is a 29 y.o. female patient with a known Iron deficiency anemia. Associated signs & symptoms: fatigue. Current treatment prenatal/ferrous sulfate denies constipation. Lab Results   Component Value Date/Time    HGB 13.1 09/15/2022 11:11 AM    HCT 38.2 09/15/2022 11:11 AM    LABIRON 25 08/31/2022 08:58 AM    FERRITIN 19 08/31/2022 08:58 AM    TIBC 307 08/31/2022 08:58 AM    UIBC 231 08/31/2022 08:58 AM    IRON 76 08/31/2022 08:58 AM    RETICPCT 1.3 08/31/2022 08:58 AM    ABSRET 0.064 08/31/2022 08:58 AM    MFMLWCWY85 620 08/31/2022 08:58 AM    FOLATE 19.5 08/31/2022 08:58 AM      VITAMIN D  Patient has a known Vitamin D Deficiency. she had a course of supplementation for 12 weeks patient will be given a daily dose of vitamin D.. she is due to get levels check. We continue to discuss ways to manage this condition. We also discussed ways to improve the levels. Such as learning food groups that are high in Vitamin D. We also discuss that sun exposure is beneficial however, too much sun and sun damage can potentially result in skin cancer. Lab Results   Component Value Date/Time    VITD25 25.5 (L) 08/31/2022 08:58 AM      No data recorded     NIMA-7 SCREENING 2/25/2021 8/10/2020   Feeling nervous, anxious, or on edge 1-Several days 2-Over half the days   Not able to stop or control worrying 1-Several days 2-Over half the days   Worrying too much about different things 0-Not at all 1-Several days   Trouble relaxing 1-Several days 0-Not at all   Being so restless that it's hard to sit still 0-Not at all 0-Not at all   Becoming easily annoyed or irritable 3-Nearly every day 0-Not at all   Feeling afraid as if something awful might happen 0-Not at all 2-Over half the days   NIMA-7 Total Score 6 7        Patient's blood count is WNL.    Lab Results   Component Value Date    WBC 3.9 09/15/2022    HGB 13.1 09/15/2022    HCT 38.2 09/15/2022    MCV 83.3 09/15/2022     09/15/2022    LYMPHOPCT 39 09/15/2022    RBC 4.58 09/15/2022    MCH 28.6 09/15/2022    MCHC 34.3 09/15/2022    RDW 13.2 09/15/2022       THYROID FUNCTION  Brannon Root  's most recent TSH level was normal. Results reviewed with the patient. Patient denies any history of thyroid disorders. Lab Results   Component Value Date/Time    TSH 2.52 2022 08:58 AM        Skye Caruso 's RENAL FUNCTION was found WNL. Lab Results   Component Value Date     2022    K 4.5 2022     2022    CO2 25 2022    BUN 13 2022    CREATININE 0.55 2022    GLUCOSE 96 2022    CALCIUM 9.3 2022    PROT 7.3 2022    GFR      2022       LFT's WNL    Lab Results   Component Value Date/Time    ALKPHOS 81 2022 08:58 AM    ALT 15 2022 08:58 AM    AST 20 2022 08:58 AM    BILITOT 0.69 2022 08:58 AM    PROT 7.3 2022 08:58 AM    LABALBU 4.8 2022 08:58 AM      Patient's urinalysis results WNL  . Lab Results   Component Value Date/Time    COLORU Yellow 2022 08:58 AM    NITRU NEGATIVE 2022 08:58 AM    LEUKOCYTESUR NEGATIVE 2022 08:58 AM    GLUCOSEU NEGATIVE 2022 08:58 AM    KETUA NEGATIVE 2022 08:58 AM    UROBILINOGEN Normal 2022 08:58 AM    BILIRUBINUR NEGATIVE 2022 08:58 AM    BILIRUBINUR neg 10/25/2019 10:02 AM        Patient's HIV screening results came back negative.    Lab Results   Component Value Date/Time    HIVAG/AB NONREACTIVE 2021 11:44 AM         Patient Active Problem List   Diagnosis    Generalized anxiety disorder    Adult BMI 25.0-25.9 kg/sq m    Gestational diabetes mellitus (GDM), antepartum    History of gestational diabetes    PLTCS 19 M APG 8/9 Wt 9#12    Corneal rust ring    Elevated fasting blood sugar    Vitamin D deficiency    history of LGA: 9#12oz      (spontaneous vaginal delivery) 2017    REPEAT LTC- section 2022    Delivery with history of     Chronic ankle pain, bilateral    Iron deficiency anemia secondary to inadequate dietary iron intake    Eczema      Past Medical History:   Diagnosis Date    Anemia 2018    Anxiety     Generalized anxiety disorder 10/6/2015    Gestational diabetes mellitus in pregnancy 2016      Past Surgical History:   Procedure Laterality Date     SECTION N/A 2019     SECTION PRIMARY performed by Dangelo Raza DO at [facility]     SECTION N/A 2022     SECTION performed by Dami Mccracken DO at [facility]    WISDOM TOOTH EXTRACTION       Family History   Problem Relation Age of Onset    Hypertension Father     Hypertension Mother     No Known Problems Brother     Breast Cancer Neg Hx     Cancer Neg Hx     Colon Cancer Neg Hx     Diabetes Neg Hx     Eclampsia Neg Hx     Ovarian Cancer Neg Hx      Labor Neg Hx     Spont Abortions Neg Hx     Stroke Neg Hx      Current Outpatient Medications   Medication Sig Dispense Refill    triamcinolone (KENALOG) 0.1 % ointment Apply topically 2 times daily. 80 g 1    Cholecalciferol 1.25 MG (20617 UT) TABS Take 1 tablet by mouth once a week 12 tablet 0    ferrous sulfate (IRON 325) 325 (65 Fe) MG tablet Take 1 tablet by mouth 2 times daily 180 tablet 1    Prenatal Vit-Fe Fumarate-FA (PRENATAL VITAMIN PLUS LOW IRON) 27-1 MG TABS TAKE 1 TABLET BY MOUTH ONCE DAILY       No current facility-administered medications for this visit. Review of Systems   Constitutional:  Negative for activity change, chills, fatigue, fever and unexpected weight change. HENT: Negative. Respiratory: Negative. Negative for cough, shortness of breath and wheezing. Cardiovascular: Negative. Negative for chest pain and palpitations. Gastrointestinal:  Negative for abdominal pain. Genitourinary:  Negative for dysuria. Musculoskeletal:  Positive for arthralgias and myalgias. Bilateral ankle pain   Skin:  Positive for color change and rash.    Allergic/Immunologic: Negative for environmental allergies. Neurological:  Negative for light-headedness and headaches. Psychiatric/Behavioral:  Negative for dysphoric mood, sleep disturbance and suicidal ideas. The patient is nervous/anxious. Prior to Visit Medications    Medication Sig Taking? Authorizing Provider   triamcinolone (KENALOG) 0.1 % ointment Apply topically 2 times daily. Yes JANUARY Willis CNP   Cholecalciferol 1.25 MG (34156 UT) TABS Take 1 tablet by mouth once a week Yes JANUARY Willis CNP   ferrous sulfate (IRON 325) 325 (65 Fe) MG tablet Take 1 tablet by mouth 2 times daily Yes JANUARY Willis CNP   Prenatal Vit-Fe Fumarate-FA (PRENATAL VITAMIN PLUS LOW IRON) 27-1 MG TABS TAKE 1 TABLET BY MOUTH ONCE DAILY Yes Historical Provider, MD      Social History     Tobacco Use    Smoking status: Never    Smokeless tobacco: Never   Substance Use Topics    Alcohol use: Not Currently     Alcohol/week: 0.0 standard drinks     Comment: rare       Vitals:    11/08/22 1108   BP: 111/78   Site: Right Upper Arm   Position: Sitting   Cuff Size: Small Adult   Pulse: 70   SpO2: 98%   Weight: 165 lb (74.8 kg)   Height: 5' 5\" (1.651 m)       Estimated body mass index is 27.46 kg/m² as calculated from the following:    Height as of this encounter: 5' 5\" (1.651 m). Weight as of this encounter: 165 lb (74.8 kg). Physical Exam  Vitals and nursing note reviewed. Constitutional:       Appearance: She is well-developed. HENT:      Right Ear: Tympanic membrane normal.      Left Ear: Tympanic membrane normal.      Nose: No mucosal edema. Right Sinus: No maxillary sinus tenderness or frontal sinus tenderness. Left Sinus: No maxillary sinus tenderness or frontal sinus tenderness. Mouth/Throat:      Pharynx: Uvula midline. Cardiovascular:      Rate and Rhythm: Normal rate. Pulmonary:      Effort: Pulmonary effort is normal.      Breath sounds: Normal breath sounds.    Musculoskeletal:      Right ankle: No deformity. No tenderness. Normal range of motion. Right Achilles Tendon: Normal.      Left ankle: No deformity. No tenderness. Normal range of motion. Left Achilles Tendon: Normal.   Skin:     General: Skin is warm and dry. Findings: Rash present. Rash is vesicular. Comments: Left elbow, annular, vesicular   Neurological:      Mental Status: She is alert and oriented to person, place, and time. Psychiatric:         Mood and Affect: Mood is not anxious. Speech: Speech is rapid and pressured. Behavior: Behavior normal.         Thought Content: Thought content does not include homicidal or suicidal ideation. ASSESSMENT/PLAN:  1. Other eczema  Worsening  Start triamcinolone  DISCUSSED AND ADVISED TO:  Use hypoallergenic creams, soaps, lotions,   Report for worsening symptoms      - triamcinolone (KENALOG) 0.1 % ointment; Apply topically 2 times daily. Dispense: 80 g; Refill: 1    2. Iron deficiency anemia secondary to inadequate dietary iron intake  Stable  Continue the same therapy. DISCUSSED and ADVISED TO:  Make iron-rich foods a part daily diet. Eat foods with vitamin C along with iron-rich foods. Vitamin C helps absorb more iron from food. Eat meat and vegetables or grains together. 3. Vitamin D deficiency  Stable  Continue Vitamin D supplementation  DISCUSSED AND ADVISED TO:  Foods that contain a lot of vitamin D includes [place], tuna, and mackerel. Cheese, egg yolks, and beef liver have small amounts of vit D.  Milk, soy drinks, orange juice, yogurt, margarine, and some kinds of cereal have vitamin D added to them. Continue to use sunblock when out in the sun to prevent skin cancer.    - vitamin D (CHOLECALCIFEROL) 25 MCG (1000 UT) TABS tablet; Take 1 tablet by mouth daily  Dispense: 90 tablet; Refill: 0    4.  Chronic foot pain, unspecified laterality  Stable  Consult podiatry  Use well fitted shoes  Stretched as tolerated  Report for worsening    - [address] [facility], [address] , [place], [place]    5. Generalized anxiety disorder  Stable  Discussed how to recognize anxiety. Advised to relieve tension with exercise or a massage. Advised to get enough rest.  Advised to avoid alcohol, caffeine, nicotine, and illegal drugs. Which can increase anxiety level and cause sleep problems. 6. Gestational diabetes mellitus (GDM), antepartum, gestational diabetes method of control unspecified  Stable  DISCUSSED and ADVISED TO:  Decrease carbohydrates, sugary drinks, desserts   Exercise regularly, as tolerated. Try to lose weight.    - POCT glycosylated hemoglobin (Hb A1C); Future  - POCT glycosylated hemoglobin (Hb A1C)    7. Hyperglycemia  Failure to Improve  Continue to evaluate  Recommend A1c    - POCT glycosylated hemoglobin (Hb A1C); Future  - POCT glycosylated hemoglobin (Hb A1C)    8. Need for influenza vaccination  Stable  Recommended by CDC. No current infection. Denies previous adverse reaction to vaccination.   - Influenza, FLUCELVAX, (age 10 mo+), IM, PF, 0.5 mL    On this date 11/8/2022 I have spent 35 minutes reviewing previous notes, test results and face to face with the patient discussing the diagnosis and importance of compliance with the treatment plan as well as documenting on the day of the visit. This note was completed by using the assistance of a speech-recognition program. However, inadvertent computerized transcription errors may be present. Although every effort was made to ensure accuracy, no guarantees can be provided that every mistake has been identified and corrected by editing   An electronic signature was used to authenticate this note.   --JANUARY Cantor - CNP on 11/8/2022 at 11:30 AM

## 2022-11-08 ENCOUNTER — OFFICE VISIT (OUTPATIENT)
Dept: FAMILY MEDICINE CLINIC | Age: 32
End: 2022-11-08
Payer: COMMERCIAL

## 2022-11-08 VITALS
WEIGHT: 165 LBS | HEART RATE: 70 BPM | HEIGHT: 65 IN | OXYGEN SATURATION: 98 % | DIASTOLIC BLOOD PRESSURE: 78 MMHG | SYSTOLIC BLOOD PRESSURE: 111 MMHG | BODY MASS INDEX: 27.49 KG/M2

## 2022-11-08 DIAGNOSIS — M79.673 CHRONIC FOOT PAIN, UNSPECIFIED LATERALITY: ICD-10-CM

## 2022-11-08 DIAGNOSIS — L30.8 OTHER ECZEMA: Primary | ICD-10-CM

## 2022-11-08 DIAGNOSIS — D50.8 IRON DEFICIENCY ANEMIA SECONDARY TO INADEQUATE DIETARY IRON INTAKE: ICD-10-CM

## 2022-11-08 DIAGNOSIS — F41.1 GENERALIZED ANXIETY DISORDER: ICD-10-CM

## 2022-11-08 DIAGNOSIS — O24.419 GESTATIONAL DIABETES MELLITUS (GDM), ANTEPARTUM, GESTATIONAL DIABETES METHOD OF CONTROL UNSPECIFIED: ICD-10-CM

## 2022-11-08 DIAGNOSIS — Z23 NEED FOR INFLUENZA VACCINATION: ICD-10-CM

## 2022-11-08 DIAGNOSIS — G89.29 CHRONIC FOOT PAIN, UNSPECIFIED LATERALITY: ICD-10-CM

## 2022-11-08 DIAGNOSIS — E55.9 VITAMIN D DEFICIENCY: ICD-10-CM

## 2022-11-08 DIAGNOSIS — R73.9 HYPERGLYCEMIA: ICD-10-CM

## 2022-11-08 PROBLEM — L30.9 ECZEMA: Status: ACTIVE | Noted: 2022-11-08

## 2022-11-08 LAB — HBA1C MFR BLD: 4.8 %

## 2022-11-08 PROCEDURE — G8427 DOCREV CUR MEDS BY ELIG CLIN: HCPCS | Performed by: FAMILY MEDICINE

## 2022-11-08 PROCEDURE — 90674 CCIIV4 VAC NO PRSV 0.5 ML IM: CPT | Performed by: FAMILY MEDICINE

## 2022-11-08 PROCEDURE — G8482 FLU IMMUNIZE ORDER/ADMIN: HCPCS | Performed by: FAMILY MEDICINE

## 2022-11-08 PROCEDURE — 1036F TOBACCO NON-USER: CPT | Performed by: FAMILY MEDICINE

## 2022-11-08 PROCEDURE — 83036 HEMOGLOBIN GLYCOSYLATED A1C: CPT | Performed by: FAMILY MEDICINE

## 2022-11-08 PROCEDURE — 99214 OFFICE O/P EST MOD 30 MIN: CPT | Performed by: FAMILY MEDICINE

## 2022-11-08 PROCEDURE — G8417 CALC BMI ABV UP PARAM F/U: HCPCS | Performed by: FAMILY MEDICINE

## 2022-11-08 ASSESSMENT — ENCOUNTER SYMPTOMS: COLOR CHANGE: 1

## 2022-11-08 NOTE — PATIENT INSTRUCTIONS
New Updates for [facility] MyChart/ [employer] ([facility]) TATY    Thank you for choosing US to give you the best care! [employer] ([facility]) is always trying to think of new ways to help their patients. We are asking all patients to try out the new digital registration that is now available through your [facility] account or the new TATY, [employer] ([facility]). Via the taty you're now able to update your personal and registration information prior to your upcoming appointment. This will save you time once you arrive at the office to check-in, not to mention your information remains safe!! Many other perks come from signing up for an account, such as:  Requesting refills  Scheduling an appointment  Completing an E-Visit  Sending a message to the office/provider  Having access to your medication list  Paying your bill/copay prior to your appointment  Scheduling your yearly mammogram  Review your test results    If you are not familiar with [facility] or the [employer] ([facility]) TATY, please ask one of us and we will be happy to answer any questions or help you set-up your account.       Your [facility] office,  [place]

## 2022-12-27 ENCOUNTER — TELEPHONE (OUTPATIENT)
Dept: FAMILY MEDICINE CLINIC | Age: 32
End: 2022-12-27

## 2022-12-27 DIAGNOSIS — L30.8 OTHER ECZEMA: Primary | ICD-10-CM

## 2022-12-27 RX ORDER — CLOTRIMAZOLE AND BETAMETHASONE DIPROPIONATE 10; .64 MG/G; MG/G
CREAM TOPICAL
Qty: 45 G | Refills: 0 | Status: SHIPPED | OUTPATIENT
Start: 2022-12-27

## 2022-12-27 NOTE — TELEPHONE ENCOUNTER
Rash on her left arm not any better said you told her to call and you would call something different in

## 2023-02-28 ENCOUNTER — TELEPHONE (OUTPATIENT)
Dept: OBGYN CLINIC | Age: 33
End: 2023-02-28

## 2023-02-28 ENCOUNTER — PATIENT MESSAGE (OUTPATIENT)
Dept: OBGYN CLINIC | Age: 33
End: 2023-02-28

## 2023-02-28 ENCOUNTER — HOSPITAL ENCOUNTER (OUTPATIENT)
Age: 33
Discharge: HOME | End: 2023-02-28
Payer: COMMERCIAL

## 2023-02-28 DIAGNOSIS — N92.6 MISSED MENSES: Primary | ICD-10-CM

## 2023-02-28 DIAGNOSIS — N92.6 MISSED MENSES: ICD-10-CM

## 2023-02-28 LAB — HCG QUANTITATIVE: 225 MIU/ML

## 2023-02-28 PROCEDURE — 84702 CHORIONIC GONADOTROPIN TEST: CPT

## 2023-02-28 PROCEDURE — 36415 COLL VENOUS BLD VENIPUNCTURE: CPT

## 2023-02-28 RX ORDER — PNV NO.95/FERROUS FUM/FOLIC AC 28MG-0.8MG
1 TABLET ORAL DAILY
Qty: 30 TABLET | Refills: 12 | Status: SHIPPED | OUTPATIENT
Start: 2023-02-28

## 2023-02-28 NOTE — TELEPHONE ENCOUNTER
Patient notified of results and recommendations, patient LMP 1/22/23. Pnv to be sent to [facility].

## 2023-02-28 NOTE — TELEPHONE ENCOUNTER
From: Todd Meehan  To: Dr. Leija Dad: 2/28/2023 10:05 AM EST  Subject: Pregnant? ? My period is late. I took a pregnancy test and there was an extremely faint line. Unsure if I am pregnant or not, my last period was Jan 22. Can I have a blood test ordered please ?  Thank you!!!

## 2023-02-28 NOTE — TELEPHONE ENCOUNTER
----- Message from JANUARY Bonilla CNP sent at 2/28/2023  2:47 PM EST -----  Repeat quant HCG in 1 week  Prenatal vitamin 1 PO QD with 12 refills.

## 2023-03-06 ENCOUNTER — HOSPITAL ENCOUNTER (OUTPATIENT)
Age: 33
Discharge: HOME | End: 2023-03-06
Payer: COMMERCIAL

## 2023-03-06 DIAGNOSIS — N92.6 MISSED MENSES: ICD-10-CM

## 2023-03-06 LAB — HCG QUANTITATIVE: 3613 MIU/ML

## 2023-03-06 PROCEDURE — 36415 COLL VENOUS BLD VENIPUNCTURE: CPT

## 2023-03-06 PROCEDURE — 84702 CHORIONIC GONADOTROPIN TEST: CPT

## 2023-03-07 ENCOUNTER — OFFICE VISIT (OUTPATIENT)
Dept: FAMILY MEDICINE CLINIC | Age: 33
End: 2023-03-07
Payer: COMMERCIAL

## 2023-03-07 ENCOUNTER — TELEPHONE (OUTPATIENT)
Dept: OBGYN CLINIC | Age: 33
End: 2023-03-07

## 2023-03-07 DIAGNOSIS — K59.01 SLOW TRANSIT CONSTIPATION: ICD-10-CM

## 2023-03-07 DIAGNOSIS — L30.9 DERMATITIS: Primary | ICD-10-CM

## 2023-03-07 PROCEDURE — 99213 OFFICE O/P EST LOW 20 MIN: CPT | Performed by: FAMILY MEDICINE

## 2023-03-07 PROCEDURE — G8427 DOCREV CUR MEDS BY ELIG CLIN: HCPCS | Performed by: FAMILY MEDICINE

## 2023-03-07 PROCEDURE — 1036F TOBACCO NON-USER: CPT | Performed by: FAMILY MEDICINE

## 2023-03-07 PROCEDURE — G8417 CALC BMI ABV UP PARAM F/U: HCPCS | Performed by: FAMILY MEDICINE

## 2023-03-07 PROCEDURE — G8482 FLU IMMUNIZE ORDER/ADMIN: HCPCS | Performed by: FAMILY MEDICINE

## 2023-03-07 RX ORDER — KETOCONAZOLE 20 MG/G
CREAM TOPICAL
Qty: 60 G | Refills: 1 | Status: SHIPPED | OUTPATIENT
Start: 2023-03-07

## 2023-03-07 SDOH — ECONOMIC STABILITY: FOOD INSECURITY: WITHIN THE PAST 12 MONTHS, THE FOOD YOU BOUGHT JUST DIDN'T LAST AND YOU DIDN'T HAVE MONEY TO GET MORE.: NEVER TRUE

## 2023-03-07 SDOH — ECONOMIC STABILITY: INCOME INSECURITY: HOW HARD IS IT FOR YOU TO PAY FOR THE VERY BASICS LIKE FOOD, HOUSING, MEDICAL CARE, AND HEATING?: NOT HARD AT ALL

## 2023-03-07 SDOH — ECONOMIC STABILITY: HOUSING INSECURITY
IN THE LAST 12 MONTHS, WAS THERE A TIME WHEN YOU DID NOT HAVE A STEADY PLACE TO SLEEP OR SLEPT IN A SHELTER (INCLUDING NOW)?: NO

## 2023-03-07 SDOH — ECONOMIC STABILITY: FOOD INSECURITY: WITHIN THE PAST 12 MONTHS, YOU WORRIED THAT YOUR FOOD WOULD RUN OUT BEFORE YOU GOT MONEY TO BUY MORE.: NEVER TRUE

## 2023-03-07 ASSESSMENT — ENCOUNTER SYMPTOMS
DIARRHEA: 0
ABDOMINAL PAIN: 0
WHEEZING: 0
CHEST TIGHTNESS: 0
ABDOMINAL DISTENTION: 0
SHORTNESS OF BREATH: 0
NAUSEA: 0
COUGH: 0
VOMITING: 0

## 2023-03-07 ASSESSMENT — ANXIETY QUESTIONNAIRES
7. FEELING AFRAID AS IF SOMETHING AWFUL MIGHT HAPPEN: 0
3. WORRYING TOO MUCH ABOUT DIFFERENT THINGS: 0
6. BECOMING EASILY ANNOYED OR IRRITABLE: 0
4. TROUBLE RELAXING: 0
2. NOT BEING ABLE TO STOP OR CONTROL WORRYING: 0
1. FEELING NERVOUS, ANXIOUS, OR ON EDGE: 0
5. BEING SO RESTLESS THAT IT IS HARD TO SIT STILL: 0
GAD7 TOTAL SCORE: 0
IF YOU CHECKED OFF ANY PROBLEMS ON THIS QUESTIONNAIRE, HOW DIFFICULT HAVE THESE PROBLEMS MADE IT FOR YOU TO DO YOUR WORK, TAKE CARE OF THINGS AT HOME, OR GET ALONG WITH OTHER PEOPLE: NOT DIFFICULT AT ALL

## 2023-03-07 ASSESSMENT — PATIENT HEALTH QUESTIONNAIRE - PHQ9
2. FEELING DOWN, DEPRESSED OR HOPELESS: 0
SUM OF ALL RESPONSES TO PHQ9 QUESTIONS 1 & 2: 0
SUM OF ALL RESPONSES TO PHQ QUESTIONS 1-9: 0
1. LITTLE INTEREST OR PLEASURE IN DOING THINGS: 0
SUM OF ALL RESPONSES TO PHQ QUESTIONS 1-9: 0

## 2023-03-07 NOTE — PROGRESS NOTES
Jamison Velasquez (:  1990) is a 29 y.o. female,New patient, PCP Emily, here for evaluation of the following chief complaint(s): Skin Problem (LEFT ELBOW/STARTED LAST SUMMER/NOT SURE WHAT HAPPENED/NO ITCHYNESS/RAISED BUMPS), Establish Care (LEFT ELBOW/SKIN/TREATMENT AT HOME NOTHING AT THE MOMENT/ USED RX CREAM IN THE PAST NO HELP), Other (STATES PREGNANT/HAS OBGYN ), Immunizations (NO TO ALL VACCINES DUE), and New Patient      ASSESSMENT/PLAN:    1. Dermatitis left elbow  Failing to resolve  It looks like ringworm, or possible contact dermatitis  Does not look like eczema  I am unable to give her Lamisil due to being pregnant  We will give antifungal topical cream and refer to dermatologist  She failed topical steroids x2 already  -     ketoconazole (NIZORAL) 2 % cream; Apply topically  2 times a day  on the left elbow for 4 weeks, Disp-60 g, R-1, Normal  -     AFL - Arabella Yarbrough MD Dermatology, [place]    She has already tried [employer] and Kenalog and did not help per prior PCP    2. Slow transit constipation  Advised to increase fluids and fiber in diet for now, likely related to pregnancy    French Lynne received counseling on the following healthy behaviors: nutrition, exercise, and medication adherence  Reviewed prior labs and health maintenance  Discussed use, benefit, and side effects of prescribed medications. Barriers to medication compliance addressed. Patient given educational materials - see patient instructions  All patient questions answered. Patient voiced understanding. The patient's past medical,surgical, social, and family history as well as her current medications and allergies were reviewed as documented in today's encounter. Medications, labs, diagnostic studies, consultations and follow-up as documented in this encounter. Return in about 9 months (around 2023) for Visit type PHYSICAL, VISION screen, PHQ9. .    Data Unavailable    Future Appointments   Date Time Provider Enrique Marie   3/27/2023  2:00 PM SCHEDULE, ULTRASOUND [facility] [address] [facility]   3/27/2023  2:15 PM SCHEDULE, [facility] [place] [facility]           Prior to Visit Medications    Medication Sig Taking? Authorizing Provider   Prenatal Vit-Fe Fumarate-FA (PRENATAL VITAMINS) 28-0.8 MG TABS Take 1 tablet by mouth daily Yes JANUARY Beckett CNP   clotrimazole-betamethasone (LOTRISONE) 1-0.05 % cream Apply topically 2 times daily. Patient not taking: Reported on 3/7/2023  JANUARY Willis CNP   triamcinolone (KENALOG) 0.1 % ointment Apply topically 2 times daily.   Patient not taking: Reported on 3/7/2023  JANUARY Willis CNP   vitamin D (CHOLECALCIFEROL) 25 MCG (1000 UT) TABS tablet Take 1 tablet by mouth daily  JANUARY Willis CNP   Cholecalciferol 1.25 MG (51926 UT) TABS Take 1 tablet by mouth once a week  JANUARY Willis - CNP   ferrous sulfate (IRON 325) 325 (65 Fe) MG tablet Take 1 tablet by mouth 2 times daily  Patient not taking: Reported on 3/7/2023  JANUARY Willis CNP   Prenatal Vit-Fe Fumarate-FA (PRENATAL VITAMIN PLUS LOW IRON) 27-1 MG TABS TAKE 1 TABLET BY MOUTH ONCE DAILY  Patient not taking: Reported on 3/7/2023  Historical Provider, MD       No Known Allergies    Past Medical History:   Diagnosis Date    Anemia 2018    Anxiety     Generalized anxiety disorder 10/6/2015    Gestational diabetes mellitus in pregnancy 2016       Past Surgical History:   Procedure Laterality Date     SECTION N/A 2019     SECTION PRIMARY performed by Corrien Feliciano DO at [facility] L[facility]D OR     SECTION N/A 2022     SECTION performed by Kamlesh Sarah DO at [facility] L&D OR    WISDOM TOOTH EXTRACTION         Family History   Problem Relation Age of Onset    Hypertension Father     Hypertension Mother     No Known Problems Brother     Breast Cancer Neg Hx     Cancer Neg Hx     Colon Cancer Neg Hx     Diabetes Neg Hx     Eclampsia Neg Hx     Ovarian Cancer Neg Hx      Labor Neg Hx     Spont Abortions Neg Hx     Stroke Neg Hx        Social History     Tobacco Use    Smoking status: Never    Smokeless tobacco: Never   Vaping Use    Vaping Use: Never used   Substance Use Topics    Alcohol use: Not Currently     Alcohol/week: 0.0 standard drinks     Comment: rare     Drug use: Never         SUBJECTIVE/OBJECTIVE:    Prior PCP: MAMADOU    Patient complains of persistent rash over the left elbow. Patient says it started like a bump in summer, then grew with fluid in it. She remembers it started after gardening and initially looked like an insect bite, and it was itching a bit at that time, but not anymore. Patient reports having left  ELBOW RASH for 1 year, since last summer  The fluid came out already, in the summer  Currently the rash doesn't itchy and doesn't hurt anymore. Denies fever, chills, night sweats. Denies joint pains. Patient says \"it is not ring worm\", nobody in the family has it. Used the creams per prior PCP, Lotrisone, Kenalog-none worked  She is currently 6 weeks pregnant. Never had eczema before, told might be eczema. She did not change any laundry soap or creams    Patient reports constipation since she got pregnant, having a bowel movement every 2 to 3 days. Sometimes it is hard. She admits of not eating enough fiber, but has been staying hydrated. She denies blood in the stool or abdominal pain. [x]Negative depression screening. PHQ Scores 3/7/2023 2022 2021 2021 2020 2019 2018   PHQ2 Score 0 0 0 0 0 0 0   PHQ9 Score 0 0 0 0 0 0 0       Review of Systems   Constitutional:  Negative for activity change, appetite change, chills, diaphoresis, fatigue and fever. Respiratory:  Negative for cough, chest tightness, shortness of breath and wheezing. Cardiovascular:  Negative for chest pain, palpitations and leg swelling. Gastrointestinal:  Positive for constipation (since pregnant). Negative for abdominal distention, abdominal pain, diarrhea, nausea and vomiting. Genitourinary:  Positive for menstrual problem (currently pregnant). Skin:  Positive for rash (left elbow). Psychiatric/Behavioral:  Negative for dysphoric mood. -vital signs stable and within normal limits except mildly overweight per BMI    /70   Pulse 73   Temp 97.2 °F (36.2 °C)   Ht 5' 5\" (1.651 m)   Wt 160 lb (72.6 kg)   LMP 01/22/2023   SpO2 97%   BMI 26.63 kg/m²        Physical Exam  Vitals and nursing note reviewed. Constitutional:       General: She is not in acute distress. Appearance: Normal appearance. She is well-developed. She is not diaphoretic. HENT:      Head: Normocephalic and atraumatic. Right Ear: External ear normal.      Left Ear: External ear normal.      Mouth/Throat:      Comments: I did not examine the mouth due to coronavirus pandemic and wearing masks    Eyes:      General: Lids are normal. No scleral icterus. Right eye: No discharge. Left eye: No discharge. Extraocular Movements: Extraocular movements intact. Conjunctiva/sclera: Conjunctivae normal.   Neck:      Thyroid: No thyromegaly. Cardiovascular:      Rate and Rhythm: Normal rate and regular rhythm. Heart sounds: Normal heart sounds. No murmur heard. Pulmonary:      Effort: Pulmonary effort is normal. No respiratory distress. Breath sounds: Normal breath sounds. No wheezing or rales. Chest:      Chest wall: No tenderness. Abdominal:      General: Bowel sounds are normal. There is no distension. Palpations: Abdomen is soft. There is no hepatomegaly or splenomegaly. Tenderness: There is no abdominal tenderness. Musculoskeletal:         General: No tenderness. Normal range of motion. Cervical back: Normal range of motion and neck supple. Right lower leg: No edema.       Left lower leg: No edema.   Skin:     General: Skin is warm and dry. Capillary Refill: Capillary refill takes less than 2 seconds. Findings: Rash present. Comments: On the dorsum of the left elbow round rash with slightly raised border, slightly bumpy, pinpoint papular, well delimitated, faint pink , size 6 cm diameter, pale in the center, slightly leathery consistency in the center   Neurological:      Mental Status: She is alert and oriented to person, place, and time. Cranial Nerves: No cranial nerve deficit. Motor: No abnormal muscle tone. Psychiatric:         Mood and Affect: Mood normal.         Behavior: Behavior normal.         Thought Content: Thought content normal.         Judgment: Judgment normal.         I personally reviewed testing with patient, and all questions answered.   Vitamin D deficiency, taking prenatal multivitamin  Otherwise labs within normal limits       Lab Results   Component Value Date    WBC 3.9 09/15/2022    HGB 13.1 09/15/2022    HCT 38.2 09/15/2022    MCV 83.3 09/15/2022     09/15/2022       Lab Results   Component Value Date/Time     08/31/2022 08:58 AM    K 4.5 08/31/2022 08:58 AM     08/31/2022 08:58 AM    CO2 25 08/31/2022 08:58 AM    BUN 13 08/31/2022 08:58 AM    CREATININE 0.55 08/31/2022 08:58 AM    GLUCOSE 96 08/31/2022 08:58 AM    CALCIUM 9.3 08/31/2022 08:58 AM        Lab Results   Component Value Date    ALT 15 08/31/2022    AST 20 08/31/2022    ALKPHOS 81 08/31/2022    BILITOT 0.69 08/31/2022       Lab Results   Component Value Date    TSH 2.52 08/31/2022       No results found for: CHOL  No results found for: TRIG  Lab Results   Component Value Date    HDL 79 08/31/2022    HDL 88 09/22/2020     Lab Results   Component Value Date    LDLCHOLESTEROL 106 08/31/2022    LDLCHOLESTEROL 81 09/22/2020     Lab Results   Component Value Date    CHOLHDLRATIO 2.4 08/31/2022    CHOLHDLRATIO 2.0 09/22/2020         Lab Results   Component Value Date QIOQEDFI32 620 08/31/2022     Lab Results   Component Value Date    FOLATE 19.5 08/31/2022     Lab Results   Component Value Date    VITD25 25.5 (L) 08/31/2022         Orders Placed This Encounter   Medications    ketoconazole (NIZORAL) 2 % cream     Sig: Apply topically  2 times a day  on the left elbow for 4 weeks     Dispense:  60 g     Refill:  1       Orders Placed This Encounter   Procedures    JESUS Hale MD Dermatology, [place]     Referral Priority:   Routine     Referral Type:   Eval and Treat     Referral Reason:   Specialty Services Required     Referred to Provider:   Tresa Delvalle MD     Requested Specialty:   Dermatology     Number of Visits Requested:   1       Medications Discontinued During This Encounter   Medication Reason    clotrimazole-betamethasone (LOTRISONE) 1-0.05 % cream Therapy completed    triamcinolone (KENALOG) 0.1 % ointment Therapy completed    vitamin D (CHOLECALCIFEROL) 25 MCG (1000 UT) TABS tablet Therapy completed    Cholecalciferol 1.25 MG (60596 UT) TABS Therapy completed    ferrous sulfate (IRON 325) 325 (65 Fe) MG tablet Therapy completed    Prenatal Vit-Fe Fumarate-FA (PRENATAL VITAMIN PLUS LOW IRON) 27-1 MG TABS Therapy completed         On this date 3/7/2023 I have spent 29 minutes reviewing previous notes, test results and face to face with the patient discussing the diagnosis and importance of compliance with the treatment plan as well as documenting on the day of the visit. This note was completed by using the assistance of a speech-recognition program. However, inadvertent computerized transcription errors may be present. Although every effort was made to ensure accuracy, no guarantees can be provided that every mistake has been identified and corrected by editing. An electronic signature was used to authenticate this note.   Electronically signed by Anayeli Deras MD on 3/15/2023 at 7:26 PM

## 2023-03-07 NOTE — TELEPHONE ENCOUNTER
Per Howard Ferraro NP, pt notified of +quant; LMP 1/22/23. Pt currently taking PNV. Call sent to [facility] to schedule new OB intake/US. Pt verbalized understanding.

## 2023-03-07 NOTE — TELEPHONE ENCOUNTER
----- Message from JANUARY Grayson CNP sent at 3/7/2023  7:59 AM EST -----  Schedule for new OB intake/ultrasound  Prenatal vitamin 1 PO QD with 12 refills

## 2023-03-07 NOTE — PROGRESS NOTES
Visit Information    Have you changed or started any medications since your last visit including any over-the-counter medicines, vitamins, or herbal medicines? no   Have you stopped taking any of your medications? Is so, why? -  no  Are you having any side effects from any of your medications? - no    Have you seen any other physician or provider since your last visit?  yes -    Have you had any other diagnostic tests since your last visit?  no   Have you been seen in the emergency room and/or had an admission in a hospital since we last saw you?  no   Have you had your routine dental cleaning in the past 6 months?  no     Do you have an active MyChart account? If no, what is the barrier?  Yes    Patient Care Team:  Virginie Dominguez MD as PCP - General (Family Medicine)  Stormy Gary DO as Consulting Physician (Obstetrics & Gynecology)  Richard Cook MD as Obstetrician (Perinatology)    Medical History Review  Past Medical, Family, and Social History reviewed and does contribute to the patient presenting condition    Health Maintenance   Topic Date Due    Hepatitis A vaccine (2 of 2 - 2-dose series) 01/07/2009    COVID-19 Vaccine (3 - Booster for Moderna series) 04/07/2021    Depression Screen  08/30/2023    A1C test (Diabetic or Prediabetic)  11/08/2023    Cervical cancer screen  09/22/2026    DTaP/Tdap/Td vaccine (6 - Td or Tdap) 01/25/2032    Flu vaccine  Completed    Hepatitis C screen  Completed    HIV screen  Completed    Hib vaccine  Aged Out    Meningococcal (ACWY) vaccine  Aged Out    Pneumococcal 0-64 years Vaccine  Aged Out    Varicella vaccine  Discontinued

## 2023-03-15 VITALS
TEMPERATURE: 97.2 F | HEART RATE: 73 BPM | HEIGHT: 65 IN | SYSTOLIC BLOOD PRESSURE: 120 MMHG | BODY MASS INDEX: 26.66 KG/M2 | OXYGEN SATURATION: 97 % | DIASTOLIC BLOOD PRESSURE: 70 MMHG | WEIGHT: 160 LBS

## 2023-03-15 ASSESSMENT — ENCOUNTER SYMPTOMS: CONSTIPATION: 1

## 2023-03-17 ENCOUNTER — TELEPHONE (OUTPATIENT)
Dept: OBGYN CLINIC | Age: 33
End: 2023-03-17

## 2023-03-17 DIAGNOSIS — R10.2 PELVIC PAIN AFFECTING PREGNANCY IN FIRST TRIMESTER, ANTEPARTUM: Primary | ICD-10-CM

## 2023-03-17 DIAGNOSIS — O26.891 PELVIC PAIN AFFECTING PREGNANCY IN FIRST TRIMESTER, ANTEPARTUM: Primary | ICD-10-CM

## 2023-03-17 NOTE — TELEPHONE ENCOUNTER
Per David Rogel pt to complete early ob US for pelvic pain/order in epic. Pt instructed to go to ED for evaluation if increased pelvic pain/vaginal bleeding. Pt voiced understanding to all recommendations given.

## 2023-03-17 NOTE — TELEPHONE ENCOUNTER
----- Message from Jaycee Kurtz sent at 3/17/2023  9:13 AM EDT -----  Regarding: FW: Uncomfortable    ----- Message -----  From: JANUARY Lamar CNP  Sent: 3/17/2023   8:29 AM EDT  To: Estelle Morales MA  Subject: RE: Uncomfortable                                Please call patient and find out how severe her pains are, ask if she is having any vaginal bleeding, fever, nausea, vomiting.    ----- Message -----  From: Estelle Morales MA  Sent: 3/17/2023   8:27 AM EDT  To: JANUARY Nevarez CNP  Subject: FW: Uncomfortable                                Please review. ----- Message -----  From: Enedina Flood  Sent: 3/16/2023   8:49 PM EDT  To: [facility] Clinical Support Pool  Subject: Uncomfortable                                    I'm having some sharp pains and feeling pressure in my lower abomen/lower back. Just want to make sure that's normal and see if you think it's okay. This is my 4th pregnancy.

## 2023-03-18 ENCOUNTER — HOSPITAL ENCOUNTER (OUTPATIENT)
Dept: ULTRASOUND IMAGING | Age: 33
End: 2023-03-18
Payer: COMMERCIAL

## 2023-03-18 DIAGNOSIS — O26.891 PELVIC PAIN AFFECTING PREGNANCY IN FIRST TRIMESTER, ANTEPARTUM: ICD-10-CM

## 2023-03-18 DIAGNOSIS — R10.2 PELVIC PAIN AFFECTING PREGNANCY IN FIRST TRIMESTER, ANTEPARTUM: ICD-10-CM

## 2023-03-18 PROCEDURE — 76801 OB US < 14 WKS SINGLE FETUS: CPT

## 2023-03-24 DIAGNOSIS — Z34.90 EARLY STAGE OF PREGNANCY: Primary | ICD-10-CM

## 2023-03-27 ENCOUNTER — INITIAL PRENATAL (OUTPATIENT)
Dept: OBGYN CLINIC | Age: 33
End: 2023-03-27
Payer: COMMERCIAL

## 2023-03-27 ENCOUNTER — PROCEDURE VISIT (OUTPATIENT)
Dept: OBGYN CLINIC | Age: 33
End: 2023-03-27

## 2023-03-27 VITALS
SYSTOLIC BLOOD PRESSURE: 106 MMHG | HEART RATE: 87 BPM | BODY MASS INDEX: 26.13 KG/M2 | WEIGHT: 157 LBS | DIASTOLIC BLOOD PRESSURE: 58 MMHG

## 2023-03-27 DIAGNOSIS — Z34.90 EARLY STAGE OF PREGNANCY: ICD-10-CM

## 2023-03-27 DIAGNOSIS — Z34.90 EARLY STAGE OF PREGNANCY: Primary | ICD-10-CM

## 2023-03-27 DIAGNOSIS — O09.91 SUPERVISION OF HIGH RISK PREGNANCY IN FIRST TRIMESTER: Primary | ICD-10-CM

## 2023-03-27 DIAGNOSIS — Z3A.01 7 WEEKS GESTATION OF PREGNANCY: ICD-10-CM

## 2023-03-27 PROBLEM — O24.419 GESTATIONAL DIABETES MELLITUS (GDM), ANTEPARTUM: Status: RESOLVED | Noted: 2017-01-03 | Resolved: 2023-03-27

## 2023-03-27 PROBLEM — E55.9 VITAMIN D DEFICIENCY: Status: RESOLVED | Noted: 2020-09-22 | Resolved: 2023-03-27

## 2023-03-27 PROBLEM — D50.8 IRON DEFICIENCY ANEMIA SECONDARY TO INADEQUATE DIETARY IRON INTAKE: Status: RESOLVED | Noted: 2022-08-30 | Resolved: 2023-03-27

## 2023-03-27 PROBLEM — R73.01 ELEVATED FASTING BLOOD SUGAR: Status: RESOLVED | Noted: 2020-08-10 | Resolved: 2023-03-27

## 2023-03-27 PROBLEM — G89.29 CHRONIC ANKLE PAIN, BILATERAL: Status: RESOLVED | Noted: 2022-08-30 | Resolved: 2023-03-27

## 2023-03-27 PROBLEM — H18.899 CORNEAL RUST RING: Status: RESOLVED | Noted: 2020-05-18 | Resolved: 2023-03-27

## 2023-03-27 PROBLEM — M25.571 CHRONIC ANKLE PAIN, BILATERAL: Status: RESOLVED | Noted: 2022-08-30 | Resolved: 2023-03-27

## 2023-03-27 PROBLEM — M25.572 CHRONIC ANKLE PAIN, BILATERAL: Status: RESOLVED | Noted: 2022-08-30 | Resolved: 2023-03-27

## 2023-03-27 LAB
CRL: NORMAL
SAC DIAMETER: NORMAL

## 2023-03-27 PROCEDURE — 99999 PR OFFICE/OUTPT VISIT,PROCEDURE ONLY: CPT | Performed by: NURSE PRACTITIONER

## 2023-03-27 PROCEDURE — H1000 PRENATAL CARE ATRISK ASSESSM: HCPCS | Performed by: NURSE PRACTITIONER

## 2023-03-27 PROCEDURE — 99999 PR OFFICE/OUTPT VISIT,PROCEDURE ONLY: CPT | Performed by: OBSTETRICS & GYNECOLOGY

## 2023-03-27 NOTE — PROGRESS NOTES
Patient presents to office for OB intake, nurse visit only. Intake completed following ultrasound in office to confirm pregnancy dating/viability. Based on ultrasound, patient is currently 7w2d  gestation, Estimated Date of Delivery: 11/11/23. Patient presents to intake FOB. This was a planned pregnancy. Patient currently taking has a current medication list which includes the following prescription(s): ketoconazole and prenatal vitamins. . Patient's medical, surgical, obstetrical and family history reviewed. See EHR for details. Patient prenatal lab work given to patient at this visit as well as OB education material. New OB consent forms signed. Patient accepted first trimester screening. Cystic Fibrosis screening previously done 8/6/16 . Referral placed to [facility] for first trimester screen. Patient scheduled for follow up OB appointment with Ayleen Laguna NP on 4/11/23. Patient instructed to complete lab work prior to follow up OB appointment.

## 2023-04-15 ENCOUNTER — HOSPITAL ENCOUNTER (EMERGENCY)
Age: 33
Discharge: HOME | End: 2023-04-16
Attending: EMERGENCY MEDICINE
Payer: COMMERCIAL

## 2023-04-15 DIAGNOSIS — O20.9 VAGINAL BLEEDING AFFECTING EARLY PREGNANCY: Primary | ICD-10-CM

## 2023-04-15 LAB
ABO/RH: NORMAL
ABSOLUTE EOS #: 0 K/UL (ref 0–0.4)
ABSOLUTE LYMPH #: 1.94 K/UL (ref 1–4.8)
ABSOLUTE MONO #: 0.59 K/UL (ref 0.1–1.3)
ALBUMIN SERPL-MCNC: 4.2 G/DL (ref 3.5–5.2)
ALP SERPL-CCNC: 67 U/L (ref 35–104)
ALT SERPL-CCNC: 12 U/L (ref 5–33)
ANION GAP SERPL CALCULATED.3IONS-SCNC: 13 MMOL/L (ref 9–17)
ANTIBODY SCREEN: NEGATIVE
ARM BAND NUMBER: NORMAL
AST SERPL-CCNC: 18 U/L
BACTERIA: NORMAL
BASOPHILS # BLD: 0 % (ref 0–2)
BASOPHILS ABSOLUTE: 0 K/UL (ref 0–0.2)
BILIRUB SERPL-MCNC: 0.3 MG/DL (ref 0.3–1.2)
BILIRUBIN URINE: NEGATIVE
BUN SERPL-MCNC: 11 MG/DL (ref 6–20)
CALCIUM SERPL-MCNC: 9.2 MG/DL (ref 8.6–10.4)
CASTS UA: NORMAL /LPF
CHLORIDE SERPL-SCNC: 104 MMOL/L (ref 98–107)
CO2 SERPL-SCNC: 21 MMOL/L (ref 20–31)
COLOR: ABNORMAL
CREAT SERPL-MCNC: 0.42 MG/DL (ref 0.5–0.9)
EOSINOPHILS RELATIVE PERCENT: 0 % (ref 0–4)
EPITHELIAL CELLS UA: NORMAL /HPF
EXPIRATION DATE: NORMAL
GFR SERPL CREATININE-BSD FRML MDRD: >60 ML/MIN/1.73M2
GLUCOSE SERPL-MCNC: 91 MG/DL (ref 70–99)
GLUCOSE UR STRIP.AUTO-MCNC: NEGATIVE MG/DL
HCT VFR BLD AUTO: 36.3 % (ref 36–46)
HGB BLD-MCNC: 12.2 G/DL (ref 12–16)
KETONES UR STRIP.AUTO-MCNC: NEGATIVE MG/DL
LEUKOCYTE ESTERASE UR QL STRIP.AUTO: ABNORMAL
LYMPHOCYTES # BLD: 36 % (ref 24–44)
MCH RBC QN AUTO: 26.6 PG (ref 26–34)
MCHC RBC AUTO-ENTMCNC: 33.5 G/DL (ref 31–37)
MCV RBC AUTO: 79.4 FL (ref 80–100)
MONOCYTES # BLD: 11 % (ref 1–7)
MORPHOLOGY: ABNORMAL
NITRITE UR QL STRIP.AUTO: NEGATIVE
PDW BLD-RTO: 15.1 % (ref 11.5–14.9)
PLATELET # BLD AUTO: 180 K/UL (ref 150–450)
PMV BLD AUTO: 7.5 FL (ref 6–12)
POTASSIUM SERPL-SCNC: 3.5 MMOL/L (ref 3.7–5.3)
PROT SERPL-MCNC: 6.9 G/DL (ref 6.4–8.3)
PROT UR STRIP.AUTO-MCNC: 6.5 MG/DL (ref 5–8)
PROT UR STRIP.AUTO-MCNC: ABNORMAL MG/DL
RBC # BLD: 4.57 M/UL (ref 4–5.2)
RBC CLUMPS #/AREA URNS AUTO: NORMAL /HPF
SEG NEUTROPHILS: 53 % (ref 36–66)
SEGMENTED NEUTROPHILS ABSOLUTE COUNT: 2.87 K/UL (ref 1.3–9.1)
SODIUM SERPL-SCNC: 138 MMOL/L (ref 135–144)
SPECIFIC GRAVITY UA: 1 (ref 1–1.03)
TURBIDITY: CLEAR
URINE HGB: ABNORMAL
UROBILINOGEN, URINE: NORMAL
WBC # BLD AUTO: 5.4 K/UL (ref 3.5–11)
WBC UA: NORMAL /HPF

## 2023-04-15 PROCEDURE — 86901 BLOOD TYPING SEROLOGIC RH(D): CPT

## 2023-04-15 PROCEDURE — 99283 EMERGENCY DEPT VISIT LOW MDM: CPT

## 2023-04-15 PROCEDURE — 87086 URINE CULTURE/COLONY COUNT: CPT

## 2023-04-15 PROCEDURE — 36415 COLL VENOUS BLD VENIPUNCTURE: CPT

## 2023-04-15 PROCEDURE — 86850 RBC ANTIBODY SCREEN: CPT

## 2023-04-15 PROCEDURE — 86900 BLOOD TYPING SEROLOGIC ABO: CPT

## 2023-04-15 PROCEDURE — 80053 COMPREHEN METABOLIC PANEL: CPT

## 2023-04-15 PROCEDURE — 84702 CHORIONIC GONADOTROPIN TEST: CPT

## 2023-04-15 PROCEDURE — 81001 URINALYSIS AUTO W/SCOPE: CPT

## 2023-04-15 PROCEDURE — 85025 COMPLETE CBC W/AUTO DIFF WBC: CPT

## 2023-04-15 ASSESSMENT — LIFESTYLE VARIABLES: HOW OFTEN DO YOU HAVE A DRINK CONTAINING ALCOHOL: NEVER

## 2023-04-16 VITALS
HEART RATE: 66 BPM | TEMPERATURE: 98.5 F | WEIGHT: 158 LBS | HEIGHT: 65 IN | SYSTOLIC BLOOD PRESSURE: 108 MMHG | RESPIRATION RATE: 16 BRPM | OXYGEN SATURATION: 99 % | DIASTOLIC BLOOD PRESSURE: 74 MMHG | BODY MASS INDEX: 26.33 KG/M2

## 2023-04-16 LAB — HCG QUANTITATIVE: ABNORMAL MIU/ML

## 2023-04-17 ENCOUNTER — APPOINTMENT (OUTPATIENT)
Dept: ULTRASOUND IMAGING | Age: 33
End: 2023-04-17
Payer: COMMERCIAL

## 2023-04-17 ENCOUNTER — ANESTHESIA (OUTPATIENT)
Dept: OPERATING ROOM | Age: 33
End: 2023-04-17
Payer: COMMERCIAL

## 2023-04-17 ENCOUNTER — ANESTHESIA EVENT (OUTPATIENT)
Dept: OPERATING ROOM | Age: 33
End: 2023-04-17
Payer: COMMERCIAL

## 2023-04-17 ENCOUNTER — HOSPITAL ENCOUNTER (OUTPATIENT)
Age: 33
Discharge: HOME | End: 2023-04-17
Attending: EMERGENCY MEDICINE | Admitting: OBSTETRICS & GYNECOLOGY
Payer: COMMERCIAL

## 2023-04-17 VITALS
TEMPERATURE: 98 F | BODY MASS INDEX: 26.16 KG/M2 | WEIGHT: 157 LBS | DIASTOLIC BLOOD PRESSURE: 76 MMHG | HEIGHT: 65 IN | SYSTOLIC BLOOD PRESSURE: 106 MMHG | RESPIRATION RATE: 18 BRPM | HEART RATE: 71 BPM | OXYGEN SATURATION: 95 %

## 2023-04-17 DIAGNOSIS — O02.1 MISSED ABORTION: ICD-10-CM

## 2023-04-17 DIAGNOSIS — O46.90 VAGINAL BLEEDING IN PREGNANCY: ICD-10-CM

## 2023-04-17 DIAGNOSIS — O03.9 MISCARRIAGE: ICD-10-CM

## 2023-04-17 DIAGNOSIS — Z76.89 ENCOUNTER FOR ASSESSMENT FOR FETAL DEMISE: Primary | ICD-10-CM

## 2023-04-17 DIAGNOSIS — G89.18 POST-OP PAIN: ICD-10-CM

## 2023-04-17 LAB
ABSOLUTE EOS #: 0.09 K/UL (ref 0–0.44)
ABSOLUTE IMMATURE GRANULOCYTE: <0.03 K/UL (ref 0–0.3)
ABSOLUTE LYMPH #: 1.48 K/UL (ref 1.1–3.7)
ABSOLUTE MONO #: 0.56 K/UL (ref 0.1–1.2)
ANION GAP SERPL CALCULATED.3IONS-SCNC: 13 MMOL/L (ref 9–17)
BASOPHILS # BLD: 0 % (ref 0–2)
BASOPHILS ABSOLUTE: 0.03 K/UL (ref 0–0.2)
BUN SERPL-MCNC: 9 MG/DL (ref 6–20)
CALCIUM SERPL-MCNC: 8.8 MG/DL (ref 8.6–10.4)
CHLORIDE SERPL-SCNC: 107 MMOL/L (ref 98–107)
CO2 SERPL-SCNC: 21 MMOL/L (ref 20–31)
CREAT SERPL-MCNC: 0.39 MG/DL (ref 0.5–0.9)
EOSINOPHILS RELATIVE PERCENT: 1 % (ref 1–4)
GFR SERPL CREATININE-BSD FRML MDRD: >60 ML/MIN/1.73M2
GLUCOSE SERPL-MCNC: 92 MG/DL (ref 70–99)
HCG QUANTITATIVE: 8601 MIU/ML
HCT VFR BLD AUTO: 37.9 % (ref 36.3–47.1)
HGB BLD-MCNC: 12.6 G/DL (ref 11.9–15.1)
IMMATURE GRANULOCYTES: 0 %
LYMPHOCYTES # BLD: 19 % (ref 24–43)
MCH RBC QN AUTO: 27.2 PG (ref 25.2–33.5)
MCHC RBC AUTO-ENTMCNC: 33.2 G/DL (ref 28.4–34.8)
MCV RBC AUTO: 81.9 FL (ref 82.6–102.9)
MICROORGANISM SPEC CULT: NO GROWTH
MONOCYTES # BLD: 7 % (ref 3–12)
NRBC AUTOMATED: 0 PER 100 WBC
PDW BLD-RTO: 13.5 % (ref 11.8–14.4)
PLATELET # BLD AUTO: 185 K/UL (ref 138–453)
PMV BLD AUTO: 9.8 FL (ref 8.1–13.5)
POTASSIUM SERPL-SCNC: 3.6 MMOL/L (ref 3.7–5.3)
RBC # BLD: 4.63 M/UL (ref 3.95–5.11)
RBC # BLD: ABNORMAL 10*6/UL
SEG NEUTROPHILS: 73 % (ref 36–65)
SEGMENTED NEUTROPHILS ABSOLUTE COUNT: 5.64 K/UL (ref 1.5–8.1)
SODIUM SERPL-SCNC: 141 MMOL/L (ref 135–144)
SPECIMEN DESCRIPTION: NORMAL
WBC # BLD AUTO: 7.8 K/UL (ref 3.5–11.3)

## 2023-04-17 PROCEDURE — 6360000002 HC RX W HCPCS: Performed by: STUDENT IN AN ORGANIZED HEALTH CARE EDUCATION/TRAINING PROGRAM

## 2023-04-17 PROCEDURE — 84702 CHORIONIC GONADOTROPIN TEST: CPT

## 2023-04-17 PROCEDURE — 3700000001 HC ADD 15 MINUTES (ANESTHESIA): Performed by: OBSTETRICS & GYNECOLOGY

## 2023-04-17 PROCEDURE — 88305 TISSUE EXAM BY PATHOLOGIST: CPT

## 2023-04-17 PROCEDURE — 76817 TRANSVAGINAL US OBSTETRIC: CPT

## 2023-04-17 PROCEDURE — 7100000000 HC PACU RECOVERY - FIRST 15 MIN: Performed by: OBSTETRICS & GYNECOLOGY

## 2023-04-17 PROCEDURE — 59820 CARE OF MISCARRIAGE: CPT | Performed by: OBSTETRICS & GYNECOLOGY

## 2023-04-17 PROCEDURE — 7100000001 HC PACU RECOVERY - ADDTL 15 MIN: Performed by: OBSTETRICS & GYNECOLOGY

## 2023-04-17 PROCEDURE — 2709999900 HC NON-CHARGEABLE SUPPLY: Performed by: OBSTETRICS & GYNECOLOGY

## 2023-04-17 PROCEDURE — 99285 EMERGENCY DEPT VISIT HI MDM: CPT

## 2023-04-17 PROCEDURE — 85025 COMPLETE CBC W/AUTO DIFF WBC: CPT

## 2023-04-17 PROCEDURE — G0378 HOSPITAL OBSERVATION PER HR: HCPCS

## 2023-04-17 PROCEDURE — 7100000010 HC PHASE II RECOVERY - FIRST 15 MIN: Performed by: OBSTETRICS & GYNECOLOGY

## 2023-04-17 PROCEDURE — 3700000000 HC ANESTHESIA ATTENDED CARE: Performed by: OBSTETRICS & GYNECOLOGY

## 2023-04-17 PROCEDURE — 96374 THER/PROPH/DIAG INJ IV PUSH: CPT

## 2023-04-17 PROCEDURE — 96375 TX/PRO/DX INJ NEW DRUG ADDON: CPT

## 2023-04-17 PROCEDURE — 2500000003 HC RX 250 WO HCPCS: Performed by: ANESTHESIOLOGY

## 2023-04-17 PROCEDURE — 3600000003 HC SURGERY LEVEL 3 BASE: Performed by: OBSTETRICS & GYNECOLOGY

## 2023-04-17 PROCEDURE — 6360000002 HC RX W HCPCS: Performed by: ANESTHESIOLOGY

## 2023-04-17 PROCEDURE — 96376 TX/PRO/DX INJ SAME DRUG ADON: CPT

## 2023-04-17 PROCEDURE — 80048 BASIC METABOLIC PNL TOTAL CA: CPT

## 2023-04-17 PROCEDURE — 6370000000 HC RX 637 (ALT 250 FOR IP): Performed by: STUDENT IN AN ORGANIZED HEALTH CARE EDUCATION/TRAINING PROGRAM

## 2023-04-17 PROCEDURE — 2580000003 HC RX 258

## 2023-04-17 PROCEDURE — 3600000013 HC SURGERY LEVEL 3 ADDTL 15MIN: Performed by: OBSTETRICS & GYNECOLOGY

## 2023-04-17 RX ORDER — SODIUM CHLORIDE, SODIUM LACTATE, POTASSIUM CHLORIDE, CALCIUM CHLORIDE 600; 310; 30; 20 MG/100ML; MG/100ML; MG/100ML; MG/100ML
INJECTION, SOLUTION INTRAVENOUS CONTINUOUS PRN
Status: DISCONTINUED | OUTPATIENT
Start: 2023-04-17 | End: 2023-04-17 | Stop reason: SDUPTHER

## 2023-04-17 RX ORDER — MORPHINE SULFATE 2 MG/ML
4 INJECTION, SOLUTION INTRAMUSCULAR; INTRAVENOUS
Status: DISCONTINUED | OUTPATIENT
Start: 2023-04-17 | End: 2023-04-17 | Stop reason: HOSPADM

## 2023-04-17 RX ORDER — DEXAMETHASONE SODIUM PHOSPHATE 10 MG/ML
INJECTION INTRAMUSCULAR; INTRAVENOUS PRN
Status: DISCONTINUED | OUTPATIENT
Start: 2023-04-17 | End: 2023-04-17 | Stop reason: SDUPTHER

## 2023-04-17 RX ORDER — FENTANYL CITRATE 50 UG/ML
INJECTION, SOLUTION INTRAMUSCULAR; INTRAVENOUS PRN
Status: DISCONTINUED | OUTPATIENT
Start: 2023-04-17 | End: 2023-04-17 | Stop reason: SDUPTHER

## 2023-04-17 RX ORDER — MORPHINE SULFATE 4 MG/ML
4 INJECTION, SOLUTION INTRAMUSCULAR; INTRAVENOUS ONCE
Status: COMPLETED | OUTPATIENT
Start: 2023-04-17 | End: 2023-04-17

## 2023-04-17 RX ORDER — KETOROLAC TROMETHAMINE 30 MG/ML
30 INJECTION, SOLUTION INTRAMUSCULAR; INTRAVENOUS ONCE
Status: COMPLETED | OUTPATIENT
Start: 2023-04-17 | End: 2023-04-17

## 2023-04-17 RX ORDER — DOXYCYCLINE HYCLATE 100 MG
200 TABLET ORAL ONCE
Status: COMPLETED | OUTPATIENT
Start: 2023-04-17 | End: 2023-04-17

## 2023-04-17 RX ORDER — METHYLERGONOVINE MALEATE 0.2 MG/1
0.2 TABLET ORAL 3 TIMES DAILY PRN
Qty: 9 TABLET | Refills: 0 | Status: SHIPPED | OUTPATIENT
Start: 2023-04-17

## 2023-04-17 RX ORDER — OXYCODONE HYDROCHLORIDE 5 MG/1
5 TABLET ORAL EVERY 4 HOURS PRN
Status: DISCONTINUED | OUTPATIENT
Start: 2023-04-17 | End: 2023-04-17 | Stop reason: HOSPADM

## 2023-04-17 RX ORDER — OXYCODONE HYDROCHLORIDE 5 MG/1
10 TABLET ORAL EVERY 4 HOURS PRN
Status: DISCONTINUED | OUTPATIENT
Start: 2023-04-17 | End: 2023-04-17 | Stop reason: HOSPADM

## 2023-04-17 RX ORDER — ONDANSETRON 2 MG/ML
4 INJECTION INTRAMUSCULAR; INTRAVENOUS ONCE
Status: COMPLETED | OUTPATIENT
Start: 2023-04-17 | End: 2023-04-17

## 2023-04-17 RX ORDER — MIDAZOLAM HYDROCHLORIDE 1 MG/ML
INJECTION INTRAMUSCULAR; INTRAVENOUS PRN
Status: DISCONTINUED | OUTPATIENT
Start: 2023-04-17 | End: 2023-04-17 | Stop reason: SDUPTHER

## 2023-04-17 RX ORDER — SODIUM CHLORIDE 9 MG/ML
INJECTION, SOLUTION INTRAVENOUS PRN
Status: DISCONTINUED | OUTPATIENT
Start: 2023-04-17 | End: 2023-04-17 | Stop reason: HOSPADM

## 2023-04-17 RX ORDER — DOXYCYCLINE HYCLATE 100 MG
100 TABLET ORAL 2 TIMES DAILY
Qty: 10 TABLET | Refills: 0 | Status: SHIPPED | OUTPATIENT
Start: 2023-04-17 | End: 2023-04-22

## 2023-04-17 RX ORDER — SODIUM CHLORIDE 0.9 % (FLUSH) 0.9 %
5-40 SYRINGE (ML) INJECTION PRN
Status: DISCONTINUED | OUTPATIENT
Start: 2023-04-17 | End: 2023-04-17 | Stop reason: HOSPADM

## 2023-04-17 RX ORDER — ONDANSETRON 2 MG/ML
4 INJECTION INTRAMUSCULAR; INTRAVENOUS ONCE
Status: DISCONTINUED | OUTPATIENT
Start: 2023-04-17 | End: 2023-04-17

## 2023-04-17 RX ORDER — SODIUM CHLORIDE 9 MG/ML
25 INJECTION, SOLUTION INTRAVENOUS PRN
Status: DISCONTINUED | OUTPATIENT
Start: 2023-04-17 | End: 2023-04-17 | Stop reason: HOSPADM

## 2023-04-17 RX ORDER — PROPOFOL 10 MG/ML
INJECTION, EMULSION INTRAVENOUS PRN
Status: DISCONTINUED | OUTPATIENT
Start: 2023-04-17 | End: 2023-04-17 | Stop reason: SDUPTHER

## 2023-04-17 RX ORDER — DOXYCYCLINE HYCLATE 100 MG
200 TABLET ORAL ONCE
Status: DISCONTINUED | OUTPATIENT
Start: 2023-04-17 | End: 2023-04-17

## 2023-04-17 RX ORDER — OXYCODONE HYDROCHLORIDE 5 MG/1
5 TABLET ORAL EVERY 6 HOURS PRN
Qty: 5 TABLET | Refills: 0 | Status: SHIPPED | OUTPATIENT
Start: 2023-04-17 | End: 2023-04-19

## 2023-04-17 RX ORDER — SODIUM CHLORIDE 0.9 % (FLUSH) 0.9 %
5-40 SYRINGE (ML) INJECTION EVERY 12 HOURS SCHEDULED
Status: DISCONTINUED | OUTPATIENT
Start: 2023-04-17 | End: 2023-04-17 | Stop reason: HOSPADM

## 2023-04-17 RX ORDER — ONDANSETRON 2 MG/ML
INJECTION INTRAMUSCULAR; INTRAVENOUS PRN
Status: DISCONTINUED | OUTPATIENT
Start: 2023-04-17 | End: 2023-04-17 | Stop reason: SDUPTHER

## 2023-04-17 RX ORDER — HYDRALAZINE HYDROCHLORIDE 20 MG/ML
10 INJECTION INTRAMUSCULAR; INTRAVENOUS
Status: DISCONTINUED | OUTPATIENT
Start: 2023-04-17 | End: 2023-04-17 | Stop reason: HOSPADM

## 2023-04-17 RX ORDER — IBUPROFEN 600 MG/1
600 TABLET ORAL EVERY 6 HOURS PRN
Qty: 40 TABLET | Refills: 1 | Status: SHIPPED | OUTPATIENT
Start: 2023-04-17

## 2023-04-17 RX ORDER — LIDOCAINE HYDROCHLORIDE 10 MG/ML
INJECTION, SOLUTION EPIDURAL; INFILTRATION; INTRACAUDAL; PERINEURAL PRN
Status: DISCONTINUED | OUTPATIENT
Start: 2023-04-17 | End: 2023-04-17 | Stop reason: SDUPTHER

## 2023-04-17 RX ORDER — ACETAMINOPHEN 500 MG
1000 TABLET ORAL EVERY 6 HOURS PRN
Status: DISCONTINUED | OUTPATIENT
Start: 2023-04-17 | End: 2023-04-17 | Stop reason: HOSPADM

## 2023-04-17 RX ORDER — ONDANSETRON 4 MG/1
4 TABLET, ORALLY DISINTEGRATING ORAL EVERY 8 HOURS PRN
Status: DISCONTINUED | OUTPATIENT
Start: 2023-04-17 | End: 2023-04-17 | Stop reason: HOSPADM

## 2023-04-17 RX ORDER — DROPERIDOL 2.5 MG/ML
0.62 INJECTION, SOLUTION INTRAMUSCULAR; INTRAVENOUS
Status: COMPLETED | OUTPATIENT
Start: 2023-04-17 | End: 2023-04-17

## 2023-04-17 RX ORDER — METOCLOPRAMIDE HYDROCHLORIDE 5 MG/ML
10 INJECTION INTRAMUSCULAR; INTRAVENOUS
Status: DISCONTINUED | OUTPATIENT
Start: 2023-04-17 | End: 2023-04-17 | Stop reason: HOSPADM

## 2023-04-17 RX ORDER — DIPHENHYDRAMINE HYDROCHLORIDE 50 MG/ML
12.5 INJECTION INTRAMUSCULAR; INTRAVENOUS
Status: DISCONTINUED | OUTPATIENT
Start: 2023-04-17 | End: 2023-04-17 | Stop reason: HOSPADM

## 2023-04-17 RX ORDER — METHYLERGONOVINE MALEATE 0.2 MG/ML
INJECTION INTRAVENOUS PRN
Status: DISCONTINUED | OUTPATIENT
Start: 2023-04-17 | End: 2023-04-17 | Stop reason: SDUPTHER

## 2023-04-17 RX ORDER — DOCUSATE SODIUM 100 MG/1
100 CAPSULE, LIQUID FILLED ORAL 2 TIMES DAILY
Status: DISCONTINUED | OUTPATIENT
Start: 2023-04-17 | End: 2023-04-17 | Stop reason: HOSPADM

## 2023-04-17 RX ORDER — MORPHINE SULFATE 2 MG/ML
2 INJECTION, SOLUTION INTRAMUSCULAR; INTRAVENOUS
Status: DISCONTINUED | OUTPATIENT
Start: 2023-04-17 | End: 2023-04-17 | Stop reason: HOSPADM

## 2023-04-17 RX ORDER — ONDANSETRON 2 MG/ML
4 INJECTION INTRAMUSCULAR; INTRAVENOUS EVERY 6 HOURS PRN
Status: DISCONTINUED | OUTPATIENT
Start: 2023-04-17 | End: 2023-04-17 | Stop reason: HOSPADM

## 2023-04-17 RX ADMIN — HYDROMORPHONE HYDROCHLORIDE 0.25 MG: 1 INJECTION, SOLUTION INTRAMUSCULAR; INTRAVENOUS; SUBCUTANEOUS at 14:05

## 2023-04-17 RX ADMIN — DROPERIDOL 0.62 MG: 2.5 INJECTION, SOLUTION INTRAMUSCULAR; INTRAVENOUS at 14:05

## 2023-04-17 RX ADMIN — ONDANSETRON 4 MG: 2 INJECTION INTRAMUSCULAR; INTRAVENOUS at 11:33

## 2023-04-17 RX ADMIN — METHYLERGONOVINE MALEATE 200 MCG: 0.2 INJECTION, SOLUTION INTRAMUSCULAR; INTRAVENOUS at 13:37

## 2023-04-17 RX ADMIN — MIDAZOLAM 2 MG: 1 INJECTION INTRAMUSCULAR; INTRAVENOUS at 13:14

## 2023-04-17 RX ADMIN — FENTANYL CITRATE 50 MCG: 50 INJECTION, SOLUTION INTRAMUSCULAR; INTRAVENOUS at 13:16

## 2023-04-17 RX ADMIN — DOXYCYCLINE HYCLATE 200 MG: 100 TABLET, COATED ORAL at 11:37

## 2023-04-17 RX ADMIN — HYDROMORPHONE HYDROCHLORIDE 0.25 MG: 1 INJECTION, SOLUTION INTRAMUSCULAR; INTRAVENOUS; SUBCUTANEOUS at 14:10

## 2023-04-17 RX ADMIN — MORPHINE SULFATE 4 MG: 4 INJECTION INTRAVENOUS at 08:06

## 2023-04-17 RX ADMIN — ONDANSETRON 4 MG: 2 INJECTION INTRAMUSCULAR; INTRAVENOUS at 13:24

## 2023-04-17 RX ADMIN — KETOROLAC TROMETHAMINE 30 MG: 30 INJECTION, SOLUTION INTRAMUSCULAR at 10:51

## 2023-04-17 RX ADMIN — ONDANSETRON 4 MG: 2 INJECTION INTRAMUSCULAR; INTRAVENOUS at 08:03

## 2023-04-17 RX ADMIN — DEXAMETHASONE SODIUM PHOSPHATE 10 MG: 10 INJECTION INTRAMUSCULAR; INTRAVENOUS at 13:24

## 2023-04-17 RX ADMIN — SODIUM CHLORIDE, POTASSIUM CHLORIDE, SODIUM LACTATE AND CALCIUM CHLORIDE: 600; 310; 30; 20 INJECTION, SOLUTION INTRAVENOUS at 13:11

## 2023-04-17 RX ADMIN — FENTANYL CITRATE 50 MCG: 50 INJECTION, SOLUTION INTRAMUSCULAR; INTRAVENOUS at 13:18

## 2023-04-17 RX ADMIN — PROPOFOL 150 MG: 10 INJECTION, EMULSION INTRAVENOUS at 13:18

## 2023-04-17 RX ADMIN — LIDOCAINE HYDROCHLORIDE 50 MG: 10 INJECTION, SOLUTION EPIDURAL; INFILTRATION; INTRACAUDAL; PERINEURAL at 13:17

## 2023-04-17 ASSESSMENT — PAIN - FUNCTIONAL ASSESSMENT
PAIN_FUNCTIONAL_ASSESSMENT: 0-10
PAIN_FUNCTIONAL_ASSESSMENT: 0-10

## 2023-04-17 ASSESSMENT — ENCOUNTER SYMPTOMS
NAUSEA: 0
VOMITING: 0
DIARRHEA: 0
RHINORRHEA: 0
COUGH: 0
SORE THROAT: 0
ABDOMINAL PAIN: 1
SHORTNESS OF BREATH: 0

## 2023-04-17 ASSESSMENT — PAIN DESCRIPTION - LOCATION
LOCATION: VAGINA
LOCATION: ABDOMEN
LOCATION: ABDOMEN;VAGINA

## 2023-04-17 ASSESSMENT — PAIN DESCRIPTION - FREQUENCY: FREQUENCY: INTERMITTENT

## 2023-04-17 ASSESSMENT — PAIN SCALES - GENERAL
PAINLEVEL_OUTOF10: 10
PAINLEVEL_OUTOF10: 10
PAINLEVEL_OUTOF10: 5

## 2023-04-17 ASSESSMENT — PAIN DESCRIPTION - DESCRIPTORS
DESCRIPTORS: CRAMPING;DISCOMFORT
DESCRIPTORS: DISCOMFORT
DESCRIPTORS: CRAMPING;DISCOMFORT

## 2023-04-17 ASSESSMENT — PAIN DESCRIPTION - PAIN TYPE: TYPE: ACUTE PAIN

## 2023-04-17 ASSESSMENT — PAIN DESCRIPTION - ONSET: ONSET: AWAKENED FROM SLEEP

## 2023-04-17 NOTE — RESULT ENCOUNTER NOTE
Management per ob    Future Appointments  5/2/2023   9:45 AM    ULTRASONOGRAPHER 2         Mat Fetal           [facility]  5/9/2023   9:45 AM    Nikki Olguin, APRN - [address] [facility] OB/Gyn        [facility] Not applicable as gestational age is greater than or equal to 34 weeks.

## 2023-04-17 NOTE — ED TRIAGE NOTES
Pt to ED c/o vaginal bleeding, possible miscarriage. Pt is 10 weeks pregnant. Pt went to [facility] on Saturday with vaginal bleeding and was told that the baby did not have a heart beat. Pt states the vaginal bleeding and pain has gotten worse, feeling like contractions. Pt states she soaks one pad about every hour and a half. Pt alert and oriented x4. Pt in gown, call light in reach. Pt's fiancee at bedside. Vitals stable. Will continue to monitor.

## 2023-04-17 NOTE — H&P
OB/GYN H&P  [facility]    Patient Name: Rudolph Block     Patient : 1990  Room/Bed:   Admission Date/Time: 2023  7:40 AM  Primary Care Physician: Sandy Rutherford MD    CC:   Chief Complaint   Patient presents with    Vaginal Bleeding     10 weeks preg, miscarriage. HPI: Rudolph Block is a 34 y.o. female [id number] presents to the emergency department, c/o vaginal bleeding at 10wk2d gestation. Patient's last menstrual period was 2023. The patient reports an increase in vaginal bleeding and abdominal cramping over the last few days. She is dated by a 6w0d ultrasound on 3/17/23. On 3/24/23 FHR identified. The patient began bleeding on 4/15/23 and reported to [facility] ED. Pelvic examination at that time showed closed cervical os with minimal bleeding. Limited bedside ultrasound performed by Dr. Nita Lam showed gestational sac, fetal contents and no visualized heart rate. The plan of care discussed at that point in time included a 48-hour quantitative b-hCG and outpatient follow up pelvic ultrasound. The patient reported back to the emergency department today for increased bleeding and abdominal cramping. She states she is currently soaking one pad/hour. REVIEW OF SYSTEMS:   A minimum of an eleven point review of systems was completed.     Constitutional: negative fever, negative chills  HEENT: negative visual disturbances, negative headaches  Respiratory: negative dyspnea, negative cough  Cardiovascular: negative chest pain,  negative palpitations  Gastrointestinal: positive abdominal pain, negative RUQ pain, negative N/V, negative diarrhea, negative constipation  Genitourinary: negative dysuria, negative vaginal discharge, positive vaginal bleeding  Dermatological: negative rash, negative wounds  Hematologic: negative bleeding/clotting disorder  Immunologic: negative recent illness, negative recent sick contact, negative allergic

## 2023-04-17 NOTE — ED NOTES
The following labs were labeled with appropriate pt sticker and tubed to lab:     [x] Blue     [x] Lavender   [] on ice  [x] Green/yellow  [x] Green/black [] on ice  [] [place] Leblanc  [] on ice  [] Yellow  [x] Red  [] Type/ Screen  [] ABG  [] VBG    [] COVID-19 swab    [] Rapid  [] PCR  [] Flu swab  [] Peds Viral Panel     [] Urine Sample  [] Fecal Sample  [] Pelvic Cultures  [] Blood Cultures  [] X 2  [] STREP Cultures      Triston Romo RN  04/17/23 [id number]

## 2023-04-17 NOTE — ED PROVIDER NOTES
[address] [facility]     Emergency Department     Faculty Attestation    I performed a history and physical examination of the patient and discussed management with the resident. I have reviewed and agree with the residents findings including all diagnostic interpretations, and treatment plans as written. Any areas of disagreement are noted on the chart. I was personally present for the key portions of any procedures. I have documented in the chart those procedures where I was not present during the key portions. I have reviewed the emergency nurses triage note. I agree with the chief complaint, past medical history, past surgical history, allergies, medications, social and family history as documented unless otherwise noted below. Documentation of the HPI, Physical Exam and Medical Decision Making performed by scribchavez is based on my personal performance of the HPI, PE and MDM. For Physician Assistant/ Nurse Practitioner cases/documentation I have personally evaluated this patient and have completed at least one if not all key elements of the E/M (history, physical exam, and MDM). Additional findings are as noted.     Primary Care Physician: Jed Iniguez MD    Patient with  10 ekg pregnant started cramping and bleeding 2 days ago bedside US at outlying facility showed no FHT, patient to follow up outpatient but worsening cramping and bleeding this morning, called ob office, she follows with Dr. Mihaela Rodgers and was directed to come to ER, blood type in a+, she is accompanied by signficant other  Abdomen ttp in the lower abdomen, bedside US shows gestational sac, but no FHT,    Inevitable v missed ab v spontaneous Ab   Pelvic exam, quant repeat, last was ~21314  Ob consultation  Pain control      Damion Treviño D.O, M.P.H  Attending Emergency Medicine Physician         Damion Treviño DO  [phone]
vomiting. Genitourinary:  Positive for vaginal bleeding. Negative for dysuria, flank pain and vaginal pain. Musculoskeletal:  Negative for myalgias, neck pain and neck stiffness. Skin:  Negative for rash and wound. Neurological:  Negative for dizziness, syncope, light-headedness and headaches. Psychiatric/Behavioral:  Negative for dysphoric mood and suicidal ideas. PHYSICAL EXAM      INITIAL VITALS:   BP 97/68   Pulse 66   Temp 98 °F (36.7 °C) (Temporal)   Resp 14   Ht 5' 5\" (1.651 m)   Wt 157 lb (71.2 kg)   LMP 01/22/2023   SpO2 96%   BMI 26.13 kg/m²     Physical Exam  Vitals and nursing note reviewed. Exam conducted with a chaperone present. Constitutional:       General: She is not in acute distress. Appearance: Normal appearance. She is not ill-appearing or diaphoretic. HENT:      Head: Normocephalic. Nose: Nose normal.      Mouth/Throat:      Mouth: Mucous membranes are moist.      Pharynx: Oropharynx is clear. Eyes:      Extraocular Movements: Extraocular movements intact. Conjunctiva/sclera: Conjunctivae normal.      Pupils: Pupils are equal, round, and reactive to light. Cardiovascular:      Rate and Rhythm: Normal rate and regular rhythm. Pulses: Normal pulses. Heart sounds: Normal heart sounds. Pulmonary:      Effort: Pulmonary effort is normal. No respiratory distress. Breath sounds: Normal breath sounds. No wheezing or rales. Chest:      Chest wall: No tenderness. Abdominal:      General: There is no distension. Palpations: Abdomen is soft. Tenderness: There is no abdominal tenderness. There is no guarding or rebound. Genitourinary:     Vagina: Bleeding present. Cervix: Normal.      Comments: Blood clots within vaginal vault; Os closed  Musculoskeletal:         General: Normal range of motion. Cervical back: Normal range of motion and neck supple. Skin:     General: Skin is warm.       Capillary Refill: Capillary

## 2023-04-17 NOTE — RESULT ENCOUNTER NOTE
Addressed in ED     Future Appointments  5/2/2023   9:45 AM    ULTRASONOGRAPHER 2         Mat Fetal           [facility]  5/9/2023   9:45 AM    Jenniffer Zimmer, APRN - [facility] OB/Gyn        [facility]

## 2023-04-17 NOTE — PROGRESS NOTES
CLINICAL PHARMACY NOTE: MEDS TO BEDS    Total # of Prescriptions Filled: 4   The following medications were delivered to the patient:  IBU 600MG   DOXY 100MG   OXY 5MG   METHYLERGONOVINE 0.2MG    Additional Documentation:     PICKED UP AT PHARMACY

## 2023-04-17 NOTE — LACTATION NOTE
To ED room 18 to assist pt with pumping before she is taken to surgery. Pt states it does not take her long to express milk. Kate hand pump given and instructed pt on proper use. Labels given for milk as well as storage containers. Encouraged pt to call lactation if she needs double electric pump.

## 2023-04-17 NOTE — ED NOTES
Writer assisting dr. Gail Pichardo for pelvic exam     Remi Berger RN  04/17/23 Kristy Shelley RN  04/17/23 [id number]

## 2023-04-17 NOTE — CONSULTS
Consultation completed, see note.
High    History of gestational diabetes 09/10/2018     Priority: High     Early 1 hour GTT ordered   If wnl repeat at 28 weeks      Dermatitis 2022     Priority: Medium    Missed  2023       Plan discussed with Dr. Dennys Ham, who is agreeable.      Attending's Name: Dr. Chiquita Burgess DO  Ob/Gyn Resident  PGY3  [facility]  2023, 11:24 AM

## 2023-04-17 NOTE — H&P
HISTORY & PHYSICAL PRE-OP UPDATE NOTE    Gynecologic Surgery Pre-Operative Attestation/update Note:  23       Facility: [address] [place], [place] [address]Martin Nowak's  Date: 2023  Time: 12:56 PM      Patient Name: Twyla Rodríguez  Patient : 1990  Room/Bed:   Admission Date/Time: 2023  7:40 AM  MRN #: [id number]  [facility] #: [id number]    Past Medical History:   Diagnosis Date    Adult BMI 25.0-25.9 kg/sq m 2016    Anemia 2018    Anxiety     Chronic ankle pain, bilateral 2022    Corneal rust ring 2020    Delivery with history of      Elevated fasting blood sugar 8/10/2020    Generalized anxiety disorder 10/6/2015    Gestational diabetes mellitus (GDM), antepartum 1/3/2017    Previous pregnancy     Gestational diabetes mellitus in pregnancy 2016    Iron deficiency anemia secondary to inadequate dietary iron intake 2022    PLTCS 19 M APG  Wt 9#12 2019    Previous LTC C/S wants repeat     (spontaneous vaginal delivery) 2021    Vitamin D deficiency 2020         Past Surgical History:   Procedure Laterality Date     SECTION N/A 2019     SECTION PRIMARY performed by Suri Angulo DO at [facility] [address] [place] [address] N/A 2022     SECTION performed by Niko Chino DO at [facility] OR    MOUTH SURGERY      WISDOM TOOTH EXTRACTION           No current facility-administered medications on file prior to encounter. Current Outpatient Medications on File Prior to Encounter   Medication Sig Dispense Refill    ondansetron (ZOFRAN) 4 MG tablet Take 1 tablet by mouth every 8 hours as needed for Nausea 30 tablet 1    pyridoxine (RA VITAMIN B-6) 50 MG tablet Take 1 tablet by mouth in the morning and 1 tablet in the evening.  60 tablet 3    ketoconazole (NIZORAL) 2 % cream Apply topically  2 times a day  on the left elbow for 4 weeks 60 g 1    Prenatal Vit-Fe Fumarate-FA (PRENATAL VITAMINS) 28-0.8 MG

## 2023-04-17 NOTE — ANESTHESIA PRE PROCEDURE
GI/Hepatic/Renal: Neg GI/Hepatic/Renal ROS            Endo/Other: Negative Endo/Other ROS                    Abdominal:             Vascular: Other Findings:           Anesthesia Plan      general     ASA 2       Induction: intravenous. MIPS: Postoperative opioids intended and Prophylactic antiemetics administered. Anesthetic plan and risks discussed with patient. Plan discussed with CRNA.                     Cierra Vee MD   4/17/2023

## 2023-04-17 NOTE — OP NOTE
04/17/2023 12.6 11.9 - 15.1 g/dL Final     Hematocrit:   Hematocrit   Date Value Ref Range Status   04/17/2023 37.9 36.3 - 47.1 % Final     Platelet Count:   Platelets   Date Value Ref Range Status   04/17/2023 185 138 - 453 k/uL Final       Rhogam Ordered: No  Iron Sulfate 325 mg PO BID Ordered: No  Colace 100 mg PO q hs Ordered: No      Electronically signed by Dion Flores DO on 4/17/23 at 1:45 PM EDT     Electronically signed by Dion Flores DO on 4/17/2023 at 1:43 PM

## 2023-04-18 LAB — SURGICAL PATHOLOGY REPORT: NORMAL

## 2023-04-18 NOTE — RESULT ENCOUNTER NOTE
Management per OB    Future Appointments  5/3/2023   9:45 AM    [address]

## 2023-04-19 NOTE — ANESTHESIA POSTPROCEDURE EVALUATION
POST- ANESTHESIA EVALUATION       Pt Name: Ravin Kerr  MRN: [id number]  YOB: 1990  Date of evaluation: 2023  Time:  8:49 AM      /76   Pulse 71   Temp 98 °F (36.7 °C) (Temporal)   Resp 18   Ht 5' 5\" (1.651 m)   Wt 157 lb (71.2 kg)   LMP 2023   SpO2 95%   BMI 26.13 kg/m²      Consciousness Level  Awake  Cardiopulmonary Status  Stable  Pain Adequately Treated YES  Nausea / Vomiting  NO  Adequate Hydration  YES  Anesthesia Related Complications NONE      Electronically signed by Amy Clements MD on 2023 at 8:49 AM       Department of Anesthesiology  Postprocedure Note    Patient: Ravin Kerr  MRN: [id number]  YOB: 1990  Date of evaluation: 2023      Procedure Summary     Date: 23 Room / Location: [address]    Anesthesia Start: 1312 Anesthesia Stop: 5004    Procedure: DILATATION AND CURETTAGE SUCTION (Perineum) Diagnosis:       Missed       (Missed  [O02.1])    Surgeons: Edis Cooper DO Responsible Provider: Amy Clements MD    Anesthesia Type: general ASA Status: 2          Anesthesia Type: No value filed.     Kendrick Phase I: Kendrick Score: 10    Kendrick Phase II: Kendrick Score: 10      Anesthesia Post Evaluation

## 2023-05-02 ENCOUNTER — HOSPITAL ENCOUNTER (OUTPATIENT)
Age: 33
Discharge: HOME | End: 2023-05-02
Payer: COMMERCIAL

## 2023-05-02 ENCOUNTER — TELEPHONE (OUTPATIENT)
Dept: OBGYN CLINIC | Age: 33
End: 2023-05-02

## 2023-05-02 DIAGNOSIS — O03.9 MISCARRIAGE: ICD-10-CM

## 2023-05-02 DIAGNOSIS — O03.9 SAB (SPONTANEOUS ABORTION): Primary | ICD-10-CM

## 2023-05-02 LAB — HCG QUANTITATIVE: 15 MIU/ML

## 2023-05-02 PROCEDURE — 84702 CHORIONIC GONADOTROPIN TEST: CPT

## 2023-05-02 PROCEDURE — 36415 COLL VENOUS BLD VENIPUNCTURE: CPT

## 2023-05-02 NOTE — TELEPHONE ENCOUNTER
Per Bertha Ramos pt notified of Nicholette Sena results and pt to Repeat quant HCG in 2weeks/ order in [facility].

## 2023-05-02 NOTE — TELEPHONE ENCOUNTER
----- Message from JANUARY Pereira CNP sent at 5/2/2023  3:51 PM EDT -----  Repeat quant HCG in 2weeks

## 2023-05-03 ENCOUNTER — TELEMEDICINE (OUTPATIENT)
Dept: OBGYN CLINIC | Age: 33
End: 2023-05-03

## 2023-05-03 DIAGNOSIS — O02.1 MISSED ABORTION: Primary | ICD-10-CM

## 2023-05-03 DIAGNOSIS — Z09 POSTOP CHECK: ICD-10-CM

## 2023-05-03 DIAGNOSIS — Z98.890 STATUS POST D&C: ICD-10-CM

## 2023-05-03 PROCEDURE — 99024 POSTOP FOLLOW-UP VISIT: CPT | Performed by: OBSTETRICS & GYNECOLOGY

## 2023-05-04 NOTE — PROGRESS NOTES
Oneal Al  5/3/2023  10:46 PM      Oneal Al (:  1990) has requested an audio/video evaluation for the following concern(s):    1. Missed     2. Postop check    3. Status post D&C          TELEHEALTH EVALUATION -- Audio/Visual (During AUUS-91 public health emergency)        Oneal Al  Procedure: Suction D&C      Oneal Al is a 34 y.o. female [id number]      She denies any complaints. She denied any shortness of breath, chest pain or dizziness. She denied any nausea, vomiting, or diarrhea. There is no fever, chills, or rigors. The patient denies any vaginal bleeding, discharge or odor. All of her pre-operative complaints are now resolved. Pt states her bleeding has decreased. Pt denies fever/ chills. BHCG 15. Pt to repeat BHCG 2 weeks. Last menstrual period 2023, not currently breastfeeding. The PE is limited due to the Virtual Visit    Abdominal Exam: soft non-tender. Good bowel sounds. No guarding, rebound or rigidity. No costal vertebral angle tenderness bilateral. No hernias. Evaluation with patient participation and visualization-(Viewed Virtually)    Incision: N/A (by video confirmation) (Viewed Virtually)    Extremities: No edema or calf pain noted bilaterally. Patient participation. (Viewed Virtually)    Pelvic Exam: Virtual visit-not completed      Results for orders placed or performed during the hospital encounter of 23   hCG, Quantitative, Pregnancy   Result Value Ref Range    hCG Quant 15 (H) <5 mIU/mL           Assessment:      Diagnosis Orders   1. Missed         2. Postop check        3.  Status post D&C             Patient Active Problem List    Diagnosis Date Noted    REPEAT LTC- section 2022     Priority: High    history of LGA: 9#12oz 2021     Priority: High    History of gestational diabetes 09/10/2018     Priority: High     Early 1 hour GTT ordered   If wnl repeat at 28 weeks      Dermatitis 2022

## 2023-08-04 ENCOUNTER — OFFICE VISIT (OUTPATIENT)
Dept: DERMATOLOGY | Age: 33
End: 2023-08-04

## 2023-08-04 VITALS
DIASTOLIC BLOOD PRESSURE: 73 MMHG | SYSTOLIC BLOOD PRESSURE: 107 MMHG | HEIGHT: 65 IN | OXYGEN SATURATION: 98 % | WEIGHT: 159 LBS | HEART RATE: 77 BPM | TEMPERATURE: 98 F | BODY MASS INDEX: 26.49 KG/M2

## 2023-08-04 DIAGNOSIS — L92.0 GRANULOMA ANNULARE: Primary | ICD-10-CM

## 2023-08-12 ENCOUNTER — HOSPITAL ENCOUNTER (OUTPATIENT)
Age: 33
Discharge: HOME | End: 2023-08-12
Payer: COMMERCIAL

## 2023-08-12 DIAGNOSIS — O03.9 SAB (SPONTANEOUS ABORTION): ICD-10-CM

## 2023-08-12 LAB — B-HCG SERPL EIA 3RD IS-ACNC: 40.2 MIU/ML

## 2023-08-12 PROCEDURE — 36415 COLL VENOUS BLD VENIPUNCTURE: CPT

## 2023-08-12 PROCEDURE — 84702 CHORIONIC GONADOTROPIN TEST: CPT

## 2023-08-14 ENCOUNTER — TELEPHONE (OUTPATIENT)
Dept: OBGYN CLINIC | Age: 33
End: 2023-08-14

## 2023-08-14 DIAGNOSIS — Z34.90 EARLY STAGE OF PREGNANCY: Primary | ICD-10-CM

## 2023-08-14 NOTE — TELEPHONE ENCOUNTER
Patient notified of results and recommendations, patient is taking prenatal vitamins already. Patient to repeat labs in 1 week, order in epic.    LMP: 7/12/23

## 2023-08-14 NOTE — TELEPHONE ENCOUNTER
----- Message from JANUARY Cook CNP sent at 8/14/2023  7:43 AM EDT -----  + quant HCG  Prenatal vitamin 1 PO QD with 12 refills. Repeat quant HCG in 1 week- next Monday.

## 2023-08-19 ENCOUNTER — HOSPITAL ENCOUNTER (OUTPATIENT)
Age: 33
Discharge: HOME | End: 2023-08-19
Payer: COMMERCIAL

## 2023-08-19 DIAGNOSIS — Z34.90 EARLY STAGE OF PREGNANCY: ICD-10-CM

## 2023-08-19 LAB — B-HCG SERPL EIA 3RD IS-ACNC: 2326 MIU/ML

## 2023-08-19 PROCEDURE — 84702 CHORIONIC GONADOTROPIN TEST: CPT

## 2023-08-19 PROCEDURE — 36415 COLL VENOUS BLD VENIPUNCTURE: CPT

## 2023-08-21 ENCOUNTER — TELEPHONE (OUTPATIENT)
Dept: OBGYN CLINIC | Age: 33
End: 2023-08-21

## 2023-08-21 DIAGNOSIS — O20.9 VAGINAL BLEEDING AFFECTING EARLY PREGNANCY: ICD-10-CM

## 2023-08-21 DIAGNOSIS — Z34.90 EARLY STAGE OF PREGNANCY: Primary | ICD-10-CM

## 2023-08-21 NOTE — TELEPHONE ENCOUNTER
LMP was 7/12 to have early ultrasound at the hospital due to history of miscarriage. To call back to schedule intake in office.

## 2023-08-21 NOTE — TELEPHONE ENCOUNTER
----- Message from JANUARY Bhatt CNP sent at 8/21/2023  7:48 AM EDT -----  Please schedule for new OB intake/ ultrasound  Prenatal vitamin 1 PO QD with 12 refills.

## 2023-08-22 ENCOUNTER — HOSPITAL ENCOUNTER (OUTPATIENT)
Dept: ULTRASOUND IMAGING | Age: 33
Discharge: HOME | End: 2023-08-24
Payer: COMMERCIAL

## 2023-08-22 DIAGNOSIS — Z34.90 EARLY STAGE OF PREGNANCY: ICD-10-CM

## 2023-08-22 DIAGNOSIS — O20.9 VAGINAL BLEEDING AFFECTING EARLY PREGNANCY: ICD-10-CM

## 2023-08-22 PROCEDURE — 76817 TRANSVAGINAL US OBSTETRIC: CPT

## 2023-08-24 ENCOUNTER — TELEPHONE (OUTPATIENT)
Dept: OBGYN CLINIC | Age: 33
End: 2023-08-24

## 2023-08-24 DIAGNOSIS — Z34.90 EARLY STAGE OF PREGNANCY: Primary | ICD-10-CM

## 2023-08-24 NOTE — TELEPHONE ENCOUNTER
----- Message from JANUARY Dean CNP sent at 8/23/2023  5:07 PM EDT -----  IUP at 5 weeks and 2 days. Too early in pregnancy to detect fetal pole. Repeat quant HCG in 1 week with OB ultrasound for fetal viability.

## 2023-08-25 ENCOUNTER — HOSPITAL ENCOUNTER (OUTPATIENT)
Age: 33
Discharge: HOME | End: 2023-08-25
Payer: COMMERCIAL

## 2023-08-25 DIAGNOSIS — Z34.90 EARLY STAGE OF PREGNANCY: ICD-10-CM

## 2023-08-25 LAB — B-HCG SERPL EIA 3RD IS-ACNC: ABNORMAL MIU/ML

## 2023-08-25 PROCEDURE — 36415 COLL VENOUS BLD VENIPUNCTURE: CPT

## 2023-08-25 PROCEDURE — 84702 CHORIONIC GONADOTROPIN TEST: CPT

## 2023-08-28 ENCOUNTER — HOSPITAL ENCOUNTER (OUTPATIENT)
Dept: ULTRASOUND IMAGING | Age: 33
Discharge: HOME | End: 2023-08-30
Payer: COMMERCIAL

## 2023-08-28 DIAGNOSIS — Z34.90 EARLY STAGE OF PREGNANCY: ICD-10-CM

## 2023-08-28 PROCEDURE — 76801 OB US < 14 WKS SINGLE FETUS: CPT

## 2023-08-28 PROCEDURE — 76817 TRANSVAGINAL US OBSTETRIC: CPT

## 2023-08-29 ENCOUNTER — TELEPHONE (OUTPATIENT)
Dept: OBGYN CLINIC | Age: 33
End: 2023-08-29

## 2023-08-29 DIAGNOSIS — Z34.90 EARLY STAGE OF PREGNANCY: Primary | ICD-10-CM

## 2023-08-29 NOTE — TELEPHONE ENCOUNTER
----- Message from JANUARY Gunderson CNP sent at 8/29/2023 10:05 AM EDT -----  IUP at 6 weeks and 0 days  Low fetal heart rate. Recommend short term follow up ultrasound with quant HCG levels. Subchorionic hematoma- pelvic rest and no heavy lifting.   Repeat OB ultrasound and quant HCG in 1 week at hospital.

## 2023-08-29 NOTE — TELEPHONE ENCOUNTER
Patient notified of results and recommendations, orders in epic for repeat HCG quant and for repeat ultrasound to be done at the hospital next week. Patient notified of subchorionic hematoma.

## 2023-08-30 NOTE — TELEPHONE ENCOUNTER
CONSULTATION NOTE :ID       Patient - Starr Patel,  Age - 60 y.o.    - 1959      Room Number - 8B-26/026-A   MRN -  [id number]   [facility]  Date of Admission -  2023 11:51 PM  Patient's PCP: Brooke Light DO     Requesting Physician: Jannet Talavera PA-C    REASON FOR CONSULTATION   Possible cellulites of the abdominal wall  CHIEF COMPLAINT   Dislodged feeding tube    HISTORY OF PRESENT ILLNESS       This is a very pleasant 60 y.o. male who was admitted to the hospital with a chief complaints of dislodged J tube. He has hx of metastatic esophageal cancer. The J tube was placed last May by Dr Melton Callander. It came out spontameously was admitted for further evaluation. His CT shows the J tube that was placed again to be on the soft tissue. It came out yesterday and when I saw him there was no feeding tube. Denies any fever or chills. He has an old non absorbable suture on the skin. No redness or prulent draiange.     PAST MEDICAL  HISTORY       Past Medical History:   Diagnosis Date    Atrial fibrillation ([address])     Benign hypertension 11/3/2022    Cancer of distal third of esophagus ([address]) 2022    GERD (gastroesophageal reflux disease)     HLD (hyperlipidemia) 8/15/2022    Sleep apnea        PAST SURGICAL HISTORY     Past Surgical History:   Procedure Laterality Date    ABDOMEN SURGERY  2022    GASTRIC DEVASCULARIZATON-[facility]    ABDOMEN SURGERY  2022    ESOPHAGOGASTRODUODENOSCOPY TRANSORAL DIAGNOSTIC ESOPHAGECTOMY W/ THORACOTOMY-[facility]    DENTAL SURGERY      25 teeth removed    PORT SURGERY Left 2022    LEFT SINGLE LUMEN MEDIPORT performed by Darron Wells MD at [address] [place] [address] Drive 2023    Open JEJUNOSTOMY TUBE INSERTION; repair of umbilical hernia performed by Kasandra Rodas MD at [address]:       Scheduled Meds:   [Held by provider] pantoprazole  40 mg Oral BID    sodium chloride flush  5-40 mL Patient aware of recommendations.

## 2023-09-05 ENCOUNTER — HOSPITAL ENCOUNTER (OUTPATIENT)
Age: 33
Discharge: HOME | End: 2023-09-05
Payer: COMMERCIAL

## 2023-09-05 ENCOUNTER — HOSPITAL ENCOUNTER (OUTPATIENT)
Dept: ULTRASOUND IMAGING | Age: 33
Discharge: HOME | End: 2023-09-07
Payer: COMMERCIAL

## 2023-09-05 DIAGNOSIS — Z34.90 EARLY STAGE OF PREGNANCY: ICD-10-CM

## 2023-09-05 LAB — B-HCG SERPL EIA 3RD IS-ACNC: ABNORMAL MIU/ML

## 2023-09-05 PROCEDURE — 76817 TRANSVAGINAL US OBSTETRIC: CPT

## 2023-09-05 PROCEDURE — 84702 CHORIONIC GONADOTROPIN TEST: CPT

## 2023-09-05 PROCEDURE — 36415 COLL VENOUS BLD VENIPUNCTURE: CPT

## 2023-09-05 NOTE — RESULT ENCOUNTER NOTE
Noted, already scheduled with her OB, increasing hCG as appropriate  Future Appointments  9/11/2023  9:30 AM    [facility] [address]  9/11/2023  10:00 AM   SCHEDULE, [facility] [address] [facility]  11/6/2023  9:30 AM    [facility] Room, KEVIN pelayo             [facility]

## 2023-09-07 NOTE — RESULT ENCOUNTER NOTE
Spoke with radiology at [facility], notified of prelim report only. Per radiology; this will be finalized hopefully today.

## 2023-09-08 ENCOUNTER — TELEPHONE (OUTPATIENT)
Dept: OBGYN CLINIC | Age: 33
End: 2023-09-08

## 2023-09-08 ENCOUNTER — HOSPITAL ENCOUNTER (OUTPATIENT)
Dept: ULTRASOUND IMAGING | Age: 33
End: 2023-09-08
Payer: COMMERCIAL

## 2023-09-08 ENCOUNTER — APPOINTMENT (OUTPATIENT)
Dept: ULTRASOUND IMAGING | Age: 33
End: 2023-09-08
Payer: COMMERCIAL

## 2023-09-08 DIAGNOSIS — Z34.90 EARLY STAGE OF PREGNANCY: Primary | ICD-10-CM

## 2023-09-08 DIAGNOSIS — Z34.90 EARLY STAGE OF PREGNANCY: ICD-10-CM

## 2023-09-08 DIAGNOSIS — O20.9 VAGINAL BLEEDING AFFECTING EARLY PREGNANCY: ICD-10-CM

## 2023-09-08 PROCEDURE — 76817 TRANSVAGINAL US OBSTETRIC: CPT

## 2023-09-08 NOTE — TELEPHONE ENCOUNTER
----- Message from JANUARY Houston NP sent at 9/7/2023  1:00 PM EDT -----  Please order repeat u/s at [facility] to assess fetal viability   FHTs 73  Schedule appt with physicians for follow up

## 2023-09-11 ENCOUNTER — OFFICE VISIT (OUTPATIENT)
Dept: OBGYN CLINIC | Age: 33
End: 2023-09-11
Payer: COMMERCIAL

## 2023-09-11 ENCOUNTER — HOSPITAL ENCOUNTER (OUTPATIENT)
Age: 33
Discharge: HOME | End: 2023-09-11
Payer: COMMERCIAL

## 2023-09-11 VITALS
BODY MASS INDEX: 26.66 KG/M2 | WEIGHT: 160 LBS | SYSTOLIC BLOOD PRESSURE: 118 MMHG | HEIGHT: 65 IN | DIASTOLIC BLOOD PRESSURE: 72 MMHG | RESPIRATION RATE: 18 BRPM | HEART RATE: 84 BPM

## 2023-09-11 DIAGNOSIS — O02.1 MISSED AB: Primary | ICD-10-CM

## 2023-09-11 DIAGNOSIS — O02.1 MISSED AB: ICD-10-CM

## 2023-09-11 DIAGNOSIS — O46.8X1 SUBCHORIONIC HEMORRHAGE OF PLACENTA IN FIRST TRIMESTER, SINGLE OR UNSPECIFIED FETUS: ICD-10-CM

## 2023-09-11 DIAGNOSIS — Z67.90 BLOOD TYPE, RH POSITIVE: ICD-10-CM

## 2023-09-11 DIAGNOSIS — O41.8X10 SUBCHORIONIC HEMORRHAGE OF PLACENTA IN FIRST TRIMESTER, SINGLE OR UNSPECIFIED FETUS: ICD-10-CM

## 2023-09-11 LAB — B-HCG SERPL EIA 3RD IS-ACNC: ABNORMAL MIU/ML

## 2023-09-11 PROCEDURE — G8417 CALC BMI ABV UP PARAM F/U: HCPCS | Performed by: OBSTETRICS & GYNECOLOGY

## 2023-09-11 PROCEDURE — G8427 DOCREV CUR MEDS BY ELIG CLIN: HCPCS | Performed by: OBSTETRICS & GYNECOLOGY

## 2023-09-11 PROCEDURE — 99213 OFFICE O/P EST LOW 20 MIN: CPT | Performed by: OBSTETRICS & GYNECOLOGY

## 2023-09-11 PROCEDURE — 1036F TOBACCO NON-USER: CPT | Performed by: OBSTETRICS & GYNECOLOGY

## 2023-09-11 PROCEDURE — 36415 COLL VENOUS BLD VENIPUNCTURE: CPT

## 2023-09-11 PROCEDURE — 84702 CHORIONIC GONADOTROPIN TEST: CPT

## 2023-09-11 NOTE — PROGRESS NOTES
Marta Murphy  2023      Marta Murphy is a 34 y.o. female [id number]      The patient was seen today. She was here to follow-up regarding her labs and diagnostics ordered at her last visit for the diagnosis of:    ICD-10-CM    1. Missed ab  O02.1 HCG, Quantitative, Pregnancy      2. Subchorionic hemorrhage of placenta in first trimester, single or unspecified fetus  O41.8X10     O46.8X1       3. Blood type, Rh positive  Z67.90           Her bowels are regular and she is voiding without difficulty. Pt had imaging with no cardiac activity and sac ~6 weeks. The sac is not elongated. Known RH postive. I discussed sx vs expectant fu and s/s sab reviewed. Abstain discussed and fu after completion for workup.        Past Medical History:   Diagnosis Date    Adult BMI 25.0-25.9 kg/sq m 2016    Anemia 2018    Anxiety     Chronic ankle pain, bilateral 2022    Corneal rust ring 2020    Delivery with history of      Elevated fasting blood sugar 8/10/2020    Generalized anxiety disorder 10/6/2015    Gestational diabetes mellitus (GDM), antepartum 1/3/2017    Previous pregnancy     Gestational diabetes mellitus in pregnancy 2016    Iron deficiency anemia secondary to inadequate dietary iron intake 2022    PLTCS 19 M APG  Wt 9#12 2019    Previous LTC C/S wants repeat     (spontaneous vaginal delivery) 2021    Vitamin D deficiency 2020         Past Surgical History:   Procedure Laterality Date     SECTION N/A 2019     SE[facility] PRIMARY [facility] [address] [place] [address] N/A 2022     SECTION performed by Duane Carnes, DO [facility]  2023    DILATION AND CURETTAGE OF UTERUS N/A 2023    DILATATION AND CURETTAGE SUCTION performed by Rose Mary Pollock DO at [address] [place] [address]

## 2023-09-14 ENCOUNTER — PATIENT MESSAGE (OUTPATIENT)
Dept: OBGYN CLINIC | Age: 33
End: 2023-09-14

## 2023-09-14 DIAGNOSIS — Z3A.09 9 WEEKS GESTATION OF PREGNANCY: ICD-10-CM

## 2023-09-14 RX ORDER — ONDANSETRON 4 MG/1
4 TABLET, FILM COATED ORAL EVERY 8 HOURS PRN
Qty: 30 TABLET | Refills: 1 | Status: SHIPPED | OUTPATIENT
Start: 2023-09-14

## 2023-09-14 NOTE — TELEPHONE ENCOUNTER
From: Lisa Nieves  To: Dr. Radha Flores: 9/14/2023 1:43 PM EDT  Subject: Nausea    Hello, my HCG levels are still so high I'm extremely nauseous every day. I've been taking b6 but I almost can't handle it anymore. The nausea is making me miserable while I wait for the miscarriage to happen. Can I please have some Zofran or anything stronger than the b6? Thank you I would appreciate anything.

## 2023-09-18 ENCOUNTER — TELEPHONE (OUTPATIENT)
Dept: OBGYN CLINIC | Age: 33
End: 2023-09-18

## 2023-09-18 ENCOUNTER — PROCEDURE VISIT (OUTPATIENT)
Dept: OBGYN CLINIC | Age: 33
End: 2023-09-18
Payer: COMMERCIAL

## 2023-09-18 ENCOUNTER — HOSPITAL ENCOUNTER (OUTPATIENT)
Age: 33
Discharge: HOME | End: 2023-09-18
Payer: COMMERCIAL

## 2023-09-18 DIAGNOSIS — O36.80X0 PREGNANCY WITH INCONCLUSIVE FETAL VIABILITY, SINGLE OR UNSPECIFIED FETUS: ICD-10-CM

## 2023-09-18 DIAGNOSIS — O02.1 MISSED AB: ICD-10-CM

## 2023-09-18 DIAGNOSIS — O36.80X0 PREGNANCY WITH INCONCLUSIVE FETAL VIABILITY, SINGLE OR UNSPECIFIED FETUS: Primary | ICD-10-CM

## 2023-09-18 LAB
ABDOMINAL CIRCUMFERENCE: NORMAL
B-HCG SERPL EIA 3RD IS-ACNC: ABNORMAL MIU/ML
BIPARIETAL DIAMETER: NORMAL
ESTIMATED FETAL WEIGHT: NORMAL
FEMORAL DIAMETER: NORMAL
HC/AC: NORMAL
HEAD CIRCUMFERENCE: NORMAL

## 2023-09-18 PROCEDURE — 76817 TRANSVAGINAL US OBSTETRIC: CPT | Performed by: OBSTETRICS & GYNECOLOGY

## 2023-09-18 PROCEDURE — 36415 COLL VENOUS BLD VENIPUNCTURE: CPT

## 2023-09-18 PROCEDURE — 84702 CHORIONIC GONADOTROPIN TEST: CPT

## 2023-09-18 NOTE — TELEPHONE ENCOUNTER
Per Lilian Toscano pt notified of quant results. Pt stating she has no s/s of SAB. Pt requesting pelvic US prior to definitive action. Per Lilian Toscano pt scheduled for pelvic US order in [facility].

## 2023-09-19 ENCOUNTER — OFFICE VISIT (OUTPATIENT)
Dept: OBGYN CLINIC | Age: 33
End: 2023-09-19
Payer: COMMERCIAL

## 2023-09-19 VITALS
BODY MASS INDEX: 26.49 KG/M2 | WEIGHT: 159 LBS | HEIGHT: 65 IN | DIASTOLIC BLOOD PRESSURE: 60 MMHG | SYSTOLIC BLOOD PRESSURE: 102 MMHG

## 2023-09-19 DIAGNOSIS — O02.1 MISSED AB: Primary | ICD-10-CM

## 2023-09-19 PROCEDURE — G8427 DOCREV CUR MEDS BY ELIG CLIN: HCPCS | Performed by: OBSTETRICS & GYNECOLOGY

## 2023-09-19 PROCEDURE — 1036F TOBACCO NON-USER: CPT | Performed by: OBSTETRICS & GYNECOLOGY

## 2023-09-19 PROCEDURE — 99213 OFFICE O/P EST LOW 20 MIN: CPT | Performed by: OBSTETRICS & GYNECOLOGY

## 2023-09-19 PROCEDURE — G8417 CALC BMI ABV UP PARAM F/U: HCPCS | Performed by: OBSTETRICS & GYNECOLOGY

## 2023-09-19 RX ORDER — MISOPROSTOL 200 UG/1
TABLET ORAL
Qty: 15 TABLET | Refills: 0 | Status: SHIPPED | OUTPATIENT
Start: 2023-09-19

## 2023-09-19 RX ORDER — IBUPROFEN 600 MG/1
600 TABLET ORAL EVERY 6 HOURS PRN
Qty: 30 TABLET | Refills: 1 | Status: SHIPPED | OUTPATIENT
Start: 2023-09-19 | End: 2023-10-04

## 2023-09-21 ENCOUNTER — TELEPHONE (OUTPATIENT)
Dept: OBGYN CLINIC | Age: 33
End: 2023-09-21

## 2023-09-21 RX ORDER — IBUPROFEN 800 MG/1
800 TABLET ORAL EVERY 6 HOURS PRN
Qty: 60 TABLET | Refills: 0 | Status: SHIPPED | OUTPATIENT
Start: 2023-09-21

## 2023-09-21 NOTE — TELEPHONE ENCOUNTER
Pt stating she is having increased cramping with Cytotec use and is requesting something to help with the pain. Pt instructed if she is bleeding through a pad every 15 min/fever/increased pain she will need to go to ED for evaluation. Pt voiced understanding to all results and recommendations.

## 2023-09-28 ENCOUNTER — TELEPHONE (OUTPATIENT)
Dept: OBGYN CLINIC | Age: 33
End: 2023-09-28

## 2023-09-28 ENCOUNTER — HOSPITAL ENCOUNTER (OUTPATIENT)
Age: 33
Discharge: HOME | End: 2023-09-28
Payer: COMMERCIAL

## 2023-09-28 ENCOUNTER — HOSPITAL ENCOUNTER (OUTPATIENT)
Dept: ULTRASOUND IMAGING | Age: 33
Discharge: HOME | End: 2023-09-30
Attending: OBSTETRICS & GYNECOLOGY
Payer: COMMERCIAL

## 2023-09-28 DIAGNOSIS — O03.9 SAB (SPONTANEOUS ABORTION): Primary | ICD-10-CM

## 2023-09-28 DIAGNOSIS — O03.9 SAB (SPONTANEOUS ABORTION): ICD-10-CM

## 2023-09-28 DIAGNOSIS — N93.9 ABNORMAL UTERINE BLEEDING: ICD-10-CM

## 2023-09-28 LAB — B-HCG SERPL EIA 3RD IS-ACNC: ABNORMAL MIU/ML

## 2023-09-28 PROCEDURE — 76856 US EXAM PELVIC COMPLETE: CPT

## 2023-09-28 PROCEDURE — 84702 CHORIONIC GONADOTROPIN TEST: CPT

## 2023-09-28 PROCEDURE — 36415 COLL VENOUS BLD VENIPUNCTURE: CPT

## 2023-09-28 PROCEDURE — 76830 TRANSVAGINAL US NON-OB: CPT

## 2023-09-28 RX ORDER — MISOPROSTOL 200 UG/1
TABLET ORAL
Qty: 3 TABLET | Refills: 0 | Status: SHIPPED | OUTPATIENT
Start: 2023-09-28

## 2023-09-28 NOTE — TELEPHONE ENCOUNTER
Per Dr Yadiel Santiago pt notified of pelvic US results. Pt to take 600mg of Cytotec po now and repeat quant in 48 hrs. Pt voiced understanding to all results and recommendations given. Pt stating she is currently feeling fine with some vaginal bleeding. Pt stating she is not having any increase pain/ bleeding or fever. Pt instructed to go to nearest ED for evaluation if symptoms worsen.

## 2023-09-28 NOTE — TELEPHONE ENCOUNTER
Pt called stating she completed Cytotec one week ago as instructed. Per pt she was having increased cramping today and had passed a large clot and now is having vaginal bleeding. Per Dr Ralf Patricio pt to complete quant today. Pending results pt will need follow up pelvic US ordered.

## 2023-09-28 NOTE — PROGRESS NOTES
Per Dr Geller Spotted pt notified of [place] [facility] results. Pt to complete pelvic US at Hospital and repeat quant in 2 weeks.

## 2023-09-30 ENCOUNTER — HOSPITAL ENCOUNTER (OUTPATIENT)
Age: 33
Discharge: HOME | End: 2023-09-30
Payer: COMMERCIAL

## 2023-09-30 DIAGNOSIS — O02.1 MISSED AB: ICD-10-CM

## 2023-09-30 LAB — B-HCG SERPL EIA 3RD IS-ACNC: 7797 MIU/ML

## 2023-09-30 PROCEDURE — 84702 CHORIONIC GONADOTROPIN TEST: CPT

## 2023-09-30 PROCEDURE — 36415 COLL VENOUS BLD VENIPUNCTURE: CPT

## 2023-10-04 ENCOUNTER — HOSPITAL ENCOUNTER (OUTPATIENT)
Age: 33
Discharge: HOME | End: 2023-10-04
Payer: COMMERCIAL

## 2023-10-04 DIAGNOSIS — O02.1 MISSED ABORTION: Primary | ICD-10-CM

## 2023-10-04 LAB — B-HCG SERPL EIA 3RD IS-ACNC: 4061 MIU/ML

## 2023-10-04 PROCEDURE — 36415 COLL VENOUS BLD VENIPUNCTURE: CPT

## 2023-10-04 PROCEDURE — 84702 CHORIONIC GONADOTROPIN TEST: CPT

## 2023-10-11 ENCOUNTER — HOSPITAL ENCOUNTER (OUTPATIENT)
Age: 33
Discharge: HOME | End: 2023-10-11
Payer: COMMERCIAL

## 2023-10-11 DIAGNOSIS — O02.1 MISSED ABORTION: ICD-10-CM

## 2023-10-11 DIAGNOSIS — O03.9 SAB (SPONTANEOUS ABORTION): Primary | ICD-10-CM

## 2023-10-11 LAB — B-HCG SERPL EIA 3RD IS-ACNC: 1431 MIU/ML

## 2023-10-11 PROCEDURE — 36415 COLL VENOUS BLD VENIPUNCTURE: CPT

## 2023-10-11 PROCEDURE — 84702 CHORIONIC GONADOTROPIN TEST: CPT

## 2023-10-26 ENCOUNTER — HOSPITAL ENCOUNTER (OUTPATIENT)
Age: 33
Discharge: HOME | End: 2023-10-26
Payer: COMMERCIAL

## 2023-10-26 DIAGNOSIS — O03.9 SAB (SPONTANEOUS ABORTION): ICD-10-CM

## 2023-10-26 LAB — B-HCG SERPL EIA 3RD IS-ACNC: 323.1 MIU/ML

## 2023-10-26 PROCEDURE — 84702 CHORIONIC GONADOTROPIN TEST: CPT

## 2023-10-26 PROCEDURE — 36415 COLL VENOUS BLD VENIPUNCTURE: CPT

## 2023-10-26 NOTE — RESULT ENCOUNTER NOTE
Pt reviewed results via [address] [place] [address]. Pt has standing order in [facility].

## 2023-11-08 ENCOUNTER — HOSPITAL ENCOUNTER (OUTPATIENT)
Age: 33
Discharge: HOME | End: 2023-11-08
Payer: COMMERCIAL

## 2023-11-08 DIAGNOSIS — O03.9 SAB (SPONTANEOUS ABORTION): ICD-10-CM

## 2023-11-08 LAB — B-HCG SERPL EIA 3RD IS-ACNC: 80.4 MIU/ML

## 2023-11-08 PROCEDURE — 36415 COLL VENOUS BLD VENIPUNCTURE: CPT

## 2023-11-08 PROCEDURE — 84702 CHORIONIC GONADOTROPIN TEST: CPT

## 2023-11-09 DIAGNOSIS — O03.9 SAB (SPONTANEOUS ABORTION): Primary | ICD-10-CM

## 2023-11-09 RX ORDER — PHENAZOPYRIDINE HYDROCHLORIDE 200 MG/1
200 TABLET, FILM COATED ORAL 3 TIMES DAILY PRN
Qty: 9 TABLET | Refills: 0 | Status: SHIPPED | OUTPATIENT
Start: 2023-11-09 | End: 2023-11-12

## 2023-11-09 RX ORDER — CIPROFLOXACIN 250 MG/1
250 TABLET, FILM COATED ORAL 2 TIMES DAILY
Qty: 20 TABLET | Refills: 0 | Status: SHIPPED | OUTPATIENT
Start: 2023-11-09 | End: 2023-11-19

## 2023-11-09 NOTE — TELEPHONE ENCOUNTER
Pt requesting something for UTI symptoms. Per Rosalinda Heaton pt to get Cipro 250mg and Pyridium 200mg sent to pt pharm.

## 2023-11-17 ENCOUNTER — HOSPITAL ENCOUNTER (OUTPATIENT)
Dept: ULTRASOUND IMAGING | Age: 33
End: 2023-11-17
Attending: OBSTETRICS & GYNECOLOGY
Payer: COMMERCIAL

## 2023-11-17 ENCOUNTER — TELEPHONE (OUTPATIENT)
Dept: OBGYN | Age: 33
End: 2023-11-17

## 2023-11-17 ENCOUNTER — TELEPHONE (OUTPATIENT)
Dept: OBGYN CLINIC | Age: 33
End: 2023-11-17

## 2023-11-17 ENCOUNTER — HOSPITAL ENCOUNTER (OUTPATIENT)
Age: 33
Discharge: HOME | End: 2023-11-17
Attending: OBSTETRICS & GYNECOLOGY
Payer: COMMERCIAL

## 2023-11-17 DIAGNOSIS — O03.9 SAB (SPONTANEOUS ABORTION): ICD-10-CM

## 2023-11-17 DIAGNOSIS — N93.9 VAGINAL BLEEDING: Primary | ICD-10-CM

## 2023-11-17 DIAGNOSIS — N93.9 VAGINAL BLEEDING: ICD-10-CM

## 2023-11-17 LAB — B-HCG SERPL EIA 3RD IS-ACNC: 9.5 MIU/ML

## 2023-11-17 PROCEDURE — 36415 COLL VENOUS BLD VENIPUNCTURE: CPT

## 2023-11-17 PROCEDURE — 76856 US EXAM PELVIC COMPLETE: CPT

## 2023-11-17 PROCEDURE — 84702 CHORIONIC GONADOTROPIN TEST: CPT

## 2023-11-17 PROCEDURE — 76830 TRANSVAGINAL US NON-OB: CPT

## 2023-11-17 RX ORDER — MISOPROSTOL 200 UG/1
800 TABLET ORAL ONCE
Qty: 4 TABLET | Refills: 0 | Status: SHIPPED | OUTPATIENT
Start: 2023-11-17 | End: 2023-11-17

## 2023-11-17 NOTE — TELEPHONE ENCOUNTER
Pt called stating she is having increased vaginal bleeding. Pt s/p SAB 9/11/23. Pt Quant 11/8/23-80. Pt stating her bleeding had lightened up but has now increased and she is concerned. Per Dr Jose Andrade pt to complete quant and Pelvic US. Orders in epic. Stat Ultrasound ordered. Pt instructed if increased bleeding/pain/fever/vaginal odor go to nearest ED for evaluation. Pt voiced understanding to all recommendations give.

## 2023-11-17 NOTE — TELEPHONE ENCOUNTER
Pt called for sono/ lab results. Results reviewed with pt. Pt states she is still bleeding after missed AB and taking cytotec but not heavy. Pt given options to schedule D&C vs cytotec 800 mcg x 1 dose and if she doesn't pass tissue/ clots to proceed with D&C. Pt wishes to proceed with medication. Pt denies fever/ chills. Pt denies lightheadedness/ dizziness. Pt was counseled on risks/ benefits/ alternative options.  Rx cytotec sent to pharmacy

## 2023-11-20 ENCOUNTER — TELEPHONE (OUTPATIENT)
Dept: OBGYN CLINIC | Age: 33
End: 2023-11-20

## 2023-11-20 DIAGNOSIS — O03.9 SAB (SPONTANEOUS ABORTION): Primary | ICD-10-CM

## 2023-11-20 NOTE — TELEPHONE ENCOUNTER
----- Message from Nabil Duarte DO sent at 11/20/2023  8:07 AM EST -----  Abstain   Repeat QBHCG I n 7 days Left message for patient to call our office.

## 2023-11-22 ENCOUNTER — TELEPHONE (OUTPATIENT)
Dept: OBGYN CLINIC | Age: 33
End: 2023-11-22

## 2023-11-22 ENCOUNTER — HOSPITAL ENCOUNTER (OUTPATIENT)
Dept: PREADMISSION TESTING | Age: 33
Discharge: HOME | End: 2023-11-22
Attending: OBSTETRICS & GYNECOLOGY | Admitting: OBSTETRICS & GYNECOLOGY
Payer: COMMERCIAL

## 2023-11-22 VITALS
SYSTOLIC BLOOD PRESSURE: 101 MMHG | RESPIRATION RATE: 16 BRPM | HEIGHT: 65 IN | DIASTOLIC BLOOD PRESSURE: 69 MMHG | HEART RATE: 66 BPM | OXYGEN SATURATION: 98 % | TEMPERATURE: 97.9 F | WEIGHT: 159 LBS | BODY MASS INDEX: 26.49 KG/M2

## 2023-11-22 LAB
B-HCG SERPL EIA 3RD IS-ACNC: 2.5 MIU/ML
BACTERIA URNS QL MICRO: ABNORMAL
BASOPHILS # BLD: 0 K/UL (ref 0–0.2)
BASOPHILS NFR BLD: 1 % (ref 0–2)
BILIRUB UR QL STRIP: NEGATIVE
CASTS #/AREA URNS LPF: ABNORMAL /LPF
CLARITY UR: CLEAR
COLOR UR: YELLOW
EOSINOPHIL # BLD: 0.1 K/UL (ref 0–0.4)
EOSINOPHILS RELATIVE PERCENT: 2 % (ref 0–4)
EPI CELLS #/AREA URNS HPF: ABNORMAL /HPF
ERYTHROCYTE [DISTWIDTH] IN BLOOD BY AUTOMATED COUNT: 12.6 % (ref 11.5–14.9)
GLUCOSE UR STRIP-MCNC: NEGATIVE MG/DL
HCT VFR BLD AUTO: 35.5 % (ref 36–46)
HGB BLD-MCNC: 11.8 G/DL (ref 12–16)
HGB UR QL STRIP.AUTO: ABNORMAL
KETONES UR STRIP-MCNC: NEGATIVE MG/DL
LEUKOCYTE ESTERASE UR QL STRIP: NEGATIVE
LYMPHOCYTES NFR BLD: 1.6 K/UL (ref 1–4.8)
LYMPHOCYTES RELATIVE PERCENT: 28 % (ref 24–44)
MCH RBC QN AUTO: 27.1 PG (ref 26–34)
MCHC RBC AUTO-ENTMCNC: 33.2 G/DL (ref 31–37)
MCV RBC AUTO: 81.7 FL (ref 80–100)
MONOCYTES NFR BLD: 0.4 K/UL (ref 0.1–1.3)
MONOCYTES NFR BLD: 7 % (ref 1–7)
NEUTROPHILS NFR BLD: 62 % (ref 36–66)
NEUTS SEG NFR BLD: 3.6 K/UL (ref 1.3–9.1)
NITRITE UR QL STRIP: NEGATIVE
PH UR STRIP: 7.5 [PH] (ref 5–8)
PLATELET # BLD AUTO: 233 K/UL (ref 150–450)
PMV BLD AUTO: 7.9 FL (ref 6–12)
PROT UR STRIP-MCNC: NEGATIVE MG/DL
RBC # BLD AUTO: 4.34 M/UL (ref 4–5.2)
RBC #/AREA URNS HPF: ABNORMAL /HPF
SP GR UR STRIP: 1.01 (ref 1–1.03)
UROBILINOGEN UR STRIP-ACNC: NORMAL EU/DL (ref 0–1)
WBC #/AREA URNS HPF: ABNORMAL /HPF
WBC OTHER # BLD: 5.7 K/UL (ref 3.5–11)

## 2023-11-22 PROCEDURE — 36415 COLL VENOUS BLD VENIPUNCTURE: CPT

## 2023-11-22 PROCEDURE — 93005 ELECTROCARDIOGRAM TRACING: CPT

## 2023-11-22 PROCEDURE — 85025 COMPLETE CBC W/AUTO DIFF WBC: CPT

## 2023-11-22 PROCEDURE — 84702 CHORIONIC GONADOTROPIN TEST: CPT

## 2023-11-22 PROCEDURE — 86403 PARTICLE AGGLUT ANTBDY SCRN: CPT

## 2023-11-22 PROCEDURE — 87086 URINE CULTURE/COLONY COUNT: CPT

## 2023-11-22 PROCEDURE — 81001 URINALYSIS AUTO W/SCOPE: CPT

## 2023-11-22 NOTE — TELEPHONE ENCOUNTER
----- Message from JANUARY Oconnell NP sent at 11/22/2023  3:46 PM EST -----  Negative   Please notified patient

## 2023-11-22 NOTE — TELEPHONE ENCOUNTER
Patient notified of negative quant result, patient to have repeat sono done Friday, will be in contact with patient once sono is completed.

## 2023-11-23 LAB
MICROORGANISM SPEC CULT: ABNORMAL
SPECIMEN DESCRIPTION: ABNORMAL

## 2023-11-24 ENCOUNTER — TELEPHONE (OUTPATIENT)
Dept: OBGYN | Age: 33
End: 2023-11-24

## 2023-11-24 ENCOUNTER — TELEPHONE (OUTPATIENT)
Dept: OBGYN CLINIC | Age: 33
End: 2023-11-24

## 2023-11-24 ENCOUNTER — HOSPITAL ENCOUNTER (OUTPATIENT)
Dept: ULTRASOUND IMAGING | Age: 33
End: 2023-11-24
Payer: COMMERCIAL

## 2023-11-24 ENCOUNTER — ANESTHESIA EVENT (OUTPATIENT)
Dept: OPERATING ROOM | Age: 33
End: 2023-11-24

## 2023-11-24 DIAGNOSIS — O03.9 SAB (SPONTANEOUS ABORTION): ICD-10-CM

## 2023-11-24 LAB
EKG ATRIAL RATE: 68 BPM
EKG P AXIS: 62 DEGREES
EKG P-R INTERVAL: 164 MS
EKG Q-T INTERVAL: 400 MS
EKG QRS DURATION: 98 MS
EKG QTC CALCULATION (BAZETT): 425 MS
EKG R AXIS: 81 DEGREES
EKG T AXIS: 36 DEGREES
EKG VENTRICULAR RATE: 68 BPM

## 2023-11-24 PROCEDURE — 76830 TRANSVAGINAL US NON-OB: CPT

## 2023-11-24 RX ORDER — AMPICILLIN 500 MG/1
500 CAPSULE ORAL 4 TIMES DAILY
Qty: 40 CAPSULE | Refills: 0 | Status: SHIPPED | OUTPATIENT
Start: 2023-11-24 | End: 2023-12-04

## 2023-11-24 NOTE — TELEPHONE ENCOUNTER
Per Sheryl Avelar pt to notified of + GBS in Urine and pt to get   Ampicillin 500 mg PO QID x 10 days sent to pt pharm.

## 2023-11-24 NOTE — PRE-PROCEDURE INSTRUCTIONS
Nothing to eat after midnight. Y  Are you taking any blood thinners? When was the last day? N  Make sure to use Hibiclens prior to surgery. Remove any jewelry and body piercings. Y  Do you wear glasses? If so, please bring a case to store them in. Y  Are you having any Covid symptoms? N  Do you have any new rashes, infections, etc. that we should be aware of?N  Do you have a ride home the day of surgery? It cannot be a cab or medical transportation. Y  Verify surgery time and what time to arrive at hospital.[id number]

## 2023-11-24 NOTE — TELEPHONE ENCOUNTER
Obstetric/Gynecology Note    Notified by radiology of US results. Patient has a plan in place and is scheduled for D&C on 11/27/2023.     Janice River [address]    [address], [address]  11/24/2023, 11:13 AM

## 2023-11-24 NOTE — TELEPHONE ENCOUNTER
----- Message from JANUARY Patino NP sent at 11/24/2023  8:29 AM EST -----  + GBS  Ampicillin 500 mg PO QID x 10 days

## 2023-11-27 ENCOUNTER — ANESTHESIA (OUTPATIENT)
Dept: OPERATING ROOM | Age: 33
End: 2023-11-27

## 2023-11-27 ENCOUNTER — HOSPITAL ENCOUNTER (OUTPATIENT)
Age: 33
Setting detail: OUTPATIENT SURGERY
Discharge: HOME | End: 2023-11-27
Attending: OBSTETRICS & GYNECOLOGY | Admitting: OBSTETRICS & GYNECOLOGY
Payer: COMMERCIAL

## 2023-11-27 VITALS
DIASTOLIC BLOOD PRESSURE: 73 MMHG | HEIGHT: 65 IN | SYSTOLIC BLOOD PRESSURE: 104 MMHG | WEIGHT: 159 LBS | TEMPERATURE: 97.8 F | RESPIRATION RATE: 16 BRPM | HEART RATE: 66 BPM | OXYGEN SATURATION: 98 % | BODY MASS INDEX: 26.49 KG/M2

## 2023-11-27 LAB
B-HCG SERPL EIA 3RD IS-ACNC: <1 MIU/ML
HCG SERPL QL: NEGATIVE

## 2023-11-27 PROCEDURE — 36415 COLL VENOUS BLD VENIPUNCTURE: CPT

## 2023-11-27 PROCEDURE — 84703 CHORIONIC GONADOTROPIN ASSAY: CPT

## 2023-11-27 PROCEDURE — 84702 CHORIONIC GONADOTROPIN TEST: CPT

## 2023-11-27 RX ORDER — SODIUM CHLORIDE, SODIUM LACTATE, POTASSIUM CHLORIDE, CALCIUM CHLORIDE 600; 310; 30; 20 MG/100ML; MG/100ML; MG/100ML; MG/100ML
INJECTION, SOLUTION INTRAVENOUS CONTINUOUS
Status: DISCONTINUED | OUTPATIENT
Start: 2023-11-27 | End: 2023-11-27 | Stop reason: HOSPADM

## 2023-11-27 RX ORDER — SODIUM CHLORIDE 0.9 % (FLUSH) 0.9 %
5-40 SYRINGE (ML) INJECTION EVERY 12 HOURS SCHEDULED
Status: DISCONTINUED | OUTPATIENT
Start: 2023-11-27 | End: 2023-11-27 | Stop reason: HOSPADM

## 2023-11-27 RX ORDER — ACETAMINOPHEN 500 MG
1000 TABLET ORAL ONCE
Status: DISCONTINUED | OUTPATIENT
Start: 2023-11-27 | End: 2023-11-27 | Stop reason: HOSPADM

## 2023-11-27 RX ORDER — LIDOCAINE HYDROCHLORIDE 10 MG/ML
1 INJECTION, SOLUTION EPIDURAL; INFILTRATION; INTRACAUDAL; PERINEURAL
Status: DISCONTINUED | OUTPATIENT
Start: 2023-11-27 | End: 2023-11-27 | Stop reason: HOSPADM

## 2023-11-27 RX ORDER — SODIUM CHLORIDE 9 MG/ML
INJECTION, SOLUTION INTRAVENOUS CONTINUOUS
Status: DISCONTINUED | OUTPATIENT
Start: 2023-11-27 | End: 2023-11-27 | Stop reason: HOSPADM

## 2023-11-27 RX ORDER — SODIUM CHLORIDE 9 MG/ML
INJECTION, SOLUTION INTRAVENOUS PRN
Status: DISCONTINUED | OUTPATIENT
Start: 2023-11-27 | End: 2023-11-27 | Stop reason: HOSPADM

## 2023-11-27 RX ORDER — GABAPENTIN 300 MG/1
300 CAPSULE ORAL ONCE
Status: DISCONTINUED | OUTPATIENT
Start: 2023-11-27 | End: 2023-11-27 | Stop reason: HOSPADM

## 2023-11-27 RX ORDER — SODIUM CHLORIDE 0.9 % (FLUSH) 0.9 %
5-40 SYRINGE (ML) INJECTION PRN
Status: DISCONTINUED | OUTPATIENT
Start: 2023-11-27 | End: 2023-11-27 | Stop reason: HOSPADM

## 2023-11-27 ASSESSMENT — PAIN - FUNCTIONAL ASSESSMENT: PAIN_FUNCTIONAL_ASSESSMENT: NONE - DENIES PAIN

## 2023-11-27 NOTE — PROGRESS NOTES
Pt was seen in preop. Pt states her leeding has subsided. Pt states she had more bleeding and cramping this weekend. Pts Quant BHCG 2 on her preop labs. Limited bedside sono done in preop done. No tissue seen  Pt was counseled that sono was limited. Pt did not wish to proceed with D&C at this time. Pt wishes to wait to follow her menses and will order formal sono if bleeding becomes heavy again. Risks/ benefits/ alternative options reviewed with pt.

## 2023-11-28 ENCOUNTER — TELEPHONE (OUTPATIENT)
Dept: OBGYN CLINIC | Age: 33
End: 2023-11-28

## 2023-11-28 DIAGNOSIS — O03.4 RETAINED PRODUCTS OF CONCEPTION AFTER MISCARRIAGE: Primary | ICD-10-CM

## 2023-11-28 PROBLEM — L30.9 DERMATITIS: Status: RESOLVED | Noted: 2022-11-08 | Resolved: 2023-11-28

## 2023-11-28 NOTE — TELEPHONE ENCOUNTER
Patient called to the office stating she is having pain and bleeding, wondering what her next steps are. Patient was scheduled for [facility]  11/27/23 but did not proceed with it. Reviewed with , patient needs to repeat her sono at the hospital and will follow up once those results are back.

## 2023-11-28 NOTE — RESULT ENCOUNTER NOTE
Mychart comment sent to patient.   Normal EKG 6 days ago, this was addressed during the admission  Future Appointments  11/29/2023 10:00 AM   [facility] ULTRASOUND  [address]             [facility] Radiolog

## 2023-11-29 ENCOUNTER — HOSPITAL ENCOUNTER (OUTPATIENT)
Dept: ULTRASOUND IMAGING | Age: 33
Discharge: HOME | End: 2023-12-01
Attending: OBSTETRICS & GYNECOLOGY
Payer: COMMERCIAL

## 2023-11-29 DIAGNOSIS — O03.4 RETAINED PRODUCTS OF CONCEPTION AFTER MISCARRIAGE: ICD-10-CM

## 2023-11-29 PROCEDURE — 76830 TRANSVAGINAL US NON-OB: CPT

## 2023-11-29 PROCEDURE — 76856 US EXAM PELVIC COMPLETE: CPT

## 2023-12-04 ENCOUNTER — ANESTHESIA EVENT (OUTPATIENT)
Dept: OPERATING ROOM | Age: 33
End: 2023-12-04
Payer: COMMERCIAL

## 2023-12-05 ENCOUNTER — ANESTHESIA (OUTPATIENT)
Dept: OPERATING ROOM | Age: 33
End: 2023-12-05
Payer: COMMERCIAL

## 2023-12-05 ENCOUNTER — HOSPITAL ENCOUNTER (OUTPATIENT)
Age: 33
Setting detail: OUTPATIENT SURGERY
Discharge: HOME | End: 2023-12-05
Attending: OBSTETRICS & GYNECOLOGY | Admitting: OBSTETRICS & GYNECOLOGY
Payer: COMMERCIAL

## 2023-12-05 VITALS
WEIGHT: 160 LBS | TEMPERATURE: 96.8 F | RESPIRATION RATE: 16 BRPM | HEART RATE: 61 BPM | SYSTOLIC BLOOD PRESSURE: 104 MMHG | HEIGHT: 65 IN | DIASTOLIC BLOOD PRESSURE: 88 MMHG | OXYGEN SATURATION: 98 % | BODY MASS INDEX: 26.66 KG/M2

## 2023-12-05 PROBLEM — Z98.890 STATUS POST D&C: Status: ACTIVE | Noted: 2023-12-05

## 2023-12-05 PROCEDURE — 59820 CARE OF MISCARRIAGE: CPT | Performed by: OBSTETRICS & GYNECOLOGY

## 2023-12-05 PROCEDURE — 3700000001 HC ADD 15 MINUTES (ANESTHESIA): Performed by: OBSTETRICS & GYNECOLOGY

## 2023-12-05 PROCEDURE — 6360000002 HC RX W HCPCS: Performed by: STUDENT IN AN ORGANIZED HEALTH CARE EDUCATION/TRAINING PROGRAM

## 2023-12-05 PROCEDURE — 7100000010 HC PHASE II RECOVERY - FIRST 15 MIN: Performed by: OBSTETRICS & GYNECOLOGY

## 2023-12-05 PROCEDURE — 2500000003 HC RX 250 WO HCPCS

## 2023-12-05 PROCEDURE — 2709999900 HC NON-CHARGEABLE SUPPLY: Performed by: OBSTETRICS & GYNECOLOGY

## 2023-12-05 PROCEDURE — 3600000014 HC SURGERY LEVEL 4 ADDTL 15MIN: Performed by: OBSTETRICS & GYNECOLOGY

## 2023-12-05 PROCEDURE — 3600000004 HC SURGERY LEVEL 4 BASE: Performed by: OBSTETRICS & GYNECOLOGY

## 2023-12-05 PROCEDURE — 7100000000 HC PACU RECOVERY - FIRST 15 MIN: Performed by: OBSTETRICS & GYNECOLOGY

## 2023-12-05 PROCEDURE — 3700000000 HC ANESTHESIA ATTENDED CARE: Performed by: OBSTETRICS & GYNECOLOGY

## 2023-12-05 PROCEDURE — 2580000003 HC RX 258: Performed by: STUDENT IN AN ORGANIZED HEALTH CARE EDUCATION/TRAINING PROGRAM

## 2023-12-05 PROCEDURE — 88305 TISSUE EXAM BY PATHOLOGIST: CPT

## 2023-12-05 PROCEDURE — 6360000002 HC RX W HCPCS

## 2023-12-05 PROCEDURE — 6370000000 HC RX 637 (ALT 250 FOR IP): Performed by: STUDENT IN AN ORGANIZED HEALTH CARE EDUCATION/TRAINING PROGRAM

## 2023-12-05 PROCEDURE — 7100000011 HC PHASE II RECOVERY - ADDTL 15 MIN: Performed by: OBSTETRICS & GYNECOLOGY

## 2023-12-05 PROCEDURE — 2580000003 HC RX 258: Performed by: OBSTETRICS & GYNECOLOGY

## 2023-12-05 PROCEDURE — 7100000001 HC PACU RECOVERY - ADDTL 15 MIN: Performed by: OBSTETRICS & GYNECOLOGY

## 2023-12-05 RX ORDER — SODIUM CHLORIDE 0.9 % (FLUSH) 0.9 %
5-40 SYRINGE (ML) INJECTION PRN
Status: DISCONTINUED | OUTPATIENT
Start: 2023-12-05 | End: 2023-12-05 | Stop reason: HOSPADM

## 2023-12-05 RX ORDER — ONDANSETRON 2 MG/ML
INJECTION INTRAMUSCULAR; INTRAVENOUS PRN
Status: DISCONTINUED | OUTPATIENT
Start: 2023-12-05 | End: 2023-12-05 | Stop reason: SDUPTHER

## 2023-12-05 RX ORDER — IBUPROFEN 600 MG/1
600 TABLET ORAL EVERY 6 HOURS PRN
Qty: 40 TABLET | Refills: 0 | Status: SHIPPED | OUTPATIENT
Start: 2023-12-05

## 2023-12-05 RX ORDER — PROPOFOL 10 MG/ML
INJECTION, EMULSION INTRAVENOUS PRN
Status: DISCONTINUED | OUTPATIENT
Start: 2023-12-05 | End: 2023-12-05 | Stop reason: SDUPTHER

## 2023-12-05 RX ORDER — SODIUM CHLORIDE 0.9 % (FLUSH) 0.9 %
5-40 SYRINGE (ML) INJECTION EVERY 12 HOURS SCHEDULED
Status: DISCONTINUED | OUTPATIENT
Start: 2023-12-05 | End: 2023-12-05 | Stop reason: HOSPADM

## 2023-12-05 RX ORDER — MAGNESIUM HYDROXIDE 1200 MG/15ML
LIQUID ORAL CONTINUOUS PRN
Status: DISCONTINUED | OUTPATIENT
Start: 2023-12-05 | End: 2023-12-05 | Stop reason: HOSPADM

## 2023-12-05 RX ORDER — LIDOCAINE HYDROCHLORIDE 10 MG/ML
INJECTION, SOLUTION EPIDURAL; INFILTRATION; INTRACAUDAL; PERINEURAL PRN
Status: DISCONTINUED | OUTPATIENT
Start: 2023-12-05 | End: 2023-12-05 | Stop reason: SDUPTHER

## 2023-12-05 RX ORDER — ONDANSETRON 2 MG/ML
4 INJECTION INTRAMUSCULAR; INTRAVENOUS ONCE
Status: COMPLETED | OUTPATIENT
Start: 2023-12-05 | End: 2023-12-05

## 2023-12-05 RX ORDER — ONDANSETRON 2 MG/ML
4 INJECTION INTRAMUSCULAR; INTRAVENOUS
Status: COMPLETED | OUTPATIENT
Start: 2023-12-05 | End: 2023-12-05

## 2023-12-05 RX ORDER — MIDAZOLAM HYDROCHLORIDE 1 MG/ML
INJECTION INTRAMUSCULAR; INTRAVENOUS PRN
Status: DISCONTINUED | OUTPATIENT
Start: 2023-12-05 | End: 2023-12-05 | Stop reason: SDUPTHER

## 2023-12-05 RX ORDER — DEXAMETHASONE SODIUM PHOSPHATE 10 MG/ML
INJECTION INTRAMUSCULAR; INTRAVENOUS PRN
Status: DISCONTINUED | OUTPATIENT
Start: 2023-12-05 | End: 2023-12-05 | Stop reason: SDUPTHER

## 2023-12-05 RX ORDER — DOXYCYCLINE HYCLATE 100 MG
200 TABLET ORAL ONCE
Status: COMPLETED | OUTPATIENT
Start: 2023-12-05 | End: 2023-12-05

## 2023-12-05 RX ORDER — KETOROLAC TROMETHAMINE 30 MG/ML
INJECTION, SOLUTION INTRAMUSCULAR; INTRAVENOUS PRN
Status: DISCONTINUED | OUTPATIENT
Start: 2023-12-05 | End: 2023-12-05 | Stop reason: SDUPTHER

## 2023-12-05 RX ORDER — DOXYCYCLINE HYCLATE 100 MG
100 TABLET ORAL 2 TIMES DAILY
Qty: 14 TABLET | Refills: 0 | Status: SHIPPED | OUTPATIENT
Start: 2023-12-05 | End: 2023-12-12

## 2023-12-05 RX ORDER — SODIUM CHLORIDE 9 MG/ML
INJECTION, SOLUTION INTRAVENOUS PRN
Status: DISCONTINUED | OUTPATIENT
Start: 2023-12-05 | End: 2023-12-05 | Stop reason: HOSPADM

## 2023-12-05 RX ORDER — SODIUM CHLORIDE, SODIUM LACTATE, POTASSIUM CHLORIDE, CALCIUM CHLORIDE 600; 310; 30; 20 MG/100ML; MG/100ML; MG/100ML; MG/100ML
INJECTION, SOLUTION INTRAVENOUS CONTINUOUS
Status: DISCONTINUED | OUTPATIENT
Start: 2023-12-05 | End: 2023-12-05 | Stop reason: HOSPADM

## 2023-12-05 RX ORDER — HYDRALAZINE HYDROCHLORIDE 20 MG/ML
10 INJECTION INTRAMUSCULAR; INTRAVENOUS
Status: DISCONTINUED | OUTPATIENT
Start: 2023-12-05 | End: 2023-12-05 | Stop reason: HOSPADM

## 2023-12-05 RX ORDER — FENTANYL CITRATE 50 UG/ML
INJECTION, SOLUTION INTRAMUSCULAR; INTRAVENOUS PRN
Status: DISCONTINUED | OUTPATIENT
Start: 2023-12-05 | End: 2023-12-05 | Stop reason: SDUPTHER

## 2023-12-05 RX ADMIN — SODIUM CHLORIDE, POTASSIUM CHLORIDE, SODIUM LACTATE AND CALCIUM CHLORIDE: 600; 310; 30; 20 INJECTION, SOLUTION INTRAVENOUS at 13:02

## 2023-12-05 RX ADMIN — KETOROLAC TROMETHAMINE 30 MG: 30 INJECTION, SOLUTION INTRAMUSCULAR at 13:39

## 2023-12-05 RX ADMIN — DEXAMETHASONE SODIUM PHOSPHATE 10 MG: 10 INJECTION INTRAMUSCULAR; INTRAVENOUS at 13:27

## 2023-12-05 RX ADMIN — FENTANYL CITRATE 50 MCG: 50 INJECTION, SOLUTION INTRAMUSCULAR; INTRAVENOUS at 13:23

## 2023-12-05 RX ADMIN — LIDOCAINE HYDROCHLORIDE 50 MG: 10 INJECTION, SOLUTION EPIDURAL; INFILTRATION; INTRACAUDAL; PERINEURAL at 13:16

## 2023-12-05 RX ADMIN — DOXYCYCLINE HYCLATE 200 MG: 100 TABLET, COATED ORAL at 13:00

## 2023-12-05 RX ADMIN — HYDROMORPHONE HYDROCHLORIDE 0.5 MG: 1 INJECTION, SOLUTION INTRAMUSCULAR; INTRAVENOUS; SUBCUTANEOUS at 14:43

## 2023-12-05 RX ADMIN — ONDANSETRON 4 MG: 2 INJECTION INTRAMUSCULAR; INTRAVENOUS at 13:00

## 2023-12-05 RX ADMIN — MIDAZOLAM 1 MG: 1 INJECTION INTRAMUSCULAR; INTRAVENOUS at 13:16

## 2023-12-05 RX ADMIN — PROPOFOL 200 MG: 10 INJECTION, EMULSION INTRAVENOUS at 13:16

## 2023-12-05 RX ADMIN — ONDANSETRON 4 MG: 2 INJECTION INTRAMUSCULAR; INTRAVENOUS at 14:43

## 2023-12-05 RX ADMIN — ONDANSETRON 4 MG: 2 INJECTION INTRAMUSCULAR; INTRAVENOUS at 13:39

## 2023-12-05 RX ADMIN — MIDAZOLAM 1 MG: 1 INJECTION INTRAMUSCULAR; INTRAVENOUS at 13:14

## 2023-12-05 ASSESSMENT — PAIN DESCRIPTION - LOCATION
LOCATION: ABDOMEN
LOCATION: ABDOMEN

## 2023-12-05 ASSESSMENT — PAIN SCALES - GENERAL: PAINLEVEL_OUTOF10: 7

## 2023-12-05 ASSESSMENT — PAIN - FUNCTIONAL ASSESSMENT: PAIN_FUNCTIONAL_ASSESSMENT: 0-10

## 2023-12-05 NOTE — OP NOTE
Gynecologic Operative Note      NAME: Cherry Villarreal   MRN: [id number]  CSN: [id number]  : 1990    PROCEDURE DATE: 2023     Pre-op Diagnosis: Pre-Op Diagnosis Codes:     * Retained products of conception, postpartum [O72.0]     Post-op Diagnosis: Same    Procedure: Procedure(s):  DILATATION AND CURETTAGE SUCTION    Surgeon: Mia Zimmerman DO    Assistant:   Camryn Lopez PGY2      Circulator Documentation of Staff:  Surgeon(s) and Role:     * Mia Zimmerman DO - Primary    OR Staff:  Scrub Person First: Kaycee Marcelino RN; Chikis Garcia      Anesthesia Type: General  Anesthesia Staff: Anesthesiologist: David Church MD  CRNA: JANUARY Simmons CRNA  SRNA: Brian Saravia RN      Complications: None      Estimated Blood Loss: 50 ml  Total IV Fluids:  see anesthesia record ml  Urine :  clear      Specimens: [unfilled]  Implants: * No implants in log *    Drains: * No LDAs found *      Condition: Stable    Findings: small midplane to RV uterus ~ 10 cm sounded to 9        Description of Procedure: (Understanding of limitations from template op-reports exist)  The patient was seen in the pre-operative area. The procedure risk and complications were reviewed. The consent , labs , and H&P were reviewed. The patient had all of her questions answered. The patient was moved to the operative suite where she was placed under general anesthesia by the anesthesiologist.  Time out was preformed. The patient was placed in the dorsal lithotomy position utilizing  Stirrups. The Positioning was checked without stress or pressure on any joints. She was prepped and draped in the normal sterile fashion. Her bladder was drained with a red graham catheter. A weighted speculum was placed along the posterior vaginal wall and the anterior lip of the cervix was visualized and grasped with an Allis clamp. Procedure was performed with continuous ultrasound guidance.  Tissue/ fluid was

## 2023-12-05 NOTE — H&P
PRE OP NOTE  Gyn Surgery H&P Update    [address]  Gynecologic Surgery  PHYSICIAN PRE-OPERATIVE NOTE:      Patient Name: Stefania Gaspar  Patient : 1990  Room/Bed: [facility]UST OR [facility] RM/NONE  Admission Date/Time: 2023 10:59 AM  Primary Care Physician: Barbara Mcdonald MD  MRN #: [id number]  [facility] #: [id number]        Date: 2023  Time: 12:57 PM      The patient was seen in pre-op holding. She is here for Suction dilatation and curettage. The procedure risks and complications were reviewed. The labs, Consent, and H&P were reviewed and updated. The patient was counseled on the possibility of  the need of a second surgery. The patient voiced understanding and had all of her questions answered. The possibility of incomplete removal of abnormal tissue was discussed.       Past Medical History:   Diagnosis Date    Adult BMI 25.0-25.9 kg/sq m 2016    Anemia 2018    Chronic ankle pain, bilateral 2022    Corneal rust ring 2020    Delivery with history of      Elevated fasting blood sugar 08/10/2020    Generalized anxiety disorder 10/06/2015    Gestational diabetes mellitus (GDM), antepartum 2017    Previous pregnancy     Gestational diabetes mellitus in pregnancy 2016    Iron deficiency anemia secondary to inadequate dietary iron intake 2022    PLTCS 19 M APG 8/9 Wt 9#12 2019    Previous LTC C/S wants repeat    Prolonged emergence from general anesthesia     once    PVC (premature ventricular contraction)     EKG      (spontaneous vaginal delivery) 2021    Under care of team 2023    PCP: Dr. Nanette Garibay [place]    Vitamin D deficiency 2020       Patient Active Problem List    Diagnosis Date Noted    REPEAT LTC- section 2022     Priority: High    history of LGA: 9#12oz 2021     Priority: High    History of gestational diabetes 09/10/2018     Priority: High     Overview Note: do not concur with clinical observations, additional testing to confirm   results is recommended.     ]  Results for orders placed or performed during the hospital encounter of 11/27/23   HCG Screen, Blood   Result Value Ref Range    hCG Qual NEGATIVE NEGATIVE   HCG, QUANTITATIVE, PREGNANCY   Result Value Ref Range    hCG Quant <1.0 <5 mIU/mL           The patient is ready for transport to the operative suite.       Electronically signed by Alexey Rios DO on 12/5/2023 at 12:57 PM

## 2023-12-07 ENCOUNTER — TELEPHONE (OUTPATIENT)
Dept: OBGYN CLINIC | Age: 33
End: 2023-12-07

## 2023-12-07 DIAGNOSIS — N93.9 VAGINAL BLEEDING: Primary | ICD-10-CM

## 2023-12-07 LAB — SURGICAL PATHOLOGY REPORT: NORMAL

## 2023-12-07 RX ORDER — METHYLERGONOVINE MALEATE 0.2 MG/1
0.2 TABLET ORAL 3 TIMES DAILY
Qty: 9 TABLET | Refills: 0 | Status: SHIPPED | OUTPATIENT
Start: 2023-12-07 | End: 2023-12-10

## 2023-12-07 NOTE — TELEPHONE ENCOUNTER
Pt called stating she is having increased vaginal bleeding. Pt S/p Suction D&C 12/5/23. Per Dr Brittany Petersen pt to get Methergine 0.2 mg TID X 3 days.   Pt instructed to contact office or go to [facility] ED if symptoms persist.

## 2024-02-12 ENCOUNTER — HOSPITAL ENCOUNTER (OUTPATIENT)
Age: 34
Discharge: HOME | End: 2024-02-12
Payer: COMMERCIAL

## 2024-02-12 ENCOUNTER — TELEPHONE (OUTPATIENT)
Dept: OBGYN CLINIC | Age: 34
End: 2024-02-12

## 2024-02-12 DIAGNOSIS — O03.9 SAB (SPONTANEOUS ABORTION): ICD-10-CM

## 2024-02-12 DIAGNOSIS — Z34.90 EARLY STAGE OF PREGNANCY: Primary | ICD-10-CM

## 2024-02-12 LAB — B-HCG SERPL EIA 3RD IS-ACNC: 1850 MIU/ML

## 2024-02-12 PROCEDURE — 36415 COLL VENOUS BLD VENIPUNCTURE: CPT

## 2024-02-12 PROCEDURE — 84702 CHORIONIC GONADOTROPIN TEST: CPT

## 2024-02-12 NOTE — TELEPHONE ENCOUNTER
----- Message from JANUARY Del Angel CNP sent at 2/12/2024 10:56 AM EST -----  Per DR Ovalle, repeat quant HCG and have patient complete OB ultrasound at hospital in 1 week.  Prenatal vitamin 1 PO QD with 12 refills.

## 2024-02-12 NOTE — TELEPHONE ENCOUNTER
Patient messaged in via [employer] and was notified of results and recommendations per  to complete ultrasound in 1 week at the hospital, and repeat her hcg levels at that time as well. Orders in epic.

## 2024-02-19 ENCOUNTER — TELEPHONE (OUTPATIENT)
Dept: OBGYN CLINIC | Age: 34
End: 2024-02-19

## 2024-02-19 ENCOUNTER — HOSPITAL ENCOUNTER (OUTPATIENT)
Dept: ULTRASOUND IMAGING | Age: 34
Discharge: HOME | End: 2024-02-21
Payer: COMMERCIAL

## 2024-02-19 ENCOUNTER — HOSPITAL ENCOUNTER (OUTPATIENT)
Age: 34
Discharge: HOME | End: 2024-02-19
Payer: COMMERCIAL

## 2024-02-19 DIAGNOSIS — Z34.90 EARLY STAGE OF PREGNANCY: ICD-10-CM

## 2024-02-19 DIAGNOSIS — Z34.90 EARLY STAGE OF PREGNANCY: Primary | ICD-10-CM

## 2024-02-19 LAB — B-HCG SERPL EIA 3RD IS-ACNC: ABNORMAL MIU/ML

## 2024-02-19 PROCEDURE — 84702 CHORIONIC GONADOTROPIN TEST: CPT

## 2024-02-19 PROCEDURE — 76817 TRANSVAGINAL US OBSTETRIC: CPT

## 2024-02-19 PROCEDURE — 36415 COLL VENOUS BLD VENIPUNCTURE: CPT

## 2024-02-19 RX ORDER — PROGESTERONE 200 MG/1
200 CAPSULE ORAL NIGHTLY
Qty: 30 CAPSULE | Refills: 2 | Status: SHIPPED | OUTPATIENT
Start: 2024-02-19 | End: 2024-05-19

## 2024-02-19 NOTE — TELEPHONE ENCOUNTER
----- Message from JANUARY Del Angel CNP sent at 2/19/2024  2:07 PM EST -----  IUP at 5 weeks.  Small subchorionic bleed noted.  Pelvic rest and no heavy lifting.  Repeat OB ultrasound at hospital in 1-2 weeks.

## 2024-02-19 NOTE — TELEPHONE ENCOUNTER
Per Renee pt notified of pelvic US results showing IUP at 5 weeks with Small subchorionic bleed noted. Pt instructed on Pelvic rest and no heavy lifting. Repeat OB ultrasound at hospital in 1-2 weeks/ order in epic.  Per Dr Ovalle pt to get Progesterone suppositories sent to pt pharm.

## 2024-02-19 NOTE — PROGRESS NOTES
Pt notified. IUP at 5 weeks.  Small subchorionic bleed noted.  Pelvic rest and no heavy lifting.  Repeat OB ultrasound at hospital in 1-2 weeks.

## 2024-02-19 NOTE — RESULT ENCOUNTER NOTE
Pt viewed results via [employer].  Pt completed OB US today.  Results pending.  Pt currently taking PNV.

## 2024-02-21 RX ORDER — PYRIDOXINE HCL (VITAMIN B6) 50 MG
50 TABLET ORAL 2 TIMES DAILY
Qty: 60 TABLET | Refills: 0 | Status: SHIPPED | OUTPATIENT
Start: 2024-02-21 | End: 2024-03-22

## 2024-02-29 ENCOUNTER — HOSPITAL ENCOUNTER (OUTPATIENT)
Dept: ULTRASOUND IMAGING | Age: 34
Discharge: HOME | End: 2024-03-02
Attending: OBSTETRICS & GYNECOLOGY
Payer: COMMERCIAL

## 2024-02-29 DIAGNOSIS — Z34.90 EARLY STAGE OF PREGNANCY: ICD-10-CM

## 2024-02-29 PROCEDURE — 76801 OB US < 14 WKS SINGLE FETUS: CPT

## 2024-02-29 PROCEDURE — 76817 TRANSVAGINAL US OBSTETRIC: CPT

## 2024-03-01 ENCOUNTER — TELEPHONE (OUTPATIENT)
Dept: OBGYN CLINIC | Age: 34
End: 2024-03-01

## 2024-03-01 NOTE — TELEPHONE ENCOUNTER
Patient notified of results and recommendations, patient to schedule intake in office. Patient is taking prenatal vitamins currently.

## 2024-03-01 NOTE — TELEPHONE ENCOUNTER
----- Message from JANUARY Del Angel - CNP sent at 3/1/2024  8:02 AM EST -----  IUP at 7 weeks.  FHT's 150s  Prenatal vitamin 1 po qd WITH 12 REFILLS.  Please refer to MFM for FTS.  Please schedule for new ob intake

## 2024-03-11 DIAGNOSIS — Z87.59 HISTORY OF MISCARRIAGE: ICD-10-CM

## 2024-03-11 DIAGNOSIS — Z34.90 EARLY STAGE OF PREGNANCY: Primary | ICD-10-CM

## 2024-03-11 RX ORDER — SWAB
1 SWAB, NON-MEDICATED MISCELLANEOUS DAILY
Qty: 30 TABLET | Refills: 0 | Status: SHIPPED | OUTPATIENT
Start: 2024-03-11

## 2024-03-13 ENCOUNTER — HOSPITAL ENCOUNTER (OUTPATIENT)
Age: 34
Setting detail: SPECIMEN
Discharge: HOME | End: 2024-03-13

## 2024-03-13 ENCOUNTER — INITIAL PRENATAL (OUTPATIENT)
Dept: OBGYN CLINIC | Age: 34
End: 2024-03-13
Payer: COMMERCIAL

## 2024-03-13 VITALS
DIASTOLIC BLOOD PRESSURE: 66 MMHG | BODY MASS INDEX: 26.63 KG/M2 | HEART RATE: 76 BPM | WEIGHT: 160 LBS | SYSTOLIC BLOOD PRESSURE: 106 MMHG

## 2024-03-13 DIAGNOSIS — Z34.90 EARLY STAGE OF PREGNANCY: Primary | ICD-10-CM

## 2024-03-13 DIAGNOSIS — Z3A.09 9 WEEKS GESTATION OF PREGNANCY: ICD-10-CM

## 2024-03-13 DIAGNOSIS — O09.91 SUPERVISION OF HIGH RISK PREGNANCY IN FIRST TRIMESTER: ICD-10-CM

## 2024-03-13 DIAGNOSIS — O02.1 MISSED ABORTION: ICD-10-CM

## 2024-03-13 DIAGNOSIS — Z98.890 STATUS POST D&C: Primary | ICD-10-CM

## 2024-03-13 DIAGNOSIS — Z34.90 EARLY STAGE OF PREGNANCY: ICD-10-CM

## 2024-03-13 LAB
ABO + RH BLD: NORMAL
BASOPHILS # BLD: <0.03 K/UL (ref 0–0.2)
BASOPHILS NFR BLD: 0 % (ref 0–2)
BLOOD GROUP ANTIBODIES SERPL: NEGATIVE
EOSINOPHIL # BLD: 0.04 K/UL (ref 0–0.44)
EOSINOPHILS RELATIVE PERCENT: 1 % (ref 1–4)
ERYTHROCYTE [DISTWIDTH] IN BLOOD BY AUTOMATED COUNT: 16.5 % (ref 11.8–14.4)
HCT VFR BLD AUTO: 39.6 % (ref 36.3–47.1)
HGB BLD-MCNC: 12.8 G/DL (ref 11.9–15.1)
IMM GRANULOCYTES # BLD AUTO: <0.03 K/UL (ref 0–0.3)
IMM GRANULOCYTES NFR BLD: 0 %
LYMPHOCYTES NFR BLD: 1.19 K/UL (ref 1.1–3.7)
LYMPHOCYTES RELATIVE PERCENT: 21 % (ref 24–43)
MCH RBC QN AUTO: 27.2 PG (ref 25.2–33.5)
MCHC RBC AUTO-ENTMCNC: 32.3 G/DL (ref 28.4–34.8)
MCV RBC AUTO: 84.3 FL (ref 82.6–102.9)
MONOCYTES NFR BLD: 0.37 K/UL (ref 0.1–1.2)
MONOCYTES NFR BLD: 6 % (ref 3–12)
NEUTROPHILS NFR BLD: 72 % (ref 36–65)
NEUTS SEG NFR BLD: 4.16 K/UL (ref 1.5–8.1)
NRBC BLD-RTO: 0 PER 100 WBC
PLATELET # BLD AUTO: 235 K/UL (ref 138–453)
PMV BLD AUTO: 10.4 FL (ref 8.1–13.5)
RBC # BLD AUTO: 4.7 M/UL (ref 3.95–5.11)
RBC # BLD: ABNORMAL 10*6/UL
WBC OTHER # BLD: 5.8 K/UL (ref 3.5–11.3)

## 2024-03-13 PROCEDURE — NBSRV NON-BILLABLE SERVICE: Performed by: NURSE PRACTITIONER

## 2024-03-13 PROCEDURE — 36415 COLL VENOUS BLD VENIPUNCTURE: CPT | Performed by: NURSE PRACTITIONER

## 2024-03-13 PROCEDURE — H1000 PRENATAL CARE ATRISK ASSESSM: HCPCS | Performed by: NURSE PRACTITIONER

## 2024-03-13 RX ORDER — PRENATAL VIT/IRON FUM/FOLIC AC 27MG-0.8MG
TABLET ORAL
COMMUNITY
Start: 2024-03-11

## 2024-03-13 NOTE — PROGRESS NOTES
Patient was in the office today for a prenatal labs.    Draw per physician order using sterile technique.  Drawn from the Right AC.

## 2024-03-13 NOTE — PROGRESS NOTES
Patient presents to office for OB intake, nurse visit only. Intake completed following ultrasound in office to confirm pregnancy dating/viability. Based on ultrasound, patient is currently 9w5d  gestation, Estimated Date of Delivery: 10/11/24. Patient presents to intake Alone. This was an planned pregnancy. Patient currently taking has a current medication list which includes the following prescription(s): prenatal vitamin, prenatal vitamin, pyridoxine, and progesterone, and the following Facility-Administered Medications: triamcinolone acetonide.. Patient's medical, surgical, obstetrical and family history reviewed. See HER for details. Patient prenatal lab work given to patient at this visit as well as OB education material. New OB consent forms signed. Patient accepted first trimester screening. Cystic Fibrosis screening previously done. Referral placed to [facility] for first trimester screen and hx of multiple SAB/D&C.Patient scheduled for follow up OB appointment with Jordyn JEWELL on 3/27/24. Patient instructed to complete lab work prior to follow up OB appointment.

## 2024-03-14 LAB
HBV SURFACE AG SERPL QL IA: NONREACTIVE
HIV 1+2 AB+HIV1 P24 AG SERPL QL IA: NONREACTIVE
RUBV IGG SERPL QL IA: 426 IU/ML
T PALLIDUM AB SER QL IA: NONREACTIVE
TSH SERPL DL<=0.05 MIU/L-ACNC: 1.21 UIU/ML (ref 0.27–4.2)

## 2024-03-27 ENCOUNTER — ROUTINE PRENATAL (OUTPATIENT)
Dept: OBGYN CLINIC | Age: 34
End: 2024-03-27
Payer: COMMERCIAL

## 2024-03-27 ENCOUNTER — HOSPITAL ENCOUNTER (OUTPATIENT)
Age: 34
Setting detail: SPECIMEN
Discharge: HOME | End: 2024-03-27

## 2024-03-27 VITALS
WEIGHT: 159 LBS | HEART RATE: 77 BPM | DIASTOLIC BLOOD PRESSURE: 78 MMHG | BODY MASS INDEX: 26.46 KG/M2 | SYSTOLIC BLOOD PRESSURE: 120 MMHG

## 2024-03-27 DIAGNOSIS — Z86.32 HISTORY OF GESTATIONAL DIABETES: Primary | ICD-10-CM

## 2024-03-27 DIAGNOSIS — Z34.90 EARLY STAGE OF PREGNANCY: ICD-10-CM

## 2024-03-27 DIAGNOSIS — R89.9 ABNORMAL LABORATORY TEST: ICD-10-CM

## 2024-03-27 DIAGNOSIS — Z11.51 SPECIAL SCREENING EXAMINATION FOR HUMAN PAPILLOMAVIRUS (HPV): ICD-10-CM

## 2024-03-27 DIAGNOSIS — Z01.419 WELL WOMAN EXAM WITH ROUTINE GYNECOLOGICAL EXAM: ICD-10-CM

## 2024-03-27 DIAGNOSIS — Z3A.11 11 WEEKS GESTATION OF PREGNANCY: ICD-10-CM

## 2024-03-27 DIAGNOSIS — Z98.891 H/O: C-SECTION: ICD-10-CM

## 2024-03-27 DIAGNOSIS — Z98.890 HISTORY OF D&C: ICD-10-CM

## 2024-03-27 DIAGNOSIS — Z3A.09 9 WEEKS GESTATION OF PREGNANCY: ICD-10-CM

## 2024-03-27 PROCEDURE — 99214 OFFICE O/P EST MOD 30 MIN: CPT | Performed by: NURSE PRACTITIONER

## 2024-03-27 PROCEDURE — G8484 FLU IMMUNIZE NO ADMIN: HCPCS | Performed by: NURSE PRACTITIONER

## 2024-03-27 PROCEDURE — G8427 DOCREV CUR MEDS BY ELIG CLIN: HCPCS | Performed by: NURSE PRACTITIONER

## 2024-03-27 PROCEDURE — 1036F TOBACCO NON-USER: CPT | Performed by: NURSE PRACTITIONER

## 2024-03-27 PROCEDURE — G8417 CALC BMI ABV UP PARAM F/U: HCPCS | Performed by: NURSE PRACTITIONER

## 2024-03-27 NOTE — PROGRESS NOTES
Transportation Needs: Unknown (3/7/2023)    PRAPARE - Transportation     Lack of Transportation (Medical): Not on file     Lack of Transportation (Non-Medical): No   Physical Activity: Not on file   Stress: Not on file   Social Connections: Not on file   Intimate Partner Violence: Not on file   Housing Stability: Unknown (3/7/2023)    Housing Stability Vital Sign     Unable to Pay for Housing in the Last Year: Not on file     Number of Places Lived in the Last Year: Not on file     Unstable Housing in the Last Year: No     Family History   Problem Relation Age of Onset    Diabetes Paternal Grandfather     Diabetes Paternal Grandmother     Stroke Maternal Grandmother     Diabetes Maternal Grandmother     Diabetes Maternal Grandfather     Hypertension Father     Breast Cancer Mother     Hypertension Mother     Depression Mother     Seizures Brother         as a child    Asthma Son     Cancer Neg Hx     Colon Cancer Neg Hx     Eclampsia Neg Hx     Ovarian Cancer Neg Hx      Labor Neg Hx     Spont Abortions Neg Hx        MEDICATIONS:  Current Outpatient Medications   Medication Sig Dispense Refill    Prenatal Vit-Fe Fumarate-FA (PRENATAL VITAMIN) 27-0.8 MG TABS       Prenatal Vit-Fe Fumarate-FA (PRENATAL VITAMIN) 28-0.8 MG TABS tablet Take 1 tablet by mouth once daily 30 tablet 0    pyridoxine (B-6) 50 MG tablet Take 1 tablet by mouth in the morning and at bedtime 60 tablet 0    progesterone (PROMETRIUM) 200 MG CAPS capsule Place 1 capsule vaginally at bedtime 30 capsule 2     Current Facility-Administered Medications   Medication Dose Route Frequency Provider Last Rate Last Admin    triamcinolone acetonide (KENALOG) injection 15 mg  15 mg Intra-LESional Once Stanford Naranjo PA-C               ALLERGIES:  Allergies as of 2024    (No Known Allergies)           Physical Exam Completed-See Epic Navigator    Chaperone for Intimate Exam  Chaperone was offered and accepted as part of the rooming

## 2024-03-28 LAB
BACTERIA URNS QL MICRO: NORMAL
BILIRUB UR QL STRIP: NEGATIVE
C TRACH DNA SPEC QL PROBE+SIG AMP: NEGATIVE
CASTS #/AREA URNS LPF: NORMAL /LPF (ref 0–2)
CLARITY UR: CLEAR
COLOR UR: YELLOW
EPI CELLS #/AREA URNS HPF: NORMAL /HPF (ref 0–5)
GLUCOSE UR STRIP-MCNC: NEGATIVE MG/DL
HGB UR QL STRIP.AUTO: NEGATIVE
KETONES UR STRIP-MCNC: NEGATIVE MG/DL
LEUKOCYTE ESTERASE UR QL STRIP: ABNORMAL
N GONORRHOEA DNA SPEC QL PROBE+SIG AMP: NEGATIVE
NITRITE UR QL STRIP: NEGATIVE
PH UR STRIP: 6.5 [PH] (ref 5–8)
PROT UR STRIP-MCNC: NEGATIVE MG/DL
RBC #/AREA URNS HPF: NORMAL /HPF (ref 0–2)
SP GR UR STRIP: 1 (ref 1–1.03)
SPECIMEN DESCRIPTION: NORMAL
UROBILINOGEN UR STRIP-ACNC: NORMAL EU/DL (ref 0–1)
WBC #/AREA URNS HPF: NORMAL /HPF (ref 0–5)

## 2024-03-29 LAB
HPV I/H RISK 4 DNA CVX QL NAA+PROBE: NOT DETECTED
HPV SAMPLE: NORMAL
HPV, INTERPRETATION: NORMAL
HPV16 DNA CVX QL NAA+PROBE: NOT DETECTED
HPV18 DNA CVX QL NAA+PROBE: NOT DETECTED
SPECIMEN DESCRIPTION: NORMAL

## 2024-03-30 LAB
MICROORGANISM SPEC CULT: NORMAL
SPECIMEN DESCRIPTION: NORMAL

## 2024-04-03 ENCOUNTER — NURSE ONLY (OUTPATIENT)
Dept: OBGYN CLINIC | Age: 34
End: 2024-04-03
Payer: COMMERCIAL

## 2024-04-03 ENCOUNTER — HOSPITAL ENCOUNTER (OUTPATIENT)
Age: 34
Setting detail: SPECIMEN
Discharge: HOME | End: 2024-04-03

## 2024-04-03 DIAGNOSIS — Z3A.09 9 WEEKS GESTATION OF PREGNANCY: ICD-10-CM

## 2024-04-03 DIAGNOSIS — Z86.32 HISTORY OF GESTATIONAL DIABETES: Primary | ICD-10-CM

## 2024-04-03 DIAGNOSIS — Z34.90 EARLY STAGE OF PREGNANCY: ICD-10-CM

## 2024-04-03 PROCEDURE — 99999 PR OFFICE/OUTPT VISIT,PROCEDURE ONLY: CPT | Performed by: NURSE PRACTITIONER

## 2024-04-03 PROCEDURE — 36415 COLL VENOUS BLD VENIPUNCTURE: CPT | Performed by: NURSE PRACTITIONER

## 2024-04-03 NOTE — PROGRESS NOTES
Patient was in the office today for a 1 hour gtt.    Draw per physician order using sterile technique.  Drawn from the right AC.

## 2024-04-04 ENCOUNTER — HOSPITAL ENCOUNTER (OUTPATIENT)
Age: 34
Discharge: HOME | End: 2024-04-04
Payer: COMMERCIAL

## 2024-04-04 ENCOUNTER — ROUTINE PRENATAL (OUTPATIENT)
Dept: PERINATAL CARE | Age: 34
End: 2024-04-04
Payer: COMMERCIAL

## 2024-04-04 VITALS
WEIGHT: 162.26 LBS | SYSTOLIC BLOOD PRESSURE: 107 MMHG | DIASTOLIC BLOOD PRESSURE: 78 MMHG | BODY MASS INDEX: 27.03 KG/M2 | HEART RATE: 88 BPM | HEIGHT: 65 IN | TEMPERATURE: 98.6 F

## 2024-04-04 DIAGNOSIS — O36.80X0 ENCOUNTER TO DETERMINE FETAL VIABILITY OF PREGNANCY, SINGLE OR UNSPECIFIED FETUS: ICD-10-CM

## 2024-04-04 DIAGNOSIS — O34.219 PREVIOUS CESAREAN DELIVERY, ANTEPARTUM CONDITION OR COMPLICATION: ICD-10-CM

## 2024-04-04 DIAGNOSIS — Z36.9 FIRST TRIMESTER SCREENING: Primary | ICD-10-CM

## 2024-04-04 DIAGNOSIS — Z86.32 HISTORY OF GESTATIONAL DIABETES IN PRIOR PREGNANCY, CURRENTLY PREGNANT IN THIRD TRIMESTER: ICD-10-CM

## 2024-04-04 DIAGNOSIS — Z3A.12 12 WEEKS GESTATION OF PREGNANCY: ICD-10-CM

## 2024-04-04 DIAGNOSIS — R34 DECREASED URINATION: Primary | ICD-10-CM

## 2024-04-04 DIAGNOSIS — O09.293 HISTORY OF GESTATIONAL DIABETES IN PRIOR PREGNANCY, CURRENTLY PREGNANT IN THIRD TRIMESTER: ICD-10-CM

## 2024-04-04 DIAGNOSIS — O09.291 HISTORY OF MACROSOMIA IN INFANT IN PRIOR PREGNANCY, CURRENTLY PREGNANT IN FIRST TRIMESTER: ICD-10-CM

## 2024-04-04 LAB
GLUCOSE 1H P 50 G GLC PO SERPL-MCNC: 121 MG/DL (ref 70–135)
GLUCOSE ADMINISTRATION: NORMAL

## 2024-04-04 PROCEDURE — 36415 COLL VENOUS BLD VENIPUNCTURE: CPT

## 2024-04-04 PROCEDURE — 76813 OB US NUCHAL MEAS 1 GEST: CPT | Performed by: OBSTETRICS & GYNECOLOGY

## 2024-04-04 PROCEDURE — 76801 OB US < 14 WKS SINGLE FETUS: CPT | Performed by: OBSTETRICS & GYNECOLOGY

## 2024-04-04 PROCEDURE — 81508 FTL CGEN ABNOR TWO PROTEINS: CPT

## 2024-04-04 NOTE — PROGRESS NOTES
Advise repeat 1-hour glucola between 24-28 weeks' gestation to evaluate for gestational diabetes.     Patient declines a Maternal-Fetal Medicine complete physician consultation today regarding the fetal and/or maternal medical/obstetrical complications/co-morbidities of pregnancy.      Maternal-Fetal Medicine (MFM) attending physician will defer all management for these medical/obstetrical complications of pregnancy to the primary attending obstetrical physician/provider, as a result.  Therefore, only an ultrasound evaluation was completed today in the MFM office.      Please refer to Maternal-Fetal Medicine OBGYN resident progress note in EPIC.

## 2024-04-04 NOTE — PROGRESS NOTES
Obstetric/Gynecology Maternal Fetal Medicine Resident Note    Patient declines formal consult with MFM attending physician for prior history of miscarriage x2 requiring dilation and curettage.     Vickie Méndez MD  OBGYN Resident, PGY1  [facility]  4/4/2024, 8:51 AM

## 2024-04-07 LAB
AFP INTERPRETATION: NORMAL
AFP SPECIMEN: NORMAL
CRL: 63.9 MM
CROWN RUMP LENGTH TWIN B: NORMAL MM
CROWN RUMP LENGTH: 63.9
DATE OF BIRTH: NORMAL
DONOR EGG?: NEGATIVE
GESTATIONAL AGE: NORMAL
HX OF HEREDITARY DISORDERS: NO
IN VITRO FERTILIZATION: NEGATIVE
MATERNAL AGE AT EDD: 32.6 YR
MATERNAL SCREEN, EER: NORMAL
MATERNAL WEIGHT: 162
MOM FOR NT, TWIN B: NORMAL
MOM FOR NT: 0.92
MOM FOR PAPP-A: 0.83
MONOCHORIONIC TWINS: NEGATIVE
MSHCG-MOM: 1.09
MSHCG: NORMAL IU/L
NUCHAL TRANSLUC (NT): 1.5
NUCHAL TRANSLUC (NT): 1.5 MM
NUCHAL TRANSLUCENCY TWIN B: NORMAL MM
NUMBER OF FETUSES: 1
NUMBER OF FETUSES: NORMAL
PAPP-A MOM: 791.8 NG/ML
PATIENT WEIGHT UNITS: NORMAL
PATIENT WEIGHT: NORMAL
PREV TRISOMY PREG: NEGATIVE
RACE (MATERNAL): NORMAL
RACE: NORMAL
READING MD CERT NUM: NORMAL
READING MD NAME: NORMAL
REPEAT SPECIMEN?: NEGATIVE
SMOKING: NEGATIVE
SMOKING: NO
SONOGRAPHER CERT NO: NORMAL
SONOGRAPHER CERT NO: NORMAL
SONOGRAPHER NAME: NORMAL
SONOGRAPHER NAME: NORMAL
ULTRASOUND DATE: NORMAL
ULTRASOUND DATE: NORMAL

## 2024-04-11 LAB — CYTOLOGY REPORT: NORMAL

## 2024-04-24 ENCOUNTER — ROUTINE PRENATAL (OUTPATIENT)
Dept: OBGYN CLINIC | Age: 34
End: 2024-04-24
Payer: COMMERCIAL

## 2024-04-24 VITALS
HEART RATE: 80 BPM | SYSTOLIC BLOOD PRESSURE: 112 MMHG | WEIGHT: 162 LBS | DIASTOLIC BLOOD PRESSURE: 80 MMHG | BODY MASS INDEX: 26.96 KG/M2

## 2024-04-24 DIAGNOSIS — R89.9 ABNORMAL LABORATORY TEST: ICD-10-CM

## 2024-04-24 DIAGNOSIS — O09.92 HIGH-RISK PREGNANCY IN SECOND TRIMESTER: Primary | ICD-10-CM

## 2024-04-24 DIAGNOSIS — Z98.891 H/O: C-SECTION: ICD-10-CM

## 2024-04-24 DIAGNOSIS — Z86.32 HISTORY OF GESTATIONAL DIABETES: ICD-10-CM

## 2024-04-24 DIAGNOSIS — Z3A.15 15 WEEKS GESTATION OF PREGNANCY: ICD-10-CM

## 2024-04-24 PROCEDURE — 99213 OFFICE O/P EST LOW 20 MIN: CPT | Performed by: NURSE PRACTITIONER

## 2024-04-24 PROCEDURE — G8427 DOCREV CUR MEDS BY ELIG CLIN: HCPCS | Performed by: NURSE PRACTITIONER

## 2024-04-24 PROCEDURE — 1036F TOBACCO NON-USER: CPT | Performed by: NURSE PRACTITIONER

## 2024-04-24 PROCEDURE — G8417 CALC BMI ABV UP PARAM F/U: HCPCS | Performed by: NURSE PRACTITIONER

## 2024-04-24 NOTE — PROGRESS NOTES
Janay Solorio is a 34 y.o. female 15w5d        OB History    Para Term  AB Living   8 3 3 0 4 3   SAB IAB Ectopic Molar Multiple Live Births   3 1 0   0 3      # Outcome Date GA Lbr Roberto/2nd Weight Sex Delivery Anes PTL Lv   8 Current            7 SAB 2023           6 SAB 2023           5 Term 22 39w0d  3.459 kg (7 lb 10 oz) F CS-LTranv Spinal N JERICHO   4 SAB 2021           3 Term 19 39w2d  4.423 kg (9 lb 12 oz) M CS-LTranv   JERICHO   2 Term 17 39w2d 02:19 / 03:04 3.459 kg (7 lb 10 oz) M Vag-Spont EPI N JERICHO   1 IAB                      Vitals  BP: 112/80  Weight - Scale: 73.5 kg (162 lb)  Pulse: 80  Patient Position: Sitting  Albumin: Negative  Glucose: Negative      The patient was seen and evaluated. There was Positive fetal movements. No contractions or leakage of fluid. Signs and symptoms of  labor as well as labor were reviewed.The Nuchal Translucency testing was reviewed and found to be normal. A single marker MSAFP was ordered for a 15-20 week gestational age window. TOP ST OH Reviewed. Dates were reviewed with the patient.  An 18-22 week anatomy ultrasound has been ordered.  The patient will return to the office for her next visit in 4 weeks. See antepartum flow sheet.     The following was discussed:  A. Counseling on the incidents of birth defects in the general population being 2-4% and that ultrasound does not diagnose every form of congenital abnormality and has limitations .   B. The only way to evaluate the chromosomal makeup to near 100% certainty is with invasive testing such as chorionic villous sampling or amniocentesis.   C. The options for testing listed in (B) with detail and all risks/benefits and alternatives will be discussed in the high risk setting.   D. NIPT testing options will be discussed with the patient, taking a maternal blood sample and screening it for fetal cells then performing a genetic karyotype.  If this were to be

## 2024-05-20 ENCOUNTER — HOSPITAL ENCOUNTER (OUTPATIENT)
Age: 34
Discharge: HOME | End: 2024-05-20
Payer: COMMERCIAL

## 2024-05-20 DIAGNOSIS — Z3A.15 15 WEEKS GESTATION OF PREGNANCY: ICD-10-CM

## 2024-05-20 PROCEDURE — 82105 ALPHA-FETOPROTEIN SERUM: CPT

## 2024-05-20 PROCEDURE — 36415 COLL VENOUS BLD VENIPUNCTURE: CPT

## 2024-05-21 ENCOUNTER — ROUTINE PRENATAL (OUTPATIENT)
Dept: OBGYN CLINIC | Age: 34
End: 2024-05-21
Payer: COMMERCIAL

## 2024-05-21 VITALS
BODY MASS INDEX: 27.62 KG/M2 | DIASTOLIC BLOOD PRESSURE: 70 MMHG | HEART RATE: 82 BPM | SYSTOLIC BLOOD PRESSURE: 116 MMHG | WEIGHT: 166 LBS

## 2024-05-21 DIAGNOSIS — Z86.32 HISTORY OF GESTATIONAL DIABETES: Primary | ICD-10-CM

## 2024-05-21 DIAGNOSIS — R89.9 ABNORMAL LABORATORY TEST: ICD-10-CM

## 2024-05-21 DIAGNOSIS — Z3A.19 19 WEEKS GESTATION OF PREGNANCY: ICD-10-CM

## 2024-05-21 DIAGNOSIS — Z98.891 H/O: C-SECTION: ICD-10-CM

## 2024-05-21 DIAGNOSIS — R00.0 RACING HEART BEAT: Primary | ICD-10-CM

## 2024-05-21 PROCEDURE — G8427 DOCREV CUR MEDS BY ELIG CLIN: HCPCS | Performed by: NURSE PRACTITIONER

## 2024-05-21 PROCEDURE — 1036F TOBACCO NON-USER: CPT | Performed by: NURSE PRACTITIONER

## 2024-05-21 PROCEDURE — 99213 OFFICE O/P EST LOW 20 MIN: CPT | Performed by: NURSE PRACTITIONER

## 2024-05-21 PROCEDURE — G8417 CALC BMI ABV UP PARAM F/U: HCPCS | Performed by: NURSE PRACTITIONER

## 2024-05-21 RX ORDER — PRENATAL VIT/IRON FUM/FOLIC AC 27MG-0.8MG
1 TABLET ORAL DAILY
COMMUNITY
Start: 2024-05-11

## 2024-05-21 NOTE — PROGRESS NOTES
Janay Solorio is a 34 y.o. female 19w4d        OB History    Para Term  AB Living   8 3 3 0 4 3   SAB IAB Ectopic Molar Multiple Live Births   3 1 0   0 3      # Outcome Date GA Lbr Roberto/2nd Weight Sex Delivery Anes PTL Lv   8 Current            7 SAB 2023           6 SAB 2023           5 Term 22 39w0d  3.459 kg (7 lb 10 oz) F CS-LTranv Spinal N JERICHO   4 SAB 2021           3 Term 19 39w2d  4.423 kg (9 lb 12 oz) M CS-LTranv   JERICHO   2 Term 17 39w2d 02:19 / 03:04 3.459 kg (7 lb 10 oz) M Vag-Spont EPI N JERICHO   1 IAB                Vitals  BP: 116/70  Weight - Scale: 75.3 kg (166 lb)  Pulse: 82  Patient Position: Sitting  Albumin: Negative  Glucose: Negative    The patient was seen and evaluated. There was positive fetal movements. No contractions or leakage of fluid. Signs and symptoms of  labor as well as labor were reviewed.The Nuchal Translucency testing was reviewed and found to be normal A single marker MSAFP was reviewed and found to be results pending. The patients anatomy ultrasound has been completed and reviewed with patient. TOP ST OH Reviewed. A 28 week lab panel was ordered. This includes a (HH, 1 hr GTT, U/A C&S). The patient is to complete this in the next two to four weeks.    Patient reports having episodes where she feels like her heart is racing. Her chest will hurt at the same time.  Noticed this started approx 3-4 weeks ago.  Happens every day but only lasts for 30 seconds at a time.  Had these episodes prior to this pregnancy but not with any of other pregnancies.      The following was discussed:  A. Counseling on the incidents of birth defects in the general population being 2-4% and that ultrasound does not diagnose every form of congenital abnormality and has limitations .   B. The only way to evaluate the chromosomal makeup to near 100% certainty is with invasive testing such as chorionic villous sampling or amniocentesis.   C. The

## 2024-05-22 LAB
AFP INTERPRETATION: NORMAL
AFP MOM: 0.65
AFP SPECIMEN: NORMAL
AFP: 34 NG/ML
DATE OF BIRTH: NORMAL
DATING METHOD: NORMAL
DETERMINED BY: NORMAL
DIABETIC: NO
DONOR EGG?: NO
DUE DATE: NORMAL
ESTIMATED DUE DATE: NORMAL
FAMILY HISTORY NTD: NO
GESTATIONAL AGE: NORMAL
IN VITRO FERTILIZATION: NO
INSULIN REQ DIABETES: NO
LAST MENSTRUAL PERIOD: NORMAL
MATERNAL AGE AT EDD: 32.6 YR
MATERNAL WEIGHT: 162
MONOCHORIONIC TWINS: NO
NUMBER OF FETUSES: NORMAL
PATIENT WEIGHT UNITS: NORMAL
PATIENT WEIGHT: NORMAL
RACE (MATERNAL): NORMAL
RACE: NORMAL
REPEAT SPECIMEN?: NO
SMOKING: NO
SMOKING: NO
VALPROIC/CARBAMAZEP: NO
ZZ NTE CLEAN UP: HISTORY: NO

## 2024-05-29 ENCOUNTER — ROUTINE PRENATAL (OUTPATIENT)
Dept: PERINATAL CARE | Age: 34
End: 2024-05-29

## 2024-05-29 VITALS
RESPIRATION RATE: 16 BRPM | DIASTOLIC BLOOD PRESSURE: 61 MMHG | WEIGHT: 169.5 LBS | TEMPERATURE: 98.6 F | SYSTOLIC BLOOD PRESSURE: 103 MMHG | BODY MASS INDEX: 28.24 KG/M2 | HEART RATE: 81 BPM | HEIGHT: 65 IN

## 2024-05-29 DIAGNOSIS — Z3A.20 20 WEEKS GESTATION OF PREGNANCY: ICD-10-CM

## 2024-05-29 DIAGNOSIS — O09.292 HISTORY OF GESTATIONAL DIABETES IN PRIOR PREGNANCY, CURRENTLY PREGNANT, SECOND TRIMESTER: ICD-10-CM

## 2024-05-29 DIAGNOSIS — Z36.86 ENCOUNTER FOR SCREENING FOR RISK OF PRE-TERM LABOR: ICD-10-CM

## 2024-05-29 DIAGNOSIS — O09.292 HISTORY OF MACROSOMIA IN INFANT IN PRIOR PREGNANCY, CURRENTLY PREGNANT IN SECOND TRIMESTER: ICD-10-CM

## 2024-05-29 DIAGNOSIS — O34.219 PREVIOUS CESAREAN DELIVERY, ANTEPARTUM CONDITION OR COMPLICATION: ICD-10-CM

## 2024-05-29 DIAGNOSIS — Z86.32 HISTORY OF GESTATIONAL DIABETES IN PRIOR PREGNANCY, CURRENTLY PREGNANT, SECOND TRIMESTER: ICD-10-CM

## 2024-05-29 DIAGNOSIS — O43.102 PLACENTAL ABNORMALITY IN SECOND TRIMESTER: Primary | ICD-10-CM

## 2024-05-29 NOTE — PROGRESS NOTES
Obstetric/Gynecology Maternal Fetal Medicine Resident Note    Patient has declined maternal genetic carrier screening.     Patient declines formal consult with MFM attending physician for history of gestational diabetes and LGA baby.     DO NYDIA Leung Resident, PGY1  [facility]  5/29/2024, 10:04 AM

## 2024-05-29 NOTE — PROGRESS NOTES
Advise repeat 1-hour glucola between 24-28 weeks' gestation to evaluate for gestational diabetes.     Patient has declined the Five Gene Carrier Screen (with the [place] test from [employer]) today.     Patient again declines a Maternal-Fetal Medicine complete physician consultation today regarding the fetal and/or maternal medical/obstetrical complications/co-morbidities of pregnancy.      Maternal-Fetal Medicine (MFM) attending physician will defer all management for these medical/obstetrical complications of pregnancy to the primary attending obstetrical physician/provider, as a result.  Therefore, only an ultrasound evaluation was completed today in the MFM office.      Please refer to Maternal-Fetal Medicine OBGYN resident progress note in EPIC.

## 2024-06-20 ENCOUNTER — ROUTINE PRENATAL (OUTPATIENT)
Dept: OBGYN CLINIC | Age: 34
End: 2024-06-20
Payer: COMMERCIAL

## 2024-06-20 VITALS
HEART RATE: 76 BPM | SYSTOLIC BLOOD PRESSURE: 110 MMHG | WEIGHT: 170 LBS | BODY MASS INDEX: 28.29 KG/M2 | DIASTOLIC BLOOD PRESSURE: 64 MMHG

## 2024-06-20 DIAGNOSIS — Z3A.23 23 WEEKS GESTATION OF PREGNANCY: ICD-10-CM

## 2024-06-20 DIAGNOSIS — Z86.32 HISTORY OF GESTATIONAL DIABETES: ICD-10-CM

## 2024-06-20 DIAGNOSIS — R89.9 ABNORMAL LABORATORY TEST: ICD-10-CM

## 2024-06-20 DIAGNOSIS — Z98.891 H/O: C-SECTION: ICD-10-CM

## 2024-06-20 DIAGNOSIS — O09.92 HIGH-RISK PREGNANCY IN SECOND TRIMESTER: Primary | ICD-10-CM

## 2024-06-20 PROCEDURE — G8417 CALC BMI ABV UP PARAM F/U: HCPCS | Performed by: OBSTETRICS & GYNECOLOGY

## 2024-06-20 PROCEDURE — 99214 OFFICE O/P EST MOD 30 MIN: CPT | Performed by: OBSTETRICS & GYNECOLOGY

## 2024-06-20 PROCEDURE — 1036F TOBACCO NON-USER: CPT | Performed by: OBSTETRICS & GYNECOLOGY

## 2024-06-20 PROCEDURE — G8427 DOCREV CUR MEDS BY ELIG CLIN: HCPCS | Performed by: OBSTETRICS & GYNECOLOGY

## 2024-06-20 NOTE — PROGRESS NOTES
Janay Solorio is a 34 y.o. female 23w6d        OB History    Para Term  AB Living   8 3 3 0 4 3   SAB IAB Ectopic Molar Multiple Live Births   3 1 0   0 3      # Outcome Date GA Lbr Roberto/2nd Weight Sex Delivery Anes PTL Lv   8 Current            7 SAB 2023           6 SAB 2023           5 Term 22 39w0d  3.459 kg (7 lb 10 oz) F CS-LTranv Spinal N JERICHO   4 SAB 2021           3 Term 19 39w2d  4.423 kg (9 lb 12 oz) M CS-LTranv   JERICHO   2 Term 17 39w2d 02:19 / 03:04 3.459 kg (7 lb 10 oz) M Vag-Spont EPI N JERICHO   1 IAB                Vitals  BP: 110/64  Weight - Scale: 77.1 kg (170 lb)  Pulse: 76  Patient Position: Sitting  Albumin: Negative  Glucose: Negative    The patient was seen and evaluated. There was positive fetal movements. No contractions or leakage of fluid. Signs and symptoms of  labor as well as labor were reviewed.The Nuchal Translucency testing was reviewed and found to be normal A single marker MSAFP was reviewed and found to be normal. The patients anatomy ultrasound has been completed and reviewed with patient. TOP ST OH Reviewed. A 28 week lab panel was ordered. This includes a (HH, 1 hr GTT, U/A C&S). The patient is to complete this in the next two to four weeks.    The following was discussed:  A. Counseling on the incidents of birth defects in the general population being 2-4% and that ultrasound does not diagnose every form of congenital abnormality and has limitations .   B. The only way to evaluate the chromosomal makeup to near 100% certainty is with invasive testing such as chorionic villous sampling or amniocentesis.   C. The options for testing listed in (B) with detail and all risks/benefits and alternatives will be discussed in the high risk setting.   D. NIPT testing options will be discussed with the patient, taking a maternal blood sample and screening it for fetal cells then performing a genetic karyotype.  If this were to be

## 2024-07-10 ENCOUNTER — ROUTINE PRENATAL (OUTPATIENT)
Dept: OBGYN CLINIC | Age: 34
End: 2024-07-10
Payer: COMMERCIAL

## 2024-07-10 VITALS
SYSTOLIC BLOOD PRESSURE: 112 MMHG | HEART RATE: 82 BPM | DIASTOLIC BLOOD PRESSURE: 68 MMHG | WEIGHT: 177 LBS | BODY MASS INDEX: 29.45 KG/M2

## 2024-07-10 DIAGNOSIS — R89.9 ABNORMAL LABORATORY TEST: ICD-10-CM

## 2024-07-10 DIAGNOSIS — Z98.891 H/O: C-SECTION: ICD-10-CM

## 2024-07-10 DIAGNOSIS — Z86.32 HISTORY OF GESTATIONAL DIABETES: Primary | ICD-10-CM

## 2024-07-10 DIAGNOSIS — Z3A.26 26 WEEKS GESTATION OF PREGNANCY: ICD-10-CM

## 2024-07-10 PROCEDURE — G8427 DOCREV CUR MEDS BY ELIG CLIN: HCPCS | Performed by: NURSE PRACTITIONER

## 2024-07-10 PROCEDURE — 99213 OFFICE O/P EST LOW 20 MIN: CPT | Performed by: NURSE PRACTITIONER

## 2024-07-10 PROCEDURE — 1036F TOBACCO NON-USER: CPT | Performed by: NURSE PRACTITIONER

## 2024-07-10 PROCEDURE — G8417 CALC BMI ABV UP PARAM F/U: HCPCS | Performed by: NURSE PRACTITIONER

## 2024-07-10 NOTE — PROGRESS NOTES
Janay Solorio is a 34 y.o. female 26w5d        OB History    Para Term  AB Living   8 3 3 0 4 3   SAB IAB Ectopic Molar Multiple Live Births   3 1 0   0 3      # Outcome Date GA Lbr Roberto/2nd Weight Sex Delivery Anes PTL Lv   8 Current            7 SAB 2023           6 SAB 2023           5 Term 22 39w0d  3.459 kg (7 lb 10 oz) F CS-LTranv Spinal N JERICHO   4 SAB 2021           3 Term 19 39w2d  4.423 kg (9 lb 12 oz) M CS-LTranv   JERICHO   2 Term 17 39w2d 02:19 / 03:04 3.459 kg (7 lb 10 oz) M Vag-Spont EPI N JERICHO   1 IAB                Vitals  BP: 112/68  Weight - Scale: 80.3 kg (177 lb)  Pulse: 82  Patient Position: Sitting  Albumin: Negative  Glucose: Negative    The patient was seen and evaluated. There was positive fetal movements. No contractions or leakage of fluid. Signs and symptoms of  labor as well as labor were reviewed.The Nuchal Translucency testing was reviewed and found to be normal A single marker MSAFP was reviewed and found to be normal. The patients anatomy ultrasound has been completed and reviewed with patient. TOP ST OH Reviewed. A 28 week lab panel was ordered. This includes a (HH, 1 hr GTT, U/A C&S). The patient is to complete this in the next two to four weeks.    The following was discussed:  A. Counseling on the incidents of birth defects in the general population being 2-4% and that ultrasound does not diagnose every form of congenital abnormality and has limitations .   B. The only way to evaluate the chromosomal makeup to near 100% certainty is with invasive testing such as chorionic villous sampling or amniocentesis.   C. The options for testing listed in (B) with detail and all risks/benefits and alternatives will be discussed in the high risk setting.   D. NIPT testing options will be discussed with the patient, taking a maternal blood sample and screening it for fetal cells then performing a genetic karyotype.  If this were to be

## 2024-07-18 ENCOUNTER — OFFICE VISIT (OUTPATIENT)
Dept: OBGYN CLINIC | Age: 34
End: 2024-07-18
Payer: COMMERCIAL

## 2024-07-18 ENCOUNTER — HOSPITAL ENCOUNTER (OUTPATIENT)
Age: 34
Setting detail: SPECIMEN
Discharge: HOME | End: 2024-07-18

## 2024-07-18 DIAGNOSIS — Z3A.27 27 WEEKS GESTATION OF PREGNANCY: Primary | ICD-10-CM

## 2024-07-18 PROCEDURE — 36415 COLL VENOUS BLD VENIPUNCTURE: CPT | Performed by: OBSTETRICS & GYNECOLOGY

## 2024-07-18 PROCEDURE — 99999 PR OFFICE/OUTPT VISIT,PROCEDURE ONLY: CPT | Performed by: OBSTETRICS & GYNECOLOGY

## 2024-07-18 NOTE — PROGRESS NOTES
Patient was in the office today for a 1 hour gtt cbc hep c.    Draw per physician order using sterile technique.  Drawn from the right AC.

## 2024-07-19 DIAGNOSIS — Z3A.26 26 WEEKS GESTATION OF PREGNANCY: ICD-10-CM

## 2024-07-19 PROBLEM — R73.09 ABNORMAL GTT (GLUCOSE TOLERANCE TEST): Status: ACTIVE | Noted: 2024-07-19

## 2024-07-19 LAB
BASOPHILS # BLD: <0.03 K/UL (ref 0–0.2)
BASOPHILS NFR BLD: 0 % (ref 0–2)
EOSINOPHIL # BLD: 0.05 K/UL (ref 0–0.44)
EOSINOPHILS RELATIVE PERCENT: 1 % (ref 1–4)
ERYTHROCYTE [DISTWIDTH] IN BLOOD BY AUTOMATED COUNT: 14.6 % (ref 11.8–14.4)
GLUCOSE 1H P 50 G GLC PO SERPL-MCNC: 134 MG/DL (ref 70–135)
GLUCOSE ADMINISTRATION: NORMAL
HCT VFR BLD AUTO: 36 % (ref 36.3–47.1)
HCV AB SERPL QL IA: NONREACTIVE
HGB BLD-MCNC: 11.3 G/DL (ref 11.9–15.1)
IMM GRANULOCYTES # BLD AUTO: 0.07 K/UL (ref 0–0.3)
IMM GRANULOCYTES NFR BLD: 1 %
LYMPHOCYTES NFR BLD: 1.03 K/UL (ref 1.1–3.7)
LYMPHOCYTES RELATIVE PERCENT: 12 % (ref 24–43)
MCH RBC QN AUTO: 29.5 PG (ref 25.2–33.5)
MCHC RBC AUTO-ENTMCNC: 31.4 G/DL (ref 28.4–34.8)
MCV RBC AUTO: 94 FL (ref 82.6–102.9)
MONOCYTES NFR BLD: 0.52 K/UL (ref 0.1–1.2)
MONOCYTES NFR BLD: 6 % (ref 3–12)
NEUTROPHILS NFR BLD: 80 % (ref 36–65)
NEUTS SEG NFR BLD: 6.87 K/UL (ref 1.5–8.1)
NRBC BLD-RTO: 0 PER 100 WBC
PLATELET # BLD AUTO: 174 K/UL (ref 138–453)
PMV BLD AUTO: 9.9 FL (ref 8.1–13.5)
RBC # BLD AUTO: 3.83 M/UL (ref 3.95–5.11)
RBC # BLD: ABNORMAL 10*6/UL
WBC OTHER # BLD: 8.6 K/UL (ref 3.5–11.3)

## 2024-07-22 ENCOUNTER — TELEPHONE (OUTPATIENT)
Dept: OBGYN CLINIC | Age: 34
End: 2024-07-22

## 2024-07-22 DIAGNOSIS — O99.810 ABNORMAL GLUCOSE AFFECTING PREGNANCY: Primary | ICD-10-CM

## 2024-07-22 NOTE — TELEPHONE ENCOUNTER
Per Jordyn pt notified of Elevated 1 hour GTT and pt to complete 3 hour GTT and HgbA1c. Orders in epic.

## 2024-07-22 NOTE — TELEPHONE ENCOUNTER
----- Message from JANUARY Thurman NP sent at 7/19/2024 11:52 AM EDT -----  Elevated 1 hour GTT  Please order 3 hour GTT and HgbA1c

## 2024-07-24 ENCOUNTER — HOSPITAL ENCOUNTER (OUTPATIENT)
Age: 34
Setting detail: SPECIMEN
Discharge: HOME | End: 2024-07-24
Payer: COMMERCIAL

## 2024-07-24 ENCOUNTER — TELEPHONE (OUTPATIENT)
Dept: OBGYN CLINIC | Age: 34
End: 2024-07-24

## 2024-07-24 ENCOUNTER — ROUTINE PRENATAL (OUTPATIENT)
Dept: OBGYN CLINIC | Age: 34
End: 2024-07-24
Payer: COMMERCIAL

## 2024-07-24 VITALS
WEIGHT: 178 LBS | BODY MASS INDEX: 29.62 KG/M2 | SYSTOLIC BLOOD PRESSURE: 110 MMHG | HEART RATE: 81 BPM | DIASTOLIC BLOOD PRESSURE: 64 MMHG

## 2024-07-24 DIAGNOSIS — O99.810 ABNORMAL GLUCOSE AFFECTING PREGNANCY: ICD-10-CM

## 2024-07-24 DIAGNOSIS — R89.9 ABNORMAL LABORATORY TEST: ICD-10-CM

## 2024-07-24 DIAGNOSIS — Z98.891 H/O: C-SECTION: ICD-10-CM

## 2024-07-24 DIAGNOSIS — R73.09 ABNORMAL GTT (GLUCOSE TOLERANCE TEST): ICD-10-CM

## 2024-07-24 DIAGNOSIS — Z3A.09 9 WEEKS GESTATION OF PREGNANCY: ICD-10-CM

## 2024-07-24 DIAGNOSIS — Z3A.28 28 WEEKS GESTATION OF PREGNANCY: ICD-10-CM

## 2024-07-24 DIAGNOSIS — O09.93 HIGH-RISK PREGNANCY IN THIRD TRIMESTER: Primary | ICD-10-CM

## 2024-07-24 DIAGNOSIS — Z86.32 HISTORY OF GESTATIONAL DIABETES: ICD-10-CM

## 2024-07-24 DIAGNOSIS — R34 DECREASED URINATION: ICD-10-CM

## 2024-07-24 LAB
AMOUNT GLUCOSE GIVEN: 100 G
BACTERIA URNS QL MICRO: ABNORMAL
BILIRUB UR QL STRIP: NEGATIVE
CASTS #/AREA URNS LPF: ABNORMAL /LPF
CLARITY UR: CLEAR
COLOR UR: YELLOW
EPI CELLS #/AREA URNS HPF: ABNORMAL /HPF
EST. AVERAGE GLUCOSE BLD GHB EST-MCNC: 82 MG/DL
GLUCOSE 2H P 75 G GLC PO SERPL-MCNC: 101 MG/DL (ref 65–99)
GLUCOSE TOLERANCE TEST 1 HOUR: 165 MG/DL (ref 65–184)
GLUCOSE TOLERANCE TEST 2 HOUR: 147 MG/DL (ref 65–139)
GLUCOSE TOLERANCE TEST 3 HOUR: 136 MG/DL (ref 65–130)
GLUCOSE UR STRIP-MCNC: ABNORMAL MG/DL
HBA1C MFR BLD: 4.5 % (ref 4–6)
HGB UR QL STRIP.AUTO: ABNORMAL
KETONES UR STRIP-MCNC: NEGATIVE MG/DL
LEUKOCYTE ESTERASE UR QL STRIP: NEGATIVE
NITRITE UR QL STRIP: NEGATIVE
PH UR STRIP: 7 [PH] (ref 5–8)
PROT UR STRIP-MCNC: NEGATIVE MG/DL
RBC #/AREA URNS HPF: ABNORMAL /HPF
SP GR UR STRIP: 1.02 (ref 1–1.03)
UROBILINOGEN UR STRIP-ACNC: NORMAL EU/DL (ref 0–1)
WBC #/AREA URNS HPF: ABNORMAL /HPF

## 2024-07-24 PROCEDURE — 36415 COLL VENOUS BLD VENIPUNCTURE: CPT

## 2024-07-24 PROCEDURE — G8417 CALC BMI ABV UP PARAM F/U: HCPCS | Performed by: OBSTETRICS & GYNECOLOGY

## 2024-07-24 PROCEDURE — 82951 GLUCOSE TOLERANCE TEST (GTT): CPT

## 2024-07-24 PROCEDURE — 81001 URINALYSIS AUTO W/SCOPE: CPT

## 2024-07-24 PROCEDURE — 83036 HEMOGLOBIN GLYCOSYLATED A1C: CPT

## 2024-07-24 PROCEDURE — 82952 GTT-ADDED SAMPLES: CPT

## 2024-07-24 PROCEDURE — 1036F TOBACCO NON-USER: CPT | Performed by: OBSTETRICS & GYNECOLOGY

## 2024-07-24 PROCEDURE — G8427 DOCREV CUR MEDS BY ELIG CLIN: HCPCS | Performed by: OBSTETRICS & GYNECOLOGY

## 2024-07-24 PROCEDURE — 99213 OFFICE O/P EST LOW 20 MIN: CPT | Performed by: OBSTETRICS & GYNECOLOGY

## 2024-07-24 RX ORDER — ONDANSETRON 4 MG/1
4 TABLET, FILM COATED ORAL EVERY 8 HOURS PRN
Qty: 30 TABLET | Refills: 1 | Status: SHIPPED | OUTPATIENT
Start: 2024-07-24

## 2024-07-24 RX ORDER — CEPHALEXIN 500 MG/1
500 CAPSULE ORAL 4 TIMES DAILY
Qty: 28 CAPSULE | Refills: 0 | Status: SHIPPED | OUTPATIENT
Start: 2024-07-24 | End: 2024-07-31

## 2024-07-24 NOTE — TELEPHONE ENCOUNTER
Per Jordyn pt notified of urine results showing + hgb in urine and pt to get Keflex 500mg PO QID x 7 days sent to pt pharm.

## 2024-07-24 NOTE — TELEPHONE ENCOUNTER
----- Message from JANUARY Del Angel - CNP sent at 7/24/2024  2:18 PM EDT -----  + hgb in urine  Keflex 500mg PO QID x 7 days

## 2024-07-24 NOTE — PROGRESS NOTES
after eating and count movements for up to one hour with a target value of ten movements.  She was instructed to notify the office if she did not make that target after two attempts or if after any attempt there was less than four movements.    The patient reports that the targets have been made Yes.     Testing:  Not indicated  The recommendation to proceed to fetal kick counts every 8 hours daily instead of Non stress testing, (as per Dianna BENNETT, Anastasiia G, Kira F, Elda LOVE, [facility] guidance for Covid-19 testing, American Journal of Obstetrics & Gynecology, 2020)     Assessment:  1. Janay Solorio is a 34 y.o. female  2.   3. 28w5d    Patient Active Problem List    Diagnosis Date Noted    H/O:  x 2 2024     Priority: High    REPEAT LTC- section 2022     Priority: High    history of LGA: 9#12oz 2021     Priority: High    History of gestational diabetes 09/10/2018     Priority: High     Early 1 hour GTT ordered   If wnl repeat at 28 weeks      Abnormal GTT (glucose tolerance test) 2024     3 hour and A1c ordered      D&C 2023    Missed  2023        Diagnosis Orders   1. High-risk pregnancy in third trimester        2. Abnormal glucose affecting pregnancy        3. history of LGA: 9#12oz         4. H/O:  x 2        5. 28 weeks gestation of pregnancy        6. History of gestational diabetes        7. Abnormal GTT (glucose tolerance test)                  Plan:  The patient will return to the office for her next visit in 2 weeks. See antepartum flow sheet.      Testing Indicated: No  Scheduled with Nursing-Pt notified: No      Patient was seen with total face to face time of 20 minutes. More than 50% of this visit was on counseling and education regarding her    Diagnosis Orders   1. High-risk pregnancy in third trimester        2. Abnormal glucose affecting pregnancy        3. history of LGA:

## 2024-08-06 ENCOUNTER — ROUTINE PRENATAL (OUTPATIENT)
Dept: OBGYN CLINIC | Age: 34
End: 2024-08-06
Payer: COMMERCIAL

## 2024-08-06 VITALS
DIASTOLIC BLOOD PRESSURE: 76 MMHG | WEIGHT: 180 LBS | HEART RATE: 92 BPM | BODY MASS INDEX: 29.95 KG/M2 | SYSTOLIC BLOOD PRESSURE: 120 MMHG

## 2024-08-06 DIAGNOSIS — O09.93 HIGH-RISK PREGNANCY IN THIRD TRIMESTER: Primary | ICD-10-CM

## 2024-08-06 DIAGNOSIS — R73.09 ABNORMAL GTT (GLUCOSE TOLERANCE TEST): ICD-10-CM

## 2024-08-06 DIAGNOSIS — R89.9 ABNORMAL LABORATORY TEST: ICD-10-CM

## 2024-08-06 DIAGNOSIS — Z98.891 H/O: C-SECTION: ICD-10-CM

## 2024-08-06 DIAGNOSIS — Z86.32 HISTORY OF GESTATIONAL DIABETES: ICD-10-CM

## 2024-08-06 DIAGNOSIS — Z3A.30 30 WEEKS GESTATION OF PREGNANCY: ICD-10-CM

## 2024-08-06 PROCEDURE — 99213 OFFICE O/P EST LOW 20 MIN: CPT | Performed by: NURSE PRACTITIONER

## 2024-08-06 PROCEDURE — 1036F TOBACCO NON-USER: CPT | Performed by: NURSE PRACTITIONER

## 2024-08-06 PROCEDURE — G8427 DOCREV CUR MEDS BY ELIG CLIN: HCPCS | Performed by: NURSE PRACTITIONER

## 2024-08-06 PROCEDURE — G8417 CALC BMI ABV UP PARAM F/U: HCPCS | Performed by: NURSE PRACTITIONER

## 2024-08-06 NOTE — PROGRESS NOTES
Janay Solorio is a 34 y.o. female 30w4d        OB History    Para Term  AB Living   8 3 3 0 4 3   SAB IAB Ectopic Molar Multiple Live Births   3 1 0   0 3      # Outcome Date GA Lbr Roberot/2nd Weight Sex Delivery Anes PTL Lv   8 Current            7 SAB 2023           6 SAB 2023           5 Term 22 39w0d  3.459 kg (7 lb 10 oz) F CS-LTranv Spinal N JERICHO   4 SAB 2021           3 Term 19 39w2d  4.423 kg (9 lb 12 oz) M CS-LTranv   JERICHO   2 Term 17 39w2d 02:19 / 03:04 3.459 kg (7 lb 10 oz) M Vag-Spont EPI N JERICHO   1 IAB                Vitals  BP: 120/76  Weight - Scale: 81.6 kg (180 lb)  Pulse: 92  Patient Position: Sitting  Albumin: Negative  Glucose: Negative      The patient was seen and evaluated. There was positive fetal movements. No contractions or leakage of fluid. Signs and symptoms of  labor as well as labor were reviewed. The S/S of Pre-Eclampsia were reviewed with the patient in detail. She is to report any of these if they occur. She currently denies any of these.    The patient had her 28 week labs completed.  Hospital Outpatient Visit on 2024   Component Date Value Ref Range Status    Color, UA 2024 Yellow  Yellow Final    Turbidity UA 2024 Clear  Clear Final    Glucose, Ur 2024 SMALL (A)  NEGATIVE mg/dL Final    Bilirubin, Urine 2024 NEGATIVE  NEGATIVE Final    Ketones, Urine 2024 NEGATIVE  NEGATIVE mg/dL Final    Specific Gravity, UA 2024 1.016  1.000 - 1.030 Final    Urine Hgb 2024 SMALL (A)  NEGATIVE Final    pH, Urine 2024 7.0  5.0 - 8.0 Final    Protein, UA 2024 NEGATIVE  NEGATIVE mg/dL Final    Urobilinogen, Urine 2024 Normal  0.0 - 1.0 EU/dL Final    Nitrite, Urine 2024 NEGATIVE  NEGATIVE Final    Leukocyte Esterase, Urine 2024 NEGATIVE  NEGATIVE Final    WBC, UA 2024 3 to 5 (A)  0 TO 5 /HPF Final    RBC, UA 2024 21 TO 50 (A)  0 TO 2 /HPF Final    Casts

## 2024-08-07 ENCOUNTER — ROUTINE PRENATAL (OUTPATIENT)
Dept: PERINATAL CARE | Age: 34
End: 2024-08-07
Payer: COMMERCIAL

## 2024-08-07 VITALS
HEIGHT: 65 IN | WEIGHT: 182 LBS | SYSTOLIC BLOOD PRESSURE: 109 MMHG | BODY MASS INDEX: 30.32 KG/M2 | HEART RATE: 89 BPM | DIASTOLIC BLOOD PRESSURE: 78 MMHG | TEMPERATURE: 97.7 F

## 2024-08-07 DIAGNOSIS — O09.293 HISTORY OF MACROSOMIA IN INFANT IN PRIOR PREGNANCY, CURRENTLY PREGNANT IN THIRD TRIMESTER: ICD-10-CM

## 2024-08-07 DIAGNOSIS — Z3A.30 30 WEEKS GESTATION OF PREGNANCY: ICD-10-CM

## 2024-08-07 DIAGNOSIS — O99.810 ABNORMAL MATERNAL GLUCOSE TOLERANCE, ANTEPARTUM: Primary | ICD-10-CM

## 2024-08-07 DIAGNOSIS — O09.293 HISTORY OF GESTATIONAL DIABETES IN PRIOR PREGNANCY, CURRENTLY PREGNANT, THIRD TRIMESTER: ICD-10-CM

## 2024-08-07 DIAGNOSIS — O43.103 PLACENTAL ABNORMALITY IN THIRD TRIMESTER: ICD-10-CM

## 2024-08-07 DIAGNOSIS — Z03.71 SUSPECTED PROBLEM WITH AMNIOTIC CAVITY AND MEMBRANE NOT FOUND: ICD-10-CM

## 2024-08-07 DIAGNOSIS — O34.219 PREVIOUS CESAREAN DELIVERY, ANTEPARTUM CONDITION OR COMPLICATION: ICD-10-CM

## 2024-08-07 DIAGNOSIS — Z86.32 HISTORY OF GESTATIONAL DIABETES IN PRIOR PREGNANCY, CURRENTLY PREGNANT, THIRD TRIMESTER: ICD-10-CM

## 2024-08-07 DIAGNOSIS — Z36.3 ENCOUNTER FOR ROUTINE SCREENING FOR MALFORMATION USING ULTRASONICS: ICD-10-CM

## 2024-08-07 PROCEDURE — 76817 TRANSVAGINAL US OBSTETRIC: CPT | Performed by: OBSTETRICS & GYNECOLOGY

## 2024-08-07 PROCEDURE — 76819 FETAL BIOPHYS PROFIL W/O NST: CPT | Performed by: OBSTETRICS & GYNECOLOGY

## 2024-08-07 PROCEDURE — 76805 OB US >/= 14 WKS SNGL FETUS: CPT | Performed by: OBSTETRICS & GYNECOLOGY

## 2024-08-07 PROCEDURE — 99999 PR OFFICE/OUTPT VISIT,PROCEDURE ONLY: CPT | Performed by: OBSTETRICS & GYNECOLOGY

## 2024-08-20 ENCOUNTER — ROUTINE PRENATAL (OUTPATIENT)
Dept: OBGYN CLINIC | Age: 34
End: 2024-08-20
Payer: COMMERCIAL

## 2024-08-20 VITALS
SYSTOLIC BLOOD PRESSURE: 120 MMHG | WEIGHT: 185 LBS | DIASTOLIC BLOOD PRESSURE: 74 MMHG | BODY MASS INDEX: 30.79 KG/M2 | HEART RATE: 90 BPM

## 2024-08-20 DIAGNOSIS — Z98.891 H/O: C-SECTION: ICD-10-CM

## 2024-08-20 DIAGNOSIS — Z86.32 HISTORY OF GESTATIONAL DIABETES: ICD-10-CM

## 2024-08-20 DIAGNOSIS — R73.09 ABNORMAL GTT (GLUCOSE TOLERANCE TEST): ICD-10-CM

## 2024-08-20 DIAGNOSIS — Z3A.32 32 WEEKS GESTATION OF PREGNANCY: ICD-10-CM

## 2024-08-20 DIAGNOSIS — O09.93 HIGH-RISK PREGNANCY IN THIRD TRIMESTER: Primary | ICD-10-CM

## 2024-08-20 DIAGNOSIS — R89.9 ABNORMAL LABORATORY TEST: ICD-10-CM

## 2024-08-20 PROCEDURE — 1036F TOBACCO NON-USER: CPT | Performed by: OBSTETRICS & GYNECOLOGY

## 2024-08-20 PROCEDURE — 99214 OFFICE O/P EST MOD 30 MIN: CPT | Performed by: OBSTETRICS & GYNECOLOGY

## 2024-08-20 PROCEDURE — G8427 DOCREV CUR MEDS BY ELIG CLIN: HCPCS | Performed by: OBSTETRICS & GYNECOLOGY

## 2024-08-20 PROCEDURE — G8417 CALC BMI ABV UP PARAM F/U: HCPCS | Performed by: OBSTETRICS & GYNECOLOGY

## 2024-08-21 NOTE — PROGRESS NOTES
Janay Solorio is a 34 y.o. female 32w5d        OB History    Para Term  AB Living   8 3 3 0 4 3   SAB IAB Ectopic Molar Multiple Live Births   3 1 0   0 3      # Outcome Date GA Lbr Roberto/2nd Weight Sex Type Anes PTL Lv   8 Current            7 SAB 2023           6 SAB 2023           5 Term 22 39w0d  3.459 kg (7 lb 10 oz) F CS-LTranv Spinal N JERICHO   4 SAB 2021           3 Term 19 39w2d  4.423 kg (9 lb 12 oz) M CS-LTranv   JERICHO   2 Term 17 39w2d 02:19 / 03:04 3.459 kg (7 lb 10 oz) M Vag-Spont EPI N JERICHO   1 IAB                Vitals  BP: 120/74  Weight - Scale: 83.9 kg (185 lb)  Pulse: 90  Patient Position: Sitting      The patient was seen and evaluated. There was positive fetal movements. No contractions or leakage of fluid. Signs and symptoms of  labor as well as labor were reviewed. The S/S of Pre-Eclampsia were reviewed with the patient in detail. She is to report any of these if they occur. She currently denies any of these.    The patient had her 28 week labs completed.  Hospital Outpatient Visit on 2024   Component Date Value Ref Range Status    Color, UA 2024 Yellow  Yellow Final    Turbidity UA 2024 Clear  Clear Final    Glucose, Ur 2024 SMALL (A)  NEGATIVE mg/dL Final    Bilirubin, Urine 2024 NEGATIVE  NEGATIVE Final    Ketones, Urine 2024 NEGATIVE  NEGATIVE mg/dL Final    Specific Gravity, UA 2024 1.016  1.000 - 1.030 Final    Urine Hgb 2024 SMALL (A)  NEGATIVE Final    pH, Urine 2024 7.0  5.0 - 8.0 Final    Protein, UA 2024 NEGATIVE  NEGATIVE mg/dL Final    Urobilinogen, Urine 2024 Normal  0.0 - 1.0 EU/dL Final    Nitrite, Urine 2024 NEGATIVE  NEGATIVE Final    Leukocyte Esterase, Urine 2024 NEGATIVE  NEGATIVE Final    WBC, UA 2024 3 to 5 (A)  0 TO 5 /HPF Final    RBC, UA 2024 21 TO 50 (A)  0 TO 2 /HPF Final    Casts UA 2024 0 TO 2 (A)  None /LPF Final

## 2024-08-27 ENCOUNTER — ROUTINE PRENATAL (OUTPATIENT)
Dept: OBGYN CLINIC | Age: 34
End: 2024-08-27
Payer: COMMERCIAL

## 2024-08-27 VITALS
HEART RATE: 78 BPM | SYSTOLIC BLOOD PRESSURE: 108 MMHG | DIASTOLIC BLOOD PRESSURE: 66 MMHG | WEIGHT: 182 LBS | BODY MASS INDEX: 30.29 KG/M2

## 2024-08-27 DIAGNOSIS — Z98.891 H/O: C-SECTION: ICD-10-CM

## 2024-08-27 DIAGNOSIS — R73.09 ABNORMAL GTT (GLUCOSE TOLERANCE TEST): ICD-10-CM

## 2024-08-27 DIAGNOSIS — R89.9 ABNORMAL LABORATORY TEST: ICD-10-CM

## 2024-08-27 DIAGNOSIS — Z3A.33 33 WEEKS GESTATION OF PREGNANCY: ICD-10-CM

## 2024-08-27 DIAGNOSIS — Z86.32 HISTORY OF GESTATIONAL DIABETES: Primary | ICD-10-CM

## 2024-08-27 PROCEDURE — G8427 DOCREV CUR MEDS BY ELIG CLIN: HCPCS | Performed by: NURSE PRACTITIONER

## 2024-08-27 PROCEDURE — 99213 OFFICE O/P EST LOW 20 MIN: CPT | Performed by: NURSE PRACTITIONER

## 2024-08-27 PROCEDURE — 59025 FETAL NON-STRESS TEST: CPT | Performed by: NURSE PRACTITIONER

## 2024-08-27 PROCEDURE — G8417 CALC BMI ABV UP PARAM F/U: HCPCS | Performed by: NURSE PRACTITIONER

## 2024-08-27 PROCEDURE — 1036F TOBACCO NON-USER: CPT | Performed by: NURSE PRACTITIONER

## 2024-08-27 NOTE — PROGRESS NOTES
REACTIVE NST  CATEGORY 1 TRACING  Moderate Variability  Baseline of 120 bpm  SEE SCANNED DOCUMENT  RTO 2-5 DAYS FOR A FOLLOW UP APPOINTMENT WITH  TESTING.   NST for elevated BMI, abnormal glucose tolerance testing.  NST for 20 minutes.

## 2024-09-03 ENCOUNTER — ROUTINE PRENATAL (OUTPATIENT)
Dept: OBGYN CLINIC | Age: 34
End: 2024-09-03
Payer: COMMERCIAL

## 2024-09-03 VITALS
DIASTOLIC BLOOD PRESSURE: 70 MMHG | WEIGHT: 183 LBS | HEART RATE: 84 BPM | BODY MASS INDEX: 30.45 KG/M2 | SYSTOLIC BLOOD PRESSURE: 116 MMHG

## 2024-09-03 DIAGNOSIS — Z3A.34 34 WEEKS GESTATION OF PREGNANCY: ICD-10-CM

## 2024-09-03 DIAGNOSIS — O09.93 HIGH-RISK PREGNANCY IN THIRD TRIMESTER: Primary | ICD-10-CM

## 2024-09-03 DIAGNOSIS — R73.09 ABNORMAL GTT (GLUCOSE TOLERANCE TEST): ICD-10-CM

## 2024-09-03 DIAGNOSIS — R89.9 ABNORMAL LABORATORY TEST: ICD-10-CM

## 2024-09-03 DIAGNOSIS — Z98.891 H/O: C-SECTION: ICD-10-CM

## 2024-09-03 DIAGNOSIS — Z86.32 HISTORY OF GESTATIONAL DIABETES: ICD-10-CM

## 2024-09-03 PROCEDURE — G8427 DOCREV CUR MEDS BY ELIG CLIN: HCPCS | Performed by: OBSTETRICS & GYNECOLOGY

## 2024-09-03 PROCEDURE — 1036F TOBACCO NON-USER: CPT | Performed by: OBSTETRICS & GYNECOLOGY

## 2024-09-03 PROCEDURE — G8417 CALC BMI ABV UP PARAM F/U: HCPCS | Performed by: OBSTETRICS & GYNECOLOGY

## 2024-09-03 PROCEDURE — 59025 FETAL NON-STRESS TEST: CPT | Performed by: OBSTETRICS & GYNECOLOGY

## 2024-09-03 PROCEDURE — 99214 OFFICE O/P EST MOD 30 MIN: CPT | Performed by: OBSTETRICS & GYNECOLOGY

## 2024-09-03 NOTE — PROGRESS NOTES
Janay Solorio is a 34 y.o. female 34w4d        OB History    Para Term  AB Living   8 3 3 0 4 3   SAB IAB Ectopic Molar Multiple Live Births   3 1 0   0 3      # Outcome Date GA Lbr Roberto/2nd Weight Sex Type Anes PTL Lv   8 Current            7 SAB 2023           6 SAB 2023           5 Term 22 39w0d  3.459 kg (7 lb 10 oz) F CS-LTranv Spinal N JERICHO   4 SAB 2021           3 Term 19 39w2d  4.423 kg (9 lb 12 oz) M CS-LTranv   JERICHO   2 Term 17 39w2d 02:19 / 03:04 3.459 kg (7 lb 10 oz) M Vag-Spont EPI N JERICHO   1 IAB                Vitals  BP: 116/70  Weight - Scale: 83 kg (183 lb)  Pulse: 84  Patient Position: Sitting  Albumin: Negative  Glucose: Negative      The patient was seen and evaluated. There was positive fetal movements. No contractions or leakage of fluid. Signs and symptoms of  labor as well as labor were reviewed. The S/S of Pre-Eclampsia were reviewed with the patient in detail. She is to report any of these if they occur. She currently denies any of these.    The patient had her 28 week labs completed.  No visits with results within 5 Week(s) from this visit.   Latest known visit with results is:   Hospital Outpatient Visit on 2024   Component Date Value Ref Range Status    Color, UA 2024 Yellow  Yellow Final    Turbidity UA 2024 Clear  Clear Final    Glucose, Ur 2024 SMALL (A)  NEGATIVE mg/dL Final    Bilirubin, Urine 2024 NEGATIVE  NEGATIVE Final    Ketones, Urine 2024 NEGATIVE  NEGATIVE mg/dL Final    Specific Gravity, UA 2024 1.016  1.000 - 1.030 Final    Urine Hgb 2024 SMALL (A)  NEGATIVE Final    pH, Urine 2024 7.0  5.0 - 8.0 Final    Protein, UA 2024 NEGATIVE  NEGATIVE mg/dL Final    Urobilinogen, Urine 2024 Normal  0.0 - 1.0 EU/dL Final    Nitrite, Urine 2024 NEGATIVE  NEGATIVE Final    Leukocyte Esterase, Urine 2024 NEGATIVE  NEGATIVE Final    WBC, UA 2024 3

## 2024-09-04 ENCOUNTER — PATIENT MESSAGE (OUTPATIENT)
Dept: OBGYN CLINIC | Age: 34
End: 2024-09-04

## 2024-09-04 RX ORDER — FLUCONAZOLE 150 MG/1
150 TABLET ORAL
Qty: 2 TABLET | Refills: 0 | Status: SHIPPED | OUTPATIENT
Start: 2024-09-04

## 2024-09-10 ENCOUNTER — ROUTINE PRENATAL (OUTPATIENT)
Dept: OBGYN CLINIC | Age: 34
End: 2024-09-10
Payer: COMMERCIAL

## 2024-09-10 VITALS
DIASTOLIC BLOOD PRESSURE: 68 MMHG | WEIGHT: 185 LBS | BODY MASS INDEX: 30.79 KG/M2 | SYSTOLIC BLOOD PRESSURE: 120 MMHG | HEART RATE: 84 BPM

## 2024-09-10 DIAGNOSIS — R89.9 ABNORMAL LABORATORY TEST: ICD-10-CM

## 2024-09-10 DIAGNOSIS — Z3A.35 35 WEEKS GESTATION OF PREGNANCY: ICD-10-CM

## 2024-09-10 DIAGNOSIS — Z98.891 H/O: C-SECTION: ICD-10-CM

## 2024-09-10 DIAGNOSIS — R73.09 ABNORMAL GTT (GLUCOSE TOLERANCE TEST): ICD-10-CM

## 2024-09-10 DIAGNOSIS — O09.93 HIGH-RISK PREGNANCY IN THIRD TRIMESTER: Primary | ICD-10-CM

## 2024-09-10 DIAGNOSIS — Z86.32 HISTORY OF GESTATIONAL DIABETES: ICD-10-CM

## 2024-09-10 PROCEDURE — G8427 DOCREV CUR MEDS BY ELIG CLIN: HCPCS | Performed by: OBSTETRICS & GYNECOLOGY

## 2024-09-10 PROCEDURE — G8417 CALC BMI ABV UP PARAM F/U: HCPCS | Performed by: OBSTETRICS & GYNECOLOGY

## 2024-09-10 PROCEDURE — 99214 OFFICE O/P EST MOD 30 MIN: CPT | Performed by: OBSTETRICS & GYNECOLOGY

## 2024-09-10 PROCEDURE — 59025 FETAL NON-STRESS TEST: CPT | Performed by: OBSTETRICS & GYNECOLOGY

## 2024-09-10 PROCEDURE — 1036F TOBACCO NON-USER: CPT | Performed by: OBSTETRICS & GYNECOLOGY

## 2024-09-17 ENCOUNTER — ROUTINE PRENATAL (OUTPATIENT)
Dept: OBGYN CLINIC | Age: 34
End: 2024-09-17
Payer: COMMERCIAL

## 2024-09-17 ENCOUNTER — HOSPITAL ENCOUNTER (OUTPATIENT)
Age: 34
Setting detail: SPECIMEN
Discharge: HOME | End: 2024-09-17

## 2024-09-17 VITALS
DIASTOLIC BLOOD PRESSURE: 76 MMHG | BODY MASS INDEX: 31.12 KG/M2 | WEIGHT: 187 LBS | SYSTOLIC BLOOD PRESSURE: 112 MMHG | HEART RATE: 84 BPM

## 2024-09-17 DIAGNOSIS — R89.9 ABNORMAL LABORATORY TEST: ICD-10-CM

## 2024-09-17 DIAGNOSIS — R73.09 ABNORMAL GTT (GLUCOSE TOLERANCE TEST): ICD-10-CM

## 2024-09-17 DIAGNOSIS — Z86.32 HISTORY OF GESTATIONAL DIABETES: ICD-10-CM

## 2024-09-17 DIAGNOSIS — Z98.891 H/O: C-SECTION: ICD-10-CM

## 2024-09-17 DIAGNOSIS — Z3A.36 36 WEEKS GESTATION OF PREGNANCY: ICD-10-CM

## 2024-09-17 DIAGNOSIS — O09.93 HIGH-RISK PREGNANCY IN THIRD TRIMESTER: Primary | ICD-10-CM

## 2024-09-17 DIAGNOSIS — O09.93 HIGH-RISK PREGNANCY IN THIRD TRIMESTER: ICD-10-CM

## 2024-09-17 PROCEDURE — 99214 OFFICE O/P EST MOD 30 MIN: CPT | Performed by: OBSTETRICS & GYNECOLOGY

## 2024-09-17 PROCEDURE — G8427 DOCREV CUR MEDS BY ELIG CLIN: HCPCS | Performed by: OBSTETRICS & GYNECOLOGY

## 2024-09-17 PROCEDURE — 1036F TOBACCO NON-USER: CPT | Performed by: OBSTETRICS & GYNECOLOGY

## 2024-09-17 PROCEDURE — 59025 FETAL NON-STRESS TEST: CPT | Performed by: OBSTETRICS & GYNECOLOGY

## 2024-09-17 PROCEDURE — G8417 CALC BMI ABV UP PARAM F/U: HCPCS | Performed by: OBSTETRICS & GYNECOLOGY

## 2024-09-20 PROBLEM — O99.820 GBS (GROUP B STREPTOCOCCUS CARRIER), +RV CULTURE, CURRENTLY PREGNANT: Status: ACTIVE | Noted: 2024-09-20

## 2024-09-20 LAB
MICROORGANISM SPEC CULT: ABNORMAL
SERVICE CMNT-IMP: ABNORMAL
SPECIMEN DESCRIPTION: ABNORMAL

## 2024-09-24 ENCOUNTER — ROUTINE PRENATAL (OUTPATIENT)
Dept: OBGYN CLINIC | Age: 34
End: 2024-09-24

## 2024-09-24 VITALS
DIASTOLIC BLOOD PRESSURE: 73 MMHG | BODY MASS INDEX: 31.62 KG/M2 | WEIGHT: 190 LBS | HEART RATE: 80 BPM | SYSTOLIC BLOOD PRESSURE: 123 MMHG

## 2024-09-24 DIAGNOSIS — Z3A.37 37 WEEKS GESTATION OF PREGNANCY: ICD-10-CM

## 2024-09-24 DIAGNOSIS — O09.93 HIGH-RISK PREGNANCY IN THIRD TRIMESTER: Primary | ICD-10-CM

## 2024-09-24 DIAGNOSIS — Z86.32 HISTORY OF GESTATIONAL DIABETES: ICD-10-CM

## 2024-09-24 DIAGNOSIS — R89.9 ABNORMAL LABORATORY TEST: ICD-10-CM

## 2024-09-24 DIAGNOSIS — Z98.891 H/O: C-SECTION: ICD-10-CM

## 2024-09-24 DIAGNOSIS — O99.820 GBS (GROUP B STREPTOCOCCUS CARRIER), +RV CULTURE, CURRENTLY PREGNANT: ICD-10-CM

## 2024-09-24 DIAGNOSIS — R73.09 ABNORMAL GTT (GLUCOSE TOLERANCE TEST): ICD-10-CM

## 2024-09-28 SDOH — ECONOMIC STABILITY: FOOD INSECURITY: WITHIN THE PAST 12 MONTHS, YOU WORRIED THAT YOUR FOOD WOULD RUN OUT BEFORE YOU GOT MONEY TO BUY MORE.: NEVER TRUE

## 2024-09-28 SDOH — ECONOMIC STABILITY: INCOME INSECURITY: HOW HARD IS IT FOR YOU TO PAY FOR THE VERY BASICS LIKE FOOD, HOUSING, MEDICAL CARE, AND HEATING?: NOT HARD AT ALL

## 2024-09-28 SDOH — ECONOMIC STABILITY: FOOD INSECURITY: WITHIN THE PAST 12 MONTHS, THE FOOD YOU BOUGHT JUST DIDN'T LAST AND YOU DIDN'T HAVE MONEY TO GET MORE.: NEVER TRUE

## 2024-09-28 SDOH — ECONOMIC STABILITY: TRANSPORTATION INSECURITY
IN THE PAST 12 MONTHS, HAS LACK OF TRANSPORTATION KEPT YOU FROM MEETINGS, WORK, OR FROM GETTING THINGS NEEDED FOR DAILY LIVING?: NO

## 2024-10-01 ENCOUNTER — ROUTINE PRENATAL (OUTPATIENT)
Dept: OBGYN CLINIC | Age: 34
End: 2024-10-01
Payer: COMMERCIAL

## 2024-10-01 VITALS
SYSTOLIC BLOOD PRESSURE: 116 MMHG | HEART RATE: 86 BPM | WEIGHT: 188 LBS | BODY MASS INDEX: 31.28 KG/M2 | DIASTOLIC BLOOD PRESSURE: 73 MMHG

## 2024-10-01 DIAGNOSIS — R73.09 ABNORMAL GTT (GLUCOSE TOLERANCE TEST): ICD-10-CM

## 2024-10-01 DIAGNOSIS — Z86.32 HISTORY OF GESTATIONAL DIABETES: ICD-10-CM

## 2024-10-01 DIAGNOSIS — R89.9 ABNORMAL LABORATORY TEST: ICD-10-CM

## 2024-10-01 DIAGNOSIS — O09.93 HIGH-RISK PREGNANCY IN THIRD TRIMESTER: Primary | ICD-10-CM

## 2024-10-01 DIAGNOSIS — Z98.891 H/O: C-SECTION: ICD-10-CM

## 2024-10-01 DIAGNOSIS — Z3A.38 38 WEEKS GESTATION OF PREGNANCY: ICD-10-CM

## 2024-10-01 DIAGNOSIS — O99.820 GBS (GROUP B STREPTOCOCCUS CARRIER), +RV CULTURE, CURRENTLY PREGNANT: ICD-10-CM

## 2024-10-01 PROCEDURE — 1036F TOBACCO NON-USER: CPT | Performed by: OBSTETRICS & GYNECOLOGY

## 2024-10-01 PROCEDURE — G8427 DOCREV CUR MEDS BY ELIG CLIN: HCPCS | Performed by: OBSTETRICS & GYNECOLOGY

## 2024-10-01 PROCEDURE — G8417 CALC BMI ABV UP PARAM F/U: HCPCS | Performed by: OBSTETRICS & GYNECOLOGY

## 2024-10-01 PROCEDURE — 99214 OFFICE O/P EST MOD 30 MIN: CPT | Performed by: OBSTETRICS & GYNECOLOGY

## 2024-10-01 PROCEDURE — 59025 FETAL NON-STRESS TEST: CPT | Performed by: OBSTETRICS & GYNECOLOGY

## 2024-10-01 PROCEDURE — G8484 FLU IMMUNIZE NO ADMIN: HCPCS | Performed by: OBSTETRICS & GYNECOLOGY

## 2024-10-01 NOTE — PROGRESS NOTES
Janay Solorio is a 34 y.o. female 38w4d        OB History    Para Term  AB Living   8 3 3 0 4 3   SAB IAB Ectopic Molar Multiple Live Births   3 1 0   0 3      # Outcome Date GA Lbr Roberto/2nd Weight Sex Type Anes PTL Lv   8 Current            7 SAB 2023           6 SAB 2023           5 Term 22 39w0d  3.459 kg (7 lb 10 oz) F CS-LTranv Spinal N JERICHO   4 SAB 2021           3 Term 19 39w2d  4.423 kg (9 lb 12 oz) M CS-LTranv   JERICHO   2 Term 17 39w2d 02:19 / 03:04 3.459 kg (7 lb 10 oz) M Vag-Spont EPI N JERICHO   1 IAB                Vitals  BP: 116/73  Weight - Scale: 85.3 kg (188 lb)  Pulse: 86  Patient Position: Sitting      The patient was seen and evaluated. There was positive fetal movements. No contractions or leakage of fluid. Signs and symptoms of labor were reviewed.  The S/S of Pre-Eclampsia were reviewed with the patient in detail. She is to report any of these if they occur. She currently denies any of these.    The patient was instructed on fetal kick counts and was given a kick sheet to complete every 8 hours. She was instructed that the baby should move at a minimum of ten times within one hour after a meal. The patient was instructed to lay down on her left side twenty minutes after eating and count movements for up to one hour with a target value of ten movements.  She was instructed to notify the office if she did not make that target after two attempts or if after any attempt there was less than four movements.    The patient reports that the targets have been made Yes.    TDAP declined  3/13/24  Praf done    KANCHAN by TVUS: 10/11/24    High Risk Dx:  Previous  x 2 for repeat/ Repeat  10/4/2024    PRENATAL LAB RESULTS:   Blood Type/Rh: A+  Antibody Screen: negative  Hemoglobin, Hematocrit: 12.8/39.6  Rubella: reactive  T. Pallidum, IgG: non reactive  Hepatitis B Surface Antigen: non reactive  TSH: 1.21  HIV: non reactive  Sickle Cell Screen:

## 2024-10-03 ENCOUNTER — ANESTHESIA EVENT (OUTPATIENT)
Dept: LABOR AND DELIVERY | Age: 34
End: 2024-10-03
Payer: COMMERCIAL

## 2024-10-03 NOTE — H&P
chart    - Anesthesia and NICU notified    - Patient is ready for transfer to the OR suite    Abnormal 1 hr GTT    - Normal 3hr GTT    - HgbA1c wnl    Multiple Placental lakes    - Previously noted on MFM US     Hx CS x 2   - G3 due to maternal request 2/2 suspected fetal macrosomia   - G5 scheduled repeat declining TOLAC   - Desires repeat CS in this pregnancy     Hx GDMA   - Early 1hr GTT wnl     Hx LGA   - G3 pregnancy    - Fetus born measuring 9#12   - EFW in this pregnancy is 7#8    Hx Missed    - In G6 pregnancy @ 10w2d   - Underwent a suction D&C   - Followed with [facility] in this pregnancy     BMI 31    Patient Active Problem List    Diagnosis Date Noted    GBS (group B Streptococcus carrier), +RV culture, currently pregnant 2024     Priority: High     2024 treat in labor per protocol      H/O:  x 2 2024     Priority: High    REPEAT LTC- section 2022     Priority: High    history of LGA: 9#12oz 2021     Priority: High    History of gestational diabetes 09/10/2018     Priority: High     Early 1 hour GTT ordered   If wnl repeat at 28 weeks      39 weeks gestation of pregnancy 10/04/2024    Abnormal GTT (glucose tolerance test) 2024     3 hour and A1c ordered      D&C 2023    Missed  2023       Plan discussed with Dr. Ovalle, who is agreeable.     Steroids given this admission: No    Risks, benefits, alternatives and possible complications have been discussed in detail with the patient. Admission, and post admission procedures and expectations were discussed in detail. All questions were answered.    Attending's Name: Dr. Vasquez Chavez MD  Ob/Gyn Resident  10/4/2024, 9:51 AM

## 2024-10-03 NOTE — DISCHARGE SUMMARY
Obstetric Discharge Summary  [facility]    Patient Name: Janay Solorio  Patient : 1990  Primary Care Physician: Virginie Dominguez MD  Admit Date: 10/4/2024    Principal Diagnosis: IUP at 39w0d, admitted for Scheduled Repeat CS     Her pregnancy has been complicated by:   Patient Active Problem List   Diagnosis    History of gestational diabetes    history of LGA: 9#12oz     REPEAT LTC- section 2022    Missed     D&C 23    H/O:  x 2    Abnormal GTT (glucose tolerance test)    GBS (group B Streptococcus carrier), +RV culture, currently pregnant    39 weeks gestation of pregnancy    RLTCS 10/4/24 F Apg 8/ Wt 8#6    Post-op pain    Uterine scar from previous  delivery    Encounter for care or examination of mother immediately after delivery       Infection Present?: No  Hospital Acquired: No    Surgical Operations & Procedures:  Analgesia: spinal  Delivery Type:  Delivery: See Labor and Delivery Summary   Laceration(s): Absent    Consultations: NICU and Anesthesia    Pertinent Findings & Procedures:   Janay Solorio is a 34 y.o. female  at 39w0d admitted for Scheduled Repeat CS; received Ancef 2g, TXA 1g, Bicitra, Tylenol, Pepcid, Scop patch pre op.     She delivered by repeat low transverse  a Live Born infant on 10/4/24.       Information for the patient's :  Jennifer Solorio [[id number]]   female   Birth Weight: 3.81 kg (8 lb 6.4 oz)    Apgars: 8 at 1 minute and 9 at 5 minutes.     ERAS for  Section  Received ERAS education outpatient: No  Consumed electrolyte-rich clear fluid prior to surgery: No  Received pre-operative Tylenol and Pepcid: Yes  Received Scopolamine patch: Yes  Received post-operative scheduled Motrin and Tylenol: Yes  Goins catheter discontinued 12 hours post-operatively: Yes        ERAS requirements met (3/6): Yes    Postpartum course: normal.    POD#1: Hgb 11.3, Plt 143.  POD#3: Pt

## 2024-10-04 ENCOUNTER — ANESTHESIA (OUTPATIENT)
Dept: LABOR AND DELIVERY | Age: 34
End: 2024-10-04
Payer: COMMERCIAL

## 2024-10-04 ENCOUNTER — HOSPITAL ENCOUNTER (INPATIENT)
Age: 34
LOS: 3 days | Discharge: HOME | DRG: 540 | End: 2024-10-07
Attending: OBSTETRICS & GYNECOLOGY | Admitting: OBSTETRICS & GYNECOLOGY
Payer: COMMERCIAL

## 2024-10-04 DIAGNOSIS — G89.18 POST-OP PAIN: Primary | ICD-10-CM

## 2024-10-04 PROBLEM — Z3A.39 39 WEEKS GESTATION OF PREGNANCY: Status: ACTIVE | Noted: 2024-10-04

## 2024-10-04 LAB
ABO + RH BLD: NORMAL
AMPHET UR QL SCN: NEGATIVE
ARM BAND NUMBER: NORMAL
BARBITURATES UR QL SCN: NEGATIVE
BENZODIAZ UR QL: NEGATIVE
BLOOD BANK SAMPLE EXPIRATION: NORMAL
BLOOD GROUP ANTIBODIES SERPL: NEGATIVE
CANNABINOIDS UR QL SCN: NEGATIVE
COCAINE UR QL SCN: NEGATIVE
ERYTHROCYTE [DISTWIDTH] IN BLOOD BY AUTOMATED COUNT: 13.5 % (ref 11.8–14.4)
FENTANYL UR QL: NEGATIVE
HCT VFR BLD AUTO: 35.7 % (ref 36.3–47.1)
HGB BLD-MCNC: 12.5 G/DL (ref 11.9–15.1)
MCH RBC QN AUTO: 30.1 PG (ref 25.2–33.5)
MCHC RBC AUTO-ENTMCNC: 35 G/DL (ref 28.4–34.8)
MCV RBC AUTO: 86 FL (ref 82.6–102.9)
METHADONE UR QL: NEGATIVE
NRBC BLD-RTO: 0 PER 100 WBC
OPIATES UR QL SCN: NEGATIVE
OXYCODONE UR QL SCN: NEGATIVE
PCP UR QL SCN: NEGATIVE
PLATELET # BLD AUTO: 170 K/UL (ref 138–453)
PMV BLD AUTO: 9.8 FL (ref 8.1–13.5)
RBC # BLD AUTO: 4.15 M/UL (ref 3.95–5.11)
T PALLIDUM AB SER QL IA: NONREACTIVE
TEST INFORMATION: NORMAL
WBC OTHER # BLD: 8.4 K/UL (ref 3.5–11.3)

## 2024-10-04 PROCEDURE — 2580000003 HC RX 258

## 2024-10-04 PROCEDURE — 86901 BLOOD TYPING SEROLOGIC RH(D): CPT

## 2024-10-04 PROCEDURE — 3700000001 HC ADD 15 MINUTES (ANESTHESIA): Performed by: OBSTETRICS & GYNECOLOGY

## 2024-10-04 PROCEDURE — 80307 DRUG TEST PRSMV CHEM ANLYZR: CPT

## 2024-10-04 PROCEDURE — 88307 TISSUE EXAM BY PATHOLOGIST: CPT

## 2024-10-04 PROCEDURE — 59514 CESAREAN DELIVERY ONLY: CPT | Performed by: OBSTETRICS & GYNECOLOGY

## 2024-10-04 PROCEDURE — 1220000000 HC SEMI PRIVATE OB R&B

## 2024-10-04 PROCEDURE — 2720000010 HC SURG SUPPLY STERILE: Performed by: OBSTETRICS & GYNECOLOGY

## 2024-10-04 PROCEDURE — 6370000000 HC RX 637 (ALT 250 FOR IP)

## 2024-10-04 PROCEDURE — 2709999900 HC NON-CHARGEABLE SUPPLY: Performed by: OBSTETRICS & GYNECOLOGY

## 2024-10-04 PROCEDURE — 2500000003 HC RX 250 WO HCPCS

## 2024-10-04 PROCEDURE — 2580000003 HC RX 258: Performed by: NURSE ANESTHETIST, CERTIFIED REGISTERED

## 2024-10-04 PROCEDURE — 3609079900 HC CESAREAN SECTION: Performed by: OBSTETRICS & GYNECOLOGY

## 2024-10-04 PROCEDURE — 3700000000 HC ANESTHESIA ATTENDED CARE: Performed by: OBSTETRICS & GYNECOLOGY

## 2024-10-04 PROCEDURE — 6360000002 HC RX W HCPCS

## 2024-10-04 PROCEDURE — 7100000001 HC PACU RECOVERY - ADDTL 15 MIN: Performed by: OBSTETRICS & GYNECOLOGY

## 2024-10-04 PROCEDURE — 86850 RBC ANTIBODY SCREEN: CPT

## 2024-10-04 PROCEDURE — 85027 COMPLETE CBC AUTOMATED: CPT

## 2024-10-04 PROCEDURE — 86900 BLOOD TYPING SEROLOGIC ABO: CPT

## 2024-10-04 PROCEDURE — 7100000000 HC PACU RECOVERY - FIRST 15 MIN: Performed by: OBSTETRICS & GYNECOLOGY

## 2024-10-04 PROCEDURE — 86780 TREPONEMA PALLIDUM: CPT

## 2024-10-04 PROCEDURE — 6360000002 HC RX W HCPCS: Performed by: NURSE ANESTHETIST, CERTIFIED REGISTERED

## 2024-10-04 RX ORDER — ONDANSETRON 4 MG/1
4 TABLET, FILM COATED ORAL EVERY 8 HOURS PRN
Qty: 30 TABLET | Refills: 1 | Status: SHIPPED | OUTPATIENT
Start: 2024-10-04 | End: 2024-10-16

## 2024-10-04 RX ORDER — SENNA AND DOCUSATE SODIUM 50; 8.6 MG/1; MG/1
1 TABLET, FILM COATED ORAL DAILY
Status: DISCONTINUED | OUTPATIENT
Start: 2024-10-04 | End: 2024-10-06

## 2024-10-04 RX ORDER — SCOLOPAMINE TRANSDERMAL SYSTEM 1 MG/1
1 PATCH, EXTENDED RELEASE TRANSDERMAL
Status: DISCONTINUED | OUTPATIENT
Start: 2024-10-04 | End: 2024-10-07 | Stop reason: HOSPADM

## 2024-10-04 RX ORDER — MORPHINE SULFATE 1 MG/ML
INJECTION, SOLUTION EPIDURAL; INTRATHECAL; INTRAVENOUS
Status: DISCONTINUED | OUTPATIENT
Start: 2024-10-04 | End: 2024-10-04 | Stop reason: SDUPTHER

## 2024-10-04 RX ORDER — OXYCODONE HYDROCHLORIDE 5 MG/1
5 TABLET ORAL EVERY 6 HOURS PRN
Qty: 20 TABLET | Refills: 0 | Status: SHIPPED | OUTPATIENT
Start: 2024-10-04 | End: 2024-10-09

## 2024-10-04 RX ORDER — OXYCODONE HYDROCHLORIDE 5 MG/1
5 TABLET ORAL EVERY 4 HOURS PRN
Status: DISCONTINUED | OUTPATIENT
Start: 2024-10-04 | End: 2024-10-07 | Stop reason: HOSPADM

## 2024-10-04 RX ORDER — ONDANSETRON 2 MG/ML
4 INJECTION INTRAMUSCULAR; INTRAVENOUS EVERY 6 HOURS PRN
Status: DISCONTINUED | OUTPATIENT
Start: 2024-10-04 | End: 2024-10-04

## 2024-10-04 RX ORDER — ONDANSETRON 4 MG/1
4 TABLET, ORALLY DISINTEGRATING ORAL EVERY 8 HOURS PRN
Status: DISCONTINUED | OUTPATIENT
Start: 2024-10-04 | End: 2024-10-07 | Stop reason: HOSPADM

## 2024-10-04 RX ORDER — SODIUM CHLORIDE 0.9 % (FLUSH) 0.9 %
5-40 SYRINGE (ML) INJECTION PRN
Status: DISCONTINUED | OUTPATIENT
Start: 2024-10-04 | End: 2024-10-07 | Stop reason: HOSPADM

## 2024-10-04 RX ORDER — ONDANSETRON 2 MG/ML
INJECTION INTRAMUSCULAR; INTRAVENOUS
Status: DISCONTINUED | OUTPATIENT
Start: 2024-10-04 | End: 2024-10-04 | Stop reason: SDUPTHER

## 2024-10-04 RX ORDER — ACETAMINOPHEN 500 MG
1000 TABLET ORAL ONCE
Status: COMPLETED | OUTPATIENT
Start: 2024-10-04 | End: 2024-10-04

## 2024-10-04 RX ORDER — OXYCODONE HYDROCHLORIDE 5 MG/1
10 TABLET ORAL EVERY 4 HOURS PRN
Status: DISCONTINUED | OUTPATIENT
Start: 2024-10-04 | End: 2024-10-07 | Stop reason: HOSPADM

## 2024-10-04 RX ORDER — SODIUM CHLORIDE 0.9 % (FLUSH) 0.9 %
5-40 SYRINGE (ML) INJECTION EVERY 12 HOURS SCHEDULED
Status: DISCONTINUED | OUTPATIENT
Start: 2024-10-04 | End: 2024-10-04

## 2024-10-04 RX ORDER — ACETAMINOPHEN 500 MG
1000 TABLET ORAL EVERY 6 HOURS PRN
Qty: 120 TABLET | Refills: 1 | Status: SHIPPED | OUTPATIENT
Start: 2024-10-04

## 2024-10-04 RX ORDER — CALCIUM CARBONATE 500 MG/1
500 TABLET, CHEWABLE ORAL 3 TIMES DAILY PRN
Status: DISCONTINUED | OUTPATIENT
Start: 2024-10-04 | End: 2024-10-07 | Stop reason: HOSPADM

## 2024-10-04 RX ORDER — DOCUSATE SODIUM 100 MG/1
100 CAPSULE, LIQUID FILLED ORAL 2 TIMES DAILY
Status: DISCONTINUED | OUTPATIENT
Start: 2024-10-04 | End: 2024-10-05

## 2024-10-04 RX ORDER — SODIUM CHLORIDE 0.9 % (FLUSH) 0.9 %
5-40 SYRINGE (ML) INJECTION EVERY 12 HOURS SCHEDULED
Status: DISCONTINUED | OUTPATIENT
Start: 2024-10-04 | End: 2024-10-07 | Stop reason: HOSPADM

## 2024-10-04 RX ORDER — IBUPROFEN 600 MG/1
600 TABLET, FILM COATED ORAL EVERY 6 HOURS PRN
Qty: 60 TABLET | Refills: 1 | Status: SHIPPED | OUTPATIENT
Start: 2024-10-04 | End: 2024-10-16

## 2024-10-04 RX ORDER — KETOROLAC TROMETHAMINE 30 MG/ML
INJECTION, SOLUTION INTRAMUSCULAR; INTRAVENOUS
Status: DISCONTINUED | OUTPATIENT
Start: 2024-10-04 | End: 2024-10-04 | Stop reason: SDUPTHER

## 2024-10-04 RX ORDER — SODIUM CHLORIDE, SODIUM LACTATE, POTASSIUM CHLORIDE, CALCIUM CHLORIDE 600; 310; 30; 20 MG/100ML; MG/100ML; MG/100ML; MG/100ML
INJECTION, SOLUTION INTRAVENOUS
Status: DISCONTINUED | OUTPATIENT
Start: 2024-10-04 | End: 2024-10-04 | Stop reason: SDUPTHER

## 2024-10-04 RX ORDER — VITAMIN A, ASCORBIC ACID, CHOLECALCIFEROL, .ALPHA.-TOCOPHEROL ACETATE, DL-, THIAMINE MONONITRATE, RIBOFLAVIN, NIACINAMIDE, PYRIDOXINE HYDROCHLORIDE, FOLIC ACID, CYANOCOBALAMIN, CALCIUM CARBONATE, IRON, ZINC OXIDE, AND CUPRIC OXIDE 4000; 120; 400; 22; 1.84; 3; 20; 10; 1; 12; 200; 29; 25; 2 [IU]/1; MG/1; [IU]/1; [IU]/1; MG/1; MG/1; MG/1; MG/1; MG/1; UG/1; MG/1; MG/1; MG/1; MG/1
1 TABLET ORAL DAILY
Status: DISCONTINUED | OUTPATIENT
Start: 2024-10-04 | End: 2024-10-07 | Stop reason: HOSPADM

## 2024-10-04 RX ORDER — SODIUM CHLORIDE 0.9 % (FLUSH) 0.9 %
10 SYRINGE (ML) INJECTION PRN
Status: DISCONTINUED | OUTPATIENT
Start: 2024-10-04 | End: 2024-10-04

## 2024-10-04 RX ORDER — SODIUM CHLORIDE, SODIUM LACTATE, POTASSIUM CHLORIDE, CALCIUM CHLORIDE 600; 310; 30; 20 MG/100ML; MG/100ML; MG/100ML; MG/100ML
INJECTION, SOLUTION INTRAVENOUS CONTINUOUS
Status: DISCONTINUED | OUTPATIENT
Start: 2024-10-04 | End: 2024-10-04

## 2024-10-04 RX ORDER — SODIUM CHLORIDE 9 MG/ML
INJECTION, SOLUTION INTRAVENOUS PRN
Status: DISCONTINUED | OUTPATIENT
Start: 2024-10-04 | End: 2024-10-04

## 2024-10-04 RX ORDER — ONDANSETRON 2 MG/ML
4 INJECTION INTRAMUSCULAR; INTRAVENOUS EVERY 6 HOURS PRN
Status: DISCONTINUED | OUTPATIENT
Start: 2024-10-04 | End: 2024-10-07 | Stop reason: HOSPADM

## 2024-10-04 RX ORDER — BUPIVACAINE HYDROCHLORIDE 7.5 MG/ML
INJECTION, SOLUTION INTRASPINAL
Status: DISCONTINUED | OUTPATIENT
Start: 2024-10-04 | End: 2024-10-04 | Stop reason: SDUPTHER

## 2024-10-04 RX ORDER — IBUPROFEN 600 MG/1
600 TABLET, FILM COATED ORAL EVERY 6 HOURS
Status: DISCONTINUED | OUTPATIENT
Start: 2024-10-05 | End: 2024-10-05

## 2024-10-04 RX ORDER — SODIUM CHLORIDE, SODIUM LACTATE, POTASSIUM CHLORIDE, AND CALCIUM CHLORIDE .6; .31; .03; .02 G/100ML; G/100ML; G/100ML; G/100ML
1000 INJECTION, SOLUTION INTRAVENOUS ONCE
Status: COMPLETED | OUTPATIENT
Start: 2024-10-04 | End: 2024-10-04

## 2024-10-04 RX ORDER — KETOROLAC TROMETHAMINE 30 MG/ML
30 INJECTION, SOLUTION INTRAMUSCULAR; INTRAVENOUS EVERY 6 HOURS
Status: COMPLETED | OUTPATIENT
Start: 2024-10-04 | End: 2024-10-05

## 2024-10-04 RX ORDER — TRANEXAMIC ACID 10 MG/ML
1000 INJECTION, SOLUTION INTRAVENOUS ONCE
Status: COMPLETED | OUTPATIENT
Start: 2024-10-04 | End: 2024-10-04

## 2024-10-04 RX ORDER — PHENYLEPHRINE HCL IN 0.9% NACL 1 MG/10 ML
SYRINGE (ML) INTRAVENOUS
Status: DISCONTINUED | OUTPATIENT
Start: 2024-10-04 | End: 2024-10-04 | Stop reason: SDUPTHER

## 2024-10-04 RX ORDER — SODIUM CHLORIDE 9 MG/ML
INJECTION, SOLUTION INTRAVENOUS PRN
Status: DISCONTINUED | OUTPATIENT
Start: 2024-10-04 | End: 2024-10-07 | Stop reason: HOSPADM

## 2024-10-04 RX ORDER — ACETAMINOPHEN 500 MG
1000 TABLET ORAL EVERY 6 HOURS
Status: DISCONTINUED | OUTPATIENT
Start: 2024-10-04 | End: 2024-10-07 | Stop reason: HOSPADM

## 2024-10-04 RX ORDER — CITRIC ACID/SODIUM CITRATE 334-500MG
30 SOLUTION, ORAL ORAL ONCE
Status: COMPLETED | OUTPATIENT
Start: 2024-10-04 | End: 2024-10-04

## 2024-10-04 RX ORDER — SENNA AND DOCUSATE SODIUM 50; 8.6 MG/1; MG/1
1 TABLET, FILM COATED ORAL DAILY
Qty: 60 TABLET | Refills: 1 | Status: SHIPPED | OUTPATIENT
Start: 2024-10-04 | End: 2024-10-16

## 2024-10-04 RX ADMIN — BUPIVACAINE HYDROCHLORIDE IN DEXTROSE 2 ML: 7.5 INJECTION, SOLUTION SUBARACHNOID at 11:17

## 2024-10-04 RX ADMIN — KETOROLAC TROMETHAMINE 30 MG: 30 INJECTION, SOLUTION INTRAMUSCULAR; INTRAVENOUS at 12:20

## 2024-10-04 RX ADMIN — ACETAMINOPHEN 1000 MG: 500 TABLET ORAL at 10:32

## 2024-10-04 RX ADMIN — OXYCODONE 5 MG: 5 TABLET ORAL at 16:29

## 2024-10-04 RX ADMIN — SODIUM CITRATE AND CITRIC ACID MONOHYDRATE 30 ML: 500; 334 SOLUTION ORAL at 11:02

## 2024-10-04 RX ADMIN — ONDANSETRON 4 MG: 2 INJECTION INTRAMUSCULAR; INTRAVENOUS at 11:22

## 2024-10-04 RX ADMIN — Medication 200 MCG: at 11:21

## 2024-10-04 RX ADMIN — KETOROLAC TROMETHAMINE 30 MG: 30 INJECTION, SOLUTION INTRAMUSCULAR; INTRAVENOUS at 18:27

## 2024-10-04 RX ADMIN — ACETAMINOPHEN 1000 MG: 500 TABLET ORAL at 22:45

## 2024-10-04 RX ADMIN — OXYCODONE 5 MG: 5 TABLET ORAL at 15:19

## 2024-10-04 RX ADMIN — DOCUSATE SODIUM 100 MG: 100 CAPSULE, LIQUID FILLED ORAL at 21:29

## 2024-10-04 RX ADMIN — Medication 2000 MG: at 11:02

## 2024-10-04 RX ADMIN — PHENYLEPHRINE HYDROCHLORIDE 50 MCG/MIN: 10 INJECTION INTRAVENOUS at 11:18

## 2024-10-04 RX ADMIN — Medication 200 MCG: at 11:23

## 2024-10-04 RX ADMIN — SODIUM CHLORIDE, POTASSIUM CHLORIDE, SODIUM LACTATE AND CALCIUM CHLORIDE 1000 ML: 600; 310; 30; 20 INJECTION, SOLUTION INTRAVENOUS at 09:55

## 2024-10-04 RX ADMIN — Medication 909 ML/HR: at 11:49

## 2024-10-04 RX ADMIN — SODIUM CHLORIDE, POTASSIUM CHLORIDE, SODIUM LACTATE AND CALCIUM CHLORIDE: 600; 310; 30; 20 INJECTION, SOLUTION INTRAVENOUS at 15:22

## 2024-10-04 RX ADMIN — FAMOTIDINE 20 MG: 10 INJECTION, SOLUTION INTRAVENOUS at 10:22

## 2024-10-04 RX ADMIN — OXYCODONE 10 MG: 5 TABLET ORAL at 21:28

## 2024-10-04 RX ADMIN — ACETAMINOPHEN 1000 MG: 500 TABLET ORAL at 16:29

## 2024-10-04 RX ADMIN — ONDANSETRON 4 MG: 2 INJECTION INTRAMUSCULAR; INTRAVENOUS at 12:00

## 2024-10-04 RX ADMIN — SODIUM CHLORIDE, POTASSIUM CHLORIDE, SODIUM LACTATE AND CALCIUM CHLORIDE: 600; 310; 30; 20 INJECTION, SOLUTION INTRAVENOUS at 12:45

## 2024-10-04 RX ADMIN — TRANEXAMIC ACID 1000 MG: 10 INJECTION, SOLUTION INTRAVENOUS at 11:25

## 2024-10-04 RX ADMIN — ONDANSETRON 4 MG: 2 INJECTION INTRAMUSCULAR; INTRAVENOUS at 18:33

## 2024-10-04 RX ADMIN — SODIUM CHLORIDE, POTASSIUM CHLORIDE, SODIUM LACTATE AND CALCIUM CHLORIDE: 600; 310; 30; 20 INJECTION, SOLUTION INTRAVENOUS at 11:10

## 2024-10-04 RX ADMIN — MORPHINE SULFATE 0.2 MG: 1 INJECTION, SOLUTION EPIDURAL; INTRATHECAL; INTRAVENOUS at 11:17

## 2024-10-04 ASSESSMENT — PAIN DESCRIPTION - DESCRIPTORS
DESCRIPTORS: SORE
DESCRIPTORS: BURNING;DISCOMFORT
DESCRIPTORS: ACHING;CRAMPING;DISCOMFORT
DESCRIPTORS: CRAMPING
DESCRIPTORS: BURNING

## 2024-10-04 ASSESSMENT — PAIN SCALES - GENERAL
PAINLEVEL_OUTOF10: 6
PAINLEVEL_OUTOF10: 7
PAINLEVEL_OUTOF10: 6
PAINLEVEL_OUTOF10: 6
PAINLEVEL_OUTOF10: 0
PAINLEVEL_OUTOF10: 4

## 2024-10-04 ASSESSMENT — PAIN DESCRIPTION - LOCATION
LOCATION: ABDOMEN
LOCATION: ABDOMEN;INCISION
LOCATION: ABDOMEN

## 2024-10-04 ASSESSMENT — PAIN DESCRIPTION - ORIENTATION
ORIENTATION: LOWER
ORIENTATION: LOWER

## 2024-10-04 NOTE — CONSULTS
Breastfeeding packet given and reviewed, baby nursed well in recovery. Writer assisted placing baby in football hold and baby nursed a few minutes with audible swallows. Pt recently stopped nursing two year old at home as well. Has a pump from previous pregnancy, signed medical necessity form given to obtain new pump through wic.

## 2024-10-04 NOTE — ANESTHESIA PRE PROCEDURE
MOUTH SURGERY  2021    cyst removed inside jaw and tooth extracted    WISDOM TOOTH EXTRACTION         Social History:    Social History     Tobacco Use    Smoking status: Never    Smokeless tobacco: Never   Substance Use Topics    Alcohol use: Not Currently     Comment: social                                Counseling given: Not Answered      Vital Signs (Current):   Vitals:    10/04/24 0920 10/04/24 0921   BP:  130/87   Pulse:  93   Resp: 18    Temp: 98.3 °F (36.8 °C)    TempSrc: Oral    SpO2: 98%                                               BP Readings from Last 3 Encounters:   10/04/24 130/87   10/01/24 116/73   09/24/24 123/73       NPO Status:                                                                                 BMI:   Wt Readings from Last 3 Encounters:   10/01/24 85.3 kg (188 lb)   09/24/24 86.2 kg (190 lb)   09/17/24 84.8 kg (187 lb)     There is no height or weight on file to calculate BMI.    CBC:   Lab Results   Component Value Date/Time    WBC 8.4 10/04/2024 09:59 AM    RBC 4.15 10/04/2024 09:59 AM    HGB 12.5 10/04/2024 09:59 AM    HCT 35.7 10/04/2024 09:59 AM    MCV 86.0 10/04/2024 09:59 AM    RDW 13.5 10/04/2024 09:59 AM     10/04/2024 09:59 AM       CMP:   Lab Results   Component Value Date/Time     04/17/2023 08:12 AM    K 3.6 04/17/2023 08:12 AM     04/17/2023 08:12 AM    CO2 21 04/17/2023 08:12 AM    BUN 9 04/17/2023 08:12 AM    CREATININE 0.39 04/17/2023 08:12 AM    GFRAA >60 08/31/2022 08:58 AM    LABGLOM >60 04/17/2023 08:12 AM    GLUCOSE 134 07/18/2024 03:17 AM    GLUCOSE 92 04/17/2023 08:12 AM    CALCIUM 8.8 04/17/2023 08:12 AM    BILITOT 0.3 04/15/2023 11:15 PM    ALKPHOS 67 04/15/2023 11:15 PM    AST 18 04/15/2023 11:15 PM    ALT 12 04/15/2023 11:15 PM       POC Tests: No results for input(s): \"POCGLU\", \"POCNA\", \"POCK\", \"POCCL\", \"POCBUN\", \"POCHEMO\", \"POCHCT\" in the last 72 hours.    Coags:   Lab Results   Component Value Date/Time    PROTIME 13.3 09/15/2022

## 2024-10-04 NOTE — FLOWSHEET NOTE
Patient to labor and delivery ambulatory from home for rpt c/s FOB at side and supportive, writer oriented patient to room and surroundings.

## 2024-10-04 NOTE — CARE COORDINATION
ANTEPARTUM NOTE    Delivery by elective  section [O82]  39 weeks gestation of pregnancy [Z3A.39]    Janay was admitted to L&D on 10/4/2024 for scheduled repeat CS @ 39 0    OB GYN Provider: Dr Ovalle    Will meet with patient after delivery to verify name/address/phone/insurance and discuss discharge planning.     Anticipate DC home 2 nights after vaginal delivery or 4 nights after C/S delivery as long as hemodynamically stable.

## 2024-10-04 NOTE — ANESTHESIA PROCEDURE NOTES
Spinal Block    End time: 10/4/2024 11:17 AM  Reason for block: primary anesthetic  Staffing  Performed: resident/CRNA   Anesthesiologist: Favio Oates MD  Resident/CRNA: Rema Cordero APRN - BILLY  Performed by: Cathy Mejia APRN - CRNA  Authorized by: Favio Oates MD    Spinal Block  Patient position: sitting  Prep: ChloraPrep and site prepped and draped  Patient monitoring: continuous pulse ox and frequent blood pressure checks  Approach: midline  Location: L3/L4  Provider prep: mask and sterile gloves  Local infiltration: lidocaine  Needle  Needle type: Pencan   Needle gauge: 24 G  Needle length: 4 in  Assessment  Sensory level: T4  Swirl obtained: Yes  CSF: clear  Attempts: 1  Hemodynamics: stable  Preanesthetic Checklist  Completed: patient identified, IV checked, site marked, risks and benefits discussed, surgical/procedural consents, equipment checked, pre-op evaluation, timeout performed, anesthesia consent given, oxygen available, monitors applied/VS acknowledged, fire risk safety assessment completed and verbalized and blood product R/B/A discussed and consented

## 2024-10-04 NOTE — FLOWSHEET NOTE
Patient admitted to room 734 from L&D via bed.   Oriented to room and surroundings.  Plan of care reviewed.  Verbalized understanding.  Instructed on infant security and safe sleep practices.  Preventing falls education provided .The following handouts given: A New Beginning: Your Guide to Postpartum Care, Rounding, gs Security System,Babies Cry A lot, Safe Sleep, Security and Visitation Guidelines.   Call light placed within reach.

## 2024-10-04 NOTE — PROGRESS NOTES
POST OPERATIVE DAY # 1    Janay Solorio is a 34 y.o. female   This patient was seen and examined today. RLTCS on 10/4/24    Her pregnancy was complicated by:   Patient Active Problem List   Diagnosis    History of gestational diabetes    history of LGA: 9#12oz 2019    REPEAT LTC- section 2022    Missed     D&C 23    H/O:  x 2    Abnormal GTT (glucose tolerance test)    GBS (group B Streptococcus carrier), +RV culture, currently pregnant    39 weeks gestation of pregnancy    RLTCS 10/4/24 F Apg 8/9 Wt 8#6       Today she is doing well without any chief complaint. Her lochia is light. She denies chest pain, shortness of breath, headache, lightheadedness, blurred vision and peripheral edema. She is breast feeding and she denies any signs or symptoms of mastitis.  She is ambulating well. She is not yet voiding without difficulty. She currently denies S/S of postpartum depression. Flatus present.  Bowel movement absent. She is tolerating solids.    Vital Signs:  Vitals:    10/04/24 1801 10/04/24 2124 10/05/24 0052 10/05/24 0538   BP: 112/73 (!) 102/56 104/60 109/68   Pulse: 71 78 54 65   Resp: 16 16 17 16   Temp: 98.1 °F (36.7 °C) 98.1 °F (36.7 °C) 97.9 °F (36.6 °C) 98.2 °F (36.8 °C)   TempSrc: Oral Oral Oral Oral   SpO2: 99% 97%         Urine Input & Output last 24hrs:     Intake/Output Summary (Last 24 hours) at 10/5/2024 0623  Last data filed at 10/4/2024 2245  Gross per 24 hour   Intake 1135.18 ml   Output 2770 ml   Net -1634.82 ml       Physical Exam:  General:  no apparent distress, alert and cooperative  Neurologic:  alert, oriented, normal speech, no focal findings or movement disorder noted  Lungs:  No increased work of breathing, good air exchange, no cyanosis  Heart:  regular rate  Abdomen: abdomen soft, non-distended, appropriately tender  Fundus: appropriately tender, firm, below umbilicus  Extremities:  no calf tenderness, non edematous  Incision: Silver dressing in place,  +flatus, tolerating PO.  She is taking roxicodone and NSAIDS but states she is very sore in her pelvis and back.  She is breastfeeding and pain increases with nursing.  We discussed anticipated post operative discomfort and will change some meds around to help her get some rest as she also states she has not slept and is exhausted.    Vitals:    10/05/24 0052 10/05/24 0538 10/05/24 0900 10/05/24 1543   BP: 104/60 109/68 114/75 115/64   Pulse: 54 65 61 67   Resp: 17 16 16 16   Temp: 97.9 °F (36.6 °C) 98.2 °F (36.8 °C) 98.2 °F (36.8 °C) 98.4 °F (36.9 °C)   TempSrc: Oral Oral     SpO2:   96%      Recent Results (from the past 24 hour(s))   CBC    Collection Time: 10/05/24  7:09 AM   Result Value Ref Range    WBC 9.5 3.5 - 11.3 k/uL    RBC 3.82 (L) 3.95 - 5.11 m/uL    Hemoglobin 11.3 (L) 11.9 - 15.1 g/dL    Hematocrit 34.4 (L) 36.3 - 47.1 %    MCV 90.1 82.6 - 102.9 fL    MCH 29.6 25.2 - 33.5 pg    MCHC 32.8 28.4 - 34.8 g/dL    RDW 14.1 11.8 - 14.4 %    Platelets 143 138 - 453 k/uL    MPV 9.6 8.1 - 13.5 fL    NRBC Automated 0.0 0.0 per 100 WBC     POD#1 Repeat C/S, girl  Rh+  Continue current post op care.  Will add flexeril and Gabapentin.  Can have hydroxyzine for sleep if needed.  Hold Motrin at this time and will give dose of toradol.      Ayden Fine, DO

## 2024-10-04 NOTE — OP NOTE
[facility]  OBSTETRICAL  PHYSICIAN POST-OPERATIVE  NOTE:      Patient Name: Janay Solorio  Patient : 1990  [facility]  Admission Date/Time: 10/4/2024  9:03 AM  Primary Care Physician: Virginie Dominguez MD  MRN #: [id number]  [facility] #: [id number]        Date: 10/4/2024  Time: 12:19 PM        Pre-operative Diagnosis:   Janay Solorio is a 34 y.o. female at 39w0d      Term pregnancy, Single fetus, and Pregnancy complicated by: see problem list  Patient Active Problem List    Diagnosis Date Noted    GBS (group B Streptococcus carrier), +RV culture, currently pregnant 2024     Priority: High     Overview Note:     2024 treat in labor per protocol      H/O:  x 2 2024     Priority: High    REPEAT LTC- section 2022     Priority: High    history of LGA: 9#12oz 2021     Priority: High    History of gestational diabetes 09/10/2018     Priority: High     Overview Note:     Early 1 hour GTT ordered   If wnl repeat at 28 weeks      39 weeks gestation of pregnancy 10/04/2024    Abnormal GTT (glucose tolerance test) 2024     Overview Note:     3 hour and A1c ordered      D&C 2023    Missed  2023       IUP@ 39w0d  See Problem List  3.   Previous  x 2 for repeat      Post-operative Diagnosis:    Living  infant(s) and Female  Same as Pre-Op        Procedures:  1.  Section- repeat : Low Cervical, Transverse    2. Abdominal Delivery of a Live Born     female      Surgeon:  Stormy Gary, DO      Assistants:  Camryn Chavez M.D. PGY3     OR Staff:  Scrub Person First: Jacqueline Velazco; Rosibel Perez RN      Anesthesia:  spinal    Duramorph Utilized: Yes        Estimated blood loss:  see QBL ML    Fluids:     IV: 600 ml   Blood Products:  none   Cell Saver: No    Urine Output::  300 ml (clear)    Drains:   TYPE: Goins  Urinary Catheter 10/04/24 Goins  fascia with bovie cautery.  Fascial incision was made and extended transversely.  The fascia was  from the underlying rectus tissue superiorly and inferiorly.  The peritoneum was identified and entered superiorly.  The peritoneal incision was extended superiorly and inferiorly, care not to involve the bowel or the bladder.  The Charlotte SHRUTI retractor was placed inferiorly into the incision. The vesico-uterine reflection was developed and skeletonized off the lower uterine segment with blunt and sharp dissection. The Charlotte Shruti retractor was reapproximated.  A low transverse uterine incision was made and the uterine cavity was entered with extreme caution with the blunt end of the scalpel.  This incision was extended using digital dissection in a cephalad to caudad manner.  A live female infant was delivered without complications. The head was delivered through the incision and fundal pressure was used for the remaining body of the .  There was a nuchal cord. It was reduced with ease x 1 loop.  The  had bulb suction of the mouth and nares. There was a spontaneous cry.  The infant was vigorous  and there was delayed cord clamping of 1 minute. Please find the  Apgar scores and weight above in the delivery summary.  The umbilical cord was then clamped and cut, the infant was handed off to the awaiting neonatology team staff member attending the delivery. Then cord specimen and cord blood was collected and the placenta was delivered using gentle traction and fundal massage, (Spontaneously), it was intact and appeared normal. The uterus was delivered from the abdominal cavity. The uterus was cleared of all clots and debris and was firm and contracted. IV Pitocin was infusing. An outflow tract was confirmed. The uterine incision was closed with running locked sutures of 0 Vicryl, starting at each corner with figure of \"8's\" and moving to the midline.  Excellent hemostasis was observed. The uterus was

## 2024-10-05 PROBLEM — N85.A UTERINE SCAR FROM PREVIOUS CESAREAN DELIVERY: Status: ACTIVE | Noted: 2024-10-05

## 2024-10-05 PROBLEM — G89.18 POST-OP PAIN: Status: ACTIVE | Noted: 2024-10-05

## 2024-10-05 LAB
ERYTHROCYTE [DISTWIDTH] IN BLOOD BY AUTOMATED COUNT: 14.1 % (ref 11.8–14.4)
HCT VFR BLD AUTO: 34.4 % (ref 36.3–47.1)
HGB BLD-MCNC: 11.3 G/DL (ref 11.9–15.1)
MCH RBC QN AUTO: 29.6 PG (ref 25.2–33.5)
MCHC RBC AUTO-ENTMCNC: 32.8 G/DL (ref 28.4–34.8)
MCV RBC AUTO: 90.1 FL (ref 82.6–102.9)
NRBC BLD-RTO: 0 PER 100 WBC
PLATELET # BLD AUTO: 143 K/UL (ref 138–453)
PMV BLD AUTO: 9.6 FL (ref 8.1–13.5)
RBC # BLD AUTO: 3.82 M/UL (ref 3.95–5.11)
WBC OTHER # BLD: 9.5 K/UL (ref 3.5–11.3)

## 2024-10-05 PROCEDURE — 2580000003 HC RX 258

## 2024-10-05 PROCEDURE — 6370000000 HC RX 637 (ALT 250 FOR IP)

## 2024-10-05 PROCEDURE — 36415 COLL VENOUS BLD VENIPUNCTURE: CPT

## 2024-10-05 PROCEDURE — 85027 COMPLETE CBC AUTOMATED: CPT

## 2024-10-05 PROCEDURE — 6360000002 HC RX W HCPCS

## 2024-10-05 PROCEDURE — 1220000000 HC SEMI PRIVATE OB R&B

## 2024-10-05 PROCEDURE — 99024 POSTOP FOLLOW-UP VISIT: CPT | Performed by: OBSTETRICS & GYNECOLOGY

## 2024-10-05 RX ORDER — HYDROXYZINE HYDROCHLORIDE 25 MG/1
25 TABLET, FILM COATED ORAL 3 TIMES DAILY PRN
Status: DISCONTINUED | OUTPATIENT
Start: 2024-10-05 | End: 2024-10-07 | Stop reason: HOSPADM

## 2024-10-05 RX ORDER — SIMETHICONE 80 MG
80 TABLET,CHEWABLE ORAL EVERY 6 HOURS PRN
Status: DISCONTINUED | OUTPATIENT
Start: 2024-10-05 | End: 2024-10-07 | Stop reason: HOSPADM

## 2024-10-05 RX ORDER — KETOROLAC TROMETHAMINE 30 MG/ML
30 INJECTION, SOLUTION INTRAMUSCULAR; INTRAVENOUS ONCE
Status: COMPLETED | OUTPATIENT
Start: 2024-10-06 | End: 2024-10-06

## 2024-10-05 RX ORDER — GABAPENTIN 300 MG/1
300 CAPSULE ORAL 3 TIMES DAILY PRN
Status: DISCONTINUED | OUTPATIENT
Start: 2024-10-05 | End: 2024-10-07 | Stop reason: HOSPADM

## 2024-10-05 RX ORDER — LANOLIN ALCOHOL/MO/W.PET/CERES
3 CREAM (GRAM) TOPICAL NIGHTLY PRN
Status: DISCONTINUED | OUTPATIENT
Start: 2024-10-05 | End: 2024-10-07 | Stop reason: HOSPADM

## 2024-10-05 RX ORDER — IBUPROFEN 600 MG/1
600 TABLET, FILM COATED ORAL EVERY 6 HOURS
Status: DISCONTINUED | OUTPATIENT
Start: 2024-10-06 | End: 2024-10-07 | Stop reason: HOSPADM

## 2024-10-05 RX ORDER — CYCLOBENZAPRINE HCL 10 MG
10 TABLET ORAL 3 TIMES DAILY PRN
Status: DISCONTINUED | OUTPATIENT
Start: 2024-10-05 | End: 2024-10-07 | Stop reason: HOSPADM

## 2024-10-05 RX ADMIN — KETOROLAC TROMETHAMINE 30 MG: 30 INJECTION, SOLUTION INTRAMUSCULAR; INTRAVENOUS at 12:53

## 2024-10-05 RX ADMIN — OXYCODONE 10 MG: 5 TABLET ORAL at 05:31

## 2024-10-05 RX ADMIN — SODIUM CHLORIDE, PRESERVATIVE FREE 10 ML: 5 INJECTION INTRAVENOUS at 23:46

## 2024-10-05 RX ADMIN — OXYCODONE 10 MG: 5 TABLET ORAL at 01:45

## 2024-10-05 RX ADMIN — Medication 1 TABLET: at 09:44

## 2024-10-05 RX ADMIN — OXYCODONE 10 MG: 5 TABLET ORAL at 23:42

## 2024-10-05 RX ADMIN — KETOROLAC TROMETHAMINE 30 MG: 30 INJECTION, SOLUTION INTRAMUSCULAR; INTRAVENOUS at 07:03

## 2024-10-05 RX ADMIN — SIMETHICONE 80 MG: 80 TABLET, CHEWABLE ORAL at 20:45

## 2024-10-05 RX ADMIN — ACETAMINOPHEN 1000 MG: 500 TABLET ORAL at 05:31

## 2024-10-05 RX ADMIN — GABAPENTIN 300 MG: 300 CAPSULE ORAL at 21:16

## 2024-10-05 RX ADMIN — OXYCODONE 10 MG: 5 TABLET ORAL at 15:10

## 2024-10-05 RX ADMIN — IBUPROFEN 600 MG: 600 TABLET, FILM COATED ORAL at 18:56

## 2024-10-05 RX ADMIN — OXYCODONE 10 MG: 5 TABLET ORAL at 09:44

## 2024-10-05 RX ADMIN — DOCUSATE SODIUM 100 MG: 100 CAPSULE, LIQUID FILLED ORAL at 09:43

## 2024-10-05 RX ADMIN — SENNOSIDES AND DOCUSATE SODIUM 1 TABLET: 50; 8.6 TABLET ORAL at 09:44

## 2024-10-05 RX ADMIN — OXYCODONE 10 MG: 5 TABLET ORAL at 19:25

## 2024-10-05 RX ADMIN — ACETAMINOPHEN 1000 MG: 500 TABLET ORAL at 23:41

## 2024-10-05 RX ADMIN — ONDANSETRON 4 MG: 2 INJECTION INTRAMUSCULAR; INTRAVENOUS at 00:41

## 2024-10-05 RX ADMIN — SODIUM CHLORIDE, PRESERVATIVE FREE 10 ML: 5 INJECTION INTRAVENOUS at 12:54

## 2024-10-05 RX ADMIN — ACETAMINOPHEN 1000 MG: 500 TABLET ORAL at 11:31

## 2024-10-05 RX ADMIN — ANTACID TABLETS 500 MG: 500 TABLET, CHEWABLE ORAL at 13:36

## 2024-10-05 RX ADMIN — ONDANSETRON 4 MG: 4 TABLET, ORALLY DISINTEGRATING ORAL at 19:24

## 2024-10-05 RX ADMIN — SODIUM CHLORIDE, PRESERVATIVE FREE 10 ML: 5 INJECTION INTRAVENOUS at 11:32

## 2024-10-05 RX ADMIN — ACETAMINOPHEN 1000 MG: 500 TABLET ORAL at 17:39

## 2024-10-05 RX ADMIN — ONDANSETRON 4 MG: 2 INJECTION INTRAMUSCULAR; INTRAVENOUS at 07:17

## 2024-10-05 RX ADMIN — CYCLOBENZAPRINE 10 MG: 10 TABLET, FILM COATED ORAL at 23:42

## 2024-10-05 RX ADMIN — KETOROLAC TROMETHAMINE 30 MG: 30 INJECTION, SOLUTION INTRAMUSCULAR; INTRAVENOUS at 00:42

## 2024-10-05 ASSESSMENT — PAIN DESCRIPTION - LOCATION
LOCATION: ABDOMEN;INCISION
LOCATION: INCISION
LOCATION: ABDOMEN;INCISION
LOCATION: ABDOMEN
LOCATION: ABDOMEN;INCISION
LOCATION: ABDOMEN
LOCATION: INCISION
LOCATION: INCISION

## 2024-10-05 ASSESSMENT — PAIN SCALES - GENERAL
PAINLEVEL_OUTOF10: 6
PAINLEVEL_OUTOF10: 6
PAINLEVEL_OUTOF10: 7
PAINLEVEL_OUTOF10: 8
PAINLEVEL_OUTOF10: 5
PAINLEVEL_OUTOF10: 3
PAINLEVEL_OUTOF10: 5
PAINLEVEL_OUTOF10: 4
PAINLEVEL_OUTOF10: 5
PAINLEVEL_OUTOF10: 6
PAINLEVEL_OUTOF10: 7
PAINLEVEL_OUTOF10: 8
PAINLEVEL_OUTOF10: 4

## 2024-10-05 ASSESSMENT — PAIN DESCRIPTION - DESCRIPTORS
DESCRIPTORS: ACHING
DESCRIPTORS: DISCOMFORT
DESCRIPTORS: ACHING
DESCRIPTORS: SORE
DESCRIPTORS: SORE
DESCRIPTORS: STABBING;ACHING
DESCRIPTORS: SORE
DESCRIPTORS: ACHING
DESCRIPTORS: SHARP
DESCRIPTORS: SHARP;PRESSURE

## 2024-10-05 ASSESSMENT — PAIN DESCRIPTION - ORIENTATION
ORIENTATION: LOWER
ORIENTATION: LOWER

## 2024-10-05 NOTE — LACTATION NOTE
Mom reports the baby is nursing well and comfortably. Reviewed how to know baby is getting to the supply well. Reviewed feeding patterns. To call out for assistance as needed.

## 2024-10-05 NOTE — ANESTHESIA POSTPROCEDURE EVALUATION
Department of Anesthesiology  Postprocedure Note    Patient: Janay Solorio  MRN: [id number]  YOB: 1990  Date of evaluation: 10/5/2024    Procedure Summary       Date: 10/04/24 Room / Location: [address]    Anesthesia Start: 1110 Anesthesia Stop: 1238    Procedure:  SECTION Diagnosis:       Delivery by elective  section      (Delivery by elective  section [O82])    Surgeons: Stormy Gary DO Responsible Provider: Favio Oates MD    Anesthesia Type: spinal ASA Status: 2            Anesthesia Type: No value filed.    Kendrick Phase I: Kendrick Score: 9    Kendrick Phase II: Kendrick Score: 10    Anesthesia Post Evaluation    Patient location during evaluation: PACU  Patient participation: complete - patient participated  Level of consciousness: awake  Pain score: 1  Airway patency: patent  Nausea & Vomiting: no nausea and no vomiting  Cardiovascular status: blood pressure returned to baseline and hemodynamically stable  Respiratory status: acceptable  Hydration status: euvolemic  Pain management: adequate    No notable events documented.

## 2024-10-05 NOTE — CARE COORDINATION
Social Work     Sw reviewed medical record (current active problem list) and tox screens and found no current concerns.     Sw spoke with mom briefly to explain Sw role, inquire if any needs or concerns, and provide safe sleep education and discuss.  Mom denied any needs or questions and informs baby has a safe sleep environment (bass).     Mom denied any current s/s of anxiety or depression and is aware to reach out to OB if any s/s occur after dc.     Mom reports a really good support system and denied any current questions or needs.      Mom reports this is her 4th child (7, 5, 2) all excited for baby.       Mom states ped will be Dr. Childress/Elier.      Sw encouraged mom to reach out if any issues or concerns arise.

## 2024-10-05 NOTE — CARE COORDINATION
CASE MANAGEMENT POST-PARTUM TRANSITIONAL CARE PLAN    Delivery by elective  section [O82]  39 weeks gestation of pregnancy [Z3A.39]    OB Provider: Dr Ovalle    Writer met w/ Janay at her bedside to discuss DCP. She is S/P CS on 10/4/2024    Writer verified address/phone number correct on facesheet. She states she lives with her zina Baird and their 3 children. Janay denied barriers with transportation home, to doctor's appointments or with paying for medications upon discharge home.     [employer] insurance correct. Writer notified Janay she has 30 days from date of birth to add  to insurance policy by contacting [facility].  She verbalized understanding. Baby will also have dad's [facility] [place]. His name is Oli Gtz and his BD is 91    Infant name on BC: Nikkie Gtz.   Infant PCP Dr Childress/Shanon [facility].     DME: no  HOME CARE: no    Anticipated DC of mother and infant 2-4 days after CS delivery.    Readmission Risk              Risk of Unplanned Readmission:  5

## 2024-10-05 NOTE — PROGRESS NOTES
POST OPERATIVE DAY # 2    Janay Solorio is a 34 y.o. female   This patient was seen and examined today. RLTCS on 10/4/24    Her pregnancy was complicated by:   Patient Active Problem List   Diagnosis    History of gestational diabetes    history of LGA: 9#12oz 2019    REPEAT LTC- section 2022    Missed     D&C 23    H/O:  x 2    Abnormal GTT (glucose tolerance test)    GBS (group B Streptococcus carrier), +RV culture, currently pregnant    39 weeks gestation of pregnancy    RLTCS 10/4/24 F Apg 8/9 Wt 8#6    Post-op pain    Uterine scar from previous  delivery    Encounter for care or examination of mother immediately after delivery       Today she is doing well without any chief complaint. Her lochia is light. She denies chest pain, shortness of breath, headache, lightheadedness, blurred vision and peripheral edema. She is breast feeding and she denies any signs or symptoms of mastitis.  She is ambulating well. She is voiding without difficulty. She currently denies S/S of postpartum depression. Flatus present.  Bowel movement present. She is tolerating solids.    Vital Signs:  Vitals:    10/05/24 0052 10/05/24 0538 10/05/24 0900 10/05/24 1543   BP: 104/60 109/68 114/75 115/64   Pulse: 54 65 61 67   Resp: 17 16 16 16   Temp: 97.9 °F (36.6 °C) 98.2 °F (36.8 °C) 98.2 °F (36.8 °C) 98.4 °F (36.9 °C)   TempSrc: Oral Oral     SpO2:   96%          Urine Input & Output last 24hrs:     Intake/Output Summary (Last 24 hours) at 10/6/2024 0016  Last data filed at 10/5/2024 1259  Gross per 24 hour   Intake --   Output 2000 ml   Net -2000 ml       Physical Exam:  General:  no apparent distress, alert and cooperative  Neurologic:  alert, oriented, normal speech, no focal findings or movement disorder noted  Lungs:  No increased work of breathing or conversational dyspnea   Heart:  Regular rate  Abdomen: abdomen soft, non-distended, non-tender  Fundus: non-tender, firm, below  plan and orders as documented by the resident.  (GC Modifier)   Pt seen & examined. Pt without c/c. Pt plans discharge tomorrow. Home care / follow up care/ discharge instructions reviewed with pt.       Jayme Jameson Obstetrics & Gynecology  [facility]  [place] [address]  [phone]      Discharge Instructions for  Birth     During a  section (), an incision is made in the abdomen and uterus (womb) to deliver the baby. The normal hospital stay is 2-4 days.   Steps to Take   Home Care    For the first 1-2 weeks, ask for someone to help you at home.    Let people help you. Take frequent rest breaks.    For vaginal bleeding, use extra absorbent pads.    Keep the incision area clean and dry.    Ask your doctor about when it is safe to shower, bathe, or soak in water.    Avoid heavy lifting for six weeks.    Diet    After a  birth, you will start with a clear liquid diet. Examples include: Jell-o, broth, and ginger ale. If you tolerate that, you can slowly go back to your regular diet. Stay away from anything greasy or spicy right after surgery. These types of foods can upset your stomach. Drink lots of fluids to prevent constipation.   Physical Activity    Do not lift anything heavier than your baby.    When shifting positions, use a pillow to support the area where the incisions were made.    Get up slowly. This will help you to avoid feeling dizzy or light headed.    Try to move around each day. Light physical activity will help with your recovery.    Ask your doctor when you will be able to go back to work.    Do not drive unless your doctor has given you permission to do so. Do not drive if you are taking prescription pain medicine.    Ask your doctor when you will be able to resume sexual activity. If you have not done so already, talk to your doctor about birth control options.    Medications    Your doctor may recommend pain medicine to ease discomfort.   If you

## 2024-10-06 PROCEDURE — 1220000000 HC SEMI PRIVATE OB R&B

## 2024-10-06 PROCEDURE — 6370000000 HC RX 637 (ALT 250 FOR IP)

## 2024-10-06 PROCEDURE — 2580000003 HC RX 258

## 2024-10-06 PROCEDURE — 6360000002 HC RX W HCPCS

## 2024-10-06 RX ORDER — DOCUSATE SODIUM 100 MG/1
100 CAPSULE, LIQUID FILLED ORAL DAILY
Status: DISCONTINUED | OUTPATIENT
Start: 2024-10-06 | End: 2024-10-07 | Stop reason: HOSPADM

## 2024-10-06 RX ORDER — BUSPIRONE HYDROCHLORIDE 10 MG/1
10 TABLET ORAL 2 TIMES DAILY
Qty: 60 TABLET | Refills: 0 | Status: SHIPPED | OUTPATIENT
Start: 2024-10-06 | End: 2024-10-16

## 2024-10-06 RX ADMIN — IBUPROFEN 600 MG: 600 TABLET, FILM COATED ORAL at 08:40

## 2024-10-06 RX ADMIN — GABAPENTIN 300 MG: 300 CAPSULE ORAL at 16:38

## 2024-10-06 RX ADMIN — SIMETHICONE 80 MG: 80 TABLET, CHEWABLE ORAL at 14:47

## 2024-10-06 RX ADMIN — ACETAMINOPHEN 1000 MG: 500 TABLET ORAL at 07:46

## 2024-10-06 RX ADMIN — SODIUM CHLORIDE, PRESERVATIVE FREE 10 ML: 5 INJECTION INTRAVENOUS at 08:43

## 2024-10-06 RX ADMIN — Medication 1 TABLET: at 08:41

## 2024-10-06 RX ADMIN — GABAPENTIN 300 MG: 300 CAPSULE ORAL at 08:41

## 2024-10-06 RX ADMIN — DOCUSATE SODIUM 100 MG: 100 CAPSULE, LIQUID FILLED ORAL at 14:59

## 2024-10-06 RX ADMIN — OXYCODONE 5 MG: 5 TABLET ORAL at 08:38

## 2024-10-06 RX ADMIN — OXYCODONE 10 MG: 5 TABLET ORAL at 21:00

## 2024-10-06 RX ADMIN — OXYCODONE 10 MG: 5 TABLET ORAL at 03:52

## 2024-10-06 RX ADMIN — OXYCODONE 10 MG: 5 TABLET ORAL at 13:05

## 2024-10-06 RX ADMIN — OXYCODONE 10 MG: 5 TABLET ORAL at 17:07

## 2024-10-06 RX ADMIN — ACETAMINOPHEN 1000 MG: 500 TABLET ORAL at 20:15

## 2024-10-06 RX ADMIN — KETOROLAC TROMETHAMINE 30 MG: 30 INJECTION, SOLUTION INTRAMUSCULAR; INTRAVENOUS at 01:39

## 2024-10-06 RX ADMIN — IBUPROFEN 600 MG: 600 TABLET, FILM COATED ORAL at 21:01

## 2024-10-06 RX ADMIN — ACETAMINOPHEN 1000 MG: 500 TABLET ORAL at 14:19

## 2024-10-06 RX ADMIN — IBUPROFEN 600 MG: 600 TABLET, FILM COATED ORAL at 14:47

## 2024-10-06 ASSESSMENT — PAIN DESCRIPTION - LOCATION
LOCATION: INCISION
LOCATION: INCISION
LOCATION: ABDOMEN
LOCATION: ABDOMEN;INCISION
LOCATION: INCISION

## 2024-10-06 ASSESSMENT — PAIN SCALES - GENERAL
PAINLEVEL_OUTOF10: 6
PAINLEVEL_OUTOF10: 7
PAINLEVEL_OUTOF10: 5
PAINLEVEL_OUTOF10: 4
PAINLEVEL_OUTOF10: 7
PAINLEVEL_OUTOF10: 7
PAINLEVEL_OUTOF10: 4
PAINLEVEL_OUTOF10: 7
PAINLEVEL_OUTOF10: 7
PAINLEVEL_OUTOF10: 6
PAINLEVEL_OUTOF10: 5
PAINLEVEL_OUTOF10: 6

## 2024-10-06 ASSESSMENT — PAIN DESCRIPTION - DESCRIPTORS
DESCRIPTORS: BURNING
DESCRIPTORS: DISCOMFORT;BURNING
DESCRIPTORS: CRUSHING;ACHING
DESCRIPTORS: SORE;BURNING;DISCOMFORT
DESCRIPTORS: CRAMPING;DISCOMFORT;BURNING
DESCRIPTORS: CRUSHING;DISCOMFORT;CRAMPING
DESCRIPTORS: DISCOMFORT
DESCRIPTORS: ACHING

## 2024-10-06 ASSESSMENT — PAIN - FUNCTIONAL ASSESSMENT
PAIN_FUNCTIONAL_ASSESSMENT: ACTIVITIES ARE NOT PREVENTED

## 2024-10-06 ASSESSMENT — PAIN DESCRIPTION - ORIENTATION
ORIENTATION: MID

## 2024-10-06 ASSESSMENT — PAIN DESCRIPTION - PAIN TYPE: TYPE: SURGICAL PAIN

## 2024-10-06 ASSESSMENT — PAIN DESCRIPTION - FREQUENCY: FREQUENCY: INTERMITTENT

## 2024-10-06 NOTE — PROGRESS NOTES
POST OPERATIVE DAY # 3    Janay Solorio is a 34 y.o. female   This patient was seen and examined today.     Her pregnancy was complicated by:   Patient Active Problem List   Diagnosis    History of gestational diabetes    history of LGA: 9#12oz     REPEAT LTC- section 2022    Missed     D&C 23    H/O:  x 2    Abnormal GTT (glucose tolerance test)    GBS (group B Streptococcus carrier), +RV culture, currently pregnant    39 weeks gestation of pregnancy    RLTCS 10/4/24 F Apg  Wt 8#6    Post-op pain    Uterine scar from previous  delivery    Encounter for care or examination of mother immediately after delivery       Today she is doing well without any chief complaint. Her lochia is light. She denies chest pain, shortness of breath, and headache. She is breast feeding and she denies any signs or symptoms of mastitis.  She is ambulating well. She is voiding without difficulty. She currently denies S/S of postpartum depression. Flatus present.  Bowel movement present. She is tolerating solids.    Vital Signs:  /74   Pulse 64   Temp 98.2 °F (36.8 °C) (Axillary)   Resp 17   LMP 2024   SpO2 97%   Breastfeeding Unknown         Physical Exam:  General:  no apparent distress, alert, and cooperative  Neurologic:  alert, oriented, normal speech, no focal findings or movement disorder noted  Lungs:  No increased work of breathing  Heart:  regular rate    Abdomen: abdomen soft, non-distended, non-tender   Fundus: non-tender, normal size, firm, below umbilicus  Incision: Silver dressing in place and clean/dry  Extremities:  no calf tenderness, non edematous    Labs:  Lab Results   Component Value Date    WBC 6.2 10/07/2024    HGB 11.1 (L) 10/07/2024    HCT 35.3 (L) 10/07/2024    MCV 93.4 10/07/2024     10/07/2024       Assessment/Plan:  Janay Solorio is a  POD # 3 s/p RLTCS   - Doing well, VSS    - female infant in General Care Nursery   - Encourage

## 2024-10-06 NOTE — LACTATION NOTE
Pt called out, checked latch and observed baby on shallow, just tip of nipple. Swallows noted during feed and not painful per mother. Assisted with permission to remove baby from breast, reposition to achieve deeper latch in left football hold preferred by mother. Reviewed aiming nose to nipple and shape of nipple after feed at breast. Baby latched deeply, strong sustained suck and intermittent audible swallows. Taught mother breast compressions and encouraged her to keep baby actively feeding at the breast for longer than 5 minutes. Encouraged her to call out with any questions or concerns.

## 2024-10-06 NOTE — LACTATION NOTE
Pt states baby has been nursing well, 5-10 minutes, but almost hourly. She reports hearing swallows at breast, but is concerned latch may be shallow. Strongly encouraged pt to call out next feed to check latch and transfer of milk. Pt reports baby has 7% weight loss. Reviewed with pt per protocol, this is not an indication to supplement with expressed milk or formula. Encouraged pt to focus on frequent feedings, calling out to check deep latch, and reviewed adequate urine and stool in .

## 2024-10-07 VITALS
OXYGEN SATURATION: 99 % | DIASTOLIC BLOOD PRESSURE: 84 MMHG | HEART RATE: 70 BPM | SYSTOLIC BLOOD PRESSURE: 117 MMHG | TEMPERATURE: 97.8 F | RESPIRATION RATE: 19 BRPM

## 2024-10-07 LAB
ALBUMIN SERPL-MCNC: 2.9 G/DL (ref 3.5–5.2)
ALBUMIN/GLOB SERPL: 1 {RATIO} (ref 1–2.5)
ALP SERPL-CCNC: 102 U/L (ref 35–104)
ALT SERPL-CCNC: 16 U/L (ref 10–35)
ANION GAP SERPL CALCULATED.3IONS-SCNC: 9 MMOL/L (ref 9–16)
AST SERPL-CCNC: 23 U/L (ref 10–35)
BASOPHILS # BLD: <0.03 K/UL (ref 0–0.2)
BASOPHILS NFR BLD: 0 % (ref 0–2)
BILIRUB SERPL-MCNC: 0.2 MG/DL (ref 0–1.2)
BUN SERPL-MCNC: 9 MG/DL (ref 6–20)
CALCIUM SERPL-MCNC: 8.4 MG/DL (ref 8.6–10.4)
CHLORIDE SERPL-SCNC: 106 MMOL/L (ref 98–107)
CO2 SERPL-SCNC: 21 MMOL/L (ref 20–31)
CREAT SERPL-MCNC: 0.6 MG/DL (ref 0.5–0.9)
CREAT UR-MCNC: 54.5 MG/DL (ref 28–217)
EOSINOPHIL # BLD: 0.13 K/UL (ref 0–0.44)
EOSINOPHILS RELATIVE PERCENT: 2 % (ref 1–4)
ERYTHROCYTE [DISTWIDTH] IN BLOOD BY AUTOMATED COUNT: 13.9 % (ref 11.8–14.4)
GFR, ESTIMATED: >90 ML/MIN/1.73M2
GLUCOSE BLD-MCNC: 98 MG/DL (ref 65–105)
GLUCOSE SERPL-MCNC: 101 MG/DL (ref 74–99)
HCT VFR BLD AUTO: 35.3 % (ref 36.3–47.1)
HGB BLD-MCNC: 11.1 G/DL (ref 11.9–15.1)
IMM GRANULOCYTES # BLD AUTO: <0.03 K/UL (ref 0–0.3)
IMM GRANULOCYTES NFR BLD: 0 %
LYMPHOCYTES NFR BLD: 2.15 K/UL (ref 1.1–3.7)
LYMPHOCYTES RELATIVE PERCENT: 35 % (ref 24–43)
MCH RBC QN AUTO: 29.4 PG (ref 25.2–33.5)
MCHC RBC AUTO-ENTMCNC: 31.4 G/DL (ref 28.4–34.8)
MCV RBC AUTO: 93.4 FL (ref 82.6–102.9)
MONOCYTES NFR BLD: 0.47 K/UL (ref 0.1–1.2)
MONOCYTES NFR BLD: 8 % (ref 3–12)
NEUTROPHILS NFR BLD: 55 % (ref 36–65)
NEUTS SEG NFR BLD: 3.43 K/UL (ref 1.5–8.1)
NRBC BLD-RTO: 0 PER 100 WBC
PLATELET # BLD AUTO: 141 K/UL (ref 138–453)
PMV BLD AUTO: 9.8 FL (ref 8.1–13.5)
POTASSIUM SERPL-SCNC: 4 MMOL/L (ref 3.7–5.3)
PROT SERPL-MCNC: 5.4 G/DL (ref 6.6–8.7)
RBC # BLD AUTO: 3.78 M/UL (ref 3.95–5.11)
SODIUM SERPL-SCNC: 136 MMOL/L (ref 136–145)
TOTAL PROTEIN, URINE: 17 MG/DL
URINE TOTAL PROTEIN CREATININE RATIO: 0.3
WBC OTHER # BLD: 6.2 K/UL (ref 3.5–11.3)

## 2024-10-07 PROCEDURE — 85025 COMPLETE CBC W/AUTO DIFF WBC: CPT

## 2024-10-07 PROCEDURE — 80053 COMPREHEN METABOLIC PANEL: CPT

## 2024-10-07 PROCEDURE — 36415 COLL VENOUS BLD VENIPUNCTURE: CPT

## 2024-10-07 PROCEDURE — 82570 ASSAY OF URINE CREATININE: CPT

## 2024-10-07 PROCEDURE — 6370000000 HC RX 637 (ALT 250 FOR IP)

## 2024-10-07 PROCEDURE — 84156 ASSAY OF PROTEIN URINE: CPT

## 2024-10-07 PROCEDURE — 82947 ASSAY GLUCOSE BLOOD QUANT: CPT

## 2024-10-07 PROCEDURE — 2580000003 HC RX 258

## 2024-10-07 RX ORDER — GABAPENTIN 300 MG/1
300 CAPSULE ORAL 3 TIMES DAILY PRN
Qty: 21 CAPSULE | Refills: 0 | Status: SHIPPED | OUTPATIENT
Start: 2024-10-07 | End: 2024-10-14

## 2024-10-07 RX ORDER — CYCLOBENZAPRINE HCL 10 MG
10 TABLET ORAL 3 TIMES DAILY PRN
Qty: 21 TABLET | Refills: 0 | Status: SHIPPED | OUTPATIENT
Start: 2024-10-07 | End: 2024-10-14

## 2024-10-07 RX ADMIN — GABAPENTIN 300 MG: 300 CAPSULE ORAL at 11:40

## 2024-10-07 RX ADMIN — DOCUSATE SODIUM 100 MG: 100 CAPSULE, LIQUID FILLED ORAL at 07:50

## 2024-10-07 RX ADMIN — OXYCODONE 10 MG: 5 TABLET ORAL at 13:19

## 2024-10-07 RX ADMIN — OXYCODONE 10 MG: 5 TABLET ORAL at 07:49

## 2024-10-07 RX ADMIN — ACETAMINOPHEN 1000 MG: 500 TABLET ORAL at 07:48

## 2024-10-07 RX ADMIN — IBUPROFEN 600 MG: 600 TABLET, FILM COATED ORAL at 11:40

## 2024-10-07 RX ADMIN — ACETAMINOPHEN 1000 MG: 500 TABLET ORAL at 01:57

## 2024-10-07 RX ADMIN — ONDANSETRON 4 MG: 4 TABLET, ORALLY DISINTEGRATING ORAL at 07:50

## 2024-10-07 RX ADMIN — Medication 1 TABLET: at 07:50

## 2024-10-07 RX ADMIN — OXYCODONE 10 MG: 5 TABLET ORAL at 01:56

## 2024-10-07 RX ADMIN — GABAPENTIN 300 MG: 300 CAPSULE ORAL at 00:27

## 2024-10-07 RX ADMIN — IBUPROFEN 600 MG: 600 TABLET, FILM COATED ORAL at 04:06

## 2024-10-07 RX ADMIN — SODIUM CHLORIDE, PRESERVATIVE FREE 10 ML: 5 INJECTION INTRAVENOUS at 07:50

## 2024-10-07 ASSESSMENT — PAIN DESCRIPTION - LOCATION
LOCATION: INCISION;BACK
LOCATION: ABDOMEN;INCISION
LOCATION: INCISION;ABDOMEN;BACK
LOCATION: ABDOMEN;INCISION

## 2024-10-07 ASSESSMENT — PAIN DESCRIPTION - DESCRIPTORS
DESCRIPTORS: SORE;THROBBING
DESCRIPTORS: DISCOMFORT
DESCRIPTORS: SORE;DISCOMFORT

## 2024-10-07 ASSESSMENT — PAIN SCALES - GENERAL
PAINLEVEL_OUTOF10: 2
PAINLEVEL_OUTOF10: 6
PAINLEVEL_OUTOF10: 7
PAINLEVEL_OUTOF10: 7
PAINLEVEL_OUTOF10: 5

## 2024-10-07 ASSESSMENT — PAIN DESCRIPTION - ORIENTATION: ORIENTATION: LOWER

## 2024-10-07 NOTE — PROGRESS NOTES
Resident Interval Note    Discussed with patient diagnosis of preeclampsia without severe features based off of an elevated P/C ratio of 0.30.  Patient is still clear for discharge at this time, and blood pressures have remained normotensive.  Strict return precautions were reviewed with the patient, she was encouraged to continue at home blood pressure monitoring, and 3-day follow-up with her primary OB office for blood pressure check.  Patient voiced understanding at this time.    All discharge instructions covered with the patient including but not limited to:   Return precautions such as heavy vaginal bleeding, increased lower abdominal pain, foul-smelling discharge, fever, headache not relieved by OTC medications, visual changes, shortness of breath, chest pain and increased swelling swelling of the extremities.  All signs and symptoms of postpartum depression review.  Patient urged to contact clinic with significant mood changes, and report to emergency department for any SI/HI.  Contact clinic with any signs or symptoms of mastitis including breast pain, tenderness, redness or abnormal discharge.  Instructions to avoid any form of vaginal penetration including intercourse, douching, use of tampons or submersion in bodies of water for 6 weeks postpartum.  Patient instructed to avoid heavy lifting and limit weight bearing to <15lbs.  Patient instructed to limit use of narcotics, and safe use of narcotics were reviewed prior to discharge.    Joaquin Chavez MD  Obstetrics & Gynecology Resident, PGY-3  [place], [place]  10/7/2024 2:49 PM

## 2024-10-07 NOTE — PLAN OF CARE
Problem: Chronic Conditions and Co-morbidities  Goal: Patient's chronic conditions and co-morbidity symptoms are monitored and maintained or improved  10/5/2024 1547 by Chelsi Braga RN  Outcome: Progressing  10/5/2024 0603 by Laverne Cabrera RN  Outcome: Progressing     Problem: Pain  Goal: Verbalizes/displays adequate comfort level or baseline comfort level  10/5/2024 1547 by Chelsi Braga RN  Outcome: Progressing  10/5/2024 0603 by Laverne Cabrera RN  Outcome: Progressing     Problem: Infection - Adult  Goal: Absence of infection at discharge  10/5/2024 1547 by Chelsi Braga RN  Outcome: Progressing  10/5/2024 0603 by Laverne Cabrera RN  Outcome: Progressing  Goal: Absence of infection during hospitalization  10/5/2024 1547 by Chelsi Braga RN  Outcome: Progressing  10/5/2024 0603 by Laverne Cabrera RN  Outcome: Progressing  Goal: Absence of fever/infection during anticipated neutropenic period  10/5/2024 1547 by Chelsi Braga RN  Outcome: Progressing  10/5/2024 0603 by Laverne Cabrera RN  Outcome: Progressing     Problem: Safety - Adult  Goal: Free from fall injury  10/5/2024 1547 by Chelsi Braga RN  Outcome: Progressing  10/5/2024 0603 by Laverne Cabrera RN  Outcome: Progressing     Problem: Postpartum  Goal: Experiences normal postpartum course  Description:  Postpartum OB-Pregnancy care plan goal which identifies if the mother is experiencing a normal postpartum course  10/5/2024 1547 by Chelsi Braga RN  Outcome: Progressing  10/5/2024 0603 by Laverne Cabrera RN  Outcome: Progressing  Goal: Appropriate maternal -  bonding  Description:  Postpartum OB-Pregnancy care plan goal which identifies if the mother and  are bonding appropriately  10/5/2024 1547 by Chelsi Braga RN  Outcome: Progressing  10/5/2024 0603 by Laverne Cabrera RN  Outcome: Progressing  Goal: Establishment of infant feeding pattern  Description:  Postpartum OB-Pregnancy care plan goal which 
  Problem: Chronic Conditions and Co-morbidities  Goal: Patient's chronic conditions and co-morbidity symptoms are monitored and maintained or improved  Outcome: Progressing     Problem: Pain  Goal: Verbalizes/displays adequate comfort level or baseline comfort level  Outcome: Progressing     Problem: Infection - Adult  Goal: Absence of infection at discharge  Outcome: Progressing  Goal: Absence of infection during hospitalization  Outcome: Progressing  Goal: Absence of fever/infection during anticipated neutropenic period  Outcome: Progressing     Problem: Safety - Adult  Goal: Free from fall injury  Outcome: Progressing     Problem: Postpartum  Goal: Experiences normal postpartum course  Description:  Postpartum OB-Pregnancy care plan goal which identifies if the mother is experiencing a normal postpartum course  Outcome: Progressing  Goal: Appropriate maternal -  bonding  Description:  Postpartum OB-Pregnancy care plan goal which identifies if the mother and  are bonding appropriately  Outcome: Progressing  Goal: Establishment of infant feeding pattern  Description:  Postpartum OB-Pregnancy care plan goal which identifies if the mother is establishing a feeding pattern with their   Outcome: Progressing  Goal: Incisions, wounds, or drain sites healing without S/S of infection  Outcome: Progressing     Problem: Discharge Planning  Goal: Discharge to home or other facility with appropriate resources  Outcome: Progressing     
  Problem: Chronic Conditions and Co-morbidities  Goal: Patient's chronic conditions and co-morbidity symptoms are monitored and maintained or improved  Outcome: Progressing     Problem: Pain  Goal: Verbalizes/displays adequate comfort level or baseline comfort level  Outcome: Progressing     Problem: Infection - Adult  Goal: Absence of infection at discharge  Outcome: Progressing  Goal: Absence of infection during hospitalization  Outcome: Progressing  Goal: Absence of fever/infection during anticipated neutropenic period  Outcome: Progressing     Problem: Safety - Adult  Goal: Free from fall injury  Outcome: Progressing     Problem: Postpartum  Goal: Experiences normal postpartum course  Description:  Postpartum OB-Pregnancy care plan goal which identifies if the mother is experiencing a normal postpartum course  Outcome: Progressing  Goal: Appropriate maternal -  bonding  Description:  Postpartum OB-Pregnancy care plan goal which identifies if the mother and  are bonding appropriately  Outcome: Progressing  Goal: Establishment of infant feeding pattern  Description:  Postpartum OB-Pregnancy care plan goal which identifies if the mother is establishing a feeding pattern with their   Outcome: Progressing  Goal: Incisions, wounds, or drain sites healing without S/S of infection  Outcome: Progressing     Problem: Discharge Planning  Goal: Discharge to home or other facility with appropriate resources  Outcome: Progressing     
Discharge to home or other facility with appropriate resources  10/6/2024 0456 by Tresa Humphrey RN  Outcome: Progressing

## 2024-10-07 NOTE — PROGRESS NOTES
Obstetric/Gynecology Resident Interval Note      Writer at bedside to evaluate patient after complaints of dizziness. Patient reports she has been feeling dizzy and nauseous. Scop patch was removed. Zofran was given for nausea earlier and patient reports it helped a bit but she still feels dizzy. CBC this morning at 0243 showed Hgb of 11.1. Patient reports bleeding has been moderate.  Orthostatic vitals were normal.  POCT glucose within normal limits.  Patient reports she has not slept more than 3 hours in the past 24 hours.  Offered patient some Benadryl to help with sleep patient declined. Patient would like to take a nap. Will continue to monitor and reevaluate patient after nap.    Vitals:    10/07/24 0030 10/07/24 0746 10/07/24 0914 10/07/24 1138   BP: 113/74 116/83 117/80 117/84   Pulse: 64 74 64 70   Resp: 17 18  19   Temp: 98.2 °F (36.8 °C) 97.9 °F (36.6 °C)  97.8 °F (36.6 °C)   TempSrc: Axillary Oral  Oral   SpO2: 97% 98%  99%       Comfort Sapp MD  OB/GYN Resident, PGY1  [place], [place]  10/7/2024, 10:14 AM

## 2024-10-07 NOTE — PROGRESS NOTES
CLINICAL PHARMACY NOTE: MEDS TO BEDS    Total # of Prescriptions Filled: 8   The following medications were delivered to the patient:  Senexon-s  Ibuprofen  Oxycodone  Ondansetron  Acetaminophen  Buspirone  Gabapentin  cyclobenzaprine    Additional Documentation:    I have personally seen and examined this patient. I fully participated in the care of this patient. I have made amendments to the documentation where appropriate and otherwise agree with the history, physical exam, and plan as documented by the

## 2024-10-07 NOTE — PROGRESS NOTES
Obstetric/Gynecology Resident Interval Note    Patient's doing well after her nap. She has gotten up, showered, and walked the huston. She reports she feels fine and no longer complains of dizziness. She would like to be discharged home. Patient stable for discharge.    Vitals:    10/07/24 0030 10/07/24 0746 10/07/24 0914 10/07/24 1138   BP: 113/74 116/83 117/80 117/84   Pulse: 64 74 64 70   Resp: 17 18  19   Temp: 98.2 °F (36.8 °C) 97.9 °F (36.6 °C)  97.8 °F (36.6 °C)   TempSrc: Axillary Oral  Oral   SpO2: 97% 98%  99%     Attending updated and in agreement with plan.     Comfort Sapp MD  OB/GYN Resident, PGY1  [place], [place]  10/7/2024, 2:18 PM

## 2024-10-08 LAB — SURGICAL PATHOLOGY REPORT: NORMAL

## 2024-10-09 ENCOUNTER — OFFICE VISIT (OUTPATIENT)
Dept: OBGYN CLINIC | Age: 34
End: 2024-10-09
Payer: COMMERCIAL

## 2024-10-09 VITALS
DIASTOLIC BLOOD PRESSURE: 70 MMHG | WEIGHT: 177 LBS | SYSTOLIC BLOOD PRESSURE: 118 MMHG | BODY MASS INDEX: 29.49 KG/M2 | HEIGHT: 65 IN

## 2024-10-09 PROCEDURE — 99213 OFFICE O/P EST LOW 20 MIN: CPT | Performed by: NURSE PRACTITIONER

## 2024-10-09 NOTE — PROGRESS NOTES
Janay Solorio  10/9/2024  3:05 PM        Janay Solorio is a 34 y.o. female       The patient was seen. She has no chief complaints today. She delivered vaginally on 10/4/2024. Patient has concerns of red rash around incision bandage. Complaining of itching in this area.  Denies fever or drainage from incision. Dressing removed today.  She is  breast feeding and there is not any signs or symptoms of mastitis.  The patient completed the E.P.D.S. Evaluation form and scored NA- to complete at 2 weeks PP.  She does not have any signs or symptoms of post partum depression. She denies any suicidal thoughts with a plan, intent to harm others, and delusional ideas.   Today her lochia is light she denies any dizziness or shortness of breath.      Her pregnancy was complicated by:  Patient Active Problem List    Diagnosis Date Noted    GBS (group B Streptococcus carrier), +RV culture, currently pregnant 2024     Priority: High     Overview Note:     2024 treat in labor per protocol      H/O:  x 2 2024     Priority: High    REPEAT LTC- section 2022     Priority: High    history of LGA: 9#12oz 2021     Priority: High    History of gestational diabetes 09/10/2018     Priority: High     Overview Note:     Early 1 hour GTT ordered   If wnl repeat at 28 weeks      Preeclampsia without severe features 10/07/2024     Overview Note:     (G8) POD#3: Pt met criteria for gHTN, PreE labs wnl, P/C 0.3. Patient met criteria for Preeclampsia without severe features. BP overall stable       Post-op pain 10/05/2024    Uterine scar from previous  delivery 10/05/2024    Encounter for care or examination of mother immediately after delivery 10/05/2024    39 weeks gestation of pregnancy 10/04/2024    RLTCS 10/4/24 F Apg 8/9 Wt 8#6 10/04/2024    Abnormal GTT (glucose tolerance test) 2024     Overview Note:     3 hour and A1c ordered      D&C 2023

## 2024-10-09 NOTE — PROGRESS NOTES
Pt in office for BP check.  C/o rash around surgical dressing.  Jordyn JEWELL in to assess.  Silver dressing removed.  No redness, odor, or drainage noted.  Pt to monitor these s/sx and incision opening and call office/go to ER at [place].  Pt verbalized understanding.  Pt to return in 1 week for PP visit.

## 2024-10-14 PROBLEM — R73.09 ABNORMAL GTT (GLUCOSE TOLERANCE TEST): Status: RESOLVED | Noted: 2024-07-19 | Resolved: 2024-10-14

## 2024-10-14 PROBLEM — O99.820 GBS (GROUP B STREPTOCOCCUS CARRIER), +RV CULTURE, CURRENTLY PREGNANT: Status: RESOLVED | Noted: 2024-09-20 | Resolved: 2024-10-14

## 2024-10-16 ENCOUNTER — OFFICE VISIT (OUTPATIENT)
Dept: OBGYN CLINIC | Age: 34
End: 2024-10-16
Payer: COMMERCIAL

## 2024-10-16 VITALS
BODY MASS INDEX: 28.32 KG/M2 | WEIGHT: 170 LBS | DIASTOLIC BLOOD PRESSURE: 74 MMHG | HEIGHT: 65 IN | SYSTOLIC BLOOD PRESSURE: 108 MMHG

## 2024-10-16 PROBLEM — Z3A.39 39 WEEKS GESTATION OF PREGNANCY: Status: RESOLVED | Noted: 2024-10-04 | Resolved: 2024-10-16

## 2024-10-16 PROCEDURE — 99213 OFFICE O/P EST LOW 20 MIN: CPT | Performed by: NURSE PRACTITIONER

## 2024-10-16 PROCEDURE — G8484 FLU IMMUNIZE NO ADMIN: HCPCS | Performed by: NURSE PRACTITIONER

## 2024-10-16 PROCEDURE — 1036F TOBACCO NON-USER: CPT | Performed by: NURSE PRACTITIONER

## 2024-10-16 PROCEDURE — G8417 CALC BMI ABV UP PARAM F/U: HCPCS | Performed by: NURSE PRACTITIONER

## 2024-10-16 PROCEDURE — G8427 DOCREV CUR MEDS BY ELIG CLIN: HCPCS | Performed by: NURSE PRACTITIONER

## 2024-10-16 PROCEDURE — 1111F DSCHRG MED/CURRENT MED MERGE: CPT | Performed by: NURSE PRACTITIONER

## 2024-10-16 NOTE — PROGRESS NOTES
Janay WEBSTER Osmin  6:27 PM  10/16/24            The patient was seen. She has no chief complaints today. She delivered by  section on 10/4/2024. She is  breast feeding and there is not any signs or symptoms of mastitis.  The patient completed the E.P.D.S. Evaluation form and scored 8.  She does not have any signs or symptoms of post partum depression. She denies any suicidal thoughts with a plan, intent to harm others, and delusional ideas. Today her lochia is light she denies any dizziness or shortness of breath.      Her pregnancy was complicated by:   Patient Active Problem List    Diagnosis Date Noted    H/O:  x 2 2024     Priority: High    REPEAT LTC- section 2022     Priority: High    history of LGA: 9#12oz 2021     Priority: High    History of gestational diabetes 09/10/2018     Priority: High     Overview Note:     Early 1 hour GTT ordered   If wnl repeat at 28 weeks      Preeclampsia without severe features 10/07/2024     Overview Note:     (G8) POD#3: Pt met criteria for gHTN, PreE labs wnl, P/C 0.3. Patient met criteria for Preeclampsia without severe features. BP overall stable       Post-op pain 10/05/2024    Uterine scar from previous  delivery 10/05/2024    Encounter for care or examination of mother immediately after delivery 10/05/2024    39 weeks gestation of pregnancy 10/04/2024    RLTCS 10/4/24 F Apg 8/9 Wt 8#6 10/04/2024    D&C 2023    Missed  2023         She does admit to having good home support. Her bowels are regular and she denies any urinary tract symptomology.    OB History    Para Term  AB Living   8 4 4 0 4 4   SAB IAB Ectopic Molar Multiple Live Births   3 1 0 0 0 4           Blood pressure 108/74, height 1.651 m (5' 5\"), weight 77.1 kg (170 lb), currently breastfeeding.    Abdomen: Soft and non-tender; good bowel sounds; no guarding, rebound or rigidity; no CVA tenderness

## 2024-10-21 ENCOUNTER — OFFICE VISIT (OUTPATIENT)
Dept: OBGYN CLINIC | Age: 34
End: 2024-10-21
Payer: COMMERCIAL

## 2024-10-21 VITALS
BODY MASS INDEX: 28.16 KG/M2 | DIASTOLIC BLOOD PRESSURE: 70 MMHG | SYSTOLIC BLOOD PRESSURE: 108 MMHG | HEIGHT: 65 IN | WEIGHT: 169 LBS

## 2024-10-21 DIAGNOSIS — R30.0 DYSURIA: ICD-10-CM

## 2024-10-21 PROCEDURE — G8484 FLU IMMUNIZE NO ADMIN: HCPCS | Performed by: NURSE PRACTITIONER

## 2024-10-21 PROCEDURE — G8417 CALC BMI ABV UP PARAM F/U: HCPCS | Performed by: NURSE PRACTITIONER

## 2024-10-21 PROCEDURE — 99213 OFFICE O/P EST LOW 20 MIN: CPT | Performed by: NURSE PRACTITIONER

## 2024-10-21 PROCEDURE — 1111F DSCHRG MED/CURRENT MED MERGE: CPT | Performed by: NURSE PRACTITIONER

## 2024-10-21 PROCEDURE — G8427 DOCREV CUR MEDS BY ELIG CLIN: HCPCS | Performed by: NURSE PRACTITIONER

## 2024-10-21 PROCEDURE — 1036F TOBACCO NON-USER: CPT | Performed by: NURSE PRACTITIONER

## 2024-10-21 NOTE — PROGRESS NOTES
no guarding, rebound or rigidity; no CVA tenderness bilaterally.    Incision: Clean, Dry and Intact without signs or symptoms of infection. Knot noted on right side of incision- desires removal     Extremities: No calf tenderness bilaterally. DTR 2/4 bilaterally. No edema.      Assessment:   Diagnosis Orders   1. Postpartum care and examination        2. Dysuria  Urinalysis with Reflex to Culture        Chief Complaint   Patient presents with    Postpartum Care     EPDS Score of na        Plan:  1. Return to the office in  3-4 weeks  2. Signs & Symptoms of mastitis reviewed; notify if occurs  3. Secondary smoke risks reviewed. Increased risks of respiratory problems, Sudden     infant death syndrome, and potential malignancies.  4. Abstinence  5. Family planning counseling and STD counseling completed  6. Continue with post operative restrictions  7. No lifting or [place]  8. Increase hydration   Reviewed timed voided  Reviewed boost pumping to help increase supply  Instructed to go to ER if she feels like harming herself or others  Reviewed s/sx of preeclampsia       Patient was seen with total face to face time of 20 minutes. More than 50% of this visit was on counseling and education regarding her    Diagnosis Orders   1. Postpartum care and examination        2. Dysuria  Urinalysis with Reflex to Culture       and her options. She was also counseled on her preventative health maintenance recommendations and follow-up.

## 2024-11-13 ENCOUNTER — OFFICE VISIT (OUTPATIENT)
Dept: OBGYN CLINIC | Age: 34
End: 2024-11-13
Payer: COMMERCIAL

## 2024-11-13 VITALS
WEIGHT: 167 LBS | HEIGHT: 65 IN | SYSTOLIC BLOOD PRESSURE: 110 MMHG | BODY MASS INDEX: 27.82 KG/M2 | DIASTOLIC BLOOD PRESSURE: 62 MMHG

## 2024-11-13 DIAGNOSIS — N94.6 DYSMENORRHEA: ICD-10-CM

## 2024-11-13 PROBLEM — G89.18 POST-OP PAIN: Status: RESOLVED | Noted: 2024-10-05 | Resolved: 2024-11-13

## 2024-11-13 PROCEDURE — G8427 DOCREV CUR MEDS BY ELIG CLIN: HCPCS | Performed by: NURSE PRACTITIONER

## 2024-11-13 PROCEDURE — G8484 FLU IMMUNIZE NO ADMIN: HCPCS | Performed by: NURSE PRACTITIONER

## 2024-11-13 PROCEDURE — G8417 CALC BMI ABV UP PARAM F/U: HCPCS | Performed by: NURSE PRACTITIONER

## 2024-11-13 PROCEDURE — 99213 OFFICE O/P EST LOW 20 MIN: CPT | Performed by: NURSE PRACTITIONER

## 2024-11-13 PROCEDURE — 1036F TOBACCO NON-USER: CPT | Performed by: NURSE PRACTITIONER

## 2024-11-13 RX ORDER — IBUPROFEN 600 MG/1
600 TABLET, FILM COATED ORAL EVERY 6 HOURS PRN
COMMUNITY

## 2024-11-13 NOTE — PROGRESS NOTES
Situation  There is no Mood / Affect changes    Breast:  (Chest)  declines  Self breast exams were reviewed in detail. Literature was given.    Pelvic Exam:  Exam deferred.    Rectal Exam:  exam declined by patient          Assessment:  1. Routine postpartum follow-up    2. Dysmenorrhea         Family planning was discussed    Signs and symptoms of mastitis were reviewed. The patient did not have any.    Signs and symptoms of post partum depression were discussed the patient did not have any.    Tobacco abuse and secondary smoke risks to the mother and her  baby were reviewed. Sudden infant death syndrome was discussed. Cessation and proper protections discussed in detail.         Plan:  Return in about 2 weeks (around 2024) for IUD placement .   Antibiotics and decreased efficacy with birth control reviewed  Barrier recommendations and STD counseling completed  S/S mastitis reviewed  Information given on Mirena. Will call to schedule an appointment if she decides to have IUD placed.  To abstain 2 weeks prior to IUD placement. To complete quant HCG day prior IUD placement.      Patient was seen with total face to face time of 20 minutes. More than 50% of this visit was on counseling and education regarding her    Diagnosis Orders   1. Routine postpartum follow-up        2. Dysmenorrhea  HCG, Quantitative, Pregnancy       and her options. She was also counseled on her preventative health maintenance recommendations and follow-up.          Electronically signed by JANUARY Del Angel CNP on 24 at 12:15 PM EST

## 2024-11-20 ENCOUNTER — PATIENT MESSAGE (OUTPATIENT)
Dept: OBGYN CLINIC | Age: 34
End: 2024-11-20

## 2024-11-20 DIAGNOSIS — N93.9 VAGINAL BLEEDING: Primary | ICD-10-CM

## 2024-11-26 ENCOUNTER — HOSPITAL ENCOUNTER (OUTPATIENT)
Dept: WOMENS IMAGING | Age: 34
Discharge: HOME | End: 2024-11-28
Payer: COMMERCIAL

## 2024-11-26 DIAGNOSIS — N93.9 VAGINAL BLEEDING: ICD-10-CM

## 2024-11-26 PROCEDURE — 76856 US EXAM PELVIC COMPLETE: CPT

## 2024-12-17 NOTE — PROGRESS NOTES
Chief Complaint   Patient presents with    RECHECK     Cholesteatoma        Dixon Conte LPN     Procedure cancelled per Dr Danial Conde post ultrasound in preop.

## 2025-01-10 ENCOUNTER — OFFICE VISIT (OUTPATIENT)
Dept: FAMILY MEDICINE CLINIC | Age: 35
End: 2025-01-10
Payer: COMMERCIAL

## 2025-01-10 VITALS
SYSTOLIC BLOOD PRESSURE: 111 MMHG | DIASTOLIC BLOOD PRESSURE: 81 MMHG | OXYGEN SATURATION: 99 % | TEMPERATURE: 98.1 F | HEART RATE: 77 BPM

## 2025-01-10 DIAGNOSIS — J01.00 ACUTE NON-RECURRENT MAXILLARY SINUSITIS: Primary | ICD-10-CM

## 2025-01-10 PROCEDURE — G8417 CALC BMI ABV UP PARAM F/U: HCPCS | Performed by: FAMILY MEDICINE

## 2025-01-10 PROCEDURE — 99213 OFFICE O/P EST LOW 20 MIN: CPT | Performed by: FAMILY MEDICINE

## 2025-01-10 PROCEDURE — G8427 DOCREV CUR MEDS BY ELIG CLIN: HCPCS | Performed by: FAMILY MEDICINE

## 2025-01-10 PROCEDURE — 1036F TOBACCO NON-USER: CPT | Performed by: FAMILY MEDICINE

## 2025-01-10 RX ORDER — FLUTICASONE PROPIONATE 50 MCG
2 SPRAY, SUSPENSION (ML) NASAL DAILY
Qty: 1 EACH | Refills: 0 | Status: SHIPPED | OUTPATIENT
Start: 2025-01-10 | End: 2025-02-09

## 2025-01-10 ASSESSMENT — ENCOUNTER SYMPTOMS
COUGH: 1
SHORTNESS OF BREATH: 0
WHEEZING: 0
SORE THROAT: 1
SINUS PRESSURE: 1

## 2025-01-10 NOTE — PROGRESS NOTES
Marcy Diane MD  [facility] PHYSICIANS [facility] WALK-IN FAMILY MEDICINE  [address]  [address]  [place] [address]  Dept: [phone]    Janay Solorio is a 34 y.o. female who presents today for their medical conditions/complaints as noted below.  Janay Solorio is here today c/o Cold Symptoms (Onset since  with sore throat, nasal congestion and cough. Otc ibuprofen and is currently nursing. )       HPI:     HPI    Patient presents to the walk-in clinic for evaluation of sinusitis symptoms that began 10 days ago  Breast-feeding currently  Endorses congestion, pressure at the maxillary sinuses, postnasal drip, sore throat, productive cough  Denies fever, wheezing, dyspnea, ear pain, nausea, vomiting, diarrhea  No history of asthma/COPD  Using Motrin, Tylenol    Patient Active Problem List   Diagnosis    History of gestational diabetes    history of LGA: 9#12oz     REPEAT LTC- section 2022    Missed     D&C 23    H/O:  x 2    RLTCS 10/4/24 F Apg 8/9 Wt 8#6    Uterine scar from previous  delivery       Past Medical History:   Diagnosis Date    Adult BMI 25.0-25.9 kg/sq m 2016    Anemia 2018    Chronic ankle pain, bilateral 2022    Corneal rust ring 2020    Delivery with history of      Elevated fasting blood sugar 08/10/2020    Generalized anxiety disorder 10/06/2015    Gestational diabetes mellitus (GDM), antepartum 2017    Previous pregnancy     Gestational diabetes mellitus in pregnancy 2016    Iron deficiency anemia secondary to inadequate dietary iron intake 2022    PLTCS 19 M APG 8/9 Wt 9#12 2019    Previous LTC C/S wants repeat    Prolonged emergence from general anesthesia     once    PVC (premature ventricular contraction)     EKG      (spontaneous vaginal delivery) 2021    Under care of team 2023    PCP: Dr. Dominguez, [place]

## 2025-03-03 RX ORDER — SWAB
1 SWAB, NON-MEDICATED MISCELLANEOUS DAILY
Qty: 30 TABLET | Refills: 0 | Status: SHIPPED | OUTPATIENT
Start: 2025-03-03

## 2025-03-05 ENCOUNTER — OFFICE VISIT (OUTPATIENT)
Dept: FAMILY MEDICINE CLINIC | Age: 35
End: 2025-03-05
Payer: COMMERCIAL

## 2025-03-05 VITALS
TEMPERATURE: 97.6 F | OXYGEN SATURATION: 98 % | SYSTOLIC BLOOD PRESSURE: 120 MMHG | BODY MASS INDEX: 28.36 KG/M2 | HEART RATE: 77 BPM | DIASTOLIC BLOOD PRESSURE: 72 MMHG | HEIGHT: 65 IN | WEIGHT: 170.2 LBS

## 2025-03-05 DIAGNOSIS — M25.511 ACUTE PAIN OF RIGHT SHOULDER: Primary | ICD-10-CM

## 2025-03-05 PROBLEM — O02.1 MISSED ABORTION: Status: RESOLVED | Noted: 2023-04-17 | Resolved: 2025-03-05

## 2025-03-05 PROBLEM — Z98.890 STATUS POST D&C: Status: RESOLVED | Noted: 2023-12-05 | Resolved: 2025-03-05

## 2025-03-05 PROBLEM — Z98.891 S/P CESAREAN SECTION: Status: RESOLVED | Noted: 2022-04-01 | Resolved: 2025-03-05

## 2025-03-05 PROBLEM — R89.9 ABNORMAL LABORATORY TEST: Status: RESOLVED | Noted: 2021-09-22 | Resolved: 2025-03-05

## 2025-03-05 PROBLEM — N85.A UTERINE SCAR FROM PREVIOUS CESAREAN DELIVERY: Status: RESOLVED | Noted: 2024-10-05 | Resolved: 2025-03-05

## 2025-03-05 PROCEDURE — 99214 OFFICE O/P EST MOD 30 MIN: CPT | Performed by: FAMILY MEDICINE

## 2025-03-05 PROCEDURE — G8427 DOCREV CUR MEDS BY ELIG CLIN: HCPCS | Performed by: FAMILY MEDICINE

## 2025-03-05 PROCEDURE — G8417 CALC BMI ABV UP PARAM F/U: HCPCS | Performed by: FAMILY MEDICINE

## 2025-03-05 PROCEDURE — 1036F TOBACCO NON-USER: CPT | Performed by: FAMILY MEDICINE

## 2025-03-05 RX ORDER — METHYLPREDNISOLONE 4 MG/1
TABLET ORAL
Qty: 1 KIT | Refills: 0 | Status: SHIPPED | OUTPATIENT
Start: 2025-03-05 | End: 2025-03-11

## 2025-03-05 RX ORDER — BACLOFEN 10 MG/1
10 TABLET ORAL NIGHTLY
Qty: 15 TABLET | Refills: 0 | Status: SHIPPED | OUTPATIENT
Start: 2025-03-05

## 2025-03-05 SDOH — ECONOMIC STABILITY: FOOD INSECURITY: WITHIN THE PAST 12 MONTHS, YOU WORRIED THAT YOUR FOOD WOULD RUN OUT BEFORE YOU GOT MONEY TO BUY MORE.: NEVER TRUE

## 2025-03-05 SDOH — ECONOMIC STABILITY: FOOD INSECURITY: WITHIN THE PAST 12 MONTHS, THE FOOD YOU BOUGHT JUST DIDN'T LAST AND YOU DIDN'T HAVE MONEY TO GET MORE.: NEVER TRUE

## 2025-03-05 ASSESSMENT — ENCOUNTER SYMPTOMS
BACK PAIN: 0
SORE THROAT: 0
CHEST TIGHTNESS: 0
NAUSEA: 0
CONSTIPATION: 0
EYE REDNESS: 0
COUGH: 0
STRIDOR: 0
ABDOMINAL PAIN: 0
COLOR CHANGE: 0
SHORTNESS OF BREATH: 0
WHEEZING: 0
BLOOD IN STOOL: 0
DIARRHEA: 0
SINUS PRESSURE: 0
VOMITING: 0
TROUBLE SWALLOWING: 0
RECTAL PAIN: 0
RHINORRHEA: 0
ABDOMINAL DISTENTION: 0

## 2025-03-05 ASSESSMENT — PATIENT HEALTH QUESTIONNAIRE - PHQ9
SUM OF ALL RESPONSES TO PHQ QUESTIONS 1-9: 0
2. FEELING DOWN, DEPRESSED OR HOPELESS: NOT AT ALL
1. LITTLE INTEREST OR PLEASURE IN DOING THINGS: NOT AT ALL
SUM OF ALL RESPONSES TO PHQ QUESTIONS 1-9: 0

## 2025-03-05 NOTE — PROGRESS NOTES
Visit Information    Have you changed or started any medications since your last visit including any over-the-counter medicines, vitamins, or herbal medicines? yes -    Have you stopped taking any of your medications? Is so, why? -  yes -   Are you having any side effects from any of your medications? - no    Have you seen any other physician or provider since your last visit?  yes -    Have you had any other diagnostic tests since your last visit?  no   Have you been seen in the emergency room and/or had an admission in a hospital since we last saw you?  no   Have you had your routine dental cleaning in the past 6 months?  no     Do you have an active MyChart account? If no, what is the barrier?  Yes    Patient Care Team:  Virginie Dominguez MD as PCP - General (Family Medicine)  Virginie Dominguez MD as PCP - Empaneled Provider  Stormy Gary DO as Consulting Physician (Obstetrics & Gynecology)  Richard Cook MD as Obstetrician (Perinatology)  Jordyn Rosa APRN - CNP as Nurse Practitioner (Certified Nurse Practitioner)    Medical History Review  Past Medical, Family, and Social History reviewed and does contribute to the patient presenting condition    Health Maintenance   Topic Date Due    Depression Screen  Never done    Hepatitis A vaccine (2 of 2 - 2-dose series) 01/07/2009    Flu vaccine (1) 08/01/2024    COVID-19 Vaccine (3 - 2024-25 season) 09/01/2024    Cervical cancer screen  03/27/2025    DTaP/Tdap/Td vaccine (6 - Td or Tdap) 01/25/2032    Hepatitis B vaccine  Completed    Hepatitis C screen  Completed    HIV screen  Completed    Hib vaccine  Aged Out    HPV vaccine  Aged Out    Polio vaccine  Aged Out    Meningococcal (ACWY) vaccine  Aged Out    Pneumococcal 0-49 years Vaccine  Aged Out    A1C test (Diabetic or Prediabetic)  Discontinued    Varicella vaccine  Discontinued             
08/31/2022         Current Outpatient Medications   Medication Sig Dispense Refill    methylPREDNISolone (MEDROL DOSEPACK) 4 MG tablet Take by mouth. 1 kit 0    baclofen (LIORESAL) 10 MG tablet Take 1 tablet by mouth at bedtime 15 tablet 0    diclofenac sodium (VOLTAREN) 1 % GEL Apply 2 g topically 2 times daily 200 g 0    Prenatal Vit-Fe Fumarate-FA (PRENATAL VITAMIN) 28-0.8 MG TABS tablet Take 1 tablet by mouth once daily 30 tablet 0    ibuprofen (ADVIL;MOTRIN) 600 MG tablet Take 1 tablet by mouth every 6 hours as needed for Pain      acetaminophen (TYLENOL) 500 MG tablet Take 2 tablets by mouth every 6 hours as needed for Pain 120 tablet 1    Prenatal Vit-Fe Fumarate-FA (PRENATAL VITAMIN) 27-0.8 MG TABS Take 1 tablet by mouth daily      fluticasone (FLONASE) 50 MCG/ACT nasal spray 2 sprays by Each Nostril route daily 1 each 0     No current facility-administered medications for this visit.         Social History     Socioeconomic History    Marital status: Single     Spouse name: Not on file    Number of children: Not on file    Years of education: Not on file    Highest education level: Not on file   Occupational History    Not on file   Tobacco Use    Smoking status: Never     Passive exposure: Past    Smokeless tobacco: Never   Vaping Use    Vaping status: Never Used   Substance and Sexual Activity    Alcohol use: Not Currently     Comment: social    Drug use: Never    Sexual activity: Yes     Partners: Male   Other Topics Concern    Not on file   Social History Narrative    Not on file     Social Determinants of Health     Financial Resource Strain: Low Risk  (3/7/2023)    Overall Financial Resource Strain (CARDIA)     Difficulty of Paying Living Expenses: Not hard at all   Food Insecurity: No Food Insecurity (3/5/2025)    Hunger Vital Sign     Worried About Running Out of Food in the Last Year: Never true     Ran Out of Food in the Last Year: Never true   Transportation Needs: No Transportation Needs

## 2025-03-31 RX ORDER — SWAB
1 SWAB, NON-MEDICATED MISCELLANEOUS DAILY
Qty: 30 TABLET | Refills: 0 | Status: SHIPPED | OUTPATIENT
Start: 2025-03-31

## 2025-06-20 ENCOUNTER — HOSPITAL ENCOUNTER (OUTPATIENT)
Age: 35
Setting detail: SPECIMEN
Discharge: HOME | End: 2025-06-20

## 2025-06-20 ENCOUNTER — OFFICE VISIT (OUTPATIENT)
Dept: OBGYN CLINIC | Age: 35
End: 2025-06-20

## 2025-06-20 VITALS
DIASTOLIC BLOOD PRESSURE: 80 MMHG | BODY MASS INDEX: 26.49 KG/M2 | SYSTOLIC BLOOD PRESSURE: 110 MMHG | HEIGHT: 65 IN | WEIGHT: 159 LBS

## 2025-06-20 DIAGNOSIS — R10.2 PELVIC PAIN: ICD-10-CM

## 2025-06-20 DIAGNOSIS — N92.6 IRREGULAR MENSES: ICD-10-CM

## 2025-06-20 DIAGNOSIS — N39.3 SUI (STRESS URINARY INCONTINENCE, FEMALE): Primary | ICD-10-CM

## 2025-06-20 DIAGNOSIS — N93.0 BLEEDING AFTER INTERCOURSE: ICD-10-CM

## 2025-06-20 DIAGNOSIS — R32 URINARY INCONTINENCE, UNSPECIFIED TYPE: ICD-10-CM

## 2025-06-20 DIAGNOSIS — Z13.1 SCREENING FOR DIABETES MELLITUS: ICD-10-CM

## 2025-06-20 DIAGNOSIS — Z01.419 WELL FEMALE EXAM WITH ROUTINE GYNECOLOGICAL EXAM: Primary | ICD-10-CM

## 2025-06-20 DIAGNOSIS — Z11.51 SPECIAL SCREENING EXAMINATION FOR HUMAN PAPILLOMAVIRUS (HPV): ICD-10-CM

## 2025-06-20 LAB
BILIRUB UR QL STRIP: NEGATIVE
CANDIDA SPECIES: NEGATIVE
CLARITY UR: CLEAR
COLOR UR: YELLOW
COMMENT: NORMAL
GARDNERELLA VAGINALIS: NEGATIVE
GLUCOSE UR STRIP-MCNC: NEGATIVE MG/DL
HGB UR QL STRIP.AUTO: NEGATIVE
KETONES UR STRIP-MCNC: NEGATIVE MG/DL
LEUKOCYTE ESTERASE UR QL STRIP: NEGATIVE
NITRITE UR QL STRIP: NEGATIVE
PH UR STRIP: 7 [PH] (ref 5–8)
PROT UR STRIP-MCNC: NEGATIVE MG/DL
SOURCE: NORMAL
SP GR UR STRIP: 1.01 (ref 1–1.03)
TRICHOMONAS: NEGATIVE
UROBILINOGEN UR STRIP-ACNC: NORMAL EU/DL (ref 0–1)

## 2025-06-20 ASSESSMENT — PATIENT HEALTH QUESTIONNAIRE - PHQ9
SUM OF ALL RESPONSES TO PHQ QUESTIONS 1-9: 0
1. LITTLE INTEREST OR PLEASURE IN DOING THINGS: NOT AT ALL
2. FEELING DOWN, DEPRESSED OR HOPELESS: NOT AT ALL
SUM OF ALL RESPONSES TO PHQ QUESTIONS 1-9: 0

## 2025-06-20 NOTE — PROGRESS NOTES
History and Physical  [facility]  [facility] [address]  [place], [place]  [address] [phone]   Fax [phone]  Janay Solorio  2023              35 y.o.  Chief Complaint   Patient presents with    Annual Exam       Patient's last menstrual period was 2025 (exact date).             Primary Care Physician: Virginie Dominguez MD    The patient was seen and examined. She has no chief complaint today and is here for her annual exam.  Her bowels are regular. There are no voiding complaints. She denies any bloating.  She denies vaginal discharge and was counseled on STD's and the need for barrier contraception.     HPI : Janay Solorio is a 35 y.o. female     Annual exam  Complaining of spotting with intercourse but doesn't happen every time with intercourse.  Been with same partner x 15 years.    Patient reports will have intermittent spotting between her menstrual cycles. Spotting lasts for a couple of hours, happens 3 out of last 7 days.    Has had 3 periods since birth of her daughter 8  months ago. First period happened in March, lasting 4-5 days. Not too heavy. Periods occurring every 4-6 weeks.  Denies weight gain or facial hair growth. Reports has been losing weight, not been actively trying.  Pelvic pain on and off. Has random cramping, pain not associated with period.     Complaining of leaking urine. Will empty bladder and stand up and immediately leak  urine.       ________________________________________________________________________  OB History    Para Term  AB Living   8 4 4 0 4 4   SAB IAB Ectopic Molar Multiple Live Births   3 1 0 0 0 4      # Outcome Date GA Lbr Roberto/2nd Weight Sex Type Anes PTL Lv   8 Term 10/04/24 39w0d  3.81 kg (8 lb 6.4 oz) F CS-LTranv Spinal N JERICHO      Name: Girl Janay Solorio      Apgar1: 8  Apgar5: 9   7 SAB 2023           6 2023           5 Term 22 39w0d  3.459 kg (7 lb 10 oz) F CS-LTranv Spinal

## 2025-06-22 LAB
C TRACH DNA SPEC QL PROBE+SIG AMP: NEGATIVE
N GONORRHOEA DNA SPEC QL PROBE+SIG AMP: NEGATIVE
SPECIMEN DESCRIPTION: NORMAL

## 2025-06-23 ENCOUNTER — RESULTS FOLLOW-UP (OUTPATIENT)
Dept: OBGYN CLINIC | Age: 35
End: 2025-06-23

## 2025-06-26 ENCOUNTER — HOSPITAL ENCOUNTER (OUTPATIENT)
Age: 35
Setting detail: SPECIMEN
Discharge: HOME | End: 2025-06-26
Payer: COMMERCIAL

## 2025-06-26 DIAGNOSIS — N92.6 IRREGULAR MENSES: ICD-10-CM

## 2025-06-26 DIAGNOSIS — Z13.1 SCREENING FOR DIABETES MELLITUS: ICD-10-CM

## 2025-06-26 LAB
ALT SERPL-CCNC: 16 U/L (ref 10–35)
AST SERPL-CCNC: 19 U/L (ref 10–35)
B-HCG SERPL EIA 3RD IS-ACNC: <0.2 MIU/ML (ref 0–7)
BASOPHILS # BLD: <0.03 K/UL (ref 0–0.2)
BASOPHILS NFR BLD: 1 % (ref 0–2)
BUN SERPL-MCNC: 12 MG/DL (ref 6–20)
CREAT SERPL-MCNC: 0.5 MG/DL (ref 0.7–1.2)
EOSINOPHIL # BLD: 0.15 K/UL (ref 0–0.44)
EOSINOPHILS RELATIVE PERCENT: 4 % (ref 0–4)
ERYTHROCYTE [DISTWIDTH] IN BLOOD BY AUTOMATED COUNT: 12.7 % (ref 11.5–14.9)
EST. AVERAGE GLUCOSE BLD GHB EST-MCNC: 91 MG/DL
ESTRADIOL LEVEL: 71.9 PG/ML
FSH SERPL-ACNC: 6.3 MIU/ML
GFR, ESTIMATED: >90 ML/MIN/1.73M2
GLUCOSE P FAST SERPL-MCNC: 107 MG/DL (ref 74–99)
HBA1C MFR BLD: 4.8 % (ref 4–6)
HCT VFR BLD AUTO: 41.7 % (ref 36–46)
HGB BLD-MCNC: 14.4 G/DL (ref 12–16)
IMM GRANULOCYTES # BLD AUTO: <0.03 K/UL (ref 0–0.3)
IMM GRANULOCYTES NFR BLD: 0 %
INR PPP: 1
INSULIN COMMENT: NORMAL
INSULIN REFERENCE RANGE:: NORMAL
INSULIN: 5 MU/L
LH SERPL-ACNC: 5.9 MIU/ML (ref 1.7–8.6)
LYMPHOCYTES NFR BLD: 1.42 K/UL (ref 1.1–3.7)
LYMPHOCYTES RELATIVE PERCENT: 35 % (ref 24–44)
MCH RBC QN AUTO: 28.5 PG (ref 26–34)
MCHC RBC AUTO-ENTMCNC: 34.5 G/DL (ref 31–37)
MCV RBC AUTO: 82.6 FL (ref 80–100)
MONOCYTES NFR BLD: 0.38 K/UL (ref 0.1–1.2)
MONOCYTES NFR BLD: 9 % (ref 3–12)
NEUTROPHILS NFR BLD: 51 % (ref 36–66)
NEUTS SEG NFR BLD: 2.1 K/UL (ref 1.5–8.1)
NRBC BLD-RTO: 0 PER 100 WBC
PARTIAL THROMBOPLASTIN TIME: 31.2 SEC (ref 24–36)
PLATELET # BLD AUTO: 228 K/UL (ref 150–450)
PMV BLD AUTO: 9.4 FL (ref 8–13.5)
PROTHROMBIN TIME: 13.9 SEC (ref 11.8–14.6)
RBC # BLD AUTO: 5.05 M/UL (ref 3.95–5.11)
TSH SERPL DL<=0.05 MIU/L-ACNC: 1.4 UIU/ML (ref 0.27–4.2)
WBC OTHER # BLD: 4.1 K/UL (ref 3.5–11)

## 2025-06-26 PROCEDURE — 83002 ASSAY OF GONADOTROPIN (LH): CPT

## 2025-06-26 PROCEDURE — 82670 ASSAY OF TOTAL ESTRADIOL: CPT

## 2025-06-26 PROCEDURE — 85025 COMPLETE CBC W/AUTO DIFF WBC: CPT

## 2025-06-26 PROCEDURE — 82947 ASSAY GLUCOSE BLOOD QUANT: CPT

## 2025-06-26 PROCEDURE — 84443 ASSAY THYROID STIM HORMONE: CPT

## 2025-06-26 PROCEDURE — 84520 ASSAY OF UREA NITROGEN: CPT

## 2025-06-26 PROCEDURE — 85610 PROTHROMBIN TIME: CPT

## 2025-06-26 PROCEDURE — 83001 ASSAY OF GONADOTROPIN (FSH): CPT

## 2025-06-26 PROCEDURE — 84450 TRANSFERASE (AST) (SGOT): CPT

## 2025-06-26 PROCEDURE — 85730 THROMBOPLASTIN TIME PARTIAL: CPT

## 2025-06-26 PROCEDURE — 84460 ALANINE AMINO (ALT) (SGPT): CPT

## 2025-06-26 PROCEDURE — 83525 ASSAY OF INSULIN: CPT

## 2025-06-26 PROCEDURE — 82565 ASSAY OF CREATININE: CPT

## 2025-06-26 PROCEDURE — 84702 CHORIONIC GONADOTROPIN TEST: CPT

## 2025-06-26 PROCEDURE — 83036 HEMOGLOBIN GLYCOSYLATED A1C: CPT

## 2025-06-26 PROCEDURE — 36415 COLL VENOUS BLD VENIPUNCTURE: CPT

## 2025-06-27 LAB — CYTOLOGY REPORT: NORMAL

## (undated) DEVICE — STRAP,POSITIONING,KNEE/BODY,FOAM,4X60": Brand: MEDLINE

## (undated) DEVICE — SVMMC GYN MIN PK

## (undated) DEVICE — STERILE LATEX POWDER-FREE SURGICAL GLOVESWITH NITRILE COATING: Brand: PROTEXIS

## (undated) DEVICE — SUTURE VCRL + SZ 0 L27IN ABSRB UD L36MM CT-1 1/2 CIR VCPP41D

## (undated) DEVICE — ST CHARLES PERI-GYN: Brand: MEDLINE INDUSTRIES, INC.

## (undated) DEVICE — SURGICAL PROCEDURE PACK C SECT B

## (undated) DEVICE — SOLUTION IRRIG 1000ML STRL H2O USP PLAS POUR BTL

## (undated) DEVICE — GARMENT,MEDLINE,DVT,INT,CALF,MED, GEN2: Brand: MEDLINE

## (undated) DEVICE — SOLUTION IRRIG 1000ML 0.9% SOD CHL USP POUR PLAS BTL

## (undated) DEVICE — SYSTEM COLL W/ TISS TRAP INCLUDE COLL CANSTR LID SET OF

## (undated) DEVICE — GOWN,AURORA,NONREINFORCED,LARGE: Brand: MEDLINE

## (undated) DEVICE — MEDI-VAC YANK SUCT HNDL W/TPRD BULBOUS TIP & NON-CONDUCTIVE: Brand: CARDINAL HEALTH

## (undated) DEVICE — SUTURE ABSORBABLE BRAIDED 2-0 CT-1 27 IN UD VICRYL J259H

## (undated) DEVICE — UNDERPANTS MAT L XL SEAMLESS CLR CODE WAISTBAND KNIT

## (undated) DEVICE — GOWN,SIRUS,NONRNF,SETINSLV,XL,20/CS: Brand: MEDLINE

## (undated) DEVICE — DRAPE,UNDRBUT,WHT GRAD PCH,CAPPORT,20/CS: Brand: MEDLINE

## (undated) DEVICE — SYRINGE MED 10ML TRNSLUC BRL PLUNG BLK MRK POLYPR CTRL

## (undated) DEVICE — SOLUTION IRRIG 1500ML STRL H2O USP POUR PLAS BTL

## (undated) DEVICE — SET COLL TBNG L6FT DIA3/8IN W/ INTEGR SWVL HNDL SLIP RNG M

## (undated) DEVICE — COUNTER NDL 40 COUNT HLD 70 FOAM BLK ADH W/ MAG

## (undated) DEVICE — CURETTE VAC CRV 8 MM INDIVIDUALLY WRP STRL VACURET

## (undated) DEVICE — SYSTEM COLL CANSTR LID SEAL CAP W/O TISS TRAP FOR 3/8IN

## (undated) DEVICE — NEPTUNE E-SEP SMOKE EVACUATION PENCIL, COATED, 70MM BLADE, PUSH BUTTON SWITCH: Brand: NEPTUNE E-SEP

## (undated) DEVICE — NEEDLE SPINAL 22GA L3.5IN SPINOCAN

## (undated) DEVICE — SUTURE VICRYL SZ 4-0 L18IN ABSRB UD L19MM PS-2 3/8 CIR PRIM J496H

## (undated) DEVICE — DRAPE,REIN 53X77,STERILE: Brand: MEDLINE

## (undated) DEVICE — 1LYRTR 16FR10ML100%SIL UMS SNP: Brand: MEDLINE INDUSTRIES, INC.

## (undated) DEVICE — TOWEL SURG W16XL26IN WHT NONFENESTRATED ST 2 PER PK

## (undated) DEVICE — GLOVE ORANGE PI 7   MSG9070

## (undated) DEVICE — CATHETERIZATION KIT PEDIATRIC 16 FR 5 CC INDWL INF CTRL

## (undated) DEVICE — SURGICAL PROCEDURE PACK C SECT ST CHARLES SCMH

## (undated) DEVICE — SUTURE VCRL SZ 4-0 L27IN ABSRB UD L19MM FS-2 3/8 CIR REV J422H

## (undated) DEVICE — SUTURE VCRL SZ 0 L36IN ABSRB UD L36MM CT-1 1/2 CIR J946H

## (undated) DEVICE — PAD,SANITARY,11 IN,MAXI,W/WINGS,N-STRL: Brand: MEDLINE

## (undated) DEVICE — CHLORAPREP 26ML ORANGE

## (undated) DEVICE — GLOVE SURG SZ 7 THK118MIL BLK LTX FREE POLYISOPRENE BEAD CUF

## (undated) DEVICE — CURETTE VAC SZ 08MM CLR PLAS MTL HNDL CVD RIG

## (undated) DEVICE — TRAY SPNL 24GA L4IN PENCAN PNCL PNT NDL 0.75% BIPIVCAIN W/

## (undated) DEVICE — PREVENA INCISION MANAGEMENT SYSTEM- PEEL & PLACE DRESSING: Brand: PREVENA™ PEEL & PLACE™

## (undated) DEVICE — TRAP TISS DISP FOR COLL SYS BERK SAFETOUCH

## (undated) DEVICE — Device

## (undated) DEVICE — GLOVE SURG SZ 6 THK91MIL LTX FREE SYN POLYISOPRENE ANTI

## (undated) DEVICE — SUTURE VICRYL SZ 2-0 L36IN ABSRB VLT L36MM CT-1 1/2 CIR J345H

## (undated) DEVICE — SUTURE VICRYL SZ 0 L36IN ABSRB UD L36MM CT-1 1/2 CIR J946H

## (undated) DEVICE — TUBING, SUCTION, 9/32" X 20', STRAIGHT: Brand: MEDLINE INDUSTRIES, INC.

## (undated) DEVICE — KENDALL SCD EXPRESS SLEEVES, KNEE LENGTH, MEDIUM: Brand: KENDALL SCD

## (undated) DEVICE — SYRINGE,CONTROL,LL,FINGER,GRIP: Brand: MEDLINE INDUSTRIES, INC.

## (undated) DEVICE — 450 ML BOTTLE OF 0.05% CHLORHEXIDINE GLUCONATE IN 99.95% STERILE WATER FOR IRRIGATION, USP AND APPLICATOR.: Brand: IRRISEPT ANTIMICROBIAL WOUND LAVAGE

## (undated) DEVICE — COUNTER NDL 10 COUNT HLD 20 FOAM BLK SGL MAG